# Patient Record
Sex: MALE | Race: WHITE | NOT HISPANIC OR LATINO | Employment: OTHER | URBAN - METROPOLITAN AREA
[De-identification: names, ages, dates, MRNs, and addresses within clinical notes are randomized per-mention and may not be internally consistent; named-entity substitution may affect disease eponyms.]

---

## 2017-02-03 DIAGNOSIS — K21.9 GASTROESOPHAGEAL REFLUX DISEASE WITHOUT ESOPHAGITIS: ICD-10-CM

## 2017-02-03 DIAGNOSIS — E78.5 HYPERLIPIDEMIA LDL GOAL <70: Primary | ICD-10-CM

## 2017-02-03 RX ORDER — ATORVASTATIN CALCIUM 40 MG/1
TABLET, FILM COATED ORAL
Qty: 135 TABLET | Refills: 2 | Status: SHIPPED | OUTPATIENT
Start: 2017-02-03 | End: 2017-05-15

## 2017-02-03 RX ORDER — FAMOTIDINE 20 MG/1
TABLET, FILM COATED ORAL
Qty: 270 TABLET | Refills: 1 | Status: SHIPPED | OUTPATIENT
Start: 2017-02-03 | End: 2017-08-04

## 2017-02-03 NOTE — TELEPHONE ENCOUNTER
Atorvastatin 60 daily (1.5 tabs)     Last Written Prescription Date: 9/6/16  Last Fill Quantity: 90, # refills: 3  [place] [facility] [facility] or [facility] prescribing provider: 9/6/16       CHOL      132   9/6/2016  HDL       51   9/6/2016  LDL       64   9/6/2016  TRIG       85   9/6/2016  CHOLHDLRATIO      3.2   8/19/2015    Prescription approved per [facility] Refill Protocol.  Quantity increased to reflect directions.  Ariela Dunham RN    Famotidine 20 mg      Last Written Prescription Date: 9/6/16  Last Fill Quantity: 270,  # refills: 1   Last [place] [facility] [place] [facility] or [facility] prescribing provider: 9/6/16  Prescription approved per [facility] [place]ill Protocol.  Ariela Dunham RN    Patient is due for diabetes follow up appointment in March. Please assist with scheduling. Thanks.  Ariela Dunham RN

## 2017-02-06 NOTE — TELEPHONE ENCOUNTER
TC called patient and informed  Declined to schedule right now[place]  Will call back  Kalyn PALACIOS

## 2017-04-11 DIAGNOSIS — I10 ESSENTIAL HYPERTENSION, BENIGN: ICD-10-CM

## 2017-04-11 DIAGNOSIS — I10 ESSENTIAL HYPERTENSION WITH GOAL BLOOD PRESSURE LESS THAN 140/90: ICD-10-CM

## 2017-04-12 RX ORDER — LISINOPRIL 20 MG/1
TABLET ORAL
Qty: 180 TABLET | Refills: 1 | Status: SHIPPED | OUTPATIENT
Start: 2017-04-12 | End: 2017-05-15

## 2017-04-12 NOTE — TELEPHONE ENCOUNTER
Prescription approved per [facility] [place].  Sarah Begum RN   [facility] - Triage

## 2017-04-13 ENCOUNTER — TELEPHONE (OUTPATIENT)
Dept: UROLOGY | Facility: CLINIC | Age: 82
End: 2017-04-13

## 2017-04-13 NOTE — TELEPHONE ENCOUNTER
Dr Mejia,          Pt calling with gross hematuria and is demanding to be seen today. He had a bowel movement and passed a blood clot through his penis. Pt is post radiation for CaP. I have explained to him that you are not available. He states he will come to the office and wait to be seen by anyone. Again, explained that we only have Dr Guzman in the office today and he has zero openings. I asked is he was taking blood thinners, he stated no. I asked about ASA 81mg which he IS taking. I have recommended that he discontinue that as it is most likely contributing to the issue. I explained that we could make an appointment for him, but the chance of him being seen today is not good, especially if he is trying to catch a flight out to [place] tonight. I recommended that he go to the emergency room. He did not like this response. He wants to be seen in the clinic, today. I honestly do not see how we can accommodate him with this request. He is requesting that you call him at [phone].    VENTURA Mckeon

## 2017-05-15 ENCOUNTER — OFFICE VISIT (OUTPATIENT)
Dept: FAMILY MEDICINE | Facility: CLINIC | Age: 82
End: 2017-05-15
Payer: COMMERCIAL

## 2017-05-15 VITALS
HEART RATE: 65 BPM | WEIGHT: 199 LBS | OXYGEN SATURATION: 96 % | HEIGHT: 68 IN | SYSTOLIC BLOOD PRESSURE: 123 MMHG | BODY MASS INDEX: 30.16 KG/M2 | DIASTOLIC BLOOD PRESSURE: 73 MMHG | TEMPERATURE: 99.2 F

## 2017-05-15 DIAGNOSIS — E78.5 HYPERLIPIDEMIA LDL GOAL <70: ICD-10-CM

## 2017-05-15 DIAGNOSIS — I10 ESSENTIAL HYPERTENSION, BENIGN: ICD-10-CM

## 2017-05-15 DIAGNOSIS — E11.21 TYPE 2 DIABETES MELLITUS WITH DIABETIC NEPHROPATHY, WITHOUT LONG-TERM CURRENT USE OF INSULIN (H): ICD-10-CM

## 2017-05-15 DIAGNOSIS — M25.50 PAIN IN JOINT, MULTIPLE SITES: ICD-10-CM

## 2017-05-15 DIAGNOSIS — Z00.00 ROUTINE GENERAL MEDICAL EXAMINATION AT A HEALTH CARE FACILITY: Primary | ICD-10-CM

## 2017-05-15 DIAGNOSIS — J30.89 SEASONAL ALLERGIC RHINITIS DUE TO OTHER ALLERGIC TRIGGER: ICD-10-CM

## 2017-05-15 LAB
CRP SERPL-MCNC: 54 MG/L (ref 0–8)
ERYTHROCYTE [DISTWIDTH] IN BLOOD BY AUTOMATED COUNT: 13.9 % (ref 10–15)
HBA1C MFR BLD: 6.4 % (ref 4.3–6)
HCT VFR BLD AUTO: 44.1 % (ref 40–53)
HGB BLD-MCNC: 14.7 G/DL (ref 13.3–17.7)
MCH RBC QN AUTO: 29.6 PG (ref 26.5–33)
MCHC RBC AUTO-ENTMCNC: 33.3 G/DL (ref 31.5–36.5)
MCV RBC AUTO: 89 FL (ref 78–100)
PLATELET # BLD AUTO: 220 10E9/L (ref 150–450)
RBC # BLD AUTO: 4.97 10E12/L (ref 4.4–5.9)
WBC # BLD AUTO: 8.4 10E9/L (ref 4–11)

## 2017-05-15 PROCEDURE — 99212 OFFICE O/P EST SF 10 MIN: CPT | Mod: 25 | Performed by: FAMILY MEDICINE

## 2017-05-15 PROCEDURE — 86140 C-REACTIVE PROTEIN: CPT | Performed by: FAMILY MEDICINE

## 2017-05-15 PROCEDURE — 82043 UR ALBUMIN QUANTITATIVE: CPT | Performed by: FAMILY MEDICINE

## 2017-05-15 PROCEDURE — 99397 PER PM REEVAL EST PAT 65+ YR: CPT | Performed by: FAMILY MEDICINE

## 2017-05-15 PROCEDURE — 83036 HEMOGLOBIN GLYCOSYLATED A1C: CPT | Performed by: FAMILY MEDICINE

## 2017-05-15 PROCEDURE — 86038 ANTINUCLEAR ANTIBODIES: CPT | Performed by: FAMILY MEDICINE

## 2017-05-15 PROCEDURE — 84550 ASSAY OF BLOOD/URIC ACID: CPT | Performed by: FAMILY MEDICINE

## 2017-05-15 PROCEDURE — 36415 COLL VENOUS BLD VENIPUNCTURE: CPT | Performed by: FAMILY MEDICINE

## 2017-05-15 PROCEDURE — 85027 COMPLETE CBC AUTOMATED: CPT | Performed by: FAMILY MEDICINE

## 2017-05-15 PROCEDURE — 86431 RHEUMATOID FACTOR QUANT: CPT | Performed by: FAMILY MEDICINE

## 2017-05-15 RX ORDER — LISINOPRIL 20 MG/1
20 TABLET ORAL 2 TIMES DAILY
Qty: 180 TABLET | Refills: 1 | Status: SHIPPED | OUTPATIENT
Start: 2017-05-15 | End: 2017-05-15

## 2017-05-15 RX ORDER — METFORMIN HCL 500 MG
1000 TABLET, EXTENDED RELEASE 24 HR ORAL 2 TIMES DAILY WITH MEALS
Qty: 360 TABLET | Refills: 1 | Status: SHIPPED | OUTPATIENT
Start: 2017-05-15 | End: 2018-03-16

## 2017-05-15 RX ORDER — ATORVASTATIN CALCIUM 40 MG/1
40 TABLET, FILM COATED ORAL DAILY
Qty: 100 TABLET | Refills: 2 | Status: SHIPPED | OUTPATIENT
Start: 2017-05-15 | End: 2018-08-10

## 2017-05-15 RX ORDER — FLUTICASONE PROPIONATE 50 MCG
2 SPRAY, SUSPENSION (ML) NASAL DAILY
Qty: 48 G | Refills: 11 | Status: SHIPPED | OUTPATIENT
Start: 2017-05-15 | End: 2018-07-03

## 2017-05-15 RX ORDER — LISINOPRIL 20 MG/1
20 TABLET ORAL 2 TIMES DAILY
Qty: 180 TABLET | Refills: 1 | Status: SHIPPED | OUTPATIENT
Start: 2017-05-15 | End: 2018-03-21

## 2017-05-15 RX ORDER — ATORVASTATIN CALCIUM 40 MG/1
40 TABLET, FILM COATED ORAL DAILY
Qty: 100 TABLET | Refills: 2 | Status: SHIPPED | OUTPATIENT
Start: 2017-05-15 | End: 2017-05-15

## 2017-05-15 NOTE — MR AVS SNAPSHOT
After Visit Summary   5/15/2017    Jamil Bedolla Sr., MD    MRN: [id number]           Patient Information     Date Of Birth          1935        Visit Information        Provider Department      5/15/2017 10:00 AM Cassia Wolff MD [facility]        Today's Diagnoses     Routine general medical examination at a health care facility    -  1    Type 2 diabetes mellitus with diabetic nephropathy, without long-term current use of insulin (H)        Essential hypertension with goal blood pressure less than 140/90        Hyperlipidemia LDL goal <70        Pain in joint, multiple sites        Essential hypertension, benign          Care Instructions      Preventive Health Recommendations:       Male Ages 65 and over    Yearly exam:             See your health care provider every year in order to  o   Review health changes.   o   Discuss preventive care.    o   Review your medicines if your doctor has prescribed any.    Talk with your health care provider about whether you should have a test to screen for prostate cancer (PSA).    Every 3 years, have a diabetes test (fasting glucose). If you are at risk for diabetes, you should have this test more often.    Every 5 years, have a cholesterol test. Have this test more often if you are at risk for high cholesterol or heart disease.     Every 10 years, have a colonoscopy. Or, have a yearly FIT test (stool test). These exams will check for colon cancer.    Talk to with your health care provider about screening for Abdominal Aortic Aneurysm if you have a family history of AAA or have a history of smoking.  Shots:     Get a flu shot each year.     Get a tetanus shot every 10 years.     Talk to your doctor about your pneumonia vaccines. There are now two you should receive - Pneumovax (PPSV 23) and Prevnar (PCV 13).    Talk to your doctor about a shingles vaccine.     Talk to your doctor about the hepatitis B vaccine.  Nutrition:     Eat  at least 5 servings of fruits and vegetables each day.     Eat whole-grain bread, whole-wheat pasta and brown rice instead of white grains and rice.     Talk to your doctor about Calcium and Vitamin D.   Lifestyle    Exercise for at least 150 minutes a week (30 minutes a day, 5 days a week). This will help you control your weight and prevent disease.     Limit alcohol to one drink per day.     No smoking.     Wear sunscreen to prevent skin cancer.     See your dentist every six months for an exam and cleaning.     See your eye doctor every 1 to 2 years to screen for conditions such as glaucoma, macular degeneration and cataracts.        Follow-ups after your visit        Your next 10 appointments already scheduled     Jun 21, 2017 10:10 AM CDT   Cystoscopy with Wes Mejia MD,  [facility]    [address]  [address]  [facility] [address] [phone]              Who to contact     If you have questions or need follow up information about today's clinic visit or your schedule please contact [facility] [place] directly at [phone].  Normal or non-critical lab and imaging results will be communicated to you by Aplicahart, letter or phone within 4 business days after the clinic has received the results. If you do not hear from us within 7 days, please contact the clinic through Aplicahart or phone. If you have a critical or abnormal lab result, we will notify you by phone as soon as possible.  Submit refill requests through Picturk or call your pharmacy and they will forward the refill request to us. Please allow 3 business days for your refill to be completed.          Additional Information About Your Visit        AplicaharCreateTrips Information     Picturk gives you secure access to your electronic health record. If you see a primary care provider, you can also send messages to your care team and make appointments. If you have  "questions, please call your primary care clinic.  If you do not have a primary care provider, please call [phone] and they will assist you.        Care EveryWhere ID     This is your Care EveryWhere ID. This could be used by other organizations to access your [facility] medical records  [id number]        Your Vitals Were     Pulse Temperature Height Pulse Oximetry BMI (Body Mass Index)       65 99.2  F (37.3  C) (Tympanic) 5' 8\" (1.727 m) 96% 30.26 kg/m2        Blood Pressure from Last 3 Encounters:   05/15/17 123/73   12/16/16 142/80   09/27/16 152/77    Weight from Last 3 Encounters:   05/15/17 199 lb (90.3 kg)   09/27/16 194 lb (88 kg)   09/12/16 203 lb (92.1 kg)              We Performed the Following     Antinuclear antibody screen by EIA     CBC with platelets     CRP, inflammation     Hemoglobin A1c     Rheumatoid factor     Uric acid          Today's Medication Changes          These changes are accurate as of: 5/15/17 11:16 AM.  If you have any questions, ask your nurse or doctor.               Start taking these medicines.        Dose/Directions    atorvastatin 40 MG tablet   Commonly known as:  LIPITOR   Used for:  Hyperlipidemia LDL goal <70   Started by:  Cassia Wolff MD        Dose:  40 mg   Take 1 tablet (40 mg) by mouth daily   Quantity:  100 tablet   Refills:  2       lisinopril 20 MG tablet   Commonly known as:  PRINIVIL/ZESTRIL   Used for:  Essential hypertension, benign, Essential hypertension with goal blood pressure less than 140/90   Started by:  Cassia Wolff MD        Dose:  20 mg   Take 1 tablet (20 mg) by mouth 2 times daily   Quantity:  180 tablet   Refills:  1            Where to get your medicines      Some of these will need a paper prescription and others can be bought over the counter.  Ask your nurse if you have questions.     Bring a paper prescription for each of these medications     atorvastatin 40 MG tablet    lisinopril 20 MG tablet                Primary " Care Provider Office Phone # Fax #    Cassia Wolff [phone]       [place] [address] [place] [address] [address]  [place] [address]        Thank you!     [facility]  [facility]. Our goal is always to provide you with excellent care. Hearing back from our patients is one way we can continue to improve our services. Please take a few minutes to complete the written survey that you may receive in the mail after your visit with us. Thank you!             Your Updated Medication List - Protect others around you: Learn how to safely use, store and throw away your medicines at www.disposemymeds.org.          This list is accurate as of: 5/15/17 11:16 AM.  Always use your most recent med list.                   Brand Name Dispense Instructions for use    * Alcohol Swabs Pads     300 each    Use one swab 2-3 times per day       * B-D SINGLE USE SWABS REGULAR Pads     270 each    1 pad 3 times daily       atorvastatin 40 MG tablet    LIPITOR    100 tablet    Take 1 tablet (40 mg) by mouth daily       * blood glucose calibration solution     3 Bottle    Use to calibrate blood glucose monitor as needed as directed.       * blood glucose calibration solution     1 Bottle    by In Vitro route 3 times daily as needed Use to calibrate blood glucose monitor as needed as directed.       blood glucose monitoring lancets     3 Box    TESTING 3-4 TIMES PER DAY       * blood glucose monitoring test strip    TRUETEST    3 Box    Testing 3 times per day. One Touch Ultra blue       * blood glucose monitoring test strip    no brand specified    100 strip    OneTouch Ultra strips; use bid.       * blood glucose monitoring test strip    ONE TOUCH ULTRA    1 Box    Use to test blood sugar 3-4 times daily or as directed.       * ONE TOUCH ULTRA test strip   Generic drug:  blood glucose monitoring     300 each    USE TO TEST BLOOD SUGAR 3-4 TIMES DAILY OR AS DIRECTED        famotidine 20 MG tablet    PEPCID    270 tablet    TAKE 1 TABLET 2 TO 3 TIMES PER DAY       fluticasone 50 MCG/ACT spray    FLONASE    48 g    USE 1 TO 2 SPRAYS IN EACH NOSTRIL EVERY DAY       glucosamine-chondroitin 500-400 MG Caps per capsule      One TID       LAMISIL PO          latanoprost 0.005 % ophthalmic solution    XALATAN     1 drop At Bedtime.       lisinopril 20 MG tablet    PRINIVIL/ZESTRIL    180 tablet    Take 1 tablet (20 mg) by mouth 2 times daily       MELATONIN PO      Take 5 mg by mouth At Bedtime       metFORMIN 500 MG 24 hr tablet    GLUCOPHAGE-XR    360 tablet    TAKE 2 TABLETS TWICE DAILY WITH MEALS       Multi-vitamin Tabs tablet   Generic drug:  multivitamin, therapeutic with minerals      1 TABLET DAILY       * ONE TOUCH ULTRA (DEVICES) Misc     1mo    soft lancets-testing 2-3 times per day       * ONE TOUCH ULTRA (DEVICES) Misc     1 bottle    Control Solution-use as directed       * blood glucose monitoring meter device kit     1 kit    Use to test blood sugars 1- times daily or as directed.       order for DME     1 each    Equipment being ordered: Postivac       vardenafil 20 MG tablet    LEVITRA    30 tablet    Take 0.5-1 tablets (10-20 mg) by mouth daily as needed for erectile dysfunction Never use with nitroglycerin, terazosin or doxazosin.       * Notice:  This list has 11 medication(s) that are the same as other medications prescribed for you. Read the directions carefully, and ask your doctor or other care provider to review them with you.

## 2017-05-15 NOTE — NURSING NOTE
"Chief Complaint   Patient presents with     Physical       Initial /73  Pulse 65  Temp 99.2  F (37.3  C) (Tympanic)  Ht 5' 8\" (1.727 m)  Wt 199 lb (90.3 kg)  SpO2 96%  BMI 30.26 kg/m2 Estimated body mass index is 30.26 kg/(m^2) as calculated from the following:    Height as of this encounter: 5' 8\" (1.727 m).    Weight as of this encounter: 199 lb (90.3 kg).  Medication Reconciliation: complete  "

## 2017-05-15 NOTE — PROGRESS NOTES
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  click delete button to remove this line now  click delete button to remove this line now  Are you in the first 12 months of your Medicare Part B coverage?  No    Healthy Habits:    Do you get at least three servings of calcium containing foods daily (dairy, green leafy vegetables, etc.)? no, taking calcium and/or vitamin D supplement: no    Amount of exercise or daily activities, outside of work: 7 day(s) per week    Problems taking medications regularly No    Medication side effects: No    Have you had an eye exam in the past two years? yes    Do you see a dentist twice per year? yes    Do you have sleep apnea, excessive snoring or daytime drowsiness?no abnormal sleeping pattern     COGNITIVE SCREEN  1) Repeat 3 items (Banana, Sunrise, Chair)    2) Clock draw: NORMAL  3) 3 item recall: Recalls 3 objects  Results: 3 items recalled: COGNITIVE IMPAIRMENT LESS LIKELY    Mini-CogTM Copyright S Aishwarya. Licensed by the author for use in [employer]; reprinted with permission (caron@[facility]). All rights reserved.            Other concerns   Joint Pain     Onset: had on and off wrist and thumb joint pain in last month     Description:   Location: thumb and wrist  mostly both hands , looked swollen and red at some point so worried about gout , no FAM hx of though   Character: Dull ache    Intensity: mild    Progression of Symptoms: better    Accompanying Signs & Symptoms:  Other symptoms: none  Has hx of back problem and back had flared up couple of times but doing quiet well since PT and no recurrence at this time    History:   Previous similar pain: no       Precipitating factors:   Trauma or overuse: no     Alleviating factors:  Improved by: NSAID - helped        Therapies Tried and outcome: aleve       Diabetes  concerns       Patient is checking blood sugars:  rarely.  Results range from 118  to 130 and sometimes higher so just worried and wants to have labs . His micro album was also high previously, although it improved since on lisinopril, but  wants to  Have it checked again     Diabetic concerns: None and other - as above, but he is trying to eat better      Symptoms of hypoglycemia (low blood sugar): none     Paresthesias (numbness or burning in feet) or sores: No     Date of last diabetic eye exam: last week      Hyperlipidemia Follow-Up      Rate your low fat/cholesterol diet?: good    Taking statin?  Yes, no muscle aches from statin. He has lowered his dose back to 40 mg instead of 60, as per cardiology instructions. Doing well o current med's    Other lipid medications/supplements?:  none     Hypertension Follow-up      Outpatient blood pressures are not being checked at home.  Results are good mostly though     Low Salt Diet: no added salt. He needs refill on all med's          Reviewed and updated as needed this visit by clinical staff  Allergies  Meds         Reviewed and updated as needed this visit by Provider        Social History   Substance Use Topics     Smoking status: Never Smoker     Smokeless tobacco: Never Used     Alcohol use Yes      Comment: 10-14 drinks per week       The patient does not drink >3 drinks per day nor >7 drinks per week.    Today's PHQ-2 Score:   PHQ-2 ( 1999 Pfizer) 5/15/2017 9/6/2016   Q1: Little interest or pleasure in doing things 0 0   Q2: Feeling down, depressed or hopeless 0 0   PHQ-2 Score 0 0       Do you feel safe in your environment - Yes    Do you have a Health Care Directive?: No: Advance care planning was reviewed with patient; patient declined at this time.    Current providers sharing in care for this patient include:   Patient Care Team:  Cassia Wolff MD as PCP - General (Family Practice)      Hearing impairment: No    Ability to successfully perform activities of daily living: Yes, no assistance  needed     Fall risk:  Fallen 2 or more times in the past year?: No  Any fall with injury in the past year?: No    Home safety:  none identified  click delete button to remove this line now    The following health maintenance items are reviewed in Epic and correct as of today:  Health Maintenance   Topic Date Due     A1C Q6 MO( NO INBASKET)  03/06/2017     DEXA Q2 YR  08/19/2017     INFLUENZA VACCINE (SYSTEM ASSIGNED)  09/01/2017     FOOT EXAM Q1 YEAR( NO INBASKET)  09/06/2017     CMP Q1 YR (NO INBASKET)  09/06/2017     FALL RISK ASSESSMENT  09/06/2017     LIPID MONITORING Q1 YEAR( NO INBASKET)  09/06/2017     MICROALBUMIN Q1 YEAR( NO INBASKET)  09/06/2017     EYE EXAM Q1 YEAR( NO INBASKET)  10/19/2017     TSH W/ FREE T4 REFLEX Q2 YEAR (NO INBASKET)  09/06/2018     TSH Q2 YEAR (NO INBASKET)  09/06/2018     ADVANCE DIRECTIVE PLANNING Q5 YRS (NO INBASKET)  09/06/2021     TETANUS IMMUNIZATION (SYSTEM ASSIGNED)  06/27/2025     PNEUMOCOCCAL  Completed         Pneumonia Vaccine:Adults age 65+ who received their first dose of Pneumovax (PPSV23) prior to age 65 years: Should be given PCV 13 > 1 year after their most recent PPSV23 AND should be given a another dose of PPSV23 > 5 years after their most recent dose of PPSV23     ROS:  C: NEGATIVE for fever, chills, change in weight  I: NEGATIVE for worrisome rashes, moles or lesions  E: NEGATIVE for vision changes or irritation  E/M: NEGATIVE for ear, mouth and throat problems  ENT/MOUTH: allergy related sx on and off, also recovering for cold recently   R: NEGATIVE for significant cough or SOB  B: NEGATIVE for masses, tenderness or discharge  CV: NEGATIVE for chest pain, palpitations or peripheral edema  GI: NEGATIVE for nausea, abdominal pain, heartburn, or change in bowel habits  : NEGATIVE for frequency, dysuria, or hematuria  M: NEGATIVE for significant arthralgias or myalgia  MUSCULOSKELETAL:NEGATIVE for significant arthralgias or myalgia except back issue   N:  "NEGATIVE for weakness, dizziness or paresthesias  ENDOCRINE: as per HPI   H: NEGATIVE for bleeding problems  P: NEGATIVE for changes in mood or affect    Problem list, Medication list, Allergies, and Medical/Social/Surgical histories reviewed in [facility] and updated as appropriate.  OBJECTIVE:                                                            There were no vitals taken for this visit. Estimated body mass index is 29.5 kg/(m^2) as calculated from the following:    Height as of 9/27/16: 5' 8\" (1.727 m).    Weight as of 9/27/16: 194 lb (88 kg).  EXAM:   GENERAL: healthy, alert and no distress  EYES: Eyes grossly normal to inspection, PERRL and conjunctivae and sclerae normal  HENT: ear canals and TM's normal, nose and mouth without ulcers or lesions  NECK: no adenopathy, no asymmetry, masses, or scars and thyroid normal to palpation  RESP: lungs clear to auscultation - no rales, rhonchi or wheezes  CV: regular rate and rhythm, normal S1 S2, no S3 or S4,  no peripheral edema and peripheral pulses strong  ABDOMEN: soft, nontender, no hepatosplenomegaly, no masses and bowel sounds normal   ( male): pt declined, seeing urology   RECTAL (pt declined   MS: no gross musculoskeletal defects noted except has minimal tenderness on both wrist also left hand thumb, MCP joint but no redness or swelling at this time. , ROM  of joints is good, and aggravate minimal discomfort, no leg edema  SKIN: no suspicious lesions or rashes  NEURO: Normal strength and tone, mentation intact and speech normal  PSYCH: mentation appears normal, affect normal/bright  Foot exam : No lesion except small area  on onychomycosis on few finger nail , normal sensation by monofilament. Normal peripheral pulses        ASSESSMENT / PLAN:                                                            (Z00.00) Routine general medical examination at a health care facility  (primary encounter diagnosis)  Comment:   Plan:     (E11.21) Type 2 diabetes mellitus " "with diabetic nephropathy, without long-term current use of insulin (H)  Comment:   Plan: Hemoglobin A1c, metFORMIN (GLUCOPHAGE-XR) 500         MG 24 hr tablet, OFFICE/OUTPT VISIT,EST,LEVL IV        Albumin Random Urine Quantitative   Discussed cares, diet/ exercise . Talked about DM management , health risk etc. gave refill on med's. Check lab, call pt with results.. Follow up as needed      (E78.5) Hyperlipidemia LDL goal <70  Comment: stable   Plan: atorvastatin (LIPITOR) 40 MG tablet,         OFFICE/OUTPT VISIT,EST,LEVL IV, DISCONTINUED:         atorvastatin (LIPITOR) 40 MG tablet,         DISCONTINUED: atorvastatin (LIPITOR) 40 MG         tablet            (M25.50) Pain in joint, multiple sites  Comment: left wrist, thumbs  Plan: Uric acid, Rheumatoid factor, Antinuclear         antibody screen by EIA, CBC with platelets,         CRP, inflammation, OFFICE/OUTPT VISIT,EST,LEVL         IV        Discussed possible differential diagnosis for his  Symptoms. He is mostly worried about gout or some other inflammatory arthritis , so wish to proceed with labs. Cares and symptomatic treatment discussed follow up if problem       (I10) Essential hypertension, benign  Comment:   Plan: lisinopril (PRINIVIL/ZESTRIL) 20 MG tablet,                  (J30.89) Seasonal allergic rhinitis due to other allergic trigger  Comment: stable   Plan: fluticasone (FLONASE) 50 MCG/ACT spray         End of Life Planning:  Patient currently has an advanced directive: Yes.  Practitioner is supportive of decision.    COUNSELING:  Reviewed preventive health counseling, as reflected in patient instructions       Regular exercise       Healthy diet/nutrition       Vision screening        Estimated body mass index is 29.5 kg/(m^2) as calculated from the following:    Height as of 9/27/16: 5' 8\" (1.727 m).    Weight as of 9/27/16: 194 lb (88 kg).     reports that he has never smoked. He has never used smokeless tobacco.        HCM issues discussed. " Diet/ exercise discussed. Check labs , call patiens with results. follow up as needed.       Appropriate preventive services were discussed with this patient, including applicable screening as appropriate for cardiovascular disease, diabetes, osteopenia/osteoporosis, and glaucoma.  As appropriate for age/gender, discussed screening for colorectal cancer, prostate cancer, breast cancer, and cervical cancer. Checklist reviewing preventive services available has been given to the patient.    Reviewed patients plan of care and provided an AVS. The Basic Care Plan (routine screening as documented in Health Maintenance) for Jamil meets the Care Plan requirement. This Care Plan has been established and reviewed with the Patient.    Counseling Resources:  ATP IV Guidelines  Pooled Cohorts Equation Calculator  Breast Cancer Risk Calculator  FRAX Risk Assessment  ICSI Preventive Guidelines  Dietary Guidelines for Americans, 2010  USDA's MyPlate  ASA Prophylaxis  Lung CA Screening    Cassia Wolff MD  [facility]

## 2017-05-16 LAB
ANA SER QL IA: NORMAL
CREAT UR-MCNC: 169 MG/DL
MICROALBUMIN UR-MCNC: 44 MG/L
MICROALBUMIN/CREAT UR: 26.04 MG/G CR (ref 0–17)
RHEUMATOID FACT SER NEPH-ACNC: <20 IU/ML (ref 0–20)
URATE SERPL-MCNC: 6.4 MG/DL (ref 3.5–7.2)

## 2017-06-20 DIAGNOSIS — C61 PROSTATE CANCER (H): Primary | ICD-10-CM

## 2017-06-21 ENCOUNTER — OFFICE VISIT (OUTPATIENT)
Dept: UROLOGY | Facility: CLINIC | Age: 82
End: 2017-06-21
Payer: COMMERCIAL

## 2017-06-21 VITALS
BODY MASS INDEX: 29.52 KG/M2 | WEIGHT: 194.8 LBS | HEIGHT: 68 IN | SYSTOLIC BLOOD PRESSURE: 128 MMHG | DIASTOLIC BLOOD PRESSURE: 70 MMHG | HEART RATE: 62 BPM

## 2017-06-21 DIAGNOSIS — C61 PROSTATE CANCER (H): ICD-10-CM

## 2017-06-21 LAB — PSA SERPL-MCNC: 0.09 NG/ML (ref 0–4)

## 2017-06-21 PROCEDURE — 84153 ASSAY OF PSA TOTAL: CPT | Performed by: UROLOGY

## 2017-06-21 PROCEDURE — 99212 OFFICE O/P EST SF 10 MIN: CPT | Performed by: UROLOGY

## 2017-06-21 PROCEDURE — 36415 COLL VENOUS BLD VENIPUNCTURE: CPT | Performed by: UROLOGY

## 2017-06-21 NOTE — MR AVS SNAPSHOT
After Visit Summary   6/21/2017    Jamil Bedolla Sr., MD    MRN: [id number]           Patient Information     Date Of Birth          1935        Visit Information        Provider Department      6/21/2017 10:00 AM Wes Mejia MD; [facility] [place]        Today's Diagnoses     Prostate cancer-[place] 4, treated with radiation           Follow-ups after your visit        Your next 10 appointments already scheduled     Sep 21, 2017 10:15 AM CDT   Return Visit with Mikal Chung MD   [facility]YSICIANS [facility] AT [place] ([facility])    [address]  [facility] [address] [phone]            Dec 06, 2017 10:20 AM CST   (Arrive by 10:00 AM)   Return Visit with Wes Mejia MD   [facility] ([facility] [place])    [address]  [address]  [facility] [place] [id number] [phone]              Who to contact     If you have questions or need follow up information about today's clinic visit or your schedule please contact [facility] [place] directly at [phone].  Normal or non-critical lab and imaging results will be communicated to you by MyChart, letter or phone within 4 business days after the clinic has received the results. If you do not hear from us within 7 days, please contact the clinic through MyChart or phone. If you have a critical or abnormal lab result, we will notify you by phone as soon as possible.  Submit refill requests through [employer] or call your pharmacy and they will forward the refill request to us. Please allow 3 business days for your refill to be completed.          Additional Information About Your Visit        MyChart Information     [employer] gives you secure access to your electronic health record. If you see a primary care provider, you can also send messages to your care team and make  "appointments. If you have questions, please call your primary care clinic.  If you do not have a primary care provider, please call [phone] and they will assist you.        Care EveryWhere ID     This is your Care EveryWhere ID. This could be used by other organizations to access your [place] medical records  [id number]        Your Vitals Were     Pulse Height BMI (Body Mass Index)             62 1.727 m (5' 8\") 29.62 kg/m2          Blood Pressure from Last 3 Encounters:   06/21/17 128/70   05/15/17 123/73   12/16/16 142/80    Weight from Last 3 Encounters:   06/21/17 88.4 kg (194 lb 12.8 oz)   05/15/17 90.3 kg (199 lb)   09/27/16 88 kg (194 lb)              We Performed the Following     PSA Diag Urologic Phys [[id number]]        Primary Care Provider Office Phone # Fax #    Cassia Baltazar Wolff [phone]       [place] LETY DELGADO [address] [place] [address] [address]  [place] [address]        Equal Access to Services     [facility] : Hadii aad ku hadasho Sodane, waaxda luqadaha, qaybta kaalmada brea, gian mclean . So [facility] [phone].    ATENCIÓN: Si habla español, tiene a wray disposición servicios gratuitos de asistencia lingüística. [facility] [phone].    We comply with applicable federal civil rights laws and [place] laws. We do not discriminate on the basis of race, color, national origin, age, disability sex, sexual orientation or gender identity.            Thank you!     Thank you for choosing [facility] [place]  for your care. Our goal is always to provide you with excellent care. Hearing back from our patients is one way we can continue to improve our services. Please take a few minutes to complete the written survey that you may receive in the mail after your visit with us. Thank you!             Your Updated Medication List - Protect others around you: Learn how to safely use, store and throw away your medicines at " www.disposemymeds.org.          This list is accurate as of: 6/21/17 11:05 AM.  Always use your most recent med list.                   Brand Name Dispense Instructions for use Diagnosis    * Alcohol Swabs Pads     300 each    Use one swab 2-3 times per day    Type 2 diabetes, HbA1C goal < 8% (H)       * B-D SINGLE USE SWABS REGULAR Pads     270 each    1 pad 3 times daily    Type 2 diabetes, HbA1C goal < 8% (H), Type II or unspecified type diabetes mellitus without mention of complication, not stated as uncontrolled       atorvastatin 40 MG tablet    LIPITOR    100 tablet    Take 1 tablet (40 mg) by mouth daily    Hyperlipidemia LDL goal <70       * blood glucose calibration solution     3 Bottle    Use to calibrate blood glucose monitor as needed as directed.    Type 2 diabetes, HbA1C goal < 8% (H)       * blood glucose calibration solution     1 Bottle    by In Vitro route 3 times daily as needed Use to calibrate blood glucose monitor as needed as directed.    Type 2 diabetes mellitus with diabetic nephropathy (H)       blood glucose monitoring lancets     3 Box    TESTING 3-4 TIMES PER DAY    Type 2 diabetes, HbA1C goal < 8% (H)       * blood glucose monitoring test strip    TRUETEST    3 Box    Testing 3 times per day. One Touch Ultra blue    Type 2 diabetes, HbA1C goal < 8% (H), Type II or unspecified type diabetes mellitus without mention of complication, not stated as uncontrolled       * blood glucose monitoring test strip    no brand specified    100 strip    OneTouch Ultra strips; use bid.    Type 2 diabetes, HbA1C goal < 8% (H)       * blood glucose monitoring test strip    ONE TOUCH ULTRA    1 Box    Use to test blood sugar 3-4 times daily or as directed.    Type 2 diabetes, HbA1C goal < 8% (H), Type II or unspecified type diabetes mellitus without mention of complication, not stated as uncontrolled       * ONE TOUCH ULTRA test strip   Generic drug:  blood glucose monitoring     300 each    USE TO TEST  BLOOD SUGAR 3-4 TIMES DAILY OR AS DIRECTED    Type 2 diabetes, HbA1C goal < 8% (H)       famotidine 20 MG tablet    PEPCID    270 tablet    TAKE 1 TABLET 2 TO 3 TIMES PER DAY    Gastroesophageal reflux disease without esophagitis       fluticasone 50 MCG/ACT spray    FLONASE    48 g    Spray 2 sprays into both nostrils daily    Seasonal allergic rhinitis due to other allergic trigger       glucosamine-chondroitin 500-400 MG Caps per capsule      One TID        LAMISIL PO           latanoprost 0.005 % ophthalmic solution    XALATAN     1 drop At Bedtime.        lisinopril 20 MG tablet    PRINIVIL/ZESTRIL    180 tablet    Take 1 tablet (20 mg) by mouth 2 times daily    Essential hypertension, benign       MELATONIN PO      Take 5 mg by mouth At Bedtime        metFORMIN 500 MG 24 hr tablet    GLUCOPHAGE-XR    360 tablet    Take 2 tablets (1,000 mg) by mouth 2 times daily (with meals)    Type 2 diabetes mellitus with diabetic nephropathy, without long-term current use of insulin (H)       Multi-vitamin Tabs tablet   Generic drug:  multivitamin, therapeutic with minerals      1 TABLET DAILY        * ONE TOUCH ULTRA (DEVICES) Misc     1mo    soft lancets-testing 2-3 times per day    Type II or unspecified type diabetes mellitus without mention of complication, not stated as uncontrolled       * ONE TOUCH ULTRA (DEVICES) Misc     1 bottle    Control Solution-use as directed    Type II or unspecified type diabetes mellitus without mention of complication, not stated as uncontrolled       * blood glucose monitoring meter device kit     1 kit    Use to test blood sugars 1- times daily or as directed.    Type 2 diabetes mellitus with diabetic nephropathy, without long-term current use of insulin (H)       order for DME     1 each    Equipment being ordered: Postivac    Erectile dysfunction       vardenafil 20 MG tablet    LEVITRA    30 tablet    Take 0.5-1 tablets (10-20 mg) by mouth daily as needed for erectile dysfunction  Never use with nitroglycerin, terazosin or doxazosin.    Other male erectile dysfunction       * Notice:  This list has 11 medication(s) that are the same as other medications prescribed for you. Read the directions carefully, and ask your doctor or other care provider to review them with you.

## 2017-06-21 NOTE — LETTER
6/21/2017       RE: Jamil Bedolla Sr., MD  [address] [place] [address]     Dear Colleague,    Thank you for referring your patient, Jamil Bedolla Sr., MD, to the [facility] [place] at [facility]. Please see a copy of my visit note below.    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  PSA is stable at 0.09.  Past medical history: Type 2 diabetes, high cholesterol, hypertension, ED, colonic polyps, plantar fasciitis, diverticulosis, coronary artery disease, PFO, carotid artery disease, glaucoma, nonsmoker  Family history: No prostate cancer. Diabetes, coronary artery disease, asthma  Review of systems: No dysuria or hematuria. He voids on a timed basis since he likes bladder sensation. Feels he empties his bladder almost all the time, can have volumes as high as 700 cc-discussed  Medications: Pepcid, Flonase, glucosamine/chondroitin, Xalatan drops, lisinopril, melatonin, metformin, multivitamin, Lamisil, Levitra  Allergies: IV contrast  Urinalysis: Clear  Exam: Normal appearance, normal vital signs alert and oriented, normocephalic, normal respirations  Assessment: PSA remains low  Plan: See in 6 months with PSA    Again, thank you for allowing me to participate in the care of your patient.      Sincerely,    Wes Mejia MD

## 2017-06-21 NOTE — PROGRESS NOTES
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  PSA is stable at 0.09.  Past medical history: Type 2 diabetes, high cholesterol, hypertension, ED, colonic polyps, plantar fasciitis, diverticulosis, coronary artery disease, PFO, carotid artery disease, glaucoma, nonsmoker  Family history: No prostate cancer. Diabetes, coronary artery disease, asthma  Review of systems: No dysuria or hematuria. He voids on a timed basis since he likes bladder sensation. Feels he empties his bladder almost all the time, can have volumes as high as 700 cc-discussed  Medications: Pepcid, Flonase, glucosamine/chondroitin, Xalatan drops, lisinopril, melatonin, metformin, multivitamin, Lamisil, Levitra  Allergies: IV contrast  Urinalysis: Clear  Exam: Normal appearance, normal vital signs alert and oriented, normocephalic, normal respirations  Assessment: PSA remains low  Plan: See in 6 months with PSA

## 2017-08-04 DIAGNOSIS — K21.9 GASTROESOPHAGEAL REFLUX DISEASE WITHOUT ESOPHAGITIS: ICD-10-CM

## 2017-08-04 NOTE — TELEPHONE ENCOUNTER
Pepcid      Last Written Prescription Date: 2/3/17  Last Fill Quantity: 270,  # refills: 1   [place] [facility] or [facility] prescribing provider: 5/15/17                                         Next 5 appointments (look out 90 days)     Sep 21, 2017 10:15 AM CDT   Return Visit with Mikal Chung MD   [facility] [place] ([facility])    [address] [place] [address] [phone]                    Misti Abdi [facility]

## 2017-08-07 RX ORDER — FAMOTIDINE 20 MG/1
TABLET, FILM COATED ORAL
Qty: 270 TABLET | Refills: 1 | Status: SHIPPED | OUTPATIENT
Start: 2017-08-07 | End: 2017-12-27

## 2017-08-07 NOTE — TELEPHONE ENCOUNTER
Refill approved through [facility] protocol.  Miriam Melvin RN  [facility]  [phone]

## 2017-08-09 DIAGNOSIS — Z71.89 ADVANCED DIRECTIVES, COUNSELING/DISCUSSION: ICD-10-CM

## 2017-08-09 NOTE — TELEPHONE ENCOUNTER
blood glucose monitoring (ONE TOUCH ULTRA) test strip         Last Written Prescription Date: 7/6/17  Last Fill Quantity: 1 box, # refills: 3  [place] [facility] or [facility] prescribing provider:  5/15/17   Next 5 appointments (look out 90 days)     Sep 21, 2017 10:15 AM CDT   Return Visit with Mikal Chung MD   [facility] AT [employer] ([facility] Clin[facility])    [address] [place] [address] [phone]                   BP Readings from Last 3 Encounters:   06/21/17 128/70   05/15/17 123/73   12/16/16 142/80     Lab Results   Component Value Date    MICROL 44 05/15/2017     Lab Results   Component Value Date    UMALCR 26.04 05/15/2017     Creatinine   Date Value Ref Range Status   09/06/2016 0.95 0.66 - 1.25 mg/dL Final   ]  GFR Estimate   Date Value Ref Range Status   09/06/2016 76 >60 mL/min/1.7m2 Final     Comment:     Non  GFR Calc   08/19/2015 77 >60 mL/min/1.7m2 Final     Comment:     Non  GFR Calc   06/26/2015 84 >60 mL/min/1.7m2 Final     Comment:     Non  GFR Calc     GFR Estimate If Black   Date Value Ref Range Status   09/06/2016 >90   GFR Calc   >60 mL/min/1.7m2 Final   08/19/2015 >90   GFR Calc   >60 mL/min/1.7m2 Final   06/26/2015 >90   GFR Calc   >60 mL/min/1.7m2 Final     Lab Results   Component Value Date    CHOL 132 09/06/2016     Lab Results   Component Value Date    HDL 51 09/06/2016     Lab Results   Component Value Date    LDL 64 09/06/2016     Lab Results   Component Value Date    TRIG 85 09/06/2016     Lab Results   Component Value Date    CHOLHDLRATIO 3.2 08/19/2015     Lab Results   Component Value Date    AST 18 09/06/2016     Lab Results   Component Value Date    ALT 29 09/06/2016     Lab Results   Component Value Date    A1C 6.4 05/15/2017    A1C 6.4 09/06/2016    A1C 6.6 02/29/2016    A1C 6.5 06/30/2015    A1C 6.8  01/08/2015     Potassium   Date Value Ref Range Status   09/06/2016 4.3 3.4 - 5.3 mmol/L Final

## 2017-08-09 NOTE — TELEPHONE ENCOUNTER
Patient has refills remaining with pharmacy.  Georgette Stoll RN - Triage  [facility]

## 2017-09-15 DIAGNOSIS — I10 ESSENTIAL HYPERTENSION, BENIGN: Primary | ICD-10-CM

## 2017-09-15 DIAGNOSIS — I25.10 CAD (CORONARY ARTERY DISEASE): ICD-10-CM

## 2017-09-15 DIAGNOSIS — E78.5 HYPERLIPIDEMIA LDL GOAL <70: ICD-10-CM

## 2017-09-21 ENCOUNTER — OFFICE VISIT (OUTPATIENT)
Dept: CARDIOLOGY | Facility: CLINIC | Age: 82
End: 2017-09-21
Attending: INTERNAL MEDICINE
Payer: COMMERCIAL

## 2017-09-21 VITALS
WEIGHT: 191.2 LBS | SYSTOLIC BLOOD PRESSURE: 121 MMHG | BODY MASS INDEX: 28.98 KG/M2 | DIASTOLIC BLOOD PRESSURE: 70 MMHG | HEIGHT: 68 IN | HEART RATE: 64 BPM

## 2017-09-21 DIAGNOSIS — I65.29 CAROTID ARTERY PLAQUE, UNSPECIFIED LATERALITY: Primary | ICD-10-CM

## 2017-09-21 DIAGNOSIS — E78.5 HYPERLIPIDEMIA LDL GOAL <70: ICD-10-CM

## 2017-09-21 DIAGNOSIS — I10 ESSENTIAL HYPERTENSION WITH GOAL BLOOD PRESSURE LESS THAN 140/90: ICD-10-CM

## 2017-09-21 DIAGNOSIS — E78.2 MIXED HYPERLIPIDEMIA: ICD-10-CM

## 2017-09-21 DIAGNOSIS — I25.10 CORONARY ARTERY DISEASE INVOLVING NATIVE CORONARY ARTERY OF NATIVE HEART WITHOUT ANGINA PECTORIS: ICD-10-CM

## 2017-09-21 DIAGNOSIS — I10 ESSENTIAL HYPERTENSION, BENIGN: ICD-10-CM

## 2017-09-21 LAB
ALT SERPL W P-5'-P-CCNC: 18 U/L (ref 5–30)
ANION GAP SERPL CALCULATED.3IONS-SCNC: 12.1 MMOL/L (ref 6–17)
BUN SERPL-MCNC: 35 MG/DL (ref 7–30)
CALCIUM SERPL-MCNC: 9.8 MG/DL (ref 8.5–10.5)
CHLORIDE SERPL-SCNC: 103 MMOL/L (ref 98–107)
CHOLEST SERPL-MCNC: 150 MG/DL
CO2 SERPL-SCNC: 25 MMOL/L (ref 23–29)
CREAT SERPL-MCNC: 1.21 MG/DL (ref 0.7–1.3)
GFR SERPL CREATININE-BSD FRML MDRD: 58 ML/MIN/1.7M2
GLUCOSE SERPL-MCNC: 146 MG/DL (ref 70–105)
HDLC SERPL-MCNC: 45 MG/DL
LDLC SERPL CALC-MCNC: 86 MG/DL
NONHDLC SERPL-MCNC: 105 MG/DL
POTASSIUM SERPL-SCNC: 5.1 MMOL/L (ref 3.5–5.1)
SODIUM SERPL-SCNC: 135 MMOL/L (ref 136–145)
TRIGL SERPL-MCNC: 93 MG/DL

## 2017-09-21 PROCEDURE — 80061 LIPID PANEL: CPT | Performed by: INTERNAL MEDICINE

## 2017-09-21 PROCEDURE — 99214 OFFICE O/P EST MOD 30 MIN: CPT | Performed by: INTERNAL MEDICINE

## 2017-09-21 PROCEDURE — 80048 BASIC METABOLIC PNL TOTAL CA: CPT | Performed by: INTERNAL MEDICINE

## 2017-09-21 PROCEDURE — 84460 ALANINE AMINO (ALT) (SGPT): CPT | Performed by: INTERNAL MEDICINE

## 2017-09-21 PROCEDURE — 36415 COLL VENOUS BLD VENIPUNCTURE: CPT | Performed by: INTERNAL MEDICINE

## 2017-09-21 NOTE — PROGRESS NOTES
HPI and Plan:   See dictation  I recommend continuing current treatment plans in the chart.            No orders of the defined types were placed in this encounter.    No orders of the defined types were placed in this encounter.    Medications Discontinued During This Encounter   Medication Reason     Terbinafine HCl (LAMISIL PO) Therapy completed         Encounter Diagnoses   Name Primary?     Essential hypertension with goal blood pressure less than 140/90      Coronary artery disease involving native coronary artery of native heart without angina pectoris      Type 2 diabetes mellitus without complication (H)      Mixed hyperlipidemia        CURRENT MEDICATIONS:  Current Outpatient Prescriptions   Medication Sig Dispense Refill     famotidine (PEPCID) 20 MG tablet TAKE 1 TABLET 2 TO 3 TIMES PER  tablet 1     atorvastatin (LIPITOR) 40 MG tablet Take 1 tablet (40 mg) by mouth daily 100 tablet 2     lisinopril (PRINIVIL/ZESTRIL) 20 MG tablet Take 1 tablet (20 mg) by mouth 2 times daily 180 tablet 1     metFORMIN (GLUCOPHAGE-XR) 500 MG 24 hr tablet Take 2 tablets (1,000 mg) by mouth 2 times daily (with meals) 360 tablet 1     fluticasone (FLONASE) 50 MCG/ACT spray Spray 2 sprays into both nostrils daily 48 g 11     vardenafil (LEVITRA) 20 MG tablet Take 0.5-1 tablets (10-20 mg) by mouth daily as needed for erectile dysfunction Never use with nitroglycerin, terazosin or doxazosin. 30 tablet 4     MELATONIN PO Take 5 mg by mouth At Bedtime       latanoprost (XALATAN) 0.005 % ophthalmic solution 1 drop At Bedtime.       MULTI-VITAMIN OR TABS 1 TABLET DAILY       GLUCOSAMINE-CHONDROITIN 500-400 MG OR CAPS One TID       Alcohol Swabs (ALCOHOL PADS) 70 % PADS 1 Application daily as needed 100 each prn     blood glucose monitoring (ONE TOUCH ULTRA) test strip Use to test blood sugar  daily or as directed. 1 Box 3     blood glucose monitoring (ONE TOUCH ULTRA 2) meter device kit Use to test blood sugars 1- times  daily or as directed. 1 kit 0     blood glucose calibration (ONETOUCH ULTRA CONTROL) solution by In Vitro route 3 times daily as needed Use to calibrate blood glucose monitor as needed as directed. 1 Bottle 3     blood glucose monitoring (ONE TOUCH ULTRASOFT) lancets TESTING 3-4 TIMES PER DAY 3 Box 3     ONE TOUCH ULTRA test strip USE TO TEST BLOOD SUGAR 3-4 TIMES DAILY OR AS DIRECTED 300 each 3     Alcohol Swabs (B-D SINGLE USE SWABS REGULAR) PADS 1 pad 3 times daily 270 each 3     blood glucose test strip OneTouch Ultra strips; use bid. 100 strip 11     blood glucose (TRUETEST) test strip Testing 3 times per day. One Touch Ultra blue 3 Box 3     blood glucose calibration (TRUETEST CONTROL LEVEL 2) solution Use to calibrate blood glucose monitor as needed as directed. 3 Bottle 2     Alcohol Swabs PADS Use one swab 2-3 times per day 300 each prn     ORDER FOR DME Equipment being ordered: Postivac 1 each 0     ONE TOUCH ULTRA (DEVICES) MISC soft lancets-testing 2-3 times per day 1mo 12     ONE TOUCH ULTRA (DEVICES) MISC Control Solution-use as directed 1 bottle prn       ALLERGIES     Allergies   Allergen Reactions     Ivp Dye [Contrast Dye]        PAST MEDICAL HISTORY:  Past Medical History:   Diagnosis Date     CAD (coronary artery disease)     per calcium score=>400     Carotid artery plaque 9/2011     Colon polyps      Diverticulosis of colon      Erectile dysfunction 7/18/2008     Glaucoma      High cholesterol      HTN (hypertension) 7/18/2008     Hypertension goal BP (blood pressure) < 140/90 12/10/2010     Obesity      PFO (patent foramen ovale)      Plantar fasciitis      Presbyesophagus      Prostate cancer-[place] 4, treated with radiation 7/28/2013     Type II or unspecified type diabetes mellitus without mention of complication, not stated as uncontrolled     Dxd about 2000       PAST SURGICAL HISTORY:  Past Surgical History:   Procedure Laterality Date     APPENDECTOMY       ARTHROSCOPY KNEE RT/LT  1998     partial menisectomy left knee     COLONOSCOPY N/A 9/27/2016    Procedure: COMBINED COLONOSCOPY, SINGLE OR MULTIPLE BIOPSY/POLYPECTOMY BY BIOPSY;  Surgeon: Maru Orta MD;  Location:  GI     HC COLONOSCOPY THRU STOMA, DIAGNOSTIC  2005     HC COLONOSCOPY THRU STOMA, DIAGNOSTIC  5/09    diverticulosis, also proctitis     HEMORRHOIDECTOMY       PHACOEMULSIFICATION CLEAR CORNEA WITH STANDARD INTRAOCULAR LENS IMPLANT Right 3/18/2015    Procedure: PHACOEMULSIFICATION CLEAR CORNEA WITH STANDARD INTRAOCULAR LENS IMPLANT;  Surgeon: Lito Gonzales MD;  Location:  EC     PHACOEMULSIFICATION CLEAR CORNEA WITH STANDARD INTRAOCULAR LENS IMPLANT Left 3/25/2015    Procedure: PHACOEMULSIFICATION CLEAR CORNEA WITH STANDARD INTRAOCULAR LENS IMPLANT;  Surgeon: Lito Gonzales MD;  Location:  EC     ROTATOR CUFF REPAIR RT/LT  1998    right.  caused him to retire     SURGICAL HISTORY OF -   1999    L4-5 discectomy     TONSILLECTOMY & ADENOIDECTOMY       TURP         FAMILY HISTORY:  Family History   Problem Relation Age of Onset     C.A.D. Father      C.A.D. Brother      Asthma Brother      C.A.D. 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 No     Social History Narrative         Review of Systems:  Skin:  Positive for bruising     Eyes:  Positive for cataracts;glasses (removal)    ENT:  Positive for postnasal drainage    Respiratory:  Negative       Cardiovascular:    Positive  "for;edema;fatigue    Gastroenterology: Negative      Genitourinary:  not assessed      Musculoskeletal:  Positive for back pain;joint pain 1 disc removed and 1 bulging disc, left knee replacement  Neurologic:  Negative      Psychiatric:  Positive for sleep disturbances    Heme/Lymph/Imm:  Positive for allergies    Endocrine:  Positive for diabetes      Physical Exam:  Vitals: /70  Pulse 64  Ht 1.727 m (5' 8\")  Wt 86.7 kg (191 lb 3.2 oz)  BMI 29.07 kg/m2    Constitutional:  cooperative;alert and oriented;well developed;well nourished overweight      Skin:  warm and dry to the touch        Head:  normocephalic        Eyes:  pupils equal and round;sclera white        ENT:  no pallor or cyanosis        Neck:  JVP normal;carotid pulses are full and equal bilaterally;no carotid bruit        Chest:  clear to auscultation          Cardiac: regular rhythm;normal S1 and S2;no murmurs, gallops or rubs detected                  Abdomen:  abdomen soft;no masses;non-tender        Vascular: pulses full and equal               2+             2+          Extremities and Back:  no deformities, clubbing, cyanosis, erythema observed;no edema              Neurological:  affect appropriate, oriented to time, person and place;no gross motor deficits;affect appropriate          Recent Lab Results:  LIPID RESULTS:  Lab Results   Component Value Date    CHOL 150 09/21/2017    HDL 45 09/21/2017    LDL 86 09/21/2017    TRIG 93 09/21/2017    CHOLHDLRATIO 3.2 08/19/2015       LIVER ENZYME RESULTS:  Lab Results   Component Value Date    AST 18 09/06/2016    ALT 18 09/21/2017       CBC RESULTS:  Lab Results   Component Value Date    WBC 8.4 05/15/2017    RBC 4.97 05/15/2017    HGB 14.7 05/15/2017    HCT 44.1 05/15/2017    MCV 89 05/15/2017    MCH 29.6 05/15/2017    MCHC 33.3 05/15/2017    RDW 13.9 05/15/2017     05/15/2017       BMP RESULTS:  Lab Results   Component Value Date     (L) 09/21/2017    POTASSIUM 5.1 09/21/2017 "    CHLORIDE 103 09/21/2017    CO2 25 09/21/2017    ANIONGAP 12.1 09/21/2017     (H) 09/21/2017    BUN 35 (H) 09/21/2017    CR 1.21 09/21/2017    GFRESTIMATED 58 (L) 09/21/2017    GFRESTBLACK 70 09/21/2017    WILIAN 9.8 09/21/2017        A1C RESULTS:  Lab Results   Component Value Date    A1C 6.4 (H) 05/15/2017       INR RESULTS:  No results found for: INR        CC  Mikal Chung MD  [address]  [place] [address]

## 2017-09-21 NOTE — LETTER
9/21/2017    Cassia Wolff MD  [address]  [place] [address]    RE: Jamil Bedolla Sr., MD       Dear Colleague,    I had the pleasure of seeing Jamil Bedolla Sr., MD in the [facility].    I had the privilege of getting together with Kenny Bedolla today.  Kenny is 82.  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  He takes very good care of himself.  He does tend to be a hair overweight at 190 pounds but remarkably he is down 12 pounds from a year ago.      He has had chronic diabetes mellitus.  Renal function has been at the upper limits of normal.  He has had a little bit of albuminuria but not profoundly so.  He felt that this was a consequence not only of his diabetes but also a period of taking naproxen.      In the past year, he denies chest pain, palpitations, shortness of breath, orthopnea or lower leg edema.  He has had no dizziness or syncope.      ALLERGIES:  IVP contrast dye.      REVIEW OF SYSTEMS:   HEENT:  Positive for a little bit of nasal drainage.   CARDIAC:  Positive for a little bit of fatigue but negative as noted above.     UPPER GASTROINTESTINAL:  Negative.    LOWER GASTROINTESTINAL:  Negative.    GENITOURINARY:  Negative.   MUSCULOSKELETAL:  Positive for some low back pain.   NEUROLOGIC:  Negative.   ENDOCRINE:  Positive for diabetes.   HEMATOLOGIC:  Relatively negative.   PSYCHIATRIC:  Relatively negative.       PHYSICAL EXAMINATION:   GENERAL:  Shows a relatively trim adult male with a little touch of being overweight in the abdominal area.   VITAL SIGNS:  As above.     NECK:  He had no neck vein distention or bruit at 30 degrees.   HEART:  Regular without gallop or murmur.   LUNGS:  Clear to auscultation.   ABDOMEN:  Soft without organomegaly, mass or pain.   EXTREMITIES:  Free of edema.  Pedal pulses +1.      MEDICATIONS:  Listed in Epic and include:   1.   Atorvastatin 40 mg at bedtime.   2.  Lisinopril 20 mg b.i.d.   3.  Metformin 1000 mg b.i.d.   4.  Levitra p.r.n.   5.  Flonase 2 sprays daily.   6.  Glucosamine chondroitin t.i.d.      He takes other p.r.n. medicines that I will not list in concert with treatment of his diabetes.      His hemoglobin A1c's have tended to be really excellent at 6.4.  Renal function:  Creatinine 1.2, BUN 35.  He has a touch of prerenal azotemia.      His lipid profiles are at goal.  His blood pressures are perfectly normal.  From a cardiovascular standpoint, he continues to be asymptomatic.  He has a calcific coronary artery disease but it has always been asymptomatic and accordingly, blood pressure management, lipid lowering and heart-healthy lifestyle have been recommended.  He has followed these issues with remarkable skill.      IMPRESSION:   1.  Healthy adult 82-year-old male.    2.  Asymptomatic coronary artery disease.   3.  Treated hypertension.   4.  Treated hyperlipidemia.      PLAN:  I did not order any tests.  I did not order any studies.  I reviewed previous imaging, echo, stress echo, blood pressure assessment, etc.  I reviewed medications, medication side effects and indications and complications.      Over 35 minutes was spent doing the consult, greater than 50% of that time face-to-face with education, counseling, etc.     Again, thank you for allowing me to participate in the care of your patient.      Sincerely,    SUMIT MCDANIEL MD     [facility]

## 2017-09-21 NOTE — MR AVS SNAPSHOT
After Visit Summary   9/21/2017    Jamil Bedolla Sr., MD    MRN: [id number]           Patient Information     Date Of Birth          1935        Visit Information        Provider Department      9/21/2017 10:15 AM Mikal Chung MD [facility]        Today's Diagnoses     Essential hypertension with goal blood pressure less than 140/90        Coronary artery disease involving native coronary artery of native heart without angina pectoris        Mixed hyperlipidemia           Follow-ups after your visit        Your next 10 appointments already scheduled     Dec 06, 2017 10:20 AM CST   (Arrive by 10:00 AM)   Return Visit with Wes Mejia MD   [facility] ([facility])    [address]  [address]  [facility] [address] [phone]              Who to contact     If you have questions or need follow up information about today's clinic visit or your schedule please contact [facility] directly at [phone].  Normal or non-critical lab and imaging results will be communicated to you by [employer], letter or phone within 4 business days after the clinic has received the results. If you do not hear from us within 7 days, please contact the clinic through [employer] or phone. If you have a critical or abnormal lab result, we will notify you by phone as soon as possible.  Submit refill requests through [employer] or call your pharmacy and they will forward the refill request to us. Please allow 3 business days for your refill to be completed.          Additional Information About Your Visit        [employer] Information     [employer] gives you secure access to your electronic health record. If you see a primary care provider, you can also send messages to your care team and make appointments. If you have questions, please call your primary care clinic.  If you do not have  "a primary care provider, please call [phone] and they will assist you.        Care EveryWhere ID     This is your Care EveryWhere ID. This could be used by other organizations to access your [place] medical records  [id number]        Your Vitals Were     Pulse Height BMI (Body Mass Index)             64 1.727 m (5' 8\") 29.07 kg/m2          Blood Pressure from Last 3 Encounters:   09/21/17 121/70   06/21/17 128/70   05/15/17 123/73    Weight from Last 3 Encounters:   09/21/17 86.7 kg (191 lb 3.2 oz)   06/21/17 88.4 kg (194 lb 12.8 oz)   05/15/17 90.3 kg (199 lb)              We Performed the Following     Follow-Up with Cardiologist        Primary Care Provider Office Phone # Fax #    Cassia Wolff [phone]       [address]  [place] [address]        Equal Access to Services     [facility]: Hadii aad ku hadasho Soomaali, waaxda luqadaha, qaybta kaalmada adeegyada, waxay idiin hayhetal mclean . So [facility] [place] [facility] [phone].    ATENCIÓN: Si habla español, tiene a wray disposición servicios gratuitos de asistencia lingüística. [facility] [phone].    We comply with applicable federal civil rights laws and [place] laws. We do not discriminate on the basis of race, color, national origin, age, disability sex, sexual orientation or gender identity.            Thank you!     Thank you for choosing [facility] [place]  for your care. Our goal is always to provide you with excellent care. Hearing back from our patients is one way we can continue to improve our services. Please take a few minutes to complete the written survey that you may receive in the mail after your visit with us. Thank you!             Your Updated Medication List - Protect others around you: Learn how to safely use, store and throw away your medicines at www.disposemymeds.org.          This list is accurate as of: 9/21/17 10:51 AM.  Always use your most recent med " list.                   Brand Name Dispense Instructions for use Diagnosis    * Alcohol Swabs Pads     300 each    Use one swab 2-3 times per day    Type 2 diabetes, HbA1C goal < 8% (H)       * B-D SINGLE USE SWABS REGULAR Pads     270 each    1 pad 3 times daily    Type 2 diabetes, HbA1C goal < 8% (H), Type II or unspecified type diabetes mellitus without mention of complication, not stated as uncontrolled       * Alcohol Pads 70 % Pads     100 each    1 Application daily as needed    Advanced directives, counseling/discussion       atorvastatin 40 MG tablet    LIPITOR    100 tablet    Take 1 tablet (40 mg) by mouth daily    Hyperlipidemia LDL goal <70       * blood glucose calibration solution     3 Bottle    Use to calibrate blood glucose monitor as needed as directed.    Type 2 diabetes, HbA1C goal < 8% (H)       * blood glucose calibration solution     1 Bottle    by In Vitro route 3 times daily as needed Use to calibrate blood glucose monitor as needed as directed.    Type 2 diabetes mellitus with diabetic nephropathy (H)       blood glucose monitoring lancets     3 Box    TESTING 3-4 TIMES PER DAY    Type 2 diabetes, HbA1C goal < 8% (H)       * blood glucose monitoring test strip    TRUETEST    3 Box    Testing 3 times per day. One Touch Ultra blue    Type 2 diabetes, HbA1C goal < 8% (H), Type II or unspecified type diabetes mellitus without mention of complication, not stated as uncontrolled       * blood glucose monitoring test strip    no brand specified    100 strip    OneTouch Ultra strips; use bid.    Type 2 diabetes, HbA1C goal < 8% (H)       * ONE TOUCH ULTRA test strip   Generic drug:  blood glucose monitoring     300 each    USE TO TEST BLOOD SUGAR 3-4 TIMES DAILY OR AS DIRECTED    Type 2 diabetes, HbA1C goal < 8% (H)       * blood glucose monitoring test strip    ONE TOUCH ULTRA    1 Box    Use to test blood sugar  daily or as directed.    Advanced directives, counseling/discussion       famotidine  20 MG tablet    PEPCID    270 tablet    TAKE 1 TABLET 2 TO 3 TIMES PER DAY    Gastroesophageal reflux disease without esophagitis       fluticasone 50 MCG/ACT spray    FLONASE    48 g    Spray 2 sprays into both nostrils daily    Seasonal allergic rhinitis due to other allergic trigger       glucosamine-chondroitin 500-400 MG Caps per capsule      One TID        latanoprost 0.005 % ophthalmic solution    XALATAN     1 drop At Bedtime.        lisinopril 20 MG tablet    PRINIVIL/ZESTRIL    180 tablet    Take 1 tablet (20 mg) by mouth 2 times daily    Essential hypertension, benign       MELATONIN PO      Take 5 mg by mouth At Bedtime        metFORMIN 500 MG 24 hr tablet    GLUCOPHAGE-XR    360 tablet    Take 2 tablets (1,000 mg) by mouth 2 times daily (with meals)    Type 2 diabetes mellitus with diabetic nephropathy, without long-term current use of insulin (H)       Multi-vitamin Tabs tablet   Generic drug:  multivitamin, therapeutic with minerals      1 TABLET DAILY        * ONE TOUCH ULTRA (DEVICES) Misc     1mo    soft lancets-testing 2-3 times per day    Type II or unspecified type diabetes mellitus without mention of complication, not stated as uncontrolled       * ONE TOUCH ULTRA (DEVICES) Misc     1 bottle    Control Solution-use as directed    Type II or unspecified type diabetes mellitus without mention of complication, not stated as uncontrolled       * blood glucose monitoring meter device kit     1 kit    Use to test blood sugars 1- times daily or as directed.    Type 2 diabetes mellitus with diabetic nephropathy, without long-term current use of insulin (H)       order for DME     1 each    Equipment being ordered: Postivac    Erectile dysfunction       vardenafil 20 MG tablet    LEVITRA    30 tablet    Take 0.5-1 tablets (10-20 mg) by mouth daily as needed for erectile dysfunction Never use with nitroglycerin, terazosin or doxazosin.    Other male erectile dysfunction       * Notice:  This list has 12  medication(s) that are the same as other medications prescribed for you. Read the directions carefully, and ask your doctor or other care provider to review them with you.

## 2017-09-21 NOTE — PROGRESS NOTES
HISTORY OF PRESENT ILLNESS:  I had the privilege of getting together with Kenny Bedolla today.  Kenny is 82.  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  He takes very good care of himself.  He does tend to be a hair overweight at 190 pounds but remarkably he is down 12 pounds from a year ago.      He has had chronic diabetes mellitus.  Renal function has been at the upper limits of normal.  He has had a little bit of albuminuria but not profoundly so.  He felt that this was a consequence not only of his diabetes but also a period of taking naproxen.      In the past year, he denies chest pain, palpitations, shortness of breath, orthopnea or lower leg edema.  He has had no dizziness or syncope.      ALLERGIES:  IVP contrast dye.      REVIEW OF SYSTEMS:   HEENT:  Positive for a little bit of nasal drainage.   CARDIAC:  Positive for a little bit of fatigue but negative as noted above.     UPPER GASTROINTESTINAL:  Negative.    LOWER GASTROINTESTINAL:  Negative.    GENITOURINARY:  Negative.   MUSCULOSKELETAL:  Positive for some low back pain.   NEUROLOGIC:  Negative.   ENDOCRINE:  Positive for diabetes.   HEMATOLOGIC:  Relatively negative.   PSYCHIATRIC:  Relatively negative.       PHYSICAL EXAMINATION:   GENERAL:  Shows a relatively trim adult male with a little touch of being overweight in the abdominal area.   VITAL SIGNS:  As above.     NECK:  He had no neck vein distention or bruit at 30 degrees.   HEART:  Regular without gallop or murmur.   LUNGS:  Clear to auscultation.   ABDOMEN:  Soft without organomegaly, mass or pain.   EXTREMITIES:  Free of edema.  Pedal pulses +1.      MEDICATIONS:  Listed in Epic and include:   1.  Atorvastatin 40 mg at bedtime.   2.  Lisinopril 20 mg b.i.d.   3.  Metformin 1000 mg b.i.d.   4.  Levitra p.r.n.   5.  Flonase 2 sprays daily.   6.  Glucosamine chondroitin t.i.d.      He takes other p.r.n. medicines  that I will not list in concert with treatment of his diabetes.      His hemoglobin A1c's have tended to be really excellent at 6.4.  Renal function:  Creatinine 1.2, BUN 35.  He has a touch of prerenal azotemia.      His lipid profiles are at goal.  His blood pressures are perfectly normal.  From a cardiovascular standpoint, he continues to be asymptomatic.  He has a calcific coronary artery disease but it has always been asymptomatic and accordingly, blood pressure management, lipid lowering and heart-healthy lifestyle have been recommended.  He has followed these issues with remarkable skill.      IMPRESSION:   1.  Healthy adult 82-year-old male.    2.  Asymptomatic coronary artery disease.   3.  Treated hypertension.   4.  Treated hyperlipidemia.      PLAN:  I did not order any tests.  I did not order any studies.  I reviewed previous imaging, echo, stress echo, blood pressure assessment, etc.  I reviewed medications, medication side effects and indications and complications.      Over 35 minutes was spent doing the consult, greater than 50% of that time face-to-face with education, counseling, etc.      cc:   [place] [address]         SUMIT MCDANIEL MD, [facility]             D: 2017 11:25   T: 2017 14:46   MT: FERNIE      Name:     IGNACIO PALOMINO   MRN:      [id number]        Account:      [id number]   :      1935           Service Date: 2017      Document: [id number]

## 2017-11-08 ENCOUNTER — TRANSFERRED RECORDS (OUTPATIENT)
Dept: HEALTH INFORMATION MANAGEMENT | Facility: CLINIC | Age: 82
End: 2017-11-08

## 2017-11-28 DIAGNOSIS — C61 PROSTATE CANCER (H): ICD-10-CM

## 2017-11-28 LAB — PSA SERPL-MCNC: 0.11 NG/ML (ref 0–4)

## 2017-11-28 PROCEDURE — 84153 ASSAY OF PSA TOTAL: CPT | Performed by: UROLOGY

## 2017-11-28 PROCEDURE — 36415 COLL VENOUS BLD VENIPUNCTURE: CPT | Performed by: UROLOGY

## 2017-12-27 DIAGNOSIS — K21.9 GASTROESOPHAGEAL REFLUX DISEASE WITHOUT ESOPHAGITIS: ICD-10-CM

## 2017-12-28 RX ORDER — FAMOTIDINE 20 MG/1
TABLET, FILM COATED ORAL
Qty: 270 TABLET | Refills: 1 | Status: SHIPPED | OUTPATIENT
Start: 2017-12-28 | End: 2018-05-17

## 2017-12-28 NOTE — TELEPHONE ENCOUNTER
Requested Prescriptions   Pending Prescriptions Disp Refills     famotidine (PEPCID) 20 MG tablet [Pharmacy Med Name: FAMOTIDINE 20 MG Tablet]  .Last Written Prescription Date:  8/7/17  Last Fill Quantity: 270,  # refills: 1   Last [facility] Visit with [facility] prescribing provider:  5/15/17   Future Office Visit:      270 tablet 1     Sig: TAKE 1 TABLET 2 TO 3 TIMES PER DAY    H2 Blockers Protocol Passed    12/27/2017  2:06 PM       Passed - Patient is age 12 or older       Passed - Recent or future visit with authorizing provider's specialty    Patient had office visit in the last year or has a visit in the next 30 days with authorizing provider.  See chart review.

## 2018-03-16 DIAGNOSIS — E11.21 TYPE 2 DIABETES MELLITUS WITH DIABETIC NEPHROPATHY, WITHOUT LONG-TERM CURRENT USE OF INSULIN (H): ICD-10-CM

## 2018-03-19 RX ORDER — METFORMIN HCL 500 MG
TABLET, EXTENDED RELEASE 24 HR ORAL
Qty: 360 TABLET | Refills: 1 | Status: SHIPPED | OUTPATIENT
Start: 2018-03-19 | End: 2018-03-20

## 2018-03-19 NOTE — TELEPHONE ENCOUNTER
"Routing refill request to provider for review/approval because:  Patient needs to be seen because:  Due for dm check  Last OV was 05/15/2017 with PCP    Miriam Melvin,RN  [facility]  [phone]            Requested Prescriptions   Pending Prescriptions Disp Refills     metFORMIN (GLUCOPHAGE-XR) 500 MG 24 hr tablet [Pharmacy Med Name: METFORMIN HCL  MG Tablet Extended Release 24 Hour] 360 tablet 1     Sig: TAKE 2 TABLETS TWICE DAILY WITH MEALS    Biguanide Agents Failed    3/16/2018  5:13 PM       Failed - Patient has documented A1c within the specified period of time.    Recent Labs   Lab Test  05/15/17   1115   A1C  6.4*            Failed - Recent (6 mo) or future (30 days) visit within the authorizing provider's specialty    Patient had office visit in the last 6 months or has a visit in the next 30 days with authorizing provider or within the authorizing provider's specialty.  See \"Patient Info\" tab in inbasket, or \"Choose Columns\" in Meds & Orders section of the refill encounter.           Passed - Blood pressure less than 140/90 in past 6 months    BP Readings from Last 3 Encounters:   09/21/17 121/70   06/21/17 128/70   05/15/17 123/73                Passed - Patient has documented LDL within the past 12 mos.    Recent Labs   Lab Test  09/21/17   0926   LDL  86            Passed - Patient has had a Microalbumin in the past 12 mos.    Recent Labs   Lab Test  05/15/17   1159   MICROL  44   UMALCR  26.04*            Passed - Patient is age 10 or older       Passed - Patient's CR is NOT>1.4 OR Patient's EGFR is NOT<45 within past 12 mos.    Recent Labs   Lab Test  09/21/17   0926   GFRESTIMATED  58*   GFRESTBLACK  70       Recent Labs   Lab Test  09/21/17   0926   CR  1.21            Passed - Patient does NOT have a diagnosis of CHF.          "

## 2018-05-16 ASSESSMENT — ACTIVITIES OF DAILY LIVING (ADL)
I_NEED_ASSISTANCE_FOR_THE_FOLLOWING_DAILY_ACTIVITIES:: SHOPPING
CURRENT_FUNCTION: MONEY MANAGEMENT REQUIRES ASSISTANCE
CURRENT_FUNCTION: LAUNDRY REQUIRES ASSISTANCE
CURRENT_FUNCTION: BATHING REQUIRES ASSISTANCE
I_NEED_ASSISTANCE_FOR_THE_FOLLOWING_DAILY_ACTIVITIES:: MONEY MANAGEMENT
CURRENT_FUNCTION: TRANSPORTATION REQUIRES ASSISTANCE
CURRENT_FUNCTION: SHOPPING REQUIRES ASSISTANCE
I_NEED_ASSISTANCE_FOR_THE_FOLLOWING_DAILY_ACTIVITIES:: LAUNDRY
CURRENT_FUNCTION: MEDICATION ADMINISTRATION REQUIRES ASSISTANCE
CURRENT_FUNCTION: PREPARING MEALS REQUIRES ASSISTANCE
I_NEED_ASSISTANCE_FOR_THE_FOLLOWING_DAILY_ACTIVITIES:: HOUSEWORK
I_NEED_ASSISTANCE_FOR_THE_FOLLOWING_DAILY_ACTIVITIES:: MEDICATION ADMINISTRATION
CURRENT_FUNCTION: TELEPHONE REQUIRES ASSISTANCE
I_NEED_ASSISTANCE_FOR_THE_FOLLOWING_DAILY_ACTIVITIES:: TRANSPORTATION
I_NEED_ASSISTANCE_FOR_THE_FOLLOWING_DAILY_ACTIVITIES:: BATHING
I_NEED_ASSISTANCE_FOR_THE_FOLLOWING_DAILY_ACTIVITIES:: TELEPHONE USE
I_NEED_ASSISTANCE_FOR_THE_FOLLOWING_DAILY_ACTIVITIES:: PREPARING MEALS
CURRENT_FUNCTION: HOUSEWORK REQUIRES ASSISTANCE

## 2018-05-17 ENCOUNTER — OFFICE VISIT (OUTPATIENT)
Dept: FAMILY MEDICINE | Facility: CLINIC | Age: 83
End: 2018-05-17
Payer: COMMERCIAL

## 2018-05-17 VITALS
HEIGHT: 68 IN | BODY MASS INDEX: 29.55 KG/M2 | OXYGEN SATURATION: 96 % | HEART RATE: 59 BPM | DIASTOLIC BLOOD PRESSURE: 72 MMHG | TEMPERATURE: 97.3 F | SYSTOLIC BLOOD PRESSURE: 131 MMHG | WEIGHT: 195 LBS

## 2018-05-17 DIAGNOSIS — R39.89 URINARY PROBLEM: ICD-10-CM

## 2018-05-17 DIAGNOSIS — K21.9 GASTROESOPHAGEAL REFLUX DISEASE WITHOUT ESOPHAGITIS: ICD-10-CM

## 2018-05-17 DIAGNOSIS — I10 ESSENTIAL HYPERTENSION, BENIGN: ICD-10-CM

## 2018-05-17 DIAGNOSIS — Z71.89 ADVANCED DIRECTIVES, COUNSELING/DISCUSSION: ICD-10-CM

## 2018-05-17 DIAGNOSIS — R10.9 ABDOMINAL DISCOMFORT: ICD-10-CM

## 2018-05-17 DIAGNOSIS — G47.09 OTHER INSOMNIA: ICD-10-CM

## 2018-05-17 DIAGNOSIS — Z00.00 ROUTINE GENERAL MEDICAL EXAMINATION AT A HEALTH CARE FACILITY: Primary | ICD-10-CM

## 2018-05-17 DIAGNOSIS — N52.8 OTHER MALE ERECTILE DYSFUNCTION: ICD-10-CM

## 2018-05-17 DIAGNOSIS — E11.21 TYPE 2 DIABETES MELLITUS WITH DIABETIC NEPHROPATHY, WITHOUT LONG-TERM CURRENT USE OF INSULIN (H): ICD-10-CM

## 2018-05-17 LAB
ALBUMIN UR-MCNC: NEGATIVE MG/DL
APPEARANCE UR: CLEAR
BILIRUB UR QL STRIP: NEGATIVE
COLOR UR AUTO: YELLOW
GLUCOSE UR STRIP-MCNC: NEGATIVE MG/DL
HBA1C MFR BLD: 6.1 % (ref 0–5.6)
HGB UR QL STRIP: NEGATIVE
KETONES UR STRIP-MCNC: NEGATIVE MG/DL
LEUKOCYTE ESTERASE UR QL STRIP: NEGATIVE
NITRATE UR QL: NEGATIVE
PH UR STRIP: 6 PH (ref 5–7)
SOURCE: NORMAL
SP GR UR STRIP: 1.01 (ref 1–1.03)
UROBILINOGEN UR STRIP-ACNC: 0.2 EU/DL (ref 0.2–1)

## 2018-05-17 PROCEDURE — G0438 PPPS, INITIAL VISIT: HCPCS | Performed by: FAMILY MEDICINE

## 2018-05-17 PROCEDURE — 82043 UR ALBUMIN QUANTITATIVE: CPT | Performed by: FAMILY MEDICINE

## 2018-05-17 PROCEDURE — 81003 URINALYSIS AUTO W/O SCOPE: CPT | Performed by: FAMILY MEDICINE

## 2018-05-17 PROCEDURE — 84443 ASSAY THYROID STIM HORMONE: CPT | Performed by: FAMILY MEDICINE

## 2018-05-17 PROCEDURE — 99214 OFFICE O/P EST MOD 30 MIN: CPT | Mod: 25 | Performed by: FAMILY MEDICINE

## 2018-05-17 PROCEDURE — 80053 COMPREHEN METABOLIC PANEL: CPT | Performed by: FAMILY MEDICINE

## 2018-05-17 PROCEDURE — 99207 C FOOT EXAM  NO CHARGE: CPT | Performed by: FAMILY MEDICINE

## 2018-05-17 PROCEDURE — 83036 HEMOGLOBIN GLYCOSYLATED A1C: CPT | Performed by: FAMILY MEDICINE

## 2018-05-17 PROCEDURE — 36415 COLL VENOUS BLD VENIPUNCTURE: CPT | Performed by: FAMILY MEDICINE

## 2018-05-17 RX ORDER — BLOOD-GLUCOSE METER
EACH MISCELLANEOUS
Qty: 1 KIT | Refills: 0 | Status: SHIPPED | OUTPATIENT
Start: 2018-05-17

## 2018-05-17 RX ORDER — LANCETS
EACH MISCELLANEOUS
Qty: 100 EACH | Refills: 11 | Status: SHIPPED | OUTPATIENT
Start: 2018-05-17 | End: 2019-05-13

## 2018-05-17 RX ORDER — LISINOPRIL 20 MG/1
20 TABLET ORAL 2 TIMES DAILY
Qty: 180 TABLET | Refills: 1 | Status: SHIPPED | OUTPATIENT
Start: 2018-05-17 | End: 2018-08-10

## 2018-05-17 RX ORDER — VARDENAFIL HYDROCHLORIDE 20 MG/1
10-20 TABLET ORAL DAILY PRN
Qty: 30 TABLET | Refills: 4 | Status: SHIPPED | OUTPATIENT
Start: 2018-05-17 | End: 2019-05-13

## 2018-05-17 RX ORDER — LANCETS
EACH MISCELLANEOUS
Status: CANCELLED | OUTPATIENT
Start: 2018-05-17

## 2018-05-17 RX ORDER — BLOOD SUGAR DIAGNOSTIC
1 STRIP MISCELLANEOUS DAILY PRN
Qty: 100 EACH | Status: SHIPPED | OUTPATIENT
Start: 2018-05-17 | End: 2018-08-17

## 2018-05-17 RX ORDER — FAMOTIDINE 20 MG/1
20 TABLET, FILM COATED ORAL 2 TIMES DAILY
Qty: 270 TABLET | Refills: 3 | Status: SHIPPED | OUTPATIENT
Start: 2018-05-17 | End: 2018-08-10

## 2018-05-17 ASSESSMENT — ACTIVITIES OF DAILY LIVING (ADL)
CURRENT_FUNCTION: MONEY MANAGEMENT REQUIRES ASSISTANCE
CURRENT_FUNCTION: MEDICATION ADMINISTRATION REQUIRES ASSISTANCE
CURRENT_FUNCTION: TRANSPORTATION REQUIRES ASSISTANCE
CURRENT_FUNCTION: LAUNDRY REQUIRES ASSISTANCE
CURRENT_FUNCTION: HOUSEWORK REQUIRES ASSISTANCE
CURRENT_FUNCTION: BATHING REQUIRES ASSISTANCE
CURRENT_FUNCTION: TELEPHONE REQUIRES ASSISTANCE
CURRENT_FUNCTION: PREPARING MEALS REQUIRES ASSISTANCE
CURRENT_FUNCTION: SHOPPING REQUIRES ASSISTANCE

## 2018-05-17 NOTE — PATIENT INSTRUCTIONS
Prevention Guidelines, Men Ages 65 and Older  Screening tests and vaccines are an important part of managing your health.A screening test is done to find possible disorders or diseases in people who don't have any symptoms. The goal is to find a disease early so lifestyle changes can be made and you can be watched more closely to reduce the risk of disease, or to detect it early enough to treat it most effectively. Screening tests are not considered diagnostic, but are used to determine if more testing is needed.  Health counseling is essential, too. Below are guidelines for these, for men ages 65 and older. Talk with your healthcare provider to make sure you re up-to-date on what you need.  Screening Who needs it How often   Abdominal aortic aneurysm Men ages 65 to 75 who have ever smoked 1 ultrasound   Alcohol misuse All men in this age group At routine exams   Blood pressure All men in this age group Yearly checkup if your blood pressure is normal  Normal blood pressure is less than 120/80 mm Hg  If your blood pressure reading is higher than normal, follow the advice of your healthcare provider   Colorectal cancer All men in this age group Flexible sigmoidoscopy every 5 years, or colonoscopy every 10 years, or double-contrast barium enema every 5 years; yearly fecal occult blood test or fecal immunochemical test; or a stool DNA test as often as your healthcare provider advises; talk with your healthcare provider about which tests are best for you and when you no longer need colonoscopies (generally after age 75)   Depression All men in this age group At routine exams   Type 2 diabetes or prediabetes All men beginning at age 45 and men without symptoms at any age who are overweight or obese and have 1 or more other risk factors for diabetes At least every 3 years (yearly if your blood sugar has already begun to rise)   Type 2 diabetes All men with prediabetes Every year   Hepatitis C Men at increased risk for  infection   talk with your healthcare provider At routine exams   High cholesterol or triglycerides All men in this age group At least every 5 years   HIV Men at increased risk for infection   talk with your healthcare provider At routine exams   Lung cancer Adults ages 55 to 80 who have smoked Yearly screening in smokers with 30 pack-year history of smoking or who quit within 15 years   Obesity All men in this age group At routine exams   Prostate cancer All men in this age group, talk to healthcare provider about risks and benefits of digital rectal exam (AILYN) and prostate-specific antigen (PSA) screening1 At routine exams   Syphilis Men at increased risk for infection   talk with your healthcare provider At routine exams   Tuberculosis Men at increased risk for infection   talk with your healthcare provider Ask your healthcare provider   Vision All men in this age group Every 1 to 2 years; if you have a chronic health condition, ask your healthcare provider if you needs exams more often   Vaccine Who needs it How often   Chickenpox (varicella) All men in this age group who have no record of this infection or vaccine 2 doses; second dose should be given at least 4 weeks after the first dose   Hepatitis A Men at increased risk for infection   talk with your healthcare provider 2 doses given at least 6 months apart   Hepatitis B Men at increased risk for infection   talk with your healthcare provider 3 doses over 6 months; second dose should be given 1 month after the first dose; the third dose should be given at least 2 months after the second dose and at least 4 months after the first dose   Haemophilus influenzae Type B (HIB) Men at increased risk for infection   talk with your healthcare provider 1 to 3 doses   Influenza (flu) All men in this age group  Once a year   Meningococcal Men at increased risk for infection   talk with your healthcare provider 1 or more doses   Pneumococcal conjugate vaccine (PCV13) and  pneumococcal polysaccharide vaccine (PPSV23) All men in this age group 1 dose of each vaccine   Tetanus/diphtheria/  pertussis (Td/Tdap) booster All men in this age group Td every 10 years, or Tdap if you will have contact with a child younger than 12 months old   Zoster All men in this age group 1 dose   Counseling Who needs it How often   Diet and exercise Men who are overweight or obese When diagnosed, and then at routine exams   Fall prevention (exercise, vitamin D supplements) All men in this age group At routine exams   Sexually transmitted infection Men at increased risk for infection   talk with your healthcare provider At routine exams   Use of daily aspirin Men ages 45 to 79 at risk for cardiovascular health problems At routine exams   Use of tobacco and the health effects it can cause All men in this age group Every visit   [address] [place] [address] [facility]   Date Last Reviewed: 2/1/2017 2000-2017 [employer] [address] [place] [address]. All rights reserved. This information is not intended as a substitute for professional medical care. Always follow your healthcare professional's instructions.

## 2018-05-17 NOTE — PROGRESS NOTES
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  click delete button to remove this line now  click delete button to remove this line now  Are you in the first 12 months of your Medicare coverage?  No    Physical   Annual:     Getting at least 3 servings of Calcium per day::  Yes    Bi-annual eye exam::  Yes    Dental care twice a year::  Yes    Sleep apnea or symptoms of sleep apnea::  Daytime drowsiness    Diet::  Diabetic and Carbohydrate counting    Frequency of exercise::  4-5 days/week    Duration of exercise::  15-30 minutes    Taking medications regularly::  Yes    Medication side effects::  Other    Additional concerns today::  YES    Ability to successfully perform activities of daily living: telephone requires assistance, transportation requires assistance, shopping requires assistance, preparing meals requires assistance, housework requires assistance, bathing requires assistance, laundry requires assistance, medication administration requires assistance and money management requires assistance  Home Safety:  Lack of grab bars in the bathroom  Hearing Impairment: no hearing concerns       Fall risk:  Fallen 2 or more times in the past year?: No  Any fall with injury in the past year?: No  click delete button to remove this line now  COGNITIVE SCREEN  1) Repeat 3 items (Banana, Sunrise, Chair)    2) Clock draw: NORMAL  3) 3 item recall: Recalls 3 objects  Results: 3 items recalled: COGNITIVE IMPAIRMENT LESS LIKELY    Mini-CogTM Copyright CRISTI Neff. Licensed by the author for use in [facility]; reprinted with permission (caron@[email or web address]). All rights reserved.        Reviewed and updated as needed this visit by clinical staff  Tobacco  Allergies  Meds         Reviewed and updated as needed this visit by Provider        Social History   Substance Use Topics     Smoking status: Never Smoker     Smokeless tobacco: Never Used     Alcohol use Yes       Comment: 10-14 drinks per week       Alcohol Use 5/16/2018   If you drink alcohol do you typically have greater than 3 drinks per day OR greater than 7 drinks per week? No   No flowsheet data found.        PROBLEMS TO ADD ON...  He wants his urine checked, as he had occasionally noted some abdominal pressure.  Although denies any burning frequency urgency.  He is planning to see his urologist in few weeks, but wants to have a urine checked today.  Also would like to have a refill on as Levitra it works well and he wants to continue.     Is also worried about his sleep, thinking he does not sleep deep and does not feel rested sometimes.  He is not too concerned with any possible sleep apnea, but he wants to go through sleep study and wants to get a referral.  Denies any mood problem otherwise feels well  He also wants refill on all his med's.  He is due for labs and is fasting today.   Diabetes Follow-up    Patient is checking blood sugars: three times daily. 2  Blood sugar testing frequency justification: He wants to check his sugar more frequently because he worries about hypoglycemia, sometime it may run higher and he wants to, so he just likes to check it frequently.  His diabetes has been in good control I told him that he does not need to check it very frequently, but patient states that he feels more comfortable checking frequently.  Her insurance does not cover the supplies he will buy it out of his pocket.          Diabetic concerns: None     Symptoms of hypoglycemia (low blood sugar): none     Paresthesias (numbness or burning in feet) or sores: No     Date of last diabetic eye exam: His see  routinely for his checkup yearly    Hyperlipidemia Follow-Up      Rate your low fat/cholesterol diet?: good    Taking statin?  Yes, no muscle aches from statin    Other lipid medications/supplements?:  None     hypertension follow-up      His BP is usually good.  Denies any problem taking medications .  Denies any  chest pain palpitations shortness of breath leg edema etc. due for labs     BP Readings from Last 2 Encounters:   05/17/18 131/72   09/21/17 121/70     Hemoglobin A1C (%)   Date Value   05/15/2017 6.4 (H)   09/06/2016 6.4 (H)     LDL Cholesterol Calculated (mg/dL)   Date Value   09/21/2017 86   09/06/2016 64       Today's PHQ-2 Score:   PHQ-2 ( 1999 [employer]) 5/16/2018   Q1: Little interest or pleasure in doing things 1   Q2: Feeling down, depressed or hopeless 0   PHQ-2 Score 1   Q1: Little interest or pleasure in doing things Several days   Q2: Feeling down, depressed or hopeless Not at all   PHQ-2 Score 1     Answers for HPI/ROS submitted by the patient on 5/16/2018   PHQ-2 Score: 1    Do you feel safe in your environment - Yes    Do you have a Health Care Directive?: Yes: Advance Directive has been received and scanned.    Current providers sharing in care for this patient include:   Patient Care Team:  Cassia Wolff MD as PCP - General (Family Practice)    The following health maintenance items are reviewed in Epic and correct as of today:  Health Maintenance   Topic Date Due     DEXA Q2 YR  08/19/2017     FOOT EXAM Q1 YEAR  09/06/2017     CMP Q1 YR  09/06/2017     A1C Q6 MO  11/15/2017     MICROALBUMIN Q1 YEAR  05/15/2018     FALL RISK ASSESSMENT  05/15/2018     INFLUENZA VACCINE (Season Ended) 09/01/2018     TSH W/ FREE T4 REFLEX Q2 YEAR  09/06/2018     TSH Q2 YEAR  09/06/2018     LIPID MONITORING Q1 YEAR  09/21/2018     EYE EXAM Q1 YEAR  11/08/2018     ADVANCE DIRECTIVE PLANNING Q5 YRS  08/09/2022     TETANUS IMMUNIZATION (SYSTEM ASSIGNED)  06/27/2025     PNEUMOCOCCAL  Completed     Patient Active Problem List   Diagnosis     Erectile dysfunction     Family history of coronary artery disease     Gastro-esophageal reflux disease with esophagitis     Decreased libido     BPH (benign prostatic hyperplasia)     Advanced directives, counseling/discussion     Type 2 diabetes, HbA1C goal < 8% (H)      Onychomycosis     Obesity (BMI 30-39.9)     Prostate cancer-Gavin 4, treated with radiation     BMI 29.0-29.9,adult     Microalbuminuria     History of colonic polyps     CAD (coronary artery disease)     Carotid artery plaque     Primary osteoarthritis of both knees     Gastroesophageal reflux disease without esophagitis     Essential hypertension, benign     Hyperlipidemia LDL goal <70     Essential hypertension with goal blood pressure less than 140/90     Type 2 diabetes mellitus with diabetic nephropathy, without long-term current use of insulin (H)     Past Surgical History:   Procedure Laterality Date     APPENDECTOMY       ARTHROSCOPY KNEE RT/LT  1998    partial menisectomy left knee     COLONOSCOPY N/A 9/27/2016    Procedure: COMBINED COLONOSCOPY, SINGLE OR MULTIPLE BIOPSY/POLYPECTOMY BY BIOPSY;  Surgeon: Maru Orta MD;  Location:  GI     HC COLONOSCOPY THRU STOMA, DIAGNOSTIC  2005     HC COLONOSCOPY THRU STOMA, DIAGNOSTIC  5/09    diverticulosis, also proctitis     HEMORRHOIDECTOMY       PHACOEMULSIFICATION CLEAR CORNEA WITH STANDARD INTRAOCULAR LENS IMPLANT Right 3/18/2015    Procedure: PHACOEMULSIFICATION CLEAR CORNEA WITH STANDARD INTRAOCULAR LENS IMPLANT;  Surgeon: Lito Gonzales MD;  Location:  [facility]     PHACOEMULSIFICATION CLEAR CORNEA WITH STANDARD INTRAOCULAR LENS IMPLANT Left 3/25/2015    Procedure: PHACOEMULSIFICATION CLEAR CORNEA WITH STANDARD INTRAOCULAR LENS IMPLANT;  Surgeon: Lito Gonzales MD;  Location:  [facility]     ROTATOR CUFF REPAIR RT/LT  1998    right.  caused him to retire     SURGICAL HISTORY OF -   1999    L4-5 discectomy     TONSILLECTOMY & ADENOIDECTOMY       TURP         Social History   Substance Use Topics     Smoking status: Never Smoker     Smokeless tobacco: Never Used     Alcohol use Yes      Comment: 10-14 drinks per week     Family History   Problem Relation Age of Onset     C.AJAM Father      KIMBERLY Brother      Asthma Brother      KIMBERLY Mother       "DIABETES Brother      Lipids Brother            Pneumonia Vaccine:Adults age 65+ who received Pneumovax (PPSV23) at 65 years or older: Should be given PCV13 > 1 year after their most recent PPSV23    Review of Systems  CONSTITUTIONAL: NEGATIVE for fever, chills, change in weight  INTEGUMENTARY/SKIN: NEGATIVE for worrisome rashes, moles or lesions  EYES: NEGATIVE for vision changes or irritation  ENT/MOUTH: NEGATIVE for ear, mouth and throat problems  RESP: NEGATIVE for significant cough or SOB  BREAST: NEGATIVE for masses, tenderness or discharge  CV: NEGATIVE for chest pain, palpitations or peripheral edema  GI: NEGATIVE for nausea, abdominal pain, heartburn, or change in bowel habits  : NEGATIVE for frequency, dysuria, or hematuria  MUSCULOSKELETAL: NEGATIVE for significant arthralgias or myalgia  NEURO: NEGATIVE for weakness, dizziness or paresthesias  ENDOCRINE: NEGATIVE for temperature intolerance, skin/hair changes  HEME: NEGATIVE for bleeding problems  PSYCHIATRIC: NEGATIVE for changes in mood or affect  .Foot exam : No lesion , normal sensation by monofilament. Normal peripheral pulses  .       OBJECTIVE:   /72  Pulse 59  Temp 97.3  F (36.3  C) (Tympanic)  Ht 5' 8\" (1.727 m)  Wt 195 lb (88.5 kg)  SpO2 96%  BMI 29.65 kg/m2 Estimated body mass index is 29.65 kg/(m^2) as calculated from the following:    Height as of this encounter: 5' 8\" (1.727 m).    Weight as of this encounter: 195 lb (88.5 kg).  Physical Exam  GENERAL: healthy, alert and no distress  EYES: Eyes grossly normal to inspection, PERRL and conjunctivae and sclerae normal  HENT: ear canals and TM's normal, nose and mouth without ulcers or lesions  NECK: no adenopathy, no asymmetry, masses, or scars and thyroid normal to palpation  RESP: lungs clear to auscultation - no rales, rhonchi or wheezes  CV: regular rate and rhythm, normal S1 S2, no S3 or S4, no murmur, click or rub, no peripheral edema and peripheral pulses strong  ABDOMEN: " soft, nontender, no hepatosplenomegaly, no masses and bowel sounds normal  MS: no gross musculoskeletal defects noted, no edema  SKIN: no suspicious lesions or rashes  NEURO: Normal strength and tone, mentation intact and speech normal  PSYCH: mentation appears normal, affect normal/bright    ASSESSMENT / PLAN:   (Z00.00) Routine general medical examination at a health care facility  (primary encounter diagnosis)  Comment:   Plan:     (Z71.89) Advanced directives, counseling/discussion  Comment:   Plan:                       (E11.21) Type 2 diabetes mellitus with diabetic nephropathy, without long-term current use of insulin (H)  Comment:   Plan: Albumin Random Urine Quantitative with Creat         Ratio, Alcohol Swabs (ALCOHOL PADS) 70 % PADS,         blood glucose monitoring (TRUETEST) test strip,        blood glucose calibration (ONETOUCH ULTRA         CONTROL) solution, blood glucose monitoring         (ONE TOUCH ULTRA 2) meter device kit, blood         glucose monitoring (ONE TOUCH ULTRASOFT)         lancets, ENDOCRINOLOGY ADULT REFERRAL, FOOT         EXAM, TSH with free T4 reflex, Comprehensive         metabolic panel, Hemoglobin A1c         Discussed cares, diet/ exercise . Talked about DM management , health risk etc. gave refill on med's. Check lab, call pt with results.  Follow up as needed      (N52.8) Other male erectile dysfunction  Comment:   Plan: vardenafil (LEVITRA) 20 MG tablet    (R10.9) Abdominal discomfort  Comment: She is mostly worried about his urine  Plan: *UA reflex to Microscopic and Culture ([facility]         and [facility] (except [place] and         [place])    (R39.89) Urinary problem  Comment:   Plan: *UA reflex to Microscopic and Culture ([facility]         and [facility] (except [place] and         [place])        UA is normal. He also plans to do follow up with urology, as scheduled     (G47.09) Other insomnia  Comment:   Plan: SLEEP PSYCHOLOGY REFERRAL        He wish to  "proceed with referral and further evaluation       (I10) Essential hypertension, benign  Comment: stable   Plan: lisinopril (PRINIVIL/ZESTRIL) 20 MG tablet            (K21.9) Gastroesophageal reflux disease without esophagitis  Comment:   Plan: famotidine (PEPCID) 20 MG tablet          Check labs. refill sent.Cares and  treatment discussed follow. up if problem   Patient expressed understanding and agreement with treatment plan. All patient's questions were answered, will let me know if has more later.  Medications: Rx's: Reviewed the potential side effects/complications of medications prescribed.       End of Life Planning:  Patient currently has an advanced directive: Yes.  Practitioner is supportive of decision.    COUNSELING:  Reviewed preventive health counseling, as reflected in patient instructions       Regular exercise       Healthy diet/nutrition       Vision screening       Hearing screening       Dental care        Estimated body mass index is 29.65 kg/(m^2) as calculated from the following:    Height as of this encounter: 5' 8\" (1.727 m).    Weight as of this encounter: 195 lb (88.5 kg).  Weight management plan: Patient was referred to their PCP to discuss a diet and exercise plan.   reports that he has never smoked. He has never used smokeless tobacco.      Appropriate preventive services were discussed with this patient, including applicable screening as appropriate for cardiovascular disease, diabetes, osteopenia/osteoporosis, and glaucoma.  As appropriate for age/gender, discussed screening for colorectal cancer, prostate cancer, breast cancer, and cervical cancer. Checklist reviewing preventive services available has been given to the patient.    Reviewed patients plan of care and provided an AVS. The Basic Care Plan (routine screening as documented in Health Maintenance) for Jamil meets the Care Plan requirement. This Care Plan has been established and reviewed with the Patient.    Counseling " Resources:  ATP IV Guidelines  Pooled Cohorts Equation Calculator  Breast Cancer Risk Calculator  FRAX Risk Assessment  ICSI Preventive Guidelines  Dietary Guidelines for Americans, 2010  USDA's MyPlate  ASA Prophylaxis  Lung CA Screening    Cassia Wolff MD  [facility] [place]

## 2018-05-17 NOTE — MR AVS SNAPSHOT
After Visit Summary   5/17/2018    Jamil Bedolla Sr., MD    MRN: [id number]           Patient Information     Date Of Birth          1935        Visit Information        Provider Department      5/17/2018 10:00 AM Cassia Wolff [place] [facility] [place]        Today's Diagnoses     Routine general medical examination at a health care facility    -  1    Advanced directives, counseling/discussion        Other male erectile dysfunction        Type 2 diabetes mellitus with diabetic nephropathy, without long-term current use of insulin (H)        Urinary problem        Other insomnia        Abdominal discomfort        Essential hypertension, benign        Gastroesophageal reflux disease without esophagitis          Care Instructions      Prevention Guidelines, Men Ages 65 and Older  Screening tests and vaccines are an important part of managing your health.A screening test is done to find possible disorders or diseases in people who don't have any symptoms. The goal is to find a disease early so lifestyle changes can be made and you can be watched more closely to reduce the risk of disease, or to detect it early enough to treat it most effectively. Screening tests are not considered diagnostic, but are used to determine if more testing is needed.  Health counseling is essential, too. Below are guidelines for these, for men ages 65 and older. Talk with your healthcare provider to make sure you re up-to-date on what you need.  Screening Who needs it How often   Abdominal aortic aneurysm Men ages 65 to 75 who have ever smoked 1 ultrasound   Alcohol misuse All men in this age group At routine exams   Blood pressure All men in this age group Yearly checkup if your blood pressure is normal  Normal blood pressure is less than 120/80 mm Hg  If your blood pressure reading is higher than normal, follow the advice of your healthcare provider   Colorectal cancer All men in this age group Flexible  sigmoidoscopy every 5 years, or colonoscopy every 10 years, or double-contrast barium enema every 5 years; yearly fecal occult blood test or fecal immunochemical test; or a stool DNA test as often as your healthcare provider advises; talk with your healthcare provider about which tests are best for you and when you no longer need colonoscopies (generally after age 75)   Depression All men in this age group At routine exams   Type 2 diabetes or prediabetes All men beginning at age 45 and men without symptoms at any age who are overweight or obese and have 1 or more other risk factors for diabetes At least every 3 years (yearly if your blood sugar has already begun to rise)   Type 2 diabetes All men with prediabetes Every year   Hepatitis C Men at increased risk for infection - talk with your healthcare provider At routine exams   High cholesterol or triglycerides All men in this age group At least every 5 years   HIV Men at increased risk for infection - talk with your healthcare provider At routine exams   Lung cancer Adults ages 55 to 80 who have smoked Yearly screening in smokers with 30 pack-year history of smoking or who quit within 15 years   Obesity All men in this age group At routine exams   Prostate cancer All men in this age group, talk to healthcare provider about risks and benefits of digital rectal exam (AILYN) and prostate-specific antigen (PSA) screening1 At routine exams   Syphilis Men at increased risk for infection - talk with your healthcare provider At routine exams   Tuberculosis Men at increased risk for infection - talk with your healthcare provider Ask your healthcare provider   Vision All men in this age group Every 1 to 2 years; if you have a chronic health condition, ask your healthcare provider if you needs exams more often   Vaccine Who needs it How often   Chickenpox (varicella) All men in this age group who have no record of this infection or vaccine 2 doses; second dose should be given  at least 4 weeks after the first dose   Hepatitis A Men at increased risk for infection - talk with your healthcare provider 2 doses given at least 6 months apart   Hepatitis B Men at increased risk for infection - talk with your healthcare provider 3 doses over 6 months; second dose should be given 1 month after the first dose; the third dose should be given at least 2 months after the second dose and at least 4 months after the first dose   Haemophilus influenzae Type B (HIB) Men at increased risk for infection - talk with your healthcare provider 1 to 3 doses   Influenza (flu) All men in this age group  Once a year   Meningococcal Men at increased risk for infection - talk with your healthcare provider 1 or more doses   Pneumococcal conjugate vaccine (PCV13) and pneumococcal polysaccharide vaccine (PPSV23) All men in this age group 1 dose of each vaccine   Tetanus/diphtheria/  pertussis (Td/Tdap) booster All men in this age group Td every 10 years, or Tdap if you will have contact with a child younger than 12 months old   Zoster All men in this age group 1 dose   Counseling Who needs it How often   Diet and exercise Men who are overweight or obese When diagnosed, and then at routine exams   Fall prevention (exercise, vitamin D supplements) All men in this age group At routine exams   Sexually transmitted infection Men at increased risk for infection - talk with your healthcare provider At routine exams   Use of daily aspirin Men ages 45 to 79 at risk for cardiovascular health problems At routine exams   Use of tobacco and the health effects it can cause All men in this age group Every visit   [address] [place] [address] [facility]   Date Last Reviewed: 2/1/2017 2000-2017 [employer]. [address] [place] [address], [place] [address]. All rights reserved. This information is not intended as a substitute for professional medical care. Always follow your healthcare professional's instructions.                 Follow-ups after your visit        Additional Services     ENDOCRINOLOGY ADULT REFERRAL       Your provider has referred you to: [facility]: [facility] [place] [phone]   [email or web address]  [facility]:  [facility]  [phone]  [email or web address]      Please be aware that coverage of these services is subject to the terms and limitations of your health insurance plan.  Call member services at your health plan with any benefit or coverage questions.      Please bring the following to your appointment:    >>   Any x-rays, CTs or MRIs which have been performed.  Contact the facility where they were done to arrange for  prior to your scheduled appointment.    >>   List of current medications   >>   This referral request   >>   Any documents/labs given to you for this referral            SLEEP PSYCHOLOGY REFERRAL       Referral Urgency: Next available    Dr. Regis Shukla is at the following clinics on the following days:  [address]        Thursday and Friday (PLEASE CALL [phone] to schedule an appointment).  [facility] [address], [place]       Wednesdays   (PLEASE CALL [phone] to schedule an appointment).     Please be aware that coverage of these services is subject to the terms and limitations of your health insurance plan. Call member services at your health plan with any benefit or coverage questions.     Please bring the following to your appointment:  >> List of current medications   >> This referral request   >> Any documents/labs given to you for this referral                  Follow-up notes from your care team     Return in about 6 months (around 11/17/2018).      Your next 10 appointments already scheduled     May 30, 2018  9:20 AM CDT   (Arrive by 9:00 AM)   Return Visit with Wes Mejia MD  "  [facility] ([facility])    [address]  [address]  [facility] [address] [phone]              Who to contact     If you have questions or need follow up information about today's clinic visit or your schedule please contact [facility] [place] directly at [phone].  Normal or non-critical lab and imaging results will be communicated to you by [employer], letter or phone within 4 business days after the clinic has received the results. If you do not hear from us within 7 days, please contact the clinic through [employer] or phone. If you have a critical or abnormal lab result, we will notify you by phone as soon as possible.  Submit refill requests through [employer] or call your pharmacy and they will forward the refill request to us. Please allow 3 business days for your refill to be completed.          Additional Information About Your Visit        [employer] Information     [employer] gives you secure access to your electronic health record. If you see a primary care provider, you can also send messages to your care team and make appointments. If you have questions, please call your primary care clinic.  If you do not have a primary care provider, please call [phone] and they will assist you.        Care EveryWhere ID     This is your Care EveryWhere ID. This could be used by other organizations to access your [place] medical records  [id number]        Your Vitals Were     Pulse Temperature Height Pulse Oximetry BMI (Body Mass Index)       59 97.3  F (36.3  C) (Tympanic) 5' 8\" (1.727 m) 96% 29.65 kg/m2        Blood Pressure from Last 3 Encounters:   05/17/18 131/72   09/21/17 121/70   06/21/17 128/70    Weight from Last 3 Encounters:   05/17/18 195 lb (88.5 kg)   09/21/17 191 lb 3.2 oz (86.7 kg)   06/21/17 194 lb 12.8 oz (88.4 kg)              We Performed the Following     *UA reflex to Microscopic and Culture ([place] and [facility] " (except [place] and [place])     Albumin Random Urine Quantitative with Creat Ratio     Comprehensive metabolic panel     ENDOCRINOLOGY ADULT REFERRAL     FOOT EXAM     Hemoglobin A1c     SLEEP PSYCHOLOGY REFERRAL     TSH with free T4 reflex          Today's Medication Changes          These changes are accurate as of 5/17/18 10:59 AM.  If you have any questions, ask your nurse or doctor.               These medicines have changed or have updated prescriptions.        Dose/Directions    blood glucose monitoring lancets   This may have changed:  See the new instructions.   Used for:  Type 2 diabetes mellitus with diabetic nephropathy, without long-term current use of insulin (H)   Changed by:  Cassia Wolff MD        Use to test blood sugar 1-2  times daily or as directed.   Quantity:  100 each   Refills:  11       * blood glucose monitoring test strip   Commonly known as:  no brand specified   This may have changed:  Another medication with the same name was changed. Make sure you understand how and when to take each.   Used for:  Type 2 diabetes, HbA1C goal < 8% (H)   Changed by:  Cassia Wolff MD        OneTouch Ultra strips; use bid.   Quantity:  100 strip   Refills:  11       * ONETOUCH ULTRA test strip   This may have changed:  Another medication with the same name was changed. Make sure you understand how and when to take each.   Used for:  Type 2 diabetes, HbA1C goal < 8% (H)   Generic drug:  blood glucose monitoring   Changed by:  Cassia Wolff MD        USE TO TEST BLOOD SUGAR 3-4 TIMES DAILY OR AS DIRECTED   Quantity:  300 each   Refills:  3       * blood glucose monitoring test strip   Commonly known as:  ONETOUCH ULTRA   This may have changed:  Another medication with the same name was changed. Make sure you understand how and when to take each.   Used for:  Advanced directives, counseling/discussion   Changed by:  Cassia Wolff MD        Use to test blood sugar  daily  or as directed.   Quantity:  1 Box   Refills:  3       * blood glucose monitoring test strip   Commonly known as:  TRUETEST   This may have changed:  additional instructions   Used for:  Type 2 diabetes mellitus with diabetic nephropathy, without long-term current use of insulin (H)   Changed by:  Cassia Wolff MD        Testing 1-2  times per day. One Touch Ultra blue   Quantity:  3 Box   Refills:  3       famotidine 20 MG tablet   Commonly known as:  PEPCID   This may have changed:  See the new instructions.   Used for:  Gastroesophageal reflux disease without esophagitis   Changed by:  Cassia Wolff MD        Dose:  20 mg   Take 1 tablet (20 mg) by mouth 2 times daily   Quantity:  270 tablet   Refills:  3       lisinopril 20 MG tablet   Commonly known as:  PRINIVIL/ZESTRIL   This may have changed:  See the new instructions.   Used for:  Essential hypertension, benign   Changed by:  Cassia Wolff MD        Dose:  20 mg   Take 1 tablet (20 mg) by mouth 2 times daily   Quantity:  180 tablet   Refills:  1       * ONE TOUCH ULTRA (DEVICES) Misc   This may have changed:  Another medication with the same name was changed. Make sure you understand how and when to take each.   Used for:  Type II or unspecified type diabetes mellitus without mention of complication, not stated as uncontrolled   Changed by:  Cassia Wolff MD        soft lancets-testing 2-3 times per day   Quantity:  1mo   Refills:  12       * ONE TOUCH ULTRA (DEVICES) Misc   This may have changed:  Another medication with the same name was changed. Make sure you understand how and when to take each.   Used for:  Type II or unspecified type diabetes mellitus without mention of complication, not stated as uncontrolled   Changed by:  Cassia Wolff MD        Control Solution-use as directed   Quantity:  1 bottle   Refills:  prn       * blood glucose monitoring meter device kit   This may have changed:  additional  instructions   Used for:  Type 2 diabetes mellitus with diabetic nephropathy, without long-term current use of insulin (H)   Changed by:  Cassia Wolff MD        Use to test blood sugars 1- 2 times daily or as directed.   Quantity:  1 kit   Refills:  0       vardenafil 20 MG tablet   Commonly known as:  LEVITRA   This may have changed:  reasons to take this   Used for:  Other male erectile dysfunction   Changed by:  Cassia Wolff MD        Dose:  10-20 mg   Take 0.5-1 tablets (10-20 mg) by mouth daily as needed Never use with nitroglycerin, terazosin or doxazosin.   Quantity:  30 tablet   Refills:  4       * Notice:  This list has 7 medication(s) that are the same as other medications prescribed for you. Read the directions carefully, and ask your doctor or other care provider to review them with you.         Where to get your medicines      These medications were sent to [facility] - [facility] [address] [facility]  [address] [facility] [address]     Phone:  [phone]     Alcohol Pads 70 % Pads    blood glucose calibration solution    blood glucose monitoring lancets    blood glucose monitoring meter device kit    blood glucose monitoring test strip    famotidine 20 MG tablet    lisinopril 20 MG tablet         Some of these will need a paper prescription and others can be bought over the counter.  Ask your nurse if you have questions.     Bring a paper prescription for each of these medications     vardenafil 20 MG tablet                Primary Care Provider Office Phone # Fax #    Cassia Wolff [phone]       [address] [place] [facility] [place] [address]        Equal Access to Services     [facility] Center[facility] : Hadclaritza Decker, waaxda luqadaha, qaybta kaalmada gian guidry. So [facility] [phone].    ATENCIÓN: Si habla español, tiene a wray disposición servicios gratuitos de asistencia  lingüísticaTejas Steen al [phone].    We comply with applicable federal civil rights laws and [place] laws. We do not discriminate on the basis of race, color, national origin, age, disability, sex, sexual orientation, or gender identity.            Thank you!     Thank you for choosing [facility] [place]  for your care. Our goal is always to provide you with excellent care. Hearing back from our patients is one way we can continue to improve our services. Please take a few minutes to complete the written survey that you may receive in the mail after your visit with us. Thank you!             Your Updated Medication List - Protect others around you: Learn how to safely use, store and throw away your medicines at www.disposemymeds.org.          This list is accurate as of 5/17/18 10:59 AM.  Always use your most recent med list.                   Brand Name Dispense Instructions for use Diagnosis    * Alcohol Swabs Pads     300 each    Use one swab 2-3 times per day    Type 2 diabetes, HbA1C goal < 8% (H)       * B-D SINGLE USE SWABS REGULAR Pads     270 each    1 pad 3 times daily    Type 2 diabetes, HbA1C goal < 8% (H), Type II or unspecified type diabetes mellitus without mention of complication, not stated as uncontrolled       * Alcohol Pads 70 % Pads     100 each    1 Application daily as needed    Advanced directives, counseling/discussion, Type 2 diabetes mellitus with diabetic nephropathy, without long-term current use of insulin (H)       atorvastatin 40 MG tablet    LIPITOR    100 tablet    Take 1 tablet (40 mg) by mouth daily    Hyperlipidemia LDL goal <70       * blood glucose calibration solution     3 Bottle    Use to calibrate blood glucose monitor as needed as directed.    Type 2 diabetes, HbA1C goal < 8% (H)       * blood glucose calibration solution     1 Bottle    by In Vitro route 3 times daily as needed Use to calibrate blood glucose monitor as needed as directed.    Type 2 diabetes  mellitus with diabetic nephropathy, without long-term current use of insulin (H)       blood glucose monitoring lancets     100 each    Use to test blood sugar 1-2  times daily or as directed.    Type 2 diabetes mellitus with diabetic nephropathy, without long-term current use of insulin (H)       * blood glucose monitoring test strip    no brand specified    100 strip    OneTouch Ultra strips; use bid.    Type 2 diabetes, HbA1C goal < 8% (H)       * ONETOUCH ULTRA test strip   Generic drug:  blood glucose monitoring     300 each    USE TO TEST BLOOD SUGAR 3-4 TIMES DAILY OR AS DIRECTED    Type 2 diabetes, HbA1C goal < 8% (H)       * blood glucose monitoring test strip    ONETOUCH ULTRA    1 Box    Use to test blood sugar  daily or as directed.    Advanced directives, counseling/discussion       * blood glucose monitoring test strip    TRUETEST    3 Box    Testing 1-2  times per day. One Touch Ultra blue    Type 2 diabetes mellitus with diabetic nephropathy, without long-term current use of insulin (H)       famotidine 20 MG tablet    PEPCID    270 tablet    Take 1 tablet (20 mg) by mouth 2 times daily    Gastroesophageal reflux disease without esophagitis       fluticasone 50 MCG/ACT spray    FLONASE    48 g    Spray 2 sprays into both nostrils daily    Seasonal allergic rhinitis due to other allergic trigger       glucosamine-chondroitin 500-400 MG Caps per capsule      One TID        latanoprost 0.005 % ophthalmic solution    XALATAN     1 drop At Bedtime.        lisinopril 20 MG tablet    PRINIVIL/ZESTRIL    180 tablet    Take 1 tablet (20 mg) by mouth 2 times daily    Essential hypertension, benign       MELATONIN PO      Take 5 mg by mouth At Bedtime        metFORMIN 500 MG 24 hr tablet    GLUCOPHAGE-XR    360 tablet    TAKE 2 TABLETS TWICE DAILY WITH MEALS    Type 2 diabetes mellitus with diabetic nephropathy, without long-term current use of insulin (H)       Multi-vitamin Tabs tablet   Generic drug:   multivitamin, therapeutic with minerals      1 TABLET DAILY        * ONE TOUCH ULTRA (DEVICES) Misc     1mo    soft lancets-testing 2-3 times per day    Type II or unspecified type diabetes mellitus without mention of complication, not stated as uncontrolled       * ONE TOUCH ULTRA (DEVICES) Misc     1 bottle    Control Solution-use as directed    Type II or unspecified type diabetes mellitus without mention of complication, not stated as uncontrolled       * blood glucose monitoring meter device kit     1 kit    Use to test blood sugars 1- 2 times daily or as directed.    Type 2 diabetes mellitus with diabetic nephropathy, without long-term current use of insulin (H)       order for DME     1 each    Equipment being ordered: Postivac    Erectile dysfunction       vardenafil 20 MG tablet    LEVITRA    30 tablet    Take 0.5-1 tablets (10-20 mg) by mouth daily as needed Never use with nitroglycerin, terazosin or doxazosin.    Other male erectile dysfunction       * Notice:  This list has 12 medication(s) that are the same as other medications prescribed for you. Read the directions carefully, and ask your doctor or other care provider to review them with you.

## 2018-05-18 ENCOUNTER — MYC MEDICAL ADVICE (OUTPATIENT)
Dept: FAMILY MEDICINE | Facility: CLINIC | Age: 83
End: 2018-05-18

## 2018-05-18 DIAGNOSIS — E11.21 TYPE 2 DIABETES MELLITUS WITH DIABETIC NEPHROPATHY, WITHOUT LONG-TERM CURRENT USE OF INSULIN (H): ICD-10-CM

## 2018-05-18 LAB
ALBUMIN SERPL-MCNC: 4 G/DL (ref 3.4–5)
ALP SERPL-CCNC: 91 U/L (ref 40–150)
ALT SERPL W P-5'-P-CCNC: 30 U/L (ref 0–70)
ANION GAP SERPL CALCULATED.3IONS-SCNC: 8 MMOL/L (ref 3–14)
AST SERPL W P-5'-P-CCNC: 15 U/L (ref 0–45)
BILIRUB SERPL-MCNC: 0.5 MG/DL (ref 0.2–1.3)
BUN SERPL-MCNC: 27 MG/DL (ref 7–30)
CALCIUM SERPL-MCNC: 9.2 MG/DL (ref 8.5–10.1)
CHLORIDE SERPL-SCNC: 106 MMOL/L (ref 94–109)
CO2 SERPL-SCNC: 22 MMOL/L (ref 20–32)
CREAT SERPL-MCNC: 0.9 MG/DL (ref 0.66–1.25)
CREAT UR-MCNC: 62 MG/DL
GFR SERPL CREATININE-BSD FRML MDRD: 81 ML/MIN/1.7M2
GLUCOSE SERPL-MCNC: 120 MG/DL (ref 70–99)
MICROALBUMIN UR-MCNC: 15 MG/L
MICROALBUMIN/CREAT UR: 24.56 MG/G CR (ref 0–17)
POTASSIUM SERPL-SCNC: 4.6 MMOL/L (ref 3.4–5.3)
PROT SERPL-MCNC: 7.7 G/DL (ref 6.8–8.8)
SODIUM SERPL-SCNC: 136 MMOL/L (ref 133–144)
TSH SERPL DL<=0.005 MIU/L-ACNC: 0.79 MU/L (ref 0.4–4)

## 2018-05-18 RX ORDER — METFORMIN HCL 500 MG
TABLET, EXTENDED RELEASE 24 HR ORAL
Qty: 360 TABLET | Refills: 1 | Status: SHIPPED | OUTPATIENT
Start: 2018-05-18 | End: 2018-08-10

## 2018-05-25 DIAGNOSIS — C61 PROSTATE CANCER (H): Primary | ICD-10-CM

## 2018-05-30 ENCOUNTER — OFFICE VISIT (OUTPATIENT)
Dept: UROLOGY | Facility: CLINIC | Age: 83
End: 2018-05-30
Payer: COMMERCIAL

## 2018-05-30 VITALS
WEIGHT: 191 LBS | HEART RATE: 63 BPM | SYSTOLIC BLOOD PRESSURE: 138 MMHG | OXYGEN SATURATION: 97 % | DIASTOLIC BLOOD PRESSURE: 76 MMHG | HEIGHT: 69 IN | BODY MASS INDEX: 28.29 KG/M2

## 2018-05-30 DIAGNOSIS — C61 PROSTATE CANCER (H): Primary | ICD-10-CM

## 2018-05-30 LAB
ALBUMIN UR-MCNC: NEGATIVE MG/DL
APPEARANCE UR: CLEAR
BILIRUB UR QL STRIP: NEGATIVE
COLOR UR AUTO: YELLOW
GLUCOSE UR STRIP-MCNC: NEGATIVE MG/DL
HGB UR QL STRIP: NEGATIVE
KETONES UR STRIP-MCNC: NEGATIVE MG/DL
LEUKOCYTE ESTERASE UR QL STRIP: NEGATIVE
NITRATE UR QL: NEGATIVE
PH UR STRIP: 5 PH (ref 5–7)
PSA SERPL-MCNC: 0.4 NG/ML (ref 0–4)
RESIDUAL VOLUME (RV) (EXTERNAL): 32
SOURCE: NORMAL
SP GR UR STRIP: 1.02 (ref 1–1.03)
UROBILINOGEN UR STRIP-ACNC: 0.2 EU/DL (ref 0.2–1)

## 2018-05-30 PROCEDURE — 84153 ASSAY OF PSA TOTAL: CPT | Performed by: UROLOGY

## 2018-05-30 PROCEDURE — 51798 US URINE CAPACITY MEASURE: CPT | Performed by: UROLOGY

## 2018-05-30 PROCEDURE — 99213 OFFICE O/P EST LOW 20 MIN: CPT | Mod: 25 | Performed by: UROLOGY

## 2018-05-30 PROCEDURE — 36415 COLL VENOUS BLD VENIPUNCTURE: CPT | Performed by: UROLOGY

## 2018-05-30 PROCEDURE — 81003 URINALYSIS AUTO W/O SCOPE: CPT | Performed by: UROLOGY

## 2018-05-30 ASSESSMENT — PAIN SCALES - GENERAL: PAINLEVEL: NO PAIN (0)

## 2018-05-30 NOTE — MR AVS SNAPSHOT
After Visit Summary   5/30/2018    Jamil Bedolla Sr., MD    MRN: [id number]           Patient Information     Date Of Birth          1935        Visit Information        Provider Department      5/30/2018 9:20 AM Wes Mejia MD [facility]        Today's Diagnoses     BPH (benign prostatic hyperplasia)    -  1    Prostate cancer (H)           Follow-ups after your visit        Your next 10 appointments already scheduled     Jun 13, 2018  2:00 PM CDT   Cystoscopy with Wes Mejia MD, UA CYF   [facility] ([facility])    [address]  [address]  [facility] [address]   [phone]            Nov 30, 2018 10:30 AM CST   Return Visit with Wes Mejia MD   [facility] ([facility])    [address]  [address]  [facility] [address]   [phone]              Future tests that were ordered for you today     Open Future Orders        Priority Expected Expires Ordered    PSA Diag Urologic Phys Routine 11/30/2018 5/30/2019 5/30/2018            Who to contact     If you have questions or need follow up information about today's clinic visit or your schedule please contact [facility] directly at [phone].  Normal or non-critical lab and imaging results will be communicated to you by MyChart, letter or phone within 4 business days after the clinic has received the results. If you do not hear from us within 7 days, please contact the clinic through MyChart or phone. If you have a critical or abnormal lab result, we will notify you by phone as soon as possible.  Submit refill requests through GRAYL or call your pharmacy and they will forward the refill request to us. Please allow 3 business days for your refill to be completed.          Additional Information About Your Visit        GetO2hart  "Information     Kiran gives you secure access to your electronic health record. If you see a primary care provider, you can also send messages to your care team and make appointments. If you have questions, please call your primary care clinic.  If you do not have a primary care provider, please call [phone] and they will assist you.        Care EveryWhere ID     This is your Care EveryWhere ID. This could be used by other organizations to access your [place] medical records  [id number]        Your Vitals Were     Pulse Height Pulse Oximetry BMI (Body Mass Index)          63 1.74 m (5' 8.5\") 97% 28.62 kg/m2         Blood Pressure from Last 3 Encounters:   05/30/18 138/76   05/17/18 131/72   09/21/17 121/70    Weight from Last 3 Encounters:   05/30/18 86.6 kg (191 lb)   05/17/18 88.5 kg (195 lb)   09/21/17 86.7 kg (191 lb 3.2 oz)              We Performed the Following     MEASURE POST-VOID RESIDUAL URINE/BLADDER CAPACITY, US NON-IMAGING (87589)     PSA Diag Urologic Phys     UA without Microscopic        Primary Care Provider Office Phone # Fax #    Cassia Wolff [phone]       [address] [place] [facility] [place] [address]        Equal Access to Services     [facility] AH: Hadii aad ku hadasho Soomaali, waaxda luqadaha, qaybta kaalmada adeegyada, waxay jeromein hayaan murtaza mclean . So [facility] [phone].    ATENCIÓN: Si habla español, tiene a wray disposición servicios gratuitos de asistencia lingüística. Llame al [phone].    We comply with applicable federal civil rights laws and [place] laws. We do not discriminate on the basis of race, color, national origin, age, disability, sex, sexual orientation, or gender identity.            Thank you!     Thank you for choosing [facility]  for your care. Our goal is always to provide you with excellent care. Hearing back from our patients is one way we can continue to improve our " services. Please take a few minutes to complete the written survey that you may receive in the mail after your visit with us. Thank you!             Your Updated Medication List - Protect others around you: Learn how to safely use, store and throw away your medicines at www.disposemymeds.org.          This list is accurate as of 5/30/18 10:27 AM.  Always use your most recent med list.                   Brand Name Dispense Instructions for use Diagnosis    * Alcohol Swabs Pads     300 each    Use one swab 2-3 times per day    Type 2 diabetes, HbA1C goal < 8% (H)       * B-D SINGLE USE SWABS REGULAR Pads     270 each    1 pad 3 times daily    Type 2 diabetes, HbA1C goal < 8% (H), Type II or unspecified type diabetes mellitus without mention of complication, not stated as uncontrolled       * Alcohol Pads 70 % Pads     100 each    1 Application daily as needed    Type 2 diabetes mellitus with diabetic nephropathy, without long-term current use of insulin (H)       atorvastatin 40 MG tablet    LIPITOR    100 tablet    Take 1 tablet (40 mg) by mouth daily    Hyperlipidemia LDL goal <70       * blood glucose calibration solution     3 Bottle    Use to calibrate blood glucose monitor as needed as directed.    Type 2 diabetes, HbA1C goal < 8% (H)       * blood glucose calibration solution     1 Bottle    by In Vitro route 3 times daily as needed Use to calibrate blood glucose monitor as needed as directed.    Type 2 diabetes mellitus with diabetic nephropathy, without long-term current use of insulin (H)       blood glucose monitoring lancets     100 each    Use to test blood sugar 1-2  times daily or as directed.    Type 2 diabetes mellitus with diabetic nephropathy, without long-term current use of insulin (H)       * blood glucose monitoring test strip    no brand specified    100 strip    OneTouch Ultra strips; use bid.    Type 2 diabetes, HbA1C goal < 8% (H)       * ONETOUCH ULTRA test strip   Generic drug:  blood  glucose monitoring     300 each    USE TO TEST BLOOD SUGAR 3-4 TIMES DAILY OR AS DIRECTED    Type 2 diabetes, HbA1C goal < 8% (H)       * blood glucose monitoring test strip    ONETOUCH ULTRA    1 Box    Use to test blood sugar  daily or as directed.    Advanced directives, counseling/discussion       * blood glucose monitoring test strip    TRUETEST    3 Box    Testing 1-2  times per day. One Touch Ultra blue    Type 2 diabetes mellitus with diabetic nephropathy, without long-term current use of insulin (H)       famotidine 20 MG tablet    PEPCID    270 tablet    Take 1 tablet (20 mg) by mouth 2 times daily    Gastroesophageal reflux disease without esophagitis       fluticasone 50 MCG/ACT spray    FLONASE    48 g    Spray 2 sprays into both nostrils daily    Seasonal allergic rhinitis due to other allergic trigger       glucosamine-chondroitin 500-400 MG Caps per capsule      One TID        latanoprost 0.005 % ophthalmic solution    XALATAN     1 drop At Bedtime.        lisinopril 20 MG tablet    PRINIVIL/ZESTRIL    180 tablet    Take 1 tablet (20 mg) by mouth 2 times daily    Essential hypertension, benign       MELATONIN PO      Take 5 mg by mouth At Bedtime        metFORMIN 500 MG 24 hr tablet    GLUCOPHAGE-XR    360 tablet    TAKE 2 TABLETS TWICE DAILY WITH MEALS    Type 2 diabetes mellitus with diabetic nephropathy, without long-term current use of insulin (H)       Multi-vitamin Tabs tablet   Generic drug:  multivitamin, therapeutic with minerals      1 TABLET DAILY        * ONE TOUCH ULTRA (DEVICES) Misc     1mo    soft lancets-testing 2-3 times per day    Type II or unspecified type diabetes mellitus without mention of complication, not stated as uncontrolled       * ONE TOUCH ULTRA (DEVICES) Misc     1 bottle    Control Solution-use as directed    Type II or unspecified type diabetes mellitus without mention of complication, not stated as uncontrolled       * blood glucose monitoring meter device kit      1 kit    Use to test blood sugars 1- 2 times daily or as directed.    Type 2 diabetes mellitus with diabetic nephropathy, without long-term current use of insulin (H)       order for DME     1 each    Equipment being ordered: Postivac    Erectile dysfunction       vardenafil 20 MG tablet    LEVITRA    30 tablet    Take 0.5-1 tablets (10-20 mg) by mouth daily as needed Never use with nitroglycerin, terazosin or doxazosin.    Other male erectile dysfunction       * Notice:  This list has 12 medication(s) that are the same as other medications prescribed for you. Read the directions carefully, and ask your doctor or other care provider to review them with you.

## 2018-05-30 NOTE — PROGRESS NOTES
Jamil Bedolla is an 82-year-old male who underwent 6 months of hormonal therapy and external radiation for a grade 3+4 adenocarcinoma the prostate. Initial stage was T2b  PSA is now 0.40. He is noticing more difficulty initiating a stream. 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  Other past medical history: Coronary artery disease, carotid artery disease, colonic polyps, diverticulosis, ED, glaucoma, high cholesterol, hypertension, history of patent foramen ovale, history of plantar fasciitis, presbyesophagus, type 2 diabetes, proteinuria, nonsmoker  Family history: Asthma, diabetes, coronary artery disease  Medications: Lipitor, Pepcid, Flonase spray, glucosamine/chondroitin, Xalatan ophthalmic solution, lisinopril, melatonin, metformin, multivitamin, Levitra  Allergies: IV contrast  Exam: Normal appearance, normal vital signs, alert and oriented, normocephalic, normal respirations, neuro grossly intact. Abdominal exam reveals no tenderness, masses. No inguinal hernia or adenopathy. Normal sphincter tone, no rectal mass or impaction, smooth prostatic fossa, seminal vesicles not palpable  Assessment: Difficulty initiating urinary stream-post void residual 13 cc. Increasing PSA-long discussion regarding follow-up, options for treatment if PSA continues to rise  Plan: See me back for cystoscopy in 1-2 weeks, PSA in 6 months

## 2018-05-30 NOTE — LETTER
5/30/2018     RE: Jamil Bedolla Sr., MD  [address] [place] [address]     Dear Colleague,    Thank you for referring your patient, Jamil Bedolla Sr., MD, to the [facility] [place] [facility]. Please see a copy of my visit note below.    Jamil Bedolla is an 82-year-old male who underwent 6 months of hormonal therapy and external radiation for a grade 3+4 adenocarcinoma the prostate. Initial stage was T2b  PSA is now 0.40. He is noticing more difficulty initiating a stream. 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  Other past medical history: Coronary artery disease, carotid artery disease, colonic polyps, diverticulosis, ED, glaucoma, high cholesterol, hypertension, history of patent foramen ovale, history of plantar fasciitis, presbyesophagus, type 2 diabetes, proteinuria, nonsmoker  Family history: Asthma, diabetes, coronary artery disease  Medications: Lipitor, Pepcid, Flonase spray, glucosamine/chondroitin, Xalatan ophthalmic solution, lisinopril, melatonin, metformin, multivitamin, Levitra  Allergies: IV contrast  Exam: Normal appearance, normal vital signs, alert and oriented, normocephalic, normal respirations, neuro grossly intact. Abdominal exam reveals no tenderness, masses. No inguinal hernia or adenopathy. Normal sphincter tone, no rectal mass or impaction, smooth prostatic fossa, seminal vesicles not palpable  Assessment: Difficulty initiating urinary stream-post void residual 13 cc. Increasing PSA-long discussion regarding follow-up, options for treatment if PSA continues to rise  Plan: See me back for cystoscopy in 1-2 weeks, PSA in 6 months    Again, thank you for allowing me to participate in the care of your patient.      Sincerely,    Wes Mejia MD

## 2018-06-12 DIAGNOSIS — R39.198 SLOWING OF URINARY STREAM: Primary | ICD-10-CM

## 2018-06-12 DIAGNOSIS — E11.9 TYPE 2 DIABETES, HBA1C GOAL < 8% (H): ICD-10-CM

## 2018-06-12 NOTE — TELEPHONE ENCOUNTER
Prescription approved per [facility], [facility] or [facility] refill protocol.  Georgette Stoll RN - Triage  [facility]

## 2018-06-12 NOTE — TELEPHONE ENCOUNTER
Prescription approved per [facility], [facility] or [facility] refill protocol.  Georgette Stoll RN - Triage  [facility]

## 2018-06-12 NOTE — TELEPHONE ENCOUNTER
"Requested Prescriptions   Pending Prescriptions Disp Refills     blood glucose monitoring (ONETOUCH ULTRA) test strip  Last Written Prescription Date:  5/17/18  Last Fill Quantity: 3 box,  # refills: 3   Last office visit: 5/17/2018 with prescribing provider:  New   Future Office Visit:     300 each 3     Sig: Use to test blood sugar 1-2 times daily or as directed.    Diabetic Supplies Protocol Passed    6/12/2018  1:44 PM       Passed - Patient is 18 years of age or older       Passed - Recent (6 mo) or future (30 days) visit within the authorizing provider's specialty    Patient had office visit in the last 6 months or has a visit in the next 30 days with authorizing provider.  See \"Patient Info\" tab in inbasket, or \"Choose Columns\" in Meds & Orders section of the refill encounter.              "

## 2018-06-13 ENCOUNTER — OFFICE VISIT (OUTPATIENT)
Dept: UROLOGY | Facility: CLINIC | Age: 83
End: 2018-06-13
Payer: MEDICARE

## 2018-06-13 VITALS
SYSTOLIC BLOOD PRESSURE: 128 MMHG | DIASTOLIC BLOOD PRESSURE: 80 MMHG | HEIGHT: 68 IN | HEART RATE: 80 BPM | BODY MASS INDEX: 28.64 KG/M2 | WEIGHT: 189 LBS

## 2018-06-13 DIAGNOSIS — R39.198 SLOWING OF URINARY STREAM: ICD-10-CM

## 2018-06-13 DIAGNOSIS — R39.11 URINARY HESITANCY: Primary | ICD-10-CM

## 2018-06-13 DIAGNOSIS — Z85.46 PERSONAL HISTORY OF MALIGNANT NEOPLASM OF PROSTATE: ICD-10-CM

## 2018-06-13 DIAGNOSIS — C61 PROSTATE CANCER (H): Primary | ICD-10-CM

## 2018-06-13 LAB
ALBUMIN UR-MCNC: NEGATIVE MG/DL
APPEARANCE UR: CLEAR
BILIRUB UR QL STRIP: NEGATIVE
COLOR UR AUTO: YELLOW
GLUCOSE UR STRIP-MCNC: NEGATIVE MG/DL
HGB UR QL STRIP: NEGATIVE
KETONES UR STRIP-MCNC: NEGATIVE MG/DL
LEUKOCYTE ESTERASE UR QL STRIP: NEGATIVE
NITRATE UR QL: NEGATIVE
PH UR STRIP: 5 PH (ref 5–7)
SOURCE: NORMAL
SP GR UR STRIP: 1.02 (ref 1–1.03)
UROBILINOGEN UR STRIP-ACNC: 0.2 EU/DL (ref 0.2–1)

## 2018-06-13 PROCEDURE — 81003 URINALYSIS AUTO W/O SCOPE: CPT | Performed by: UROLOGY

## 2018-06-13 PROCEDURE — 52000 CYSTOURETHROSCOPY: CPT | Mod: GW | Performed by: UROLOGY

## 2018-06-13 RX ORDER — CIPROFLOXACIN 500 MG/1
500 TABLET, FILM COATED ORAL ONCE
Qty: 1 TABLET | Refills: 0 | Status: SHIPPED | OUTPATIENT
Start: 2018-06-13 | End: 2018-06-13

## 2018-06-13 ASSESSMENT — PAIN SCALES - GENERAL: PAINLEVEL: NO PAIN (0)

## 2018-06-13 NOTE — MR AVS SNAPSHOT
After Visit Summary   6/13/2018    Jamil Bedolla Sr., MD    MRN: [id number]           Patient Information     Date Of Birth          1935        Visit Information        Provider Department      6/13/2018 2:00 PM Wes Mejia MD; UA CYF [facility]        Today's Diagnoses     Urinary hesitancy    -  1    Personal history of malignant neoplasm of prostate          Care Instructions    Please call [phone] to schedule MRI          Follow-ups after your visit        Your next 10 appointments already scheduled     Nov 30, 2018 10:30 AM CST   Return Visit with Wes Mejia MD   [facility] [place] ([facility])    [address]  [address]  [facility] [address] [phone]              Future tests that were ordered for you today     Open Future Orders        Priority Expected Expires Ordered    MR Prostate [[id number]] Routine  6/13/2019 6/13/2018            Who to contact     If you have questions or need follow up information about today's clinic visit or your schedule please contact [facility] directly at [phone].  Normal or non-critical lab and imaging results will be communicated to you by Plymptonhart, letter or phone within 4 business days after the clinic has received the results. If you do not hear from us within 7 days, please contact the clinic through Plymptonhart or phone. If you have a critical or abnormal lab result, we will notify you by phone as soon as possible.  Submit refill requests through eSNF or call your pharmacy and they will forward the refill request to us. Please allow 3 business days for your refill to be completed.          Additional Information About Your Visit        MyChart Information     eSNF gives you secure access to your electronic health record. If you see a primary care provider, you can also send messages to your  "care team and make appointments. If you have questions, please call your primary care clinic.  If you do not have a primary care provider, please call [phone] and they will assist you.        Care EveryWhere ID     This is your Care EveryWhere ID. This could be used by other organizations to access your [place] medical records  [id number]        Your Vitals Were     Pulse Height BMI (Body Mass Index)             80 1.727 m (5' 8\") 28.74 kg/m2          Blood Pressure from Last 3 Encounters:   06/13/18 128/80   05/30/18 138/76   05/17/18 131/72    Weight from Last 3 Encounters:   06/13/18 85.7 kg (189 lb)   05/30/18 86.6 kg (191 lb)   05/17/18 88.5 kg (195 lb)              We Performed the Following     CYSTOURETHROSCOPY (54890)          Today's Medication Changes          These changes are accurate as of 6/13/18  2:56 PM.  If you have any questions, ask your nurse or doctor.               Start taking these medicines.        Dose/Directions    ciprofloxacin 500 MG tablet   Commonly known as:  CIPRO   Used for:  Prostate cancer (H)   Started by:  Wes Mejia MD        Dose:  500 mg   Take 1 tablet (500 mg) by mouth once for 1 dose   Quantity:  1 tablet   Refills:  0            Where to get your medicines      These medications were sent to [facility] [place], [place] - [address]  [address], [place] [address]     Phone:  [phone]     ciprofloxacin 500 MG tablet                Primary Care Provider Office Phone # Fax #    Cassia Baltazar Wolff [phone] [address] [place] [facility] [place] [address]        Equal Access to Services     [facility] AH: Hadclaritza Decker, blake goodman, gian durán. So [facility] [phone].    ATENCIÓN: Si habla español, tiene a wray disposición servicios gratuitos de asistencia lingüística. Llame al [phone].    We comply with applicable federal " civil rights laws and [place] laws. We do not discriminate on the basis of race, color, national origin, age, disability, sex, sexual orientation, or gender identity.            Thank you!     Thank you for choosing [facility]  for your care. Our goal is always to provide you with excellent care. Hearing back from our patients is one way we can continue to improve our services. Please take a few minutes to complete the written survey that you may receive in the mail after your visit with us. Thank you!             Your Updated Medication List - Protect others around you: Learn how to safely use, store and throw away your medicines at www.disposemymeds.org.          This list is accurate as of 6/13/18  2:56 PM.  Always use your most recent med list.                   Brand Name Dispense Instructions for use Diagnosis    * Alcohol Swabs Pads     300 each    Use one swab 2-3 times per day    Type 2 diabetes, HbA1C goal < 8% (H)       * B-D SINGLE USE SWABS REGULAR Pads     270 each    1 pad 3 times daily    Type 2 diabetes, HbA1C goal < 8% (H), Type II or unspecified type diabetes mellitus without mention of complication, not stated as uncontrolled       * Alcohol Pads 70 % Pads     100 each    1 Application daily as needed    Type 2 diabetes mellitus with diabetic nephropathy, without long-term current use of insulin (H)       atorvastatin 40 MG tablet    LIPITOR    100 tablet    Take 1 tablet (40 mg) by mouth daily    Hyperlipidemia LDL goal <70       * blood glucose calibration solution     3 Bottle    Use to calibrate blood glucose monitor as needed as directed.    Type 2 diabetes, HbA1C goal < 8% (H)       * blood glucose calibration solution     1 Bottle    by In Vitro route 3 times daily as needed Use to calibrate blood glucose monitor as needed as directed.    Type 2 diabetes mellitus with diabetic nephropathy, without long-term current use of insulin (H)       blood  glucose monitoring lancets     100 each    Use to test blood sugar 1-2  times daily or as directed.    Type 2 diabetes mellitus with diabetic nephropathy, without long-term current use of insulin (H)       * blood glucose monitoring test strip    no brand specified    100 strip    OneTouch Ultra strips; use bid.    Type 2 diabetes, HbA1C goal < 8% (H)       * blood glucose monitoring test strip    ONETOUCH ULTRA    1 Box    Use to test blood sugar  daily or as directed.    Advanced directives, counseling/discussion       * blood glucose monitoring test strip    TRUETEST    3 Box    Testing 1-2  times per day. One Touch Ultra blue    Type 2 diabetes mellitus with diabetic nephropathy, without long-term current use of insulin (H)       * blood glucose monitoring test strip    ONETOUCH ULTRA    300 each    USE TO TEST BLOOD SUGAR 3-4 TIMES DAILY OR AS DIRECTED    Type 2 diabetes, HbA1C goal < 8% (H)       ciprofloxacin 500 MG tablet    CIPRO    1 tablet    Take 1 tablet (500 mg) by mouth once for 1 dose    Prostate cancer (H)       famotidine 20 MG tablet    PEPCID    270 tablet    Take 1 tablet (20 mg) by mouth 2 times daily    Gastroesophageal reflux disease without esophagitis       fluticasone 50 MCG/ACT spray    FLONASE    48 g    Spray 2 sprays into both nostrils daily    Seasonal allergic rhinitis due to other allergic trigger       glucosamine-chondroitin 500-400 MG Caps per capsule      One TID        latanoprost 0.005 % ophthalmic solution    XALATAN     1 drop At Bedtime.        lisinopril 20 MG tablet    PRINIVIL/ZESTRIL    180 tablet    Take 1 tablet (20 mg) by mouth 2 times daily    Essential hypertension, benign       MELATONIN PO      Take 5 mg by mouth At Bedtime        metFORMIN 500 MG 24 hr tablet    GLUCOPHAGE-XR    360 tablet    TAKE 2 TABLETS TWICE DAILY WITH MEALS    Type 2 diabetes mellitus with diabetic nephropathy, without long-term current use of insulin (H)       Multi-vitamin Tabs tablet    Generic drug:  multivitamin, therapeutic with minerals      1 TABLET DAILY        * ONE TOUCH ULTRA (DEVICES) Misc     1mo    soft lancets-testing 2-3 times per day    Type II or unspecified type diabetes mellitus without mention of complication, not stated as uncontrolled       * ONE TOUCH ULTRA (DEVICES) Misc     1 bottle    Control Solution-use as directed    Type II or unspecified type diabetes mellitus without mention of complication, not stated as uncontrolled       * blood glucose monitoring meter device kit     1 kit    Use to test blood sugars 1- 2 times daily or as directed.    Type 2 diabetes mellitus with diabetic nephropathy, without long-term current use of insulin (H)       order for DME     1 each    Equipment being ordered: Postivac    Erectile dysfunction       vardenafil 20 MG tablet    LEVITRA    30 tablet    Take 0.5-1 tablets (10-20 mg) by mouth daily as needed Never use with nitroglycerin, terazosin or doxazosin.    Other male erectile dysfunction       * Notice:  This list has 12 medication(s) that are the same as other medications prescribed for you. Read the directions carefully, and ask your doctor or other care provider to review them with you.

## 2018-06-13 NOTE — PROGRESS NOTES
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 He's had more urinary hesitancy than usual initiating a stream and has to strain. This exacerbates his diastases recti. He now presents for cystoscopy to rule out obstruction. He had TURP for benign disease in 1992.  Recently his PSA increased from 0.11-0.40. He has a negative rectal exam but is anxious about the increase.  Flexible cystoscopy-normal urethra, wide-open prostatic fossa and bladder neck, trabeculated bladder with large diverticuli.  Patient has small postvoid residuals with Valsalva.  Assessment: No bladder outlet obstruction, weak bladder wall, increasing PSA after radiation  Plan: Cipro 500 mg ×1 today. 3 Lynda MRI of prostate to rule out local recurrence. UA, PVR Visit in the fall-check residual without straining to void

## 2018-06-13 NOTE — NURSING NOTE
Chief Complaint   Patient presents with     Follow Up For     pt. here for Cystoscopy due to slow stream      Maggie Men

## 2018-06-13 NOTE — LETTER
6/13/2018     RE: Jamil Bedolla Sr., MD  [address] [place] [address]     Dear Colleague,    Thank you for referring your patient, Jamil Bedolla Sr., MD, to the [facility] UROLOGY CLINIC [place] at [facility]. Please see a copy of my visit note below.    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 He's had more urinary hesitancy than usual initiating a stream and has to strain. This exacerbates his diastases recti. He now presents for cystoscopy to rule out obstruction. He had TURP for benign disease in 1992.  Recently his PSA increased from 0.11-0.40. He has a negative rectal exam but is anxious about the increase.  Flexible cystoscopy-normal urethra, wide-open prostatic fossa and bladder neck, trabeculated bladder with large diverticuli.  Patient has small postvoid residuals with Valsalva.  Assessment: No bladder outlet obstruction, weak bladder wall, increasing PSA after radiation  Plan: Cipro 500 mg ×1 today. 3 Lynda MRI of prostate to rule out local recurrence. UA, PVR Visit in the fall-check residual without straining to void    Again, thank you for allowing me to participate in the care of your patient.      Sincerely,    Wes Mejia MD

## 2018-06-13 NOTE — PATIENT INSTRUCTIONS
"Please call [phone] to schedule MRI         AFTER YOUR CYSTOSCOPY         You have just completed a cystoscopy, or \"cysto\", which allowed your physician to learn more about your bladder (or to remove a stent placed after surgery). We suggest that you continue to avoid caffeine, fruit juice, and alcohol for the next 24 hours, however, you are encouraged to return to your normal activities.       A few things that are considered normal after your cystoscopy:    * small amount of bleeding (or spotting) that clears within the next 24 hours    * slight burning sensation with urination    * sensation to of needing to avoid more frequently    * the feeling of \"air\" in your urine    * mild discomfort that is relieved with Tylonol        Please contact our office promptly if you:    * develop a fever above 101 degrees    * are unable to urinate    * develop bright red blood that does not stop    * severe pain or swelling        And of course, please contact our office with any concerns or questions [phone]        "

## 2018-06-26 ENCOUNTER — RADIANT APPOINTMENT (OUTPATIENT)
Dept: MRI IMAGING | Facility: CLINIC | Age: 83
End: 2018-06-26
Attending: UROLOGY
Payer: COMMERCIAL

## 2018-06-26 DIAGNOSIS — Z85.46 PERSONAL HISTORY OF MALIGNANT NEOPLASM OF PROSTATE: ICD-10-CM

## 2018-06-26 RX ORDER — GADOBUTROL 604.72 MG/ML
7.5 INJECTION INTRAVENOUS ONCE
Status: COMPLETED | OUTPATIENT
Start: 2018-06-26 | End: 2018-06-26

## 2018-06-26 RX ADMIN — GADOBUTROL 7.5 ML: 604.72 INJECTION INTRAVENOUS at 18:42

## 2018-06-27 NOTE — DISCHARGE INSTRUCTIONS

## 2018-06-29 DIAGNOSIS — Z79.2 PROPHYLACTIC ANTIBIOTIC: Primary | ICD-10-CM

## 2018-06-29 RX ORDER — CIPROFLOXACIN 500 MG/1
500 TABLET, FILM COATED ORAL 2 TIMES DAILY
Qty: 6 TABLET | Refills: 0 | Status: CANCELLED | OUTPATIENT
Start: 2018-06-29

## 2018-07-02 DIAGNOSIS — Z79.2 PROPHYLACTIC ANTIBIOTIC: Primary | ICD-10-CM

## 2018-07-02 RX ORDER — SULFAMETHOXAZOLE/TRIMETHOPRIM 800-160 MG
1 TABLET ORAL EVERY 12 HOURS
Qty: 6 TABLET | Refills: 0 | Status: SHIPPED | OUTPATIENT
Start: 2018-07-02 | End: 2018-07-05

## 2018-07-02 NOTE — PROGRESS NOTES
----- Message -----      From: Wes Mejia MD      Sent: 6/28/2018   5:29 PM        To: [facility] - Scheduling Pool     Needs TRUSP + biopsies   Stopping aspirin today   Septra DS #6 - please call in; take 1 every 12 hours starting day before procedure   Gent 80mg IM 30 mins before procedure        Septra DS e-scribed to pt's pharmacy as above. Pt informed. He expressed understanding and agreement wit this plan.

## 2018-07-03 DIAGNOSIS — J30.2 SEASONAL ALLERGIC RHINITIS: Primary | ICD-10-CM

## 2018-07-03 RX ORDER — FLUTICASONE PROPIONATE 50 MCG
SPRAY, SUSPENSION (ML) NASAL
Qty: 48 G | Refills: 11 | Status: SHIPPED | OUTPATIENT
Start: 2018-07-03 | End: 2019-08-23

## 2018-07-03 NOTE — TELEPHONE ENCOUNTER
"Requested Prescriptions   Pending Prescriptions Disp Refills     fluticasone (FLONASE) 50 MCG/ACT spray [Pharmacy Med Name: FLUTICASONE PROPIONATE 50 MCG/ACT Suspension] 48 g 11    Last Written Prescription Date:  05/15/2017  Last Fill Quantity: 48 g,  # refills: 11   Last office visit: 5/17/2018 with prescribing provider:  Cassia Wolff Office Visit:   Next 5 appointments (look out 90 days)     Sep 26, 2018 10:45 AM CDT   Return Visit with Mikal Chung MD   [facility] ([facility])    [address] [place] [address]   [phone] OPT 2                  Sig: USE 2 SPRAYS IN EACH NOSTRIL EVERY DAY    Inhaled Steroids Protocol Passed    7/3/2018  5:12 AM       Passed - Patient is age 12 or older       Passed - Recent (12 mo) or future (30 days) visit within the authorizing provider's specialty    Patient had office visit in the last 12 months or has a visit in the next 30 days with authorizing provider or within the authorizing provider's specialty.  See \"Patient Info\" tab in inbasket, or \"Choose Columns\" in Meds & Orders section of the refill encounter.                  "

## 2018-07-03 NOTE — TELEPHONE ENCOUNTER
Prescription approved per [facility], [facility] or [facility] refill protocol.  Georgette Stoll RN - Triage  [facility]

## 2018-07-06 ENCOUNTER — APPOINTMENT (OUTPATIENT)
Dept: UROLOGY | Facility: CLINIC | Age: 83
End: 2018-07-06
Payer: COMMERCIAL

## 2018-07-06 ENCOUNTER — OFFICE VISIT (OUTPATIENT)
Dept: UROLOGY | Facility: CLINIC | Age: 83
End: 2018-07-06
Payer: COMMERCIAL

## 2018-07-06 VITALS
HEIGHT: 68 IN | BODY MASS INDEX: 28.64 KG/M2 | OXYGEN SATURATION: 96 % | DIASTOLIC BLOOD PRESSURE: 70 MMHG | WEIGHT: 189 LBS | SYSTOLIC BLOOD PRESSURE: 142 MMHG | HEART RATE: 70 BPM

## 2018-07-06 DIAGNOSIS — C61 MALIGNANT NEOPLASM OF PROSTATE (H): Primary | ICD-10-CM

## 2018-07-06 PROCEDURE — 88342 IMHCHEM/IMCYTCHM 1ST ANTB: CPT | Mod: 59 | Performed by: UROLOGY

## 2018-07-06 PROCEDURE — 76872 US TRANSRECTAL: CPT | Performed by: UROLOGY

## 2018-07-06 PROCEDURE — 88305 TISSUE EXAM BY PATHOLOGIST: CPT | Performed by: UROLOGY

## 2018-07-06 PROCEDURE — 76942 ECHO GUIDE FOR BIOPSY: CPT | Mod: 59 | Performed by: UROLOGY

## 2018-07-06 PROCEDURE — 55700 HC BIOPSY PROSTATE NEEDLE/PUNCH: CPT | Performed by: UROLOGY

## 2018-07-06 PROCEDURE — 88344 IMHCHEM/IMCYTCHM EA MLT ANTB: CPT | Performed by: UROLOGY

## 2018-07-06 ASSESSMENT — PAIN SCALES - GENERAL
PAINLEVEL: NO PAIN (0)
PAINLEVEL: NO PAIN (0)

## 2018-07-06 NOTE — PATIENT INSTRUCTIONS
[facility]  Transrectal Ultrasound  Post Operative Information    The physician who performed your Transrectal Ultrasound is Dr. Mejia (telephone number [phone]).  Please contact this doctor if you have any problems or questions.  If unable to reach your doctor, please return to the Emergency Department.      Take one antibiotic the evening of the procedure and then as directed on your prescription.    Drink at least 6-8 glasses of fluids for the first 48 hours.    Avoid heavy lifting and strenuous activity for 48 hours.    Avoid sexual intercourse for the first 24 hours.    No aspirin or ibuprofen products (Motrin, Advil, Nuprin, ect.) for one week.  You may take acetaminophen (Tylenol) for pain.    You may notice a small amount of blood on the tissue after a bowel movement.    You may pass blood with clots in your urine following the procedure.  The amount will decrease with time but may be visible for up to two weeks.     You make have blood in your semen for 4 weeks after the procedure.    You may experience mild perineal (groin area) discomfort after the procedure.    Please call you doctor if you have any of the follow symptoms:  Fever  Increase in the amount of blood passed  Severe discomfort or pain

## 2018-07-06 NOTE — MR AVS SNAPSHOT
After Visit Summary   7/6/2018    Jamil Bedolla Sr., MD    MRN: [id number]           Patient Information     Date Of Birth          1935        Visit Information        Provider Department      7/6/2018 3:00 PM Wes Mejia MD; UA BX ROOM [facility] [place]        Today's Diagnoses     Malignant neoplasm of prostate (H)    -  1      Care Instructions    [facility]  Transrectal Ultrasound  Post Operative Information    The physician who performed your Transrectal Ultrasound is Dr. Mejia (telephone number [phone]).  Please contact this doctor if you have any problems or questions.  If unable to reach your doctor, please return to the Emergency Department.      Take one antibiotic the evening of the procedure and then as directed on your prescription.    Drink at least 6-8 glasses of fluids for the first 48 hours.    Avoid heavy lifting and strenuous activity for 48 hours.    Avoid sexual intercourse for the first 24 hours.    No aspirin or ibuprofen products (Motrin, Advil, Nuprin, ect.) for one week.  You may take acetaminophen (Tylenol) for pain.    You may notice a small amount of blood on the tissue after a bowel movement.    You may pass blood with clots in your urine following the procedure.  The amount will decrease with time but may be visible for up to two weeks.     You make have blood in your semen for 4 weeks after the procedure.    You may experience mild perineal (groin area) discomfort after the procedure.    Please call you doctor if you have any of the follow symptoms:  Fever  Increase in the amount of blood passed  Severe discomfort or pain            Follow-ups after your visit        Your next 10 appointments already scheduled     Jul 13, 2018  9:00 AM CDT   Return Visit with Wes Mejia MD   [facility] [place] ([facility] [place])    [address]  [address] [place] [address]  [address]   [phone]            Sep 26, 2018 10:45 AM CDT   Return Visit with Mikal Chung MD   [facility] ([facility])    [address]  [place] [address] [phone] OPT 2            Nov 30, 2018 10:30 AM CST   Return Visit with Wes Mejia MD   [facility] [place] ([facility] [place])    [address]  [address]  [facility] [id number] [phone]              Future tests that were ordered for you today     Open Future Orders        Priority Expected Expires Ordered    US TRANSRECTAL Routine  7/6/2019 7/6/2018            Who to contact     If you have questions or need follow up information about today's clinic visit or your schedule please contact [facility] directly at [phone].  Normal or non-critical lab and imaging results will be communicated to you by [email or web address], letter or phone within 4 business days after the clinic has received the results. If you do not hear from us within 7 days, please contact the clinic through Pulsart or phone. If you have a critical or abnormal lab result, we will notify you by phone as soon as possible.  Submit refill requests through Auterra or call your pharmacy and they will forward the refill request to us. Please allow 3 business days for your refill to be completed.          Additional Information About Your Visit        [email or web address] Information     Auterra gives you secure access to your electronic health record. If you see a primary care provider, you can also send messages to your care team and make appointments. If you have questions, please call your primary care clinic.  If you do not have a primary care provider, please call [phone] and they will assist you.        Care EveryWhere ID     This is your Care EveryWhere ID. This could be used by other organizations to access your [facility]  "records  [id number]        Your Vitals Were     Pulse Height Pulse Oximetry BMI (Body Mass Index)          70 1.727 m (5' 8\") 96% 28.74 kg/m2         Blood Pressure from Last 3 Encounters:   07/06/18 142/70   06/13/18 128/80   05/30/18 138/76    Weight from Last 3 Encounters:   07/06/18 85.7 kg (189 lb)   06/13/18 85.7 kg (189 lb)   05/30/18 86.6 kg (191 lb)              We Performed the Following     BIOPSY OF PROSTATE,NEEDLE/PUNCH [74894]     INJECT NERV BLCK,OTHR PERIPH NERV [82423]        Primary Care Provider Office Phone # Fax #    Cassia Wolff [phone]       [address]  [place] [address]        Equal Access to Services     [facility]: Hadii aad ku hadasho Soomaali, waaxda luqadaha, qaybta kaalmada adeegyada, waxay jeromein hayhetal mclean . So [facility] [phone].    ATENCIÓN: Si habla español, tiene a wray disposición servicios gratuitos de asistencia lingüística. Celsa al [phone].    We comply with applicable federal civil rights laws and [place] laws. We do not discriminate on the basis of race, color, national origin, age, disability, sex, sexual orientation, or gender identity.            Thank you!     Thank you for choosing [facility]OLOGY CL[facility] [place]  for your care. Our goal is always to provide you with excellent care. Hearing back from our patients is one way we can continue to improve our services. Please take a few minutes to complete the written survey that you may receive in the mail after your visit with us. Thank you!             Your Updated Medication List - Protect others around you: Learn how to safely use, store and throw away your medicines at www.disposemymeds.org.          This list is accurate as of 7/6/18  3:48 PM.  Always use your most recent med list.                   Brand Name Dispense Instructions for use Diagnosis    * Alcohol Swabs Pads     300 each    Use one swab 2-3 times per day    Type 2 " diabetes, HbA1C goal < 8% (H)       * B-D SINGLE USE SWABS REGULAR Pads     270 each    1 pad 3 times daily    Type 2 diabetes, HbA1C goal < 8% (H), Type II or unspecified type diabetes mellitus without mention of complication, not stated as uncontrolled       * Alcohol Pads 70 % Pads     100 each    1 Application daily as needed    Type 2 diabetes mellitus with diabetic nephropathy, without long-term current use of insulin (H)       atorvastatin 40 MG tablet    LIPITOR    100 tablet    Take 1 tablet (40 mg) by mouth daily    Hyperlipidemia LDL goal <70       * blood glucose calibration solution     3 Bottle    Use to calibrate blood glucose monitor as needed as directed.    Type 2 diabetes, HbA1C goal < 8% (H)       * blood glucose calibration solution     1 Bottle    by In Vitro route 3 times daily as needed Use to calibrate blood glucose monitor as needed as directed.    Type 2 diabetes mellitus with diabetic nephropathy, without long-term current use of insulin (H)       blood glucose monitoring lancets     100 each    Use to test blood sugar 1-2  times daily or as directed.    Type 2 diabetes mellitus with diabetic nephropathy, without long-term current use of insulin (H)       * blood glucose monitoring test strip    no brand specified    100 strip    OneTouch Ultra strips; use bid.    Type 2 diabetes, HbA1C goal < 8% (H)       * blood glucose monitoring test strip    ONETOUCH ULTRA    1 Box    Use to test blood sugar  daily or as directed.    Advanced directives, counseling/discussion       * blood glucose monitoring test strip    TRUETEST    3 Box    Testing 1-2  times per day. One Touch Ultra blue    Type 2 diabetes mellitus with diabetic nephropathy, without long-term current use of insulin (H)       * blood glucose monitoring test strip    ONETOUCH ULTRA    300 each    USE TO TEST BLOOD SUGAR 3-4 TIMES DAILY OR AS DIRECTED    Type 2 diabetes, HbA1C goal < 8% (H)       famotidine 20 MG tablet    PEPCID     270 tablet    Take 1 tablet (20 mg) by mouth 2 times daily    Gastroesophageal reflux disease without esophagitis       fluticasone 50 MCG/ACT spray    FLONASE    48 g    USE 2 SPRAYS IN EACH NOSTRIL EVERY DAY    Seasonal allergic rhinitis       glucosamine-chondroitin 500-400 MG Caps per capsule      One TID        latanoprost 0.005 % ophthalmic solution    XALATAN     1 drop At Bedtime.        lisinopril 20 MG tablet    PRINIVIL/ZESTRIL    180 tablet    Take 1 tablet (20 mg) by mouth 2 times daily    Essential hypertension, benign       MELATONIN PO      Take 5 mg by mouth At Bedtime        metFORMIN 500 MG 24 hr tablet    GLUCOPHAGE-XR    360 tablet    TAKE 2 TABLETS TWICE DAILY WITH MEALS    Type 2 diabetes mellitus with diabetic nephropathy, without long-term current use of insulin (H)       Multi-vitamin Tabs tablet   Generic drug:  multivitamin, therapeutic with minerals      1 TABLET DAILY        * ONE TOUCH ULTRA (DEVICES) Misc     1mo    soft lancets-testing 2-3 times per day    Type II or unspecified type diabetes mellitus without mention of complication, not stated as uncontrolled       * ONE TOUCH ULTRA (DEVICES) Misc     1 bottle    Control Solution-use as directed    Type II or unspecified type diabetes mellitus without mention of complication, not stated as uncontrolled       * blood glucose monitoring meter device kit     1 kit    Use to test blood sugars 1- 2 times daily or as directed.    Type 2 diabetes mellitus with diabetic nephropathy, without long-term current use of insulin (H)       order for DME     1 each    Equipment being ordered: Postivac    Erectile dysfunction       vardenafil 20 MG tablet    LEVITRA    30 tablet    Take 0.5-1 tablets (10-20 mg) by mouth daily as needed Never use with nitroglycerin, terazosin or doxazosin.    Other male erectile dysfunction       * Notice:  This list has 12 medication(s) that are the same as other medications prescribed for you. Read the directions  carefully, and ask your doctor or other care provider to review them with you.

## 2018-07-06 NOTE — NURSING NOTE
Chief Complaint   Patient presents with     Hx of Prostate Cancer       The following medication was given:     MEDICATION:  Lidocaine 2% Soln  ROUTE: Rectal  SITE: Prostate  DOSE: 20ml  LOT #: [id number]  : Hospira  EXPIRATION DATE: 04/01/2020  NDC#: 2962-4736-77  Was there drug waste? No        The following medication was given:     MEDICATION:  Gentamicin  ROUTE: IM  SITE: LUQ - Gluteus  DOSE: 80mg  LOT #: [id number]  : Premier  EXPIRATION DATE:01/19  NDC#: 353420-914-16  Was there drug waste? No    Pre-Operative    Consent read and signed: Yes     Allergies   Allergen Reactions     Ivp Dye [Contrast Dye]      Pre-operative antibiotics taken: Yes  Aspirin or other blood thinning medications not taken in 7-10 days:  Yes  Time of Fleet's enema: 1:00pm        Krysta Larios CMA  July 6, 2018

## 2018-07-06 NOTE — PROGRESS NOTES
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 His PSA increased recently from 0.1-0.4. Rectal exam was unremarkable. MRI scan shows a lesion at the left apex. He now returns for ultrasound of the prostate with transrectal biopsies. He has been off anticoagulants, taken oral antibiotic and received gentamicin IM prior to the procedure.  Procedure: Transrectal ultrasound of prostate, transrectal biopsies of prostate  Local anesthesia  Estimated blood loss less than 5 cc  Findings: Very small prostate, large TUR defect, hypoechoic changes at the left apex, less so at left base laterally and right base laterally. 4 biopsies taken from left apex, one biopsy from right base laterally and left base laterally  Complications: None. Patient tolerated procedure well  Follow-up: Rest for 24 hours, complete antibiotic course. Call with tissue report next Tuesday  Discussed possibility of seed implants, cryotherapy, intermittent hormonal therapy

## 2018-07-08 ENCOUNTER — MEDICAL CORRESPONDENCE (OUTPATIENT)
Dept: HEALTH INFORMATION MANAGEMENT | Facility: CLINIC | Age: 83
End: 2018-07-08

## 2018-07-11 LAB — COPATH REPORT: NORMAL

## 2018-07-13 ENCOUNTER — OFFICE VISIT (OUTPATIENT)
Dept: UROLOGY | Facility: CLINIC | Age: 83
End: 2018-07-13
Payer: COMMERCIAL

## 2018-07-13 VITALS
DIASTOLIC BLOOD PRESSURE: 68 MMHG | WEIGHT: 189 LBS | OXYGEN SATURATION: 97 % | SYSTOLIC BLOOD PRESSURE: 130 MMHG | BODY MASS INDEX: 28.64 KG/M2 | HEART RATE: 64 BPM | HEIGHT: 68 IN

## 2018-07-13 DIAGNOSIS — C61 PROSTATE CANCER (H): ICD-10-CM

## 2018-07-13 DIAGNOSIS — C61 MALIGNANT NEOPLASM OF PROSTATE (H): Primary | ICD-10-CM

## 2018-07-13 PROCEDURE — 99214 OFFICE O/P EST MOD 30 MIN: CPT | Performed by: UROLOGY

## 2018-07-13 PROCEDURE — 96402 CHEMO HORMON ANTINEOPL SQ/IM: CPT | Performed by: UROLOGY

## 2018-07-13 ASSESSMENT — PAIN SCALES - GENERAL: PAINLEVEL: NO PAIN (0)

## 2018-07-13 NOTE — PROGRESS NOTES
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 His PSA increased from 0.1-0.4 recently. An MRI scan of the pelvis showed a suspicious area at the left apex. Biopsies show grade 4+4 recurrence at the left apex and left base.  He is here with his wife today to discuss options for treatment.  Other past medical history: Coronary artery disease, carotid artery plaque, colonic polyps, diverticulosis, ED, glaucoma, high cholesterol, hypertension, patent foramen ovale, plantar fasciitis, type 2 diabetes, nonsmoker  Medications: Lipitor, Pepcid, Flonase, glucosamine/chondroitin, Xalatan ophthalmic solution, lisinopril, metformin, melatonin, Levitra  Allergies: IV contrast  Exam: Alert and oriented, with spouse. Previous rectal exams unremarkable. Small prostate on ultrasound, large TUR defect  Assessment: Recurrent grade 4 adenocarcinoma of the prostate. Discussed options for treatment, risks and follow-up. Not a candidate for salvage prostatectomy or further external radiation. Discussed afterloading of seeds, cryotherapy, intermittent and continuous hormonal therapy, prognosis, side effects.  Plan: Confirm again 240 mg today, bone scan, CBC and LFTs. See Edgardo Guzman M.D. to discuss cryotherapy. They would also like to have a second opinion before any final decision is made-I recommended they see Joel Che M.D. at the [facility] or Je Hernandez M.D. at [facility]

## 2018-07-13 NOTE — LETTER
7/13/2018       RE: Jamil Bedolla Sr., MD  [address] [place] [address]     Dear Colleague,    Thank you for referring your patient, Jamil Bedolla Sr., MD, to the [facility]OLOG[facility] [place] at [facility]. Please see a copy of my visit note below.    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 His PSA increased from 0.1-0.4 recently. An MRI scan of the pelvis showed a suspicious area at the left apex. Biopsies show grade 4+4 recurrence at the left apex and left base.  He is here with his wife today to discuss options for treatment.  Other past medical history: Coronary artery disease, carotid artery plaque, colonic polyps, diverticulosis, ED, glaucoma, high cholesterol, hypertension, patent foramen ovale, plantar fasciitis, type 2 diabetes, nonsmoker  Medications: Lipitor, Pepcid, Flonase, glucosamine/chondroitin, Xalatan ophthalmic solution, lisinopril, metformin, melatonin, Levitra  Allergies: IV contrast  Exam: Alert and oriented, with spouse. Previous rectal exams unremarkable. Small prostate on ultrasound, large TUR defect  Assessment: Recurrent grade 4 adenocarcinoma of the prostate. Discussed options for treatment, risks and follow-up. Not a candidate for salvage prostatectomy or further external radiation. Discussed afterloading of seeds, cryotherapy, intermittent and continuous hormonal therapy, prognosis, side effects.  Plan: Confirm again 240 mg today, bone scan, CBC and LFTs. See Edgardo Guzman M.D. to discuss cryotherapy. They would also like to have a second opinion before any final decision is made-I recommended they see Joel Che M.D. at the [facility] or Je Hernandez M.D. at [facility]    Again, thank you for allowing me to participate in the care of your patient.       Sincerely,    Wes Mejia MD

## 2018-07-13 NOTE — LETTER
7/13/2018      RE: Jamil Bedolla Sr., MD  [address]   [place] [address]       Jamil Bedolla M.D. is an 82-year-old male who was treated for grade 3+4 adenocarcinoma of the prostate several years ago with hormonal therapy and external beam radiation therapy. His PSA increased from 0.1-0.4 recently. An MRI scan of the pelvis showed a suspicious area at the left apex. Biopsies show grade 4+4 recurrence at the left apex and left base.  He is here with his wife today to discuss options for treatment.  Other past medical history: Coronary artery disease, carotid artery plaque, colonic polyps, diverticulosis, ED, glaucoma, high cholesterol, hypertension, patent foramen ovale, plantar fasciitis, type 2 diabetes, nonsmoker  Medications: Lipitor, Pepcid, Flonase, glucosamine/chondroitin, Xalatan ophthalmic solution, lisinopril, metformin, melatonin, Levitra  Allergies: IV contrast  Exam: Alert and oriented, with spouse. Previous rectal exams unremarkable. Small prostate on ultrasound, large TUR defect  Assessment: Recurrent grade 4 adenocarcinoma of the prostate. Discussed options for treatment, risks and follow-up. Not a candidate for salvage prostatectomy or further external radiation. Discussed afterloading of seeds, cryotherapy, intermittent and continuous hormonal therapy, prognosis, side effects.  Plan: Confirm again 240 mg today, bone scan, CBC and LFTs. See Edgardo Guzman M.D. to discuss cryotherapy. They would also like to have a second opinion before any final decision is made-I recommended they see Joel Che M.D. at the [facility] or Je Hernandez M.D. at [facility]    Wes Mejia MD

## 2018-07-13 NOTE — MR AVS SNAPSHOT
After Visit Summary   7/13/2018    Jamil Bedolla Sr., MD    MRN: [id number]           Patient Information     Date Of Birth          1935        Visit Information        Provider Department      7/13/2018 9:00 AM Wes Mejia MD [facility]        Today's Diagnoses     Malignant neoplasm of prostate (H)    -  1       Follow-ups after your visit        Your next 10 appointments already scheduled     Jul 20, 2018 10:00 AM CDT   NM INJECTION with SHNMINJ   [facility] [place] [facility] Nuclear Medicine ([facility])    [address] [address] [phone]            Jul 20, 2018  1:00 PM CDT   NM BONE SCAN WHOLE BODY with SHNM1   [facility] Nuclear Medicine [facility])    [address] [address] [phone]           Please bring a list of your medicines to the exam. (Include vitamins, minerals and over-the-counter drugs.) You should wear comfortable clothes. Leave your valuables at home. Please bring related prior results and films.  Tell your doctor:   If you are breastfeeding or may be pregnant.   If you have had a barium test within the past 48 hours. Barium may change the results of certain exams.   If you think you may need sedation (medicine to help you relax).  You may eat and drink as normal.  Please call your Imaging Department at your exam site with any questions.            Jul 23, 2018  8:00 AM CDT   Return Visit with Edgardo Guzman MD   [facility] [place] ([facility])    [address]  [address]  [facility] [address]   [phone]            Aug 16, 2018  1:00 PM CDT   Return Visit with UA NURSE   [facility] ([facility])    [address]  [address]  [facility] [address]   [phone]            Sep 26, 2018 10:45 AM CDT   Return Visit with Mikal GROVE  MD Sheldon   [facility]   [place] ([facility])    [address]  [place] [address] [phone] OPT 2            Nov 30, 2018 10:30 AM CST   Return Visit with Wes Mejia MD   [facility] [place] ([facility] [place] [facility])    [address]  [address]  [place] [id number] [phone]              Future tests that were ordered for you today     Open Future Orders        Priority Expected Expires Ordered    CBC with platelets [PCU225] Routine  7/13/2019 7/13/2018    Hepatic panel Routine  7/13/2019 7/13/2018    NM Bone Scan Whole Body Routine  7/13/2019 7/13/2018            Who to contact     If you have questions or need follow up information about today's clinic visit or your schedule please contact [facility] [place] directly at [phone].  Normal or non-critical lab and imaging results will be communicated to you by Composerighthart, letter or phone within 4 business days after the clinic has received the results. If you do not hear from us within 7 days, please contact the clinic through Holidog or phone. If you have a critical or abnormal lab result, we will notify you by phone as soon as possible.  Submit refill requests through Holidog or call your pharmacy and they will forward the refill request to us. Please allow 3 business days for your refill to be completed.          Additional Information About Your Visit        Composerighthart Information     Holidog gives you secure access to your electronic health record. If you see a primary care provider, you can also send messages to your care team and make appointments. If you have questions, please call your primary care clinic.  If you do not have a primary care provider, please call [phone] and they will assist you.        Care EveryWhere ID     This is your Care EveryWhere ID. This could be used by other organizations to access  "your [facility] medical records  [id number]        Your Vitals Were     Pulse Height Pulse Oximetry BMI (Body Mass Index)          64 1.727 m (5' 8\") 97% 28.74 kg/m2         Blood Pressure from Last 3 Encounters:   07/13/18 130/68   07/06/18 142/70   06/13/18 128/80    Weight from Last 3 Encounters:   07/13/18 85.7 kg (189 lb)   07/06/18 85.7 kg (189 lb)   06/13/18 85.7 kg (189 lb)               Primary Care Provider Office Phone # Fax #    Cassia Baltazar Wolff [phone]       [address]  [place] [address]        Equal Access to Services     [facility] : Hadii vincenzo jamil hadasho Soomaali, waaxda luqadaha, qaybta kaalmada adeegyada, gian canoin gerald mclean . So [facility] [phone].    ATENCIÓN: Si habla español, tiene a wray disposición servicios gratuitos de asistencia lingüística. [facility] [phone].    We comply with applicable federal civil rights laws and [place] laws. We do not discriminate on the basis of race, color, national origin, age, disability, sex, sexual orientation, or gender identity.            Thank you!     Thank you for choosing [facility] [place]  for your care. Our goal is always to provide you with excellent care. Hearing back from our patients is one way we can continue to improve our services. Please take a few minutes to complete the written survey that you may receive in the mail after your visit with us. Thank you!             Your Updated Medication List - Protect others around you: Learn how to safely use, store and throw away your medicines at www.disposemymeds.org.          This list is accurate as of 7/13/18 10:13 AM.  Always use your most recent med list.                   Brand Name Dispense Instructions for use Diagnosis    * Alcohol Swabs Pads     300 each    Use one swab 2-3 times per day    Type 2 diabetes, HbA1C goal < 8% (H)       * B-D SINGLE USE SWABS REGULAR Pads     270 each    1 pad 3 " times daily    Type 2 diabetes, HbA1C goal < 8% (H), Type II or unspecified type diabetes mellitus without mention of complication, not stated as uncontrolled       * Alcohol Pads 70 % Pads     100 each    1 Application daily as needed    Type 2 diabetes mellitus with diabetic nephropathy, without long-term current use of insulin (H)       atorvastatin 40 MG tablet    LIPITOR    100 tablet    Take 1 tablet (40 mg) by mouth daily    Hyperlipidemia LDL goal <70       * blood glucose calibration solution     3 Bottle    Use to calibrate blood glucose monitor as needed as directed.    Type 2 diabetes, HbA1C goal < 8% (H)       * blood glucose calibration solution     1 Bottle    by In Vitro route 3 times daily as needed Use to calibrate blood glucose monitor as needed as directed.    Type 2 diabetes mellitus with diabetic nephropathy, without long-term current use of insulin (H)       blood glucose monitoring lancets     100 each    Use to test blood sugar 1-2  times daily or as directed.    Type 2 diabetes mellitus with diabetic nephropathy, without long-term current use of insulin (H)       * blood glucose monitoring test strip    no brand specified    100 strip    OneTouch Ultra strips; use bid.    Type 2 diabetes, HbA1C goal < 8% (H)       * blood glucose monitoring test strip    ONETOUCH ULTRA    1 Box    Use to test blood sugar  daily or as directed.    Advanced directives, counseling/discussion       * blood glucose monitoring test strip    TRUETEST    3 Box    Testing 1-2  times per day. One Touch Ultra blue    Type 2 diabetes mellitus with diabetic nephropathy, without long-term current use of insulin (H)       * blood glucose monitoring test strip    ONETOUCH ULTRA    300 each    USE TO TEST BLOOD SUGAR 3-4 TIMES DAILY OR AS DIRECTED    Type 2 diabetes, HbA1C goal < 8% (H)       famotidine 20 MG tablet    PEPCID    270 tablet    Take 1 tablet (20 mg) by mouth 2 times daily    Gastroesophageal reflux disease  without esophagitis       fluticasone 50 MCG/ACT spray    FLONASE    48 g    USE 2 SPRAYS IN EACH NOSTRIL EVERY DAY    Seasonal allergic rhinitis       glucosamine-chondroitin 500-400 MG Caps per capsule      One TID        latanoprost 0.005 % ophthalmic solution    XALATAN     1 drop At Bedtime.        lisinopril 20 MG tablet    PRINIVIL/ZESTRIL    180 tablet    Take 1 tablet (20 mg) by mouth 2 times daily    Essential hypertension, benign       MELATONIN PO      Take 5 mg by mouth At Bedtime        metFORMIN 500 MG 24 hr tablet    GLUCOPHAGE-XR    360 tablet    TAKE 2 TABLETS TWICE DAILY WITH MEALS    Type 2 diabetes mellitus with diabetic nephropathy, without long-term current use of insulin (H)       Multi-vitamin Tabs tablet   Generic drug:  multivitamin, therapeutic with minerals      1 TABLET DAILY        * ONE TOUCH ULTRA (DEVICES) Misc     1mo    soft lancets-testing 2-3 times per day    Type II or unspecified type diabetes mellitus without mention of complication, not stated as uncontrolled       * ONE TOUCH ULTRA (DEVICES) Misc     1 bottle    Control Solution-use as directed    Type II or unspecified type diabetes mellitus without mention of complication, not stated as uncontrolled       * blood glucose monitoring meter device kit     1 kit    Use to test blood sugars 1- 2 times daily or as directed.    Type 2 diabetes mellitus with diabetic nephropathy, without long-term current use of insulin (H)       order for DME     1 each    Equipment being ordered: Postivac    Erectile dysfunction       vardenafil 20 MG tablet    LEVITRA    30 tablet    Take 0.5-1 tablets (10-20 mg) by mouth daily as needed Never use with nitroglycerin, terazosin or doxazosin.    Other male erectile dysfunction       * Notice:  This list has 12 medication(s) that are the same as other medications prescribed for you. Read the directions carefully, and ask your doctor or other care provider to review them with you.

## 2018-07-13 NOTE — NURSING NOTE
Chief Complaint   Patient presents with     HX OF PROSTATE CANCER     PATIENT HERE TODAY TOO GO OVER CTRUS RESULT         The following medication was given:     MEDICATION:  Firmagon  ROUTE: SQ  SITE: RLQ - Abd.  DOSE: 120mg  LOT #: [id number]  : SHAQ  EXPIRATION DATE:04/2020  NDC#: [address]-0   Was there drug waste? No  Multi-dose vial: Yes        The following medication was given:     MEDICATION:  Firmagon  ROUTE: SQ  SITE: LUQ - Abd  DOSE: 120mg  LOT #: [id number]  : SHAQ  EXPIRATION DATE: 04/2020  NDC#: [address]-5  Was there drug waste? No  Multi-dose vial: Yes      Patient will return in one month for another  Firmagon    Krysta Larios [facility]  July 13, 2018

## 2018-07-20 ENCOUNTER — HOSPITAL ENCOUNTER (OUTPATIENT)
Dept: NUCLEAR MEDICINE | Facility: CLINIC | Age: 83
Setting detail: NUCLEAR MEDICINE
End: 2018-07-20
Attending: UROLOGY
Payer: MEDICARE

## 2018-07-20 ENCOUNTER — HOSPITAL ENCOUNTER (OUTPATIENT)
Dept: NUCLEAR MEDICINE | Facility: CLINIC | Age: 83
Setting detail: NUCLEAR MEDICINE
Discharge: HOME | End: 2018-07-20
Attending: UROLOGY | Admitting: UROLOGY
Payer: MEDICARE

## 2018-07-20 DIAGNOSIS — C61 MALIGNANT NEOPLASM OF PROSTATE (H): ICD-10-CM

## 2018-07-20 PROCEDURE — A9503 TC99M MEDRONATE: HCPCS | Performed by: UROLOGY

## 2018-07-20 PROCEDURE — 78306 BONE IMAGING WHOLE BODY: CPT

## 2018-07-20 PROCEDURE — 34300033 ZZH RX 343: Performed by: UROLOGY

## 2018-07-20 RX ORDER — TC 99M MEDRONATE 20 MG/10ML
25 INJECTION, POWDER, LYOPHILIZED, FOR SOLUTION INTRAVENOUS ONCE
Status: COMPLETED | OUTPATIENT
Start: 2018-07-20 | End: 2018-07-20

## 2018-07-20 RX ADMIN — TC 99M MEDRONATE 25 MCI.: 20 INJECTION, POWDER, LYOPHILIZED, FOR SOLUTION INTRAVENOUS at 10:31

## 2018-07-23 ENCOUNTER — OFFICE VISIT (OUTPATIENT)
Dept: UROLOGY | Facility: CLINIC | Age: 83
End: 2018-07-23
Payer: COMMERCIAL

## 2018-07-23 VITALS
HEIGHT: 69 IN | BODY MASS INDEX: 28.14 KG/M2 | HEART RATE: 62 BPM | DIASTOLIC BLOOD PRESSURE: 78 MMHG | OXYGEN SATURATION: 97 % | SYSTOLIC BLOOD PRESSURE: 122 MMHG | WEIGHT: 190 LBS

## 2018-07-23 DIAGNOSIS — C61 PROSTATE CANCER (H): Primary | ICD-10-CM

## 2018-07-23 PROCEDURE — 99215 OFFICE O/P EST HI 40 MIN: CPT | Performed by: UROLOGY

## 2018-07-23 ASSESSMENT — PAIN SCALES - GENERAL: PAINLEVEL: NO PAIN (0)

## 2018-07-23 NOTE — PROGRESS NOTES
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 Mejia.  He has a history of being diagnosed with Gavin 7 adenocarcinoma the prostate 5 years ago and treated by external beam therapy at that time.  There has been a recent gradual rise in his PSA  Results for ALTIGNACIO SR., MD (MRN [id number]) as of 7/23/2018 08:19   Ref. Range 12/16/2016 10:35 6/21/2017 10:32 11/28/2017 10:58 5/30/2018 09:16   PSA Diag Urologic Phys Latest Ref Range: 0.00 - 4.00 ng/mL 0.06 0.09 0.11 0.40     He has no major symptoms at this time, is controlling the urine well.  In view of the recent rise in the PSA, the MRI scan was repeated    MRI PROSTATE:  6/26/2018 7:39 PM     CLINICAL HISTORY: grade 3+4 prostate cancer, treated with radiation,  PSA increasing; Personal history of malignant neoplasm of prostate     Comparison: 7/9/2013.     TECHNIQUE:      The following sequences were obtained: High-resolution axial  T2-weighted, coronal T2-weighted, 3D volumetric T2-weighted, axial  pre-contrast T1, axial diffusion-weighted, axial apparent diffusion  coefficient and axial dynamic contrast-enhanced T1. Postcontrast  images were evaluated on a separate workstation to evaluate dynamic  contrast enhancement.  Contrast dose: 7.5mL Gadavist     Using an image analysis software package ([employer]), three-dimensional  (3D) volumetric images of the prostate were reconstructed by a  radiologist and archived to PACS for prostate lesion analysis and  biopsy planning.  Additionally, the software package was utilized for  contrast kinetic analysis.     FINDINGS:  Prostate gland size: Large TURP defect making size estimated  unreliable.     Diffuse radiation changes in the remaining prostate. PI-RADS  terminology is not utilized due to previous radiation therapy changes.     There is a focus of abnormal diffusion at the left prostate apex at  approximately  the 3:00 position relative to the urethra. This is best  seen on series 11 image 16 and series 14 image 299. It measures 1.4  cm. It has early focal contrast enhancement. There is low T2 signal,  however T2 signal throughout the peripheral zone is low due to  radiation changes. No gross extraprostatic spread.     Diffuse bladder trabeculation and diverticula. Colonic diverticuli. No  adenopathy. Visualized pelvic bones are unremarkable.         IMPRESSION:   1. Based on the most suspicious abnormality, clinically significant  cancer is likely to be present. There are diffuse radiation changes  and TURP changes. A 1.4 cm left prostate apex lesion is suspicious for  recurrence. PI-RADS characterization does not apply following  radiation treatment, though this lesion is the equivalent of a PI-RADs  4 abnormality.  2. No evidence of extraprostatic malignancy. No suspicious adenopathy  or evidence of pelvic metastases.     The images are interpreted according to PI-RADS v2.  http://[email or web address].[email or web address]  [email or web address]     BRENDEN HARTMAN MD  Past Medical History:   Diagnosis Date     CAD (coronary artery disease)     per calcium score=>400     Carotid artery plaque 9/2011     Colon polyps      Diverticulosis of colon      Erectile dysfunction 7/18/2008     Glaucoma      High cholesterol      HTN (hypertension) 7/18/2008     Hypertension goal BP (blood pressure) < 140/90 12/10/2010     Obesity      PFO (patent foramen ovale)      Plantar fasciitis      Presbyesophagus      Prostate cancer-Gavin 4, treated with radiation 7/28/2013     Type II or unspecified type diabetes mellitus without mention of complication, not stated as uncontrolled     Dxd about 2000       Social History     Social History     Marital status:      Spouse name: Salome Gates     Number of children: 2     Years of education: N/A     Occupational History     MD, [employer] Retired     Social  History Main Topics     Smoking status: Never Smoker     Smokeless tobacco: Never Used     Alcohol use Yes      Comment: 10-14 drinks per week     Drug use: No     Sexual activity: Yes     Partners: Female     Other Topics Concern     Parent/Sibling W/ Cabg, Mi Or Angioplasty Before 65f 55m? No     Social History Narrative       Past Surgical History:   Procedure Laterality Date     APPENDECTOMY       ARTHROSCOPY KNEE RT/LT  1998    partial menisectomy left knee     COLONOSCOPY N/A 9/27/2016    Procedure: COMBINED COLONOSCOPY, SINGLE OR MULTIPLE BIOPSY/POLYPECTOMY BY BIOPSY;  Surgeon: Maru Orta MD;  Location:  GI     HC COLONOSCOPY THRU STOMA, DIAGNOSTIC  2005     HC COLONOSCOPY THRU STOMA, DIAGNOSTIC  5/09    diverticulosis, also proctitis     HEMORRHOIDECTOMY       PHACOEMULSIFICATION CLEAR CORNEA WITH STANDARD INTRAOCULAR LENS IMPLANT Right 3/18/2015    Procedure: PHACOEMULSIFICATION CLEAR CORNEA WITH STANDARD INTRAOCULAR LENS IMPLANT;  Surgeon: Lito Gonzales MD;  Location:  [facility]     PHACOEMULSIFICATION CLEAR CORNEA WITH STANDARD INTRAOCULAR LENS IMPLANT Left 3/25/2015    Procedure: PHACOEMULSIFICATION CLEAR CORNEA WITH STANDARD INTRAOCULAR LENS IMPLANT;  Surgeon: Lito Gonzales MD;  Location:  [facility]     ROTATOR CUFF REPAIR RT/LT  1998    right.  caused him to retire     SURGICAL HISTORY OF -   1999    L4-5 discectomy     TONSILLECTOMY & ADENOIDECTOMY       TURP         Family History   Problem Relation Age of Onset     C.A.D. Father      C.A.D. Brother      Asthma Brother      C.A.STACEY. Mother      Diabetes Brother      Lipids Brother          Current Outpatient Prescriptions:      atorvastatin (LIPITOR) 40 MG tablet, Take 1 tablet (40 mg) by mouth daily, Disp: 100 tablet, Rfl: 2     famotidine (PEPCID) 20 MG tablet, Take 1 tablet (20 mg) by mouth 2 times daily, Disp: 270 tablet, Rfl: 3     GLUCOSAMINE-CHONDROITIN 500-400 MG OR CAPS, One TID, Disp: , Rfl:      latanoprost (XALATAN) 0.005 %  ophthalmic solution, 1 drop At Bedtime., Disp: , Rfl:      lisinopril (PRINIVIL/ZESTRIL) 20 MG tablet, Take 1 tablet (20 mg) by mouth 2 times daily, Disp: 180 tablet, Rfl: 1     metFORMIN (GLUCOPHAGE-XR) 500 MG 24 hr tablet, TAKE 2 TABLETS TWICE DAILY WITH MEALS, Disp: 360 tablet, Rfl: 1     MULTI-VITAMIN OR TABS, 1 TABLET DAILY, Disp: , Rfl:      vardenafil (LEVITRA) 20 MG tablet, Take 0.5-1 tablets (10-20 mg) by mouth daily as needed Never use with nitroglycerin, terazosin or doxazosin., Disp: 30 tablet, Rfl: 4     Alcohol Swabs (ALCOHOL PADS) 70 % PADS, 1 Application daily as needed (Patient not taking: Reported on 7/23/2018), Disp: 100 each, Rfl: prn     Alcohol Swabs (B-D SINGLE USE SWABS REGULAR) PADS, 1 pad 3 times daily (Patient not taking: Reported on 7/23/2018), Disp: 270 each, Rfl: 3     Alcohol Swabs PADS, Use one swab 2-3 times per day (Patient not taking: Reported on 7/23/2018), Disp: 300 each, Rfl: prn     blood glucose calibration (ONETOUCH ULTRA CONTROL) solution, by In Vitro route 3 times daily as needed Use to calibrate blood glucose monitor as needed as directed. (Patient not taking: Reported on 7/23/2018), Disp: 1 Bottle, Rfl: 3     blood glucose calibration (TRUETEST CONTROL LEVEL 2) solution, Use to calibrate blood glucose monitor as needed as directed. (Patient not taking: Reported on 7/23/2018), Disp: 3 Bottle, Rfl: 2     blood glucose monitoring (ONE TOUCH ULTRA 2) meter device kit, Use to test blood sugars 1- 2 times daily or as directed. (Patient not taking: Reported on 7/23/2018), Disp: 1 kit, Rfl: 0     blood glucose monitoring (ONE TOUCH ULTRA) test strip, Use to test blood sugar  daily or as directed. (Patient not taking: Reported on 7/23/2018), Disp: 1 Box, Rfl: 3     blood glucose monitoring (ONE TOUCH ULTRASOFT) lancets, Use to test blood sugar 1-2  times daily or as directed. (Patient not taking: Reported on 7/23/2018), Disp: 100 each, Rfl: 11     blood glucose monitoring  "(ONETOUCH ULTRA) test strip, USE TO TEST BLOOD SUGAR 3-4 TIMES DAILY OR AS DIRECTED (Patient not taking: Reported on 7/23/2018), Disp: 300 each, Rfl: 3     blood glucose monitoring (TRUETEST) test strip, Testing 1-2  times per day. One Touch Ultra blue (Patient not taking: Reported on 7/23/2018), Disp: 3 Box, Rfl: 3     blood glucose test strip, OneTouch Ultra strips; use bid. (Patient not taking: Reported on 7/23/2018), Disp: 100 strip, Rfl: 11     fluticasone (FLONASE) 50 MCG/ACT spray, USE 2 SPRAYS IN EACH NOSTRIL EVERY DAY (Patient not taking: Reported on 7/23/2018), Disp: 48 g, Rfl: 11     MELATONIN PO, Take 5 mg by mouth At Bedtime, Disp: , Rfl:      ONE TOUCH ULTRA (DEVICES) MISC, soft lancets-testing 2-3 times per day (Patient not taking: No sig reported), Disp: 1mo, Rfl: 12     ONE TOUCH ULTRA (DEVICES) MISC, Control Solution-use as directed (Patient not taking: No sig reported), Disp: 1 bottle, Rfl: prn     ORDER FOR DME, Equipment being ordered: Postivac (Patient not taking: Reported on 7/23/2018), Disp: 1 each, Rfl: 0    10 point ROS of systems including Constitutional, Eyes, Respiratory, Cardiovascular, Gastroenterology, Genitourinary, Integumentary, Muscularskeletal, Psychiatric were all negative except for pertinent positives noted in my HPI.    Examination:   /78 (BP Location: Left arm, Patient Position: Sitting, Cuff Size: Adult Regular)  Pulse 62  Ht 1.74 m (5' 8.5\")  Wt 86.2 kg (190 lb)  SpO2 97%  BMI 28.47 kg/m2  General Impression: Very pleasant gentleman in no acute distress, well oriented in time place and person.  Quite conversational  Mental Status: Normal.  HEENT.  There is no clinical evidence of jaundice and the mucous membranes are normal  Skin: Skin is normal to examination  Respiratory System: The respiratory cycle is normal  Lymph Nodes: Not examined  Back/Flank Tenderness: Not examined  Cardiovascular System: Not examined  Abdominal Examination: Not examined  Extremities: " Not examined  Genitial: Not examined  Rectal Examination: Recently had rectal examination by Dr. Mejia  Neurologic System: No focal abnormal clinical neurological signs in the central, or peripheral nervous systems    Impression: Today we had a lengthy discussion about the situation.  In summary he does have evidence of a rising PSA after radiation therapy 5 years ago although the PSA is only 0.4 at the present time.  The recent MRI does show, a suspicious area at the left prostatic apex approximately 1.4 cm in diameter which when subsequently biopsied showed evidence of Gavin 4/4 adenocarcinoma.  There is a significant prostatic filling defect observed on the MRI scan and the volume of the prostate was not estimated.  He has recently been treated with 240 mg of Firmagon.  The patient is very interested in ongoing options for treatment of disease.  I went over the options with him.  Clearly we cannot reradiate the prostate and he would not be considered a candidate for radical prostatectomy in this situation.  The 3 options would be  1.  Observation only.  2.  Hormonal treatment and I would recommend intermittent treatment if this was to be considered.  3.  Cryoablation of the prostate.     In the face of [place] 4/4 disease I would be concerned about observation only unless.  Life expectancy was very limited he is 82 years of age but is in stable health although there are some ongoing medical issues with a family history of longevity.  I would therefore not favor this option.  Hormonal treatment alone is a very reasonable option although that clearly would be side effects, potentially causing hot flashes and even bone demineralization.  He has already received 240 of Firmagon, and I would recommend intermittent treatment by monitoring the PSA and only administering hormone treatment if the PSA climbed is once again after initial f.  Another possibility would be to consider Casodex 50 mg daily only as an option  "at some point  Finally we had a lengthy discussion about cryotherapy of the prostate.  1 of the best indication for cryoablation of the prostate is failure of radiation therapy; and I went over this very carefully with the patient today explaining the technique and the potential complications in particular of incontinence and rectal fistula.  Complications are higher in patients who have had prior radiation therapy although the overall incidence of these, in these days is low because of the precautions that are taken, in particular using thermocouples in Denoinvillier's fascia and adjacent to the external sphincter, in order to monitor the temperature in these areas very closely.  The problem with him he said he has had a previous transurethral resection of the prostate and this is considered a relative contraindication to cryoablation.  I have explained this to them carefully today.  This does not mean I would rule it out, and it may be possible to do focal cryotherapy of the area at the prostatic apex on the left side, but I think this requires very careful follow-up and possibly even a trial of intermittent hormone treatment before we would accept the risk of doing this.  Clearly he is going to be hard to prevent urethral sloughing with the warming catheter were used to protect the prostatic urethral mucosa when there is a wide prostatic filling defect present there.  We did have extended discussion about this and other issues related to this and I answered all his questions.  At the present time we will give consideration to this and I will also discuss the situation with Dr. Mejia.        Plan: I will be happy to see him again further discussions if the patient wishes    Time: 40 minutes.  Greater than 50% in discussion  and consultation of a complex issue as noted above    \"This dictation was performed with voice recognition software and may contain errors,  omissions and inadvertent word substitution.\"      "

## 2018-07-23 NOTE — LETTER
7/23/2018       RE: Jamil Bedolla Sr., MD  [address] [place] [address]     Dear Colleague,    Thank you for referring your patient, Jamil Bedolla Sr., MD, to the [facility] [place] at [facility]. Please see a copy of my visit note below.    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 Mejia.  He has a history of being diagnosed with Gavin 7 adenocarcinoma the prostate 5 years ago and treated by external beam therapy at that time.  There has been a recent gradual rise in his PSA  Results for JAMIL BEDOLLA SR., MD (MRN [id number]) as of 7/23/2018 08:19   Ref. Range 12/16/2016 10:35 6/21/2017 10:32 11/28/2017 10:58 5/30/2018 09:16   PSA Diag Urologic Phys Latest Ref Range: 0.00 - 4.00 ng/mL 0.06 0.09 0.11 0.40     He has no major symptoms at this time, is controlling the urine well.  In view of the recent rise in the PSA, the MRI scan was repeated    MRI PROSTATE:  6/26/2018 7:39 PM     CLINICAL HISTORY: grade 3+4 prostate cancer, treated with radiation,  PSA increasing; Personal history of malignant neoplasm of prostate     Comparison: 7/9/2013.     TECHNIQUE:      The following sequences were obtained: High-resolution axial  T2-weighted, coronal T2-weighted, 3D volumetric T2-weighted, axial  pre-contrast T1, axial diffusion-weighted, axial apparent diffusion  coefficient and axial dynamic contrast-enhanced T1. Postcontrast  images were evaluated on a separate workstation to evaluate dynamic  contrast enhancement.  Contrast dose: 7.5mL Gadavist     Using an image analysis software package (Simple Tithe), three-dimensional  (3D) volumetric images of the prostate were reconstructed by a  radiologist and archived to PACS for prostate lesion analysis and  biopsy planning.  Additionally, the software package was utilized for  contrast kinetic  analysis.     FINDINGS:  Prostate gland size: Large TURP defect making size estimated  unreliable.     Diffuse radiation changes in the remaining prostate. PI-RADS  terminology is not utilized due to previous radiation therapy changes.     There is a focus of abnormal diffusion at the left prostate apex at  approximately the 3:00 position relative to the urethra. This is best  seen on series 11 image 16 and series 14 image 299. It measures 1.4  cm. It has early focal contrast enhancement. There is low T2 signal,  however T2 signal throughout the peripheral zone is low due to  radiation changes. No gross extraprostatic spread.     Diffuse bladder trabeculation and diverticula. Colonic diverticuli. No  adenopathy. Visualized pelvic bones are unremarkable.         IMPRESSION:   1. Based on the most suspicious abnormality, clinically significant  cancer is likely to be present. There are diffuse radiation changes  and TURP changes. A 1.4 cm left prostate apex lesion is suspicious for  recurrence. PI-RADS characterization does not apply following  radiation treatment, though this lesion is the equivalent of a PI-RADs  4 abnormality.  2. No evidence of extraprostatic malignancy. No suspicious adenopathy  or evidence of pelvic metastases.     The images are interpreted according to PI-RADS v2.  http://www.acr.org/~/media/[email or web address]/QualitySafety/Resources  [email or web address]     BRENDEN HARTMAN MD  Past Medical History:   Diagnosis Date     CAD (coronary artery disease)     per calcium score=>400     Carotid artery plaque 9/2011     Colon polyps      Diverticulosis of colon      Erectile dysfunction 7/18/2008     Glaucoma      High cholesterol      HTN (hypertension) 7/18/2008     Hypertension goal BP (blood pressure) < 140/90 12/10/2010     Obesity      PFO (patent foramen ovale)      Plantar fasciitis      Presbyesophagus      Prostate cancer-[place] 4, treated with radiation 7/28/2013     Type II or  unspecified type diabetes mellitus without mention of complication, not stated as uncontrolled     Dxd about 2000       Social History     Social History     Marital status:      Spouse name: Salome Gates     Number of children: 2     Years of education: N/A     Occupational History     MD, [employer] Retired     Social History Main Topics     Smoking status: Never Smoker     Smokeless tobacco: Never Used     Alcohol use Yes      Comment: 10-14 drinks per week     Drug use: No     Sexual activity: Yes     Partners: Female     Other Topics Concern     Parent/Sibling W/ Cabg, Mi Or Angioplasty Before 65f 55m? No     Social History Narrative       Past Surgical History:   Procedure Laterality Date     APPENDECTOMY       ARTHROSCOPY KNEE RT/LT  1998    partial menisectomy left knee     COLONOSCOPY N/A 9/27/2016    Procedure: COMBINED COLONOSCOPY, SINGLE OR MULTIPLE BIOPSY/POLYPECTOMY BY BIOPSY;  Surgeon: Maru Orta MD;  Location:  GI     HC COLONOSCOPY THRU STOMA, DIAGNOSTIC  2005     HC COLONOSCOPY THRU STOMA, DIAGNOSTIC  5/09    diverticulosis, also proctitis     HEMORRHOIDECTOMY       PHACOEMULSIFICATION CLEAR CORNEA WITH STANDARD INTRAOCULAR LENS IMPLANT Right 3/18/2015    Procedure: PHACOEMULSIFICATION CLEAR CORNEA WITH STANDARD INTRAOCULAR LENS IMPLANT;  Surgeon: Lito Gonzales MD;  Location:  EC     PHACOEMULSIFICATION CLEAR CORNEA WITH STANDARD INTRAOCULAR LENS IMPLANT Left 3/25/2015    Procedure: PHACOEMULSIFICATION CLEAR CORNEA WITH STANDARD INTRAOCULAR LENS IMPLANT;  Surgeon: Lito Gonzales MD;  Location:  EC     ROTATOR CUFF REPAIR RT/LT  1998    right.  caused him to retire     SURGICAL HISTORY OF -   1999    L4-5 discectomy     TONSILLECTOMY & ADENOIDECTOMY       TURP         Family History   Problem Relation Age of Onset     C.A.D. Father      C.A.D. Brother      Asthma Brother      CTejasAISIDRA. Mother      Diabetes Brother      Lipids Brother          Current Outpatient  Prescriptions:      atorvastatin (LIPITOR) 40 MG tablet, Take 1 tablet (40 mg) by mouth daily, Disp: 100 tablet, Rfl: 2     famotidine (PEPCID) 20 MG tablet, Take 1 tablet (20 mg) by mouth 2 times daily, Disp: 270 tablet, Rfl: 3     GLUCOSAMINE-CHONDROITIN 500-400 MG OR CAPS, One TID, Disp: , Rfl:      latanoprost (XALATAN) 0.005 % ophthalmic solution, 1 drop At Bedtime., Disp: , Rfl:      lisinopril (PRINIVIL/ZESTRIL) 20 MG tablet, Take 1 tablet (20 mg) by mouth 2 times daily, Disp: 180 tablet, Rfl: 1     metFORMIN (GLUCOPHAGE-XR) 500 MG 24 hr tablet, TAKE 2 TABLETS TWICE DAILY WITH MEALS, Disp: 360 tablet, Rfl: 1     MULTI-VITAMIN OR TABS, 1 TABLET DAILY, Disp: , Rfl:      vardenafil (LEVITRA) 20 MG tablet, Take 0.5-1 tablets (10-20 mg) by mouth daily as needed Never use with nitroglycerin, terazosin or doxazosin., Disp: 30 tablet, Rfl: 4     Alcohol Swabs (ALCOHOL PADS) 70 % PADS, 1 Application daily as needed (Patient not taking: Reported on 7/23/2018), Disp: 100 each, Rfl: prn     Alcohol Swabs (B-D SINGLE USE SWABS REGULAR) PADS, 1 pad 3 times daily (Patient not taking: Reported on 7/23/2018), Disp: 270 each, Rfl: 3     Alcohol Swabs PADS, Use one swab 2-3 times per day (Patient not taking: Reported on 7/23/2018), Disp: 300 each, Rfl: prn     blood glucose calibration (ONETOUCH ULTRA CONTROL) solution, by In Vitro route 3 times daily as needed Use to calibrate blood glucose monitor as needed as directed. (Patient not taking: Reported on 7/23/2018), Disp: 1 Bottle, Rfl: 3     blood glucose calibration (TRUETEST CONTROL LEVEL 2) solution, Use to calibrate blood glucose monitor as needed as directed. (Patient not taking: Reported on 7/23/2018), Disp: 3 Bottle, Rfl: 2     blood glucose monitoring (ONE TOUCH ULTRA 2) meter device kit, Use to test blood sugars 1- 2 times daily or as directed. (Patient not taking: Reported on 7/23/2018), Disp: 1 kit, Rfl: 0     blood glucose monitoring (ONE TOUCH ULTRA) test strip,  "Use to test blood sugar  daily or as directed. (Patient not taking: Reported on 7/23/2018), Disp: 1 Box, Rfl: 3     blood glucose monitoring (ONE TOUCH ULTRASOFT) lancets, Use to test blood sugar 1-2  times daily or as directed. (Patient not taking: Reported on 7/23/2018), Disp: 100 each, Rfl: 11     blood glucose monitoring (ONETOUCH ULTRA) test strip, USE TO TEST BLOOD SUGAR 3-4 TIMES DAILY OR AS DIRECTED (Patient not taking: Reported on 7/23/2018), Disp: 300 each, Rfl: 3     blood glucose monitoring (TRUETEST) test strip, Testing 1-2  times per day. One Touch Ultra blue (Patient not taking: Reported on 7/23/2018), Disp: 3 Box, Rfl: 3     blood glucose test strip, OneTouch Ultra strips; use bid. (Patient not taking: Reported on 7/23/2018), Disp: 100 strip, Rfl: 11     fluticasone (FLONASE) 50 MCG/ACT spray, USE 2 SPRAYS IN EACH NOSTRIL EVERY DAY (Patient not taking: Reported on 7/23/2018), Disp: 48 g, Rfl: 11     MELATONIN PO, Take 5 mg by mouth At Bedtime, Disp: , Rfl:      ONE TOUCH ULTRA (DEVICES) MISC, soft lancets-testing 2-3 times per day (Patient not taking: No sig reported), Disp: 1mo, Rfl: 12     ONE TOUCH ULTRA (DEVICES) MISC, Control Solution-use as directed (Patient not taking: No sig reported), Disp: 1 bottle, Rfl: prn     ORDER FOR DME, Equipment being ordered: Postivac (Patient not taking: Reported on 7/23/2018), Disp: 1 each, Rfl: 0    10 point ROS of systems including Constitutional, Eyes, Respiratory, Cardiovascular, Gastroenterology, Genitourinary, Integumentary, Muscularskeletal, Psychiatric were all negative except for pertinent positives noted in my HPI.    Examination:   /78 (BP Location: Left arm, Patient Position: Sitting, Cuff Size: Adult Regular)  Pulse 62  Ht 1.74 m (5' 8.5\")  Wt 86.2 kg (190 lb)  SpO2 97%  BMI 28.47 kg/m2  General Impression: Very pleasant gentleman in no acute distress, well oriented in time place and person.  Quite conversational  Mental Status: " Normal.  HEENT.  There is no clinical evidence of jaundice and the mucous membranes are normal  Skin: Skin is normal to examination  Respiratory System: The respiratory cycle is normal  Lymph Nodes: Not examined  Back/Flank Tenderness: Not examined  Cardiovascular System: Not examined  Abdominal Examination: Not examined  Extremities: Not examined  Genitial: Not examined  Rectal Examination: Recently had rectal examination by Dr. Mejia  Neurologic System: No focal abnormal clinical neurological signs in the central, or peripheral nervous systems    Impression: Today we had a lengthy discussion about the situation.  In summary he does have evidence of a rising PSA after radiation therapy 5 years ago although the PSA is only 0.4 at the present time.  The recent MRI does show, a suspicious area at the left prostatic apex approximately 1.4 cm in diameter which when subsequently biopsied showed evidence of Gavin 4/4 adenocarcinoma.  There is a significant prostatic filling defect observed on the MRI scan and the volume of the prostate was not estimated.  He has recently been treated with 240 mg of Firmagon.  The patient is very interested in ongoing options for treatment of disease.  I went over the options with him.  Clearly we cannot reradiate the prostate and he would not be considered a candidate for radical prostatectomy in this situation.  The 3 options would be  1.  Observation only.  2.  Hormonal treatment and I would recommend intermittent treatment if this was to be considered.  3.  Cryoablation of the prostate.     In the face of [place] 4/4 disease I would be concerned about observation only unless.  Life expectancy was very limited he is 82 years of age but is in stable health although there are some ongoing medical issues with a family history of longevity.  I would therefore not favor this option.  Hormonal treatment alone is a very reasonable option although that clearly would be side effects,  potentially causing hot flashes and even bone demineralization.  He has already received 240 of Firmagon, and I would recommend intermittent treatment by monitoring the PSA and only administering hormone treatment if the PSA climbed is once again after initial f.  Another possibility would be to consider Casodex 50 mg daily only as an option at some point  Finally we had a lengthy discussion about cryotherapy of the prostate.  1 of the best indication for cryoablation of the prostate is failure of radiation therapy; and I went over this very carefully with the patient today explaining the technique and the potential complications in particular of incontinence and rectal fistula.  Complications are higher in patients who have had prior radiation therapy although the overall incidence of these, in these days is low because of the precautions that are taken, in particular using thermocouples in Denoinvillier's fascia and adjacent to the external sphincter, in order to monitor the temperature in these areas very closely.  The problem with him he said he has had a previous transurethral resection of the prostate and this is considered a relative contraindication to cryoablation.  I have explained this to them carefully today.  This does not mean I would rule it out, and it may be possible to do focal cryotherapy of the area at the prostatic apex on the left side, but I think this requires very careful follow-up and possibly even a trial of intermittent hormone treatment before we would accept the risk of doing this.  Clearly he is going to be hard to prevent urethral sloughing with the warming catheter were used to protect the prostatic urethral mucosa when there is a wide prostatic filling defect present there.  We did have extended discussion about this and other issues related to this and I answered all his questions.  At the present time we will give consideration to this and I will also discuss the situation with  "Dr. Mejia.        Plan: I will be happy to see him again further discussions if the patient wishes    Time: 40 minutes.  Greater than 50% in discussion  and consultation of a complex issue as noted above    \"This dictation was performed with voice recognition software and may contain errors,  omissions and inadvertent word substitution.\"        Again, thank you for allowing me to participate in the care of your patient.      Sincerely,    Edgardo Guzman MD      "

## 2018-07-23 NOTE — MR AVS SNAPSHOT
After Visit Summary   7/23/2018    Jamil Bedolla Sr., MD    MRN: [id number]           Patient Information     Date Of Birth          1935        Visit Information        Provider Department      7/23/2018 8:00 AM Edgardo Guzman MD [facility] [place]        Today's Diagnoses     Prostate cancer (H)    -  1       Follow-ups after your visit        Follow-up notes from your care team     Return if symptoms worsen or fail to improve.      Your next 10 appointments already scheduled     Aug 16, 2018  1:00 PM CDT   Return Visit with UA NURSE   [facility] [place] ([facility])    [address]  [address]  [facility] [address]   [phone]            Sep 26, 2018 10:45 AM CDT   Return Visit with Mikal Chung MD   [facility] ([facility])    [address]  [facility] [address]   [phone] OPT 2            Nov 30, 2018 10:30 AM CST   Return Visit with Wes eMjia MD   [facility] ([facility])    [address]  [address]  [facility] [address]   [phone]              Who to contact     If you have questions or need follow up information about today's clinic visit or your schedule please contact [facility] directly at [phone].  Normal or non-critical lab and imaging results will be communicated to you by MyChart, letter or phone within 4 business days after the clinic has received the results. If you do not hear from us within 7 days, please contact the clinic through MyChart or phone. If you have a critical or abnormal lab result, we will notify you by phone as soon as possible.  Submit refill requests through [employer] or call your pharmacy and they will forward the refill request to us. Please allow 3 business days for your refill to be  "completed.          Additional Information About Your Visit        [facility] Information     [employer] gives you secure access to your electronic health record. If you see a primary care provider, you can also send messages to your care team and make appointments. If you have questions, please call your primary care clinic.  If you do not have a primary care provider, please call [phone] and they will assist you.        Care EveryWhere ID     This is your Care EveryWhere ID. This could be used by other organizations to access your [place] medical records  [id number]        Your Vitals Were     Pulse Height Pulse Oximetry BMI (Body Mass Index)          62 1.74 m (5' 8.5\") 97% 28.47 kg/m2         Blood Pressure from Last 3 Encounters:   07/23/18 122/78   07/13/18 130/68   07/06/18 142/70    Weight from Last 3 Encounters:   07/23/18 86.2 kg (190 lb)   07/13/18 85.7 kg (189 lb)   07/06/18 85.7 kg (189 lb)              Today, you had the following     No orders found for display       Primary Care Provider Office Phone # Fax #    Cassia Baltazar Wolff [phone]       [address] [facility] [address] [facility] [place] [address]        Equal Access to Services     [facility]: Hadii aad ku hadasho Soomaali, waaxda luqadaha, qaybta kaalmada adeegyada, waxay idiin haydarwinn murtaza barraza la'hetal . So [facility] [phone].    ATENCIÓN: Si habla español, tiene a wray disposición servicios gratuitos de asistencia lingüística. Llame al [phone].    We comply with applicable federal civil rights laws and [place] laws. We do not discriminate on the basis of race, color, national origin, age, disability, sex, sexual orientation, or gender identity.            Thank you!     Thank you for choosing [facility] [place]  for your care. Our goal is always to provide you with excellent care. Hearing back from our patients is one way we can continue to improve our services. Please take a few minutes " to complete the written survey that you may receive in the mail after your visit with us. Thank you!             Your Updated Medication List - Protect others around you: Learn how to safely use, store and throw away your medicines at www.disposemymeds.org.          This list is accurate as of 7/23/18 10:13 AM.  Always use your most recent med list.                   Brand Name Dispense Instructions for use Diagnosis    * Alcohol Swabs Pads     300 each    Use one swab 2-3 times per day    Type 2 diabetes, HbA1C goal < 8% (H)       * B-D SINGLE USE SWABS REGULAR Pads     270 each    1 pad 3 times daily    Type 2 diabetes, HbA1C goal < 8% (H), Type II or unspecified type diabetes mellitus without mention of complication, not stated as uncontrolled       * Alcohol Pads 70 % Pads     100 each    1 Application daily as needed    Type 2 diabetes mellitus with diabetic nephropathy, without long-term current use of insulin (H)       atorvastatin 40 MG tablet    LIPITOR    100 tablet    Take 1 tablet (40 mg) by mouth daily    Hyperlipidemia LDL goal <70       * blood glucose calibration solution     3 Bottle    Use to calibrate blood glucose monitor as needed as directed.    Type 2 diabetes, HbA1C goal < 8% (H)       * blood glucose calibration solution     1 Bottle    by In Vitro route 3 times daily as needed Use to calibrate blood glucose monitor as needed as directed.    Type 2 diabetes mellitus with diabetic nephropathy, without long-term current use of insulin (H)       blood glucose monitoring lancets     100 each    Use to test blood sugar 1-2  times daily or as directed.    Type 2 diabetes mellitus with diabetic nephropathy, without long-term current use of insulin (H)       * blood glucose monitoring test strip    no brand specified    100 strip    OneTouch Ultra strips; use bid.    Type 2 diabetes, HbA1C goal < 8% (H)       * blood glucose monitoring test strip    ONETOUCH ULTRA    1 Box    Use to test blood sugar   daily or as directed.    Advanced directives, counseling/discussion       * blood glucose monitoring test strip    TRUETEST    3 Box    Testing 1-2  times per day. One Touch Ultra blue    Type 2 diabetes mellitus with diabetic nephropathy, without long-term current use of insulin (H)       * blood glucose monitoring test strip    ONETOUCH ULTRA    300 each    USE TO TEST BLOOD SUGAR 3-4 TIMES DAILY OR AS DIRECTED    Type 2 diabetes, HbA1C goal < 8% (H)       famotidine 20 MG tablet    PEPCID    270 tablet    Take 1 tablet (20 mg) by mouth 2 times daily    Gastroesophageal reflux disease without esophagitis       fluticasone 50 MCG/ACT spray    FLONASE    48 g    USE 2 SPRAYS IN EACH NOSTRIL EVERY DAY    Seasonal allergic rhinitis       glucosamine-chondroitin 500-400 MG Caps per capsule      One TID        latanoprost 0.005 % ophthalmic solution    XALATAN     1 drop At Bedtime.        lisinopril 20 MG tablet    PRINIVIL/ZESTRIL    180 tablet    Take 1 tablet (20 mg) by mouth 2 times daily    Essential hypertension, benign       MELATONIN PO      Take 5 mg by mouth At Bedtime        metFORMIN 500 MG 24 hr tablet    GLUCOPHAGE-XR    360 tablet    TAKE 2 TABLETS TWICE DAILY WITH MEALS    Type 2 diabetes mellitus with diabetic nephropathy, without long-term current use of insulin (H)       Multi-vitamin Tabs tablet   Generic drug:  multivitamin, therapeutic with minerals      1 TABLET DAILY        * ONE TOUCH ULTRA (DEVICES) Misc     1mo    soft lancets-testing 2-3 times per day    Type II or unspecified type diabetes mellitus without mention of complication, not stated as uncontrolled       * ONE TOUCH ULTRA (DEVICES) Misc     1 bottle    Control Solution-use as directed    Type II or unspecified type diabetes mellitus without mention of complication, not stated as uncontrolled       * blood glucose monitoring meter device kit     1 kit    Use to test blood sugars 1- 2 times daily or as directed.    Type 2 diabetes  mellitus with diabetic nephropathy, without long-term current use of insulin (H)       order for DME     1 each    Equipment being ordered: Postivac    Erectile dysfunction       vardenafil 20 MG tablet    LEVITRA    30 tablet    Take 0.5-1 tablets (10-20 mg) by mouth daily as needed Never use with nitroglycerin, terazosin or doxazosin.    Other male erectile dysfunction       * Notice:  This list has 12 medication(s) that are the same as other medications prescribed for you. Read the directions carefully, and ask your doctor or other care provider to review them with you.

## 2018-07-23 NOTE — NURSING NOTE
Chief Complaint   Patient presents with     Clinic Care Coordination - Follow-up     Pt here to discuss cryo     Galina Spencer CMA

## 2018-08-10 DIAGNOSIS — E78.5 HYPERLIPIDEMIA LDL GOAL <70: ICD-10-CM

## 2018-08-10 DIAGNOSIS — E11.21 TYPE 2 DIABETES MELLITUS WITH DIABETIC NEPHROPATHY, WITHOUT LONG-TERM CURRENT USE OF INSULIN (H): ICD-10-CM

## 2018-08-10 DIAGNOSIS — K21.9 GASTROESOPHAGEAL REFLUX DISEASE WITHOUT ESOPHAGITIS: ICD-10-CM

## 2018-08-10 DIAGNOSIS — I10 ESSENTIAL HYPERTENSION, BENIGN: ICD-10-CM

## 2018-08-10 NOTE — TELEPHONE ENCOUNTER
Reason for Call:  Medication or medication refill:    Do you use a [facility]?  Name of the pharmacy and phone number for the current request:     [facility] [place], [place] [facility] [address]    Name of the medication requested: famotidine (PEPCID) 20 MG tablet, atorvastatin (LIPITOR) 40 MG tablet, lisinopril (PRINIVIL/ZESTRIL) 20 MG tablet, metformin     Other request:     Can we leave a detailed message on this number? YES    Phone number patient can be reached at: Home number on file [phone] (home)    Best Time: anytime     Call taken on 8/10/2018 at 1:13 PM by Merlyn Forte

## 2018-08-13 RX ORDER — ATORVASTATIN CALCIUM 40 MG/1
40 TABLET, FILM COATED ORAL DAILY
Qty: 100 TABLET | Refills: 2 | Status: SHIPPED | OUTPATIENT
Start: 2018-08-13 | End: 2018-09-26

## 2018-08-13 RX ORDER — LISINOPRIL 20 MG/1
20 TABLET ORAL 2 TIMES DAILY
Qty: 180 TABLET | Refills: 1 | Status: SHIPPED | OUTPATIENT
Start: 2018-08-13 | End: 2018-09-26

## 2018-08-13 RX ORDER — ATORVASTATIN CALCIUM 40 MG/1
TABLET, FILM COATED ORAL
Qty: 90 TABLET | Refills: 2 | Status: SHIPPED | OUTPATIENT
Start: 2018-08-13 | End: 2018-08-17

## 2018-08-13 RX ORDER — FAMOTIDINE 20 MG/1
20 TABLET, FILM COATED ORAL 2 TIMES DAILY
Qty: 270 TABLET | Refills: 3 | Status: SHIPPED | OUTPATIENT
Start: 2018-08-13 | End: 2019-09-18

## 2018-08-13 RX ORDER — FAMOTIDINE 20 MG/1
TABLET, FILM COATED ORAL
Qty: 270 TABLET | Refills: 1 | Status: SHIPPED | OUTPATIENT
Start: 2018-08-13 | End: 2018-08-17

## 2018-08-13 RX ORDER — METFORMIN HCL 500 MG
TABLET, EXTENDED RELEASE 24 HR ORAL
Qty: 360 TABLET | Refills: 1 | Status: SHIPPED | OUTPATIENT
Start: 2018-08-13 | End: 2019-05-13

## 2018-08-13 NOTE — TELEPHONE ENCOUNTER
Metformin---- Routing refill request to provider for review/approval because:  Labs out of range:  A1C 6.1 and UMALCR 24.56    Lisinopril, atorvastatin, and famotidine------ Prescription approved per [facility] Refill Protocol.    Court Vee RN  EP Triage

## 2018-08-13 NOTE — TELEPHONE ENCOUNTER
Routing refill request to provider for review/approval because:  A break in medication  Georgette Stoll RN - Triage  [facility]

## 2018-08-13 NOTE — TELEPHONE ENCOUNTER
"Requested Prescriptions   Pending Prescriptions Disp Refills     atorvastatin (LIPITOR) 40 MG tablet [Pharmacy Med Name: ATORVASTATIN CALCIUM 40 MG Tablet]  Last Written Prescription Date:  5-  Last Fill Quantity: 100 tablet,  # refills: 2   Last office visit: 5/17/2018 with prescribing provider:     Future Office Visit:   Next 5 appointments (look out 90 days)     Sep 26, 2018 10:45 AM CDT   Return Visit with Mikal Chung MD   [facility] ([facility])    [address] [place] [id number] [phone] OPT 2                  90 tablet 2     Sig: TAKE 1 TABLET EVERY DAY    Statins Protocol Passed    8/10/2018  4:59 PM       Passed - LDL on file in past 12 months    Recent Labs   Lab Test  09/21/17   0926   LDL  86            Passed - No abnormal creatine kinase in past 12 months    Recent Labs   Lab Test  03/09/15   1031   CKT  130               Passed - Recent (12 mo) or future (30 days) visit within the authorizing provider's specialty    Patient had office visit in the last 12 months or has a visit in the next 30 days with authorizing provider or within the authorizing provider's specialty.  See \"Patient Info\" tab in inbasket, or \"Choose Columns\" in Meds & Orders section of the refill encounter.           Passed - Patient is age 18 or older              famotidine (PEPCID) 20 MG tablet [Pharmacy Med Name: FAMOTIDINE 20 MG Tablet]  Last Written Prescription Date:  5-  Last Fill Quantity: 270 tablet,  # refills: 3   Last office visit: 5/17/2018 with prescribing provider:     Future Office Visit:   Next 5 appointments (look out 90 days)     Sep 26, 2018 10:45 AM CDT   Return Visit with Mikal Chung MD   [facility] ([facility])    [address] [id number] [phone] OPT 2                  270 tablet 1     Sig: TAKE 1 TABLET 2 TO 3 TIMES PER DAY    H2 " "Blockers Protocol Passed    8/10/2018  4:59 PM       Passed - Patient is age 12 or older       Passed - Recent (12 mo) or future (30 days) visit within the authorizing provider's specialty    Patient had office visit in the last 12 months or has a visit in the next 30 days with authorizing provider or within the authorizing provider's specialty.  See \"Patient Info\" tab in inbasket, or \"Choose Columns\" in Meds & Orders section of the refill encounter.              .rxdetail  "

## 2018-08-13 NOTE — TELEPHONE ENCOUNTER
"Requested Prescriptions   Pending Prescriptions Disp Refills     lisinopril (PRINIVIL/ZESTRIL) 20 MG tablet  Last Written Prescription Date:  5-  Last Fill Quantity: 180 tablet,  # refills: 1   Last office visit: 5/17/2018 with prescribing provider:     Future Office Visit:   Next 5 appointments (look out 90 days)     Sep 26, 2018 10:45 AM CDT   Return Visit with Mikal Chung MD   [facility] ([facility])    [address] [place] [id number] [phone] OPT 2                  180 tablet 1     Sig: Take 1 tablet (20 mg) by mouth 2 times daily    ACE Inhibitors (Including Combos) Protocol Passed    8/10/2018  1:16 PM       Passed - Blood pressure under 140/90 in past 12 months    BP Readings from Last 3 Encounters:   07/23/18 122/78   07/13/18 130/68   07/06/18 142/70                Passed - Recent (12 mo) or future (30 days) visit within the authorizing provider's specialty    Patient had office visit in the last 12 months or has a visit in the next 30 days with authorizing provider or within the authorizing provider's specialty.  See \"Patient Info\" tab in inbasket, or \"Choose Columns\" in Meds & Orders section of the refill encounter.           Passed - Patient is age 18 or older       Passed - Normal serum creatinine on file in past 12 months    Recent Labs   Lab Test  05/17/18   1108   CR  0.90            Passed - Normal serum potassium on file in past 12 months    Recent Labs   Lab Test  05/17/18   1108   POTASSIUM  4.6                   atorvastatin (LIPITOR) 40 MG tablet  Last Written Prescription Date:  5-  Last Fill Quantity: 100 tablet,  # refills: 2   Last office visit: 5/17/2018 with prescribing provider:     Future Office Visit:   Next 5 appointments (look out 90 days)     Sep 26, 2018 10:45 AM CDT   Return Visit with Mikal Chung MD   [facility] ([facility])    [facility] " "[address] [address] [phone] OPT 2                  100 tablet 2     Sig: Take 1 tablet (40 mg) by mouth daily    Statins Protocol Passed    8/10/2018  1:16 PM       Passed - LDL on file in past 12 months    Recent Labs   Lab Test  09/21/17   0926   LDL  86            Passed - No abnormal creatine kinase in past 12 months    Recent Labs   Lab Test  03/09/15   1031   CKT  130               Passed - Recent (12 mo) or future (30 days) visit within the authorizing provider's specialty    Patient had office visit in the last 12 months or has a visit in the next 30 days with authorizing provider or within the authorizing provider's specialty.  See \"Patient Info\" tab in inbasket, or \"Choose Columns\" in Meds & Orders section of the refill encounter.           Passed - Patient is age 18 or older              famotidine (PEPCID) 20 MG tablet  Last Written Prescription Date:  5-  Last Fill Quantity: 270 tablet,  # refills: 3   Last office visit: 5/17/2018 with prescribing provider:     Future Office Visit:   Next 5 appointments (look out 90 days)     Sep 26, 2018 10:45 AM CDT   Return Visit with Mikal Chung MD   [facility] ([facility])    [address] [address] [phone] OPT 2                  270 tablet 3     Sig: Take 1 tablet (20 mg) by mouth 2 times daily    H2 Blockers Protocol Passed    8/10/2018  1:16 PM       Passed - Patient is age 12 or older       Passed - Recent (12 mo) or future (30 days) visit within the authorizing provider's specialty    Patient had office visit in the last 12 months or has a visit in the next 30 days with authorizing provider or within the authorizing provider's specialty.  See \"Patient Info\" tab in inbasket, or \"Choose Columns\" in Meds & Orders section of the refill encounter.                  metFORMIN (GLUCOPHAGE-XR) 500 MG 24 hr tablet  Last Written Prescription " "Date:  5-  Last Fill Quantity: 360 tablet,  # refills: 1   Last office visit: 5/17/2018 with prescribing provider:     Future Office Visit:   Next 5 appointments (look out 90 days)     Sep 26, 2018 10:45 AM CDT   Return Visit with Mikal Chung MD   [facility] ([facility] [place])    [address] [place] [id number] [phone] OPT 2                  360 tablet 1     Sig: TAKE 2 TABLETS TWICE DAILY WITH MEALS    Biguanide Agents Passed    8/10/2018  1:16 PM       Passed - Blood pressure less than 140/90 in past 6 months    BP Readings from Last 3 Encounters:   07/23/18 122/78   07/13/18 130/68   07/06/18 142/70                Passed - Patient has documented LDL within the past 12 mos.    Recent Labs   Lab Test  09/21/17   0926   LDL  86            Passed - Patient has had a Microalbumin in the past 12 mos.    Recent Labs   Lab Test  05/17/18   1109   MICROL  15   UMALCR  24.56*            Passed - Patient is age 10 or older       Passed - Patient has documented A1c within the specified period of time.    If HgbA1C is 8 or greater, it needs to be on file within the past 3 months.  If less than 8, must be on file within the past 6 months.     Recent Labs   Lab Test  05/17/18   1108   A1C  6.1*            Passed - Patient's CR is NOT>1.4 OR Patient's EGFR is NOT<45 within past 12 mos.    Recent Labs   Lab Test  05/17/18   1108   GFRESTIMATED  81   GFRESTBLACK  >90       Recent Labs   Lab Test  05/17/18   1108   CR  0.90            Passed - Patient does NOT have a diagnosis of CHF.       Passed - Recent (6 mo) or future (30 days) visit within the authorizing provider's specialty    Patient had office visit in the last 6 months or has a visit in the next 30 days with authorizing provider or within the authorizing provider's specialty.  See \"Patient Info\" tab in inbasket, or \"Choose Columns\" in Meds & Orders section of the refill encounter.        "

## 2018-08-14 ENCOUNTER — OFFICE VISIT (OUTPATIENT)
Dept: UROLOGY | Facility: CLINIC | Age: 83
End: 2018-08-14
Payer: COMMERCIAL

## 2018-08-14 VITALS — WEIGHT: 190 LBS | HEART RATE: 66 BPM | BODY MASS INDEX: 28.79 KG/M2 | OXYGEN SATURATION: 98 % | HEIGHT: 68 IN

## 2018-08-14 DIAGNOSIS — C61 MALIGNANT NEOPLASM OF PROSTATE (H): Primary | ICD-10-CM

## 2018-08-14 PROCEDURE — 99213 OFFICE O/P EST LOW 20 MIN: CPT | Performed by: UROLOGY

## 2018-08-14 ASSESSMENT — PAIN SCALES - GENERAL: PAINLEVEL: NO PAIN (0)

## 2018-08-14 NOTE — PROGRESS NOTES
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 Davis regarding his recurrent disease after radiation therapy. He has grade 4+4 disease at the left apex and left base laterally. His PSA is 0.4 before firmagon.  [facility] refused an appointment-I called today and his records were reviewed by Bryce Betts M.D.  I asked that his records be sent to Je Hernandez M.D. for review and that he call me.  Other past medical history, medications, allergies reviewed  Exam: With spouse. 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  Long discussion regarding observation, intermittent hormonal therapy, survival curves, hormone resistance, more definitive treatment with either afterloading of seeds or focal cryotherapy, risks and follow-up  Plan: Dr. Hernandez will review his records and hopefully be in touch with me in the next week.  We will push for afterloading of seeds or cryotherapy since Dr. Bedolla is very motivated to attempt additional treatment for potential cure rather than palliation.

## 2018-08-14 NOTE — LETTER
8/14/2018      RE: Jamil Bedolla Sr., MD  [address] [place] [address]       Jamil Bedolla MD has now spoken with Dr. Davis regarding his recurrent disease after radiation therapy. He has grade 4+4 disease at the left apex and left base laterally. His PSA is 0.4 before firmagon.  [facility] refused an appointment-I called today and his records were reviewed by Bryce Betts M.D.  I asked that his records be sent to Je Hernandez M.D. for review and that he call me.  Other past medical history, medications, allergies reviewed  Exam: With spouse. 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  Long discussion regarding observation, intermittent hormonal therapy, survival curves, hormone resistance, more definitive treatment with either afterloading of seeds or focal cryotherapy, risks and follow-up  Plan: Dr. Hernandez will review his records and hopefully be in touch with me in the next week.  We will push for afterloading of seeds or cryotherapy since Dr. Bedolla is very motivated to attempt additional treatment for potential cure rather than palliation.                    Wes Mejia MD

## 2018-08-14 NOTE — MR AVS SNAPSHOT
After Visit Summary   8/14/2018    Jamil Bedolla Sr., MD    MRN: [id number]           Patient Information     Date Of Birth          1935        Visit Information        Provider Department      8/14/2018 4:10 PM Wes Mejia MD [facility]        Today's Diagnoses     Malignant neoplasm of prostate (H)    -  1       Follow-ups after your visit        Your next 10 appointments already scheduled     Aug 16, 2018  1:00 PM CDT   Return Visit with UA NURSE   [facility] ([facility])    [address]  [address]  [facility] [address]   [phone]            Sep 26, 2018 10:45 AM CDT   Return Visit with Mikal Chung MD   [facility] ([facility])    [address] [facility] [address]  [facility] [address]   [phone] OPT 2            Nov 30, 2018 10:30 AM CST   Return Visit with Wes Mejia MD   [facility] ([facility])    [address]  [address]  [facility] [address]   [phone]              Who to contact     If you have questions or need follow up information about today's clinic visit or your schedule please contact [facility] directly at [phone].  Normal or non-critical lab and imaging results will be communicated to you by MyChart, letter or phone within 4 business days after the clinic has received the results. If you do not hear from us within 7 days, please contact the clinic through MyChart or phone. If you have a critical or abnormal lab result, we will notify you by phone as soon as possible.  Submit refill requests through babbel or call your pharmacy and they will forward the refill request to us. Please allow 3 business days for your refill to be completed.          Additional Information About Your Visit       "  MyChart Information     [employer] gives you secure access to your electronic health record. If you see a primary care provider, you can also send messages to your care team and make appointments. If you have questions, please call your primary care clinic.  If you do not have a primary care provider, please call [phone] and they will assist you.        Care EveryWhere ID     This is your Care EveryWhere ID. This could be used by other organizations to access your [place] medical records  [id number]        Your Vitals Were     Pulse Height Pulse Oximetry BMI (Body Mass Index)          66 1.727 m (5' 8\") 98% 28.89 kg/m2         Blood Pressure from Last 3 Encounters:   07/23/18 122/78   07/13/18 130/68   07/06/18 142/70    Weight from Last 3 Encounters:   08/14/18 86.2 kg (190 lb)   07/23/18 86.2 kg (190 lb)   07/13/18 85.7 kg (189 lb)              Today, you had the following     No orders found for display       Primary Care Provider Office Phone # Fax #    Cassia Wolff [phone]       [address] [place] [facility] [place][note 2][place] [address]        Equal Access to Services     LAY [facility] AH: Hadii aad ku hadasho Soomaali, waaxda luqadaha, qaybta kaalmada adeegyada, waxay idiin haydarwinn murtaza barraza labal ah. So [facility] [phone].    ATENCIÓN: Si habla español, tiene a wray disposición servicios gratuitos de asistencia lingüística. Llame al [phone].    We comply with applicable federal civil rights laws and [place] laws. We do not discriminate on the basis of race, color, national origin, age, disability, sex, sexual orientation, or gender identity.            Thank you!     Thank you for choosing [facility] [place]  for your care. Our goal is always to provide you with excellent care. Hearing back from our patients is one way we can continue to improve our services. Please take a few minutes to complete the written survey that you may receive in the " mail after your visit with us. Thank you!             Your Updated Medication List - Protect others around you: Learn how to safely use, store and throw away your medicines at www.disposemymeds.org.          This list is accurate as of 8/14/18  5:14 PM.  Always use your most recent med list.                   Brand Name Dispense Instructions for use Diagnosis    * Alcohol Swabs Pads     300 each    Use one swab 2-3 times per day    Type 2 diabetes, HbA1C goal < 8% (H)       * B-D SINGLE USE SWABS REGULAR Pads     270 each    1 pad 3 times daily    Type 2 diabetes, HbA1C goal < 8% (H), Type II or unspecified type diabetes mellitus without mention of complication, not stated as uncontrolled       * Alcohol Pads 70 % Pads     100 each    1 Application daily as needed    Type 2 diabetes mellitus with diabetic nephropathy, without long-term current use of insulin (H)       * atorvastatin 40 MG tablet    LIPITOR    100 tablet    Take 1 tablet (40 mg) by mouth daily    Hyperlipidemia LDL goal <70       * atorvastatin 40 MG tablet    LIPITOR    90 tablet    TAKE 1 TABLET EVERY DAY    Hyperlipidemia LDL goal <70       * blood glucose calibration solution     3 Bottle    Use to calibrate blood glucose monitor as needed as directed.    Type 2 diabetes, HbA1C goal < 8% (H)       * blood glucose calibration solution     1 Bottle    by In Vitro route 3 times daily as needed Use to calibrate blood glucose monitor as needed as directed.    Type 2 diabetes mellitus with diabetic nephropathy, without long-term current use of insulin (H)       blood glucose monitoring lancets     100 each    Use to test blood sugar 1-2  times daily or as directed.    Type 2 diabetes mellitus with diabetic nephropathy, without long-term current use of insulin (H)       * blood glucose monitoring test strip    no brand specified    100 strip    OneTouch Ultra strips; use bid.    Type 2 diabetes, HbA1C goal < 8% (H)       * blood glucose monitoring test  strip    ONETOUCH ULTRA    1 Box    Use to test blood sugar  daily or as directed.    Advanced directives, counseling/discussion       * blood glucose monitoring test strip    TRUETEST    3 Box    Testing 1-2  times per day. One Touch Ultra blue    Type 2 diabetes mellitus with diabetic nephropathy, without long-term current use of insulin (H)       * blood glucose monitoring test strip    ONETOUCH ULTRA    300 each    USE TO TEST BLOOD SUGAR 3-4 TIMES DAILY OR AS DIRECTED    Type 2 diabetes, HbA1C goal < 8% (H)       * famotidine 20 MG tablet    PEPCID    270 tablet    Take 1 tablet (20 mg) by mouth 2 times daily    Gastroesophageal reflux disease without esophagitis       * famotidine 20 MG tablet    PEPCID    270 tablet    TAKE 1 TABLET 2 TO 3 TIMES PER DAY    Gastroesophageal reflux disease without esophagitis       fluticasone 50 MCG/ACT spray    FLONASE    48 g    USE 2 SPRAYS IN EACH NOSTRIL EVERY DAY    Seasonal allergic rhinitis       glucosamine-chondroitin 500-400 MG Caps per capsule      One TID        latanoprost 0.005 % ophthalmic solution    XALATAN     1 drop At Bedtime.        lisinopril 20 MG tablet    PRINIVIL/ZESTRIL    180 tablet    Take 1 tablet (20 mg) by mouth 2 times daily    Essential hypertension, benign       MELATONIN PO      Take 5 mg by mouth At Bedtime        metFORMIN 500 MG 24 hr tablet    GLUCOPHAGE-XR    360 tablet    TAKE 2 TABLETS TWICE DAILY WITH MEALS    Type 2 diabetes mellitus with diabetic nephropathy, without long-term current use of insulin (H)       Multi-vitamin Tabs tablet   Generic drug:  multivitamin, therapeutic with minerals      1 TABLET DAILY        * ONE TOUCH ULTRA (DEVICES) Misc     1mo    soft lancets-testing 2-3 times per day    Type II or unspecified type diabetes mellitus without mention of complication, not stated as uncontrolled       * ONE TOUCH ULTRA (DEVICES) Misc     1 bottle    Control Solution-use as directed    Type II or unspecified type diabetes  mellitus without mention of complication, not stated as uncontrolled       * blood glucose monitoring meter device kit     1 kit    Use to test blood sugars 1- 2 times daily or as directed.    Type 2 diabetes mellitus with diabetic nephropathy, without long-term current use of insulin (H)       order for DME     1 each    Equipment being ordered: Postivac    Erectile dysfunction       vardenafil 20 MG tablet    LEVITRA    30 tablet    Take 0.5-1 tablets (10-20 mg) by mouth daily as needed Never use with nitroglycerin, terazosin or doxazosin.    Other male erectile dysfunction       * Notice:  This list has 16 medication(s) that are the same as other medications prescribed for you. Read the directions carefully, and ask your doctor or other care provider to review them with you.

## 2018-08-14 NOTE — LETTER
8/14/2018       RE: Jamil Bedolla Sr., MD  [address] [place] [address]     Dear Colleague,    Thank you for referring your patient, Jamil Bedolla Sr., MD, to the [facility] [place] at [facility]. Please see a copy of my visit note below.    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 Davis regarding his recurrent disease after radiation therapy. He has grade 4+4 disease at the left apex and left base laterally. His PSA is 0.4 before firmagon.  [facility] refused an appointment-I called today and his records were reviewed by Bryce Betts M.D.  I asked that his records be sent to Je Hernandez M.D. for review and that he call me.  Other past medical history, medications, allergies reviewed  Exam: With spouse. 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  Long discussion regarding observation, intermittent hormonal therapy, survival curves, hormone resistance, more definitive treatment with either afterloading of seeds or focal cryotherapy, risks and follow-up  Plan: Dr. Hernandez will review his records and hopefully be in touch with me in the next week.  We will push for afterloading of seeds or cryotherapy since Dr. Bedolla is very motivated to attempt additional treatment for potential cure rather than palliation.                 Again, thank you for allowing me to participate in the care of your patient.      Sincerely,    Wes Mejia MD

## 2018-08-17 ENCOUNTER — OFFICE VISIT (OUTPATIENT)
Dept: UROLOGY | Facility: CLINIC | Age: 83
End: 2018-08-17
Payer: COMMERCIAL

## 2018-08-17 DIAGNOSIS — C61 PROSTATE CANCER (H): ICD-10-CM

## 2018-08-17 DIAGNOSIS — C61 PROSTATE CANCER (H): Primary | ICD-10-CM

## 2018-08-17 PROCEDURE — 96372 THER/PROPH/DIAG INJ SC/IM: CPT | Performed by: UROLOGY

## 2018-08-17 ASSESSMENT — PAIN SCALES - GENERAL: PAINLEVEL: NO PAIN (0)

## 2018-08-17 NOTE — MR AVS SNAPSHOT
After Visit Summary   8/17/2018    Jamil Bedolla Sr., MD    MRN: [id number]           Patient Information     Date Of Birth          1935        Visit Information        Provider Department      8/17/2018 10:00 AM UA NURSE [facility]rology [facility]        Today's Diagnoses     Prostate cancer-Gavin 4, treated with radiation    -  1       Follow-ups after your visit        Your next 10 appointments already scheduled     Sep 26, 2018 10:45 AM CDT   Return Visit with Mikal Chung MD   [facility] ([email or web address])    [address]  [place] [address] [phone] OPT 2            Nov 30, 2018 10:30 AM CST   Return Visit with Wes Mejia MD   [facility] ([facility])    [address]  [address]  [place] [id number] [phone]              Who to contact     If you have questions or need follow up information about today's clinic visit or your schedule please contact [facility]OLOG[facility] directly at [phone].  Normal or non-critical lab and imaging results will be communicated to you by TeamDynamixhart, letter or phone within 4 business days after the clinic has received the results. If you do not hear from us within 7 days, please contact the clinic through MobPartnert or phone. If you have a critical or abnormal lab result, we will notify you by phone as soon as possible.  Submit refill requests through MeinProspekt or call your pharmacy and they will forward the refill request to us. Please allow 3 business days for your refill to be completed.          Additional Information About Your Visit        MyChart Information     [employer] gives you secure access to your electronic health record. If you see a primary care provider, you can also send messages to your care team and make appointments. If you have questions, please  call your primary care clinic.  If you do not have a primary care provider, please call [phone] and they will assist you.        Care EveryWhere ID     This is your Care EveryWhere ID. This could be used by other organizations to access your [facility] medical records  [id number]         Blood Pressure from Last 3 Encounters:   07/23/18 122/78   07/13/18 130/68   07/06/18 142/70    Weight from Last 3 Encounters:   08/14/18 86.2 kg (190 lb)   07/23/18 86.2 kg (190 lb)   07/13/18 85.7 kg (189 lb)              Today, you had the following     No orders found for display       Primary Care Provider Office Phone # Fax #    Cassia Wolff [phone]       [address]  [place] [address]        Equal Access to Services     [facility]: Hadii aad ku hadasho Soomaali, waaxda luqadaha, qaybta kaalmada adeegyada, waxay jeromein hayaan murtaza mclean . So [facility] [phone].    ATENCIÓN: Si habla español, tiene a wray disposición servicios gratuitos de asistencia lingüística. Llame al [phone].    We comply with applicable federal civil rights laws and [place] laws. We do not discriminate on the basis of race, color, national origin, age, disability, sex, sexual orientation, or gender identity.            Thank you!     Thank you for choosing [facility] [place]  for your care. Our goal is always to provide you with excellent care. Hearing back from our patients is one way we can continue to improve our services. Please take a few minutes to complete the written survey that you may receive in the mail after your visit with us. Thank you!             Your Updated Medication List - Protect others around you: Learn how to safely use, store and throw away your medicines at www.disposemymeds.org.          This list is accurate as of 8/17/18  2:04 PM.  Always use your most recent med list.                   Brand Name Dispense Instructions for use  Diagnosis    atorvastatin 40 MG tablet    LIPITOR    100 tablet    Take 1 tablet (40 mg) by mouth daily    Hyperlipidemia LDL goal <70       B-D SINGLE USE SWABS REGULAR Pads     270 each    1 pad 3 times daily    Type 2 diabetes, HbA1C goal < 8% (H), Type II or unspecified type diabetes mellitus without mention of complication, not stated as uncontrolled       blood glucose monitoring lancets     100 each    Use to test blood sugar 1-2  times daily or as directed.    Type 2 diabetes mellitus with diabetic nephropathy, without long-term current use of insulin (H)       blood glucose monitoring meter device kit     1 kit    Use to test blood sugars 1- 2 times daily or as directed.    Type 2 diabetes mellitus with diabetic nephropathy, without long-term current use of insulin (H)       blood glucose monitoring test strip    ONETOUCH ULTRA    1 Box    Use to test blood sugar  daily or as directed.    Advanced directives, counseling/discussion       famotidine 20 MG tablet    PEPCID    270 tablet    Take 1 tablet (20 mg) by mouth 2 times daily    Gastroesophageal reflux disease without esophagitis       fluticasone 50 MCG/ACT spray    FLONASE    48 g    USE 2 SPRAYS IN EACH NOSTRIL EVERY DAY    Seasonal allergic rhinitis       glucosamine-chondroitin 500-400 MG Caps per capsule      One TID        latanoprost 0.005 % ophthalmic solution    XALATAN     1 drop At Bedtime.        lisinopril 20 MG tablet    PRINIVIL/ZESTRIL    180 tablet    Take 1 tablet (20 mg) by mouth 2 times daily    Essential hypertension, benign       MELATONIN PO      Take 5 mg by mouth At Bedtime        metFORMIN 500 MG 24 hr tablet    GLUCOPHAGE-XR    360 tablet    TAKE 2 TABLETS TWICE DAILY WITH MEALS    Type 2 diabetes mellitus with diabetic nephropathy, without long-term current use of insulin (H)       Multi-vitamin Tabs tablet   Generic drug:  multivitamin, therapeutic with minerals      1 TABLET DAILY        order for DME     1 each     Equipment being ordered: Postivac    Erectile dysfunction       vardenafil 20 MG tablet    LEVITRA    30 tablet    Take 0.5-1 tablets (10-20 mg) by mouth daily as needed Never use with nitroglycerin, terazosin or doxazosin.    Other male erectile dysfunction

## 2018-08-17 NOTE — PROGRESS NOTES
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 Mejia for firmagon (80 mg) injection for prostate cancer.  This service provided today was under the supervising provider of the day, Dr. Mejia, who was available if needed.  The following medication was given:     MEDICATION: Firmagon  ROUTE: deep SQ  SITE: right lower abdomen  DOSE: 80 mg  LOT #: [id number]  :  SHAQ  EXPIRATION DATE:  10/2020  NDC#: [address]-6  Patient tolerate well.     Savannah Cuevas LPN

## 2018-08-19 ENCOUNTER — HOSPITAL ENCOUNTER (EMERGENCY)
Facility: CLINIC | Age: 83
Discharge: HOME | End: 2018-08-19
Attending: EMERGENCY MEDICINE | Admitting: EMERGENCY MEDICINE
Payer: MEDICARE

## 2018-08-19 VITALS
HEIGHT: 68 IN | BODY MASS INDEX: 29.25 KG/M2 | RESPIRATION RATE: 17 BRPM | TEMPERATURE: 97.9 F | DIASTOLIC BLOOD PRESSURE: 55 MMHG | SYSTOLIC BLOOD PRESSURE: 108 MMHG | OXYGEN SATURATION: 94 % | WEIGHT: 193 LBS

## 2018-08-19 DIAGNOSIS — I49.1 PREMATURE ATRIAL CONTRACTIONS: ICD-10-CM

## 2018-08-19 DIAGNOSIS — R00.2 PALPITATIONS: ICD-10-CM

## 2018-08-19 LAB
ANION GAP SERPL CALCULATED.3IONS-SCNC: 16 MMOL/L (ref 3–14)
BASOPHILS # BLD AUTO: 0 10E9/L (ref 0–0.2)
BASOPHILS NFR BLD AUTO: 0.4 %
BUN SERPL-MCNC: 22 MG/DL (ref 7–30)
CALCIUM SERPL-MCNC: 8.9 MG/DL (ref 8.5–10.1)
CHLORIDE SERPL-SCNC: 104 MMOL/L (ref 94–109)
CO2 SERPL-SCNC: 20 MMOL/L (ref 20–32)
CREAT SERPL-MCNC: 0.94 MG/DL (ref 0.66–1.25)
DIFFERENTIAL METHOD BLD: NORMAL
EOSINOPHIL # BLD AUTO: 0.3 10E9/L (ref 0–0.7)
EOSINOPHIL NFR BLD AUTO: 3.2 %
ERYTHROCYTE [DISTWIDTH] IN BLOOD BY AUTOMATED COUNT: 15 % (ref 10–15)
GFR SERPL CREATININE-BSD FRML MDRD: 77 ML/MIN/1.7M2
GLUCOSE SERPL-MCNC: 133 MG/DL (ref 70–99)
HCT VFR BLD AUTO: 42 % (ref 40–53)
HGB BLD-MCNC: 13.7 G/DL (ref 13.3–17.7)
IMM GRANULOCYTES # BLD: 0 10E9/L (ref 0–0.4)
IMM GRANULOCYTES NFR BLD: 0.4 %
INTERPRETATION ECG - MUSE: NORMAL
INTERPRETATION ECG - MUSE: NORMAL
LYMPHOCYTES # BLD AUTO: 2.5 10E9/L (ref 0.8–5.3)
LYMPHOCYTES NFR BLD AUTO: 32 %
MCH RBC QN AUTO: 28.5 PG (ref 26.5–33)
MCHC RBC AUTO-ENTMCNC: 32.6 G/DL (ref 31.5–36.5)
MCV RBC AUTO: 87 FL (ref 78–100)
MONOCYTES # BLD AUTO: 0.7 10E9/L (ref 0–1.3)
MONOCYTES NFR BLD AUTO: 9.2 %
NEUTROPHILS # BLD AUTO: 4.2 10E9/L (ref 1.6–8.3)
NEUTROPHILS NFR BLD AUTO: 54.8 %
NRBC # BLD AUTO: 0 10*3/UL
NRBC BLD AUTO-RTO: 0 /100
PLATELET # BLD AUTO: 183 10E9/L (ref 150–450)
POTASSIUM SERPL-SCNC: 3.9 MMOL/L (ref 3.4–5.3)
RBC # BLD AUTO: 4.81 10E12/L (ref 4.4–5.9)
SODIUM SERPL-SCNC: 140 MMOL/L (ref 133–144)
TROPONIN I SERPL-MCNC: <0.015 UG/L (ref 0–0.04)
WBC # BLD AUTO: 7.7 10E9/L (ref 4–11)

## 2018-08-19 PROCEDURE — 84484 ASSAY OF TROPONIN QUANT: CPT | Performed by: EMERGENCY MEDICINE

## 2018-08-19 PROCEDURE — 85025 COMPLETE CBC W/AUTO DIFF WBC: CPT | Performed by: EMERGENCY MEDICINE

## 2018-08-19 PROCEDURE — 93005 ELECTROCARDIOGRAM TRACING: CPT

## 2018-08-19 PROCEDURE — 80048 BASIC METABOLIC PNL TOTAL CA: CPT | Performed by: EMERGENCY MEDICINE

## 2018-08-19 PROCEDURE — 99284 EMERGENCY DEPT VISIT MOD MDM: CPT | Mod: 25

## 2018-08-19 PROCEDURE — 93005 ELECTROCARDIOGRAM TRACING: CPT | Mod: 76

## 2018-08-19 RX ORDER — ASPIRIN 81 MG/1
324 TABLET, CHEWABLE ORAL ONCE
Status: DISCONTINUED | OUTPATIENT
Start: 2018-08-19 | End: 2018-08-19 | Stop reason: HOSPADM

## 2018-08-19 ASSESSMENT — ENCOUNTER SYMPTOMS
SHORTNESS OF BREATH: 0
COUGH: 1
FEVER: 0

## 2018-08-19 NOTE — ED PROVIDER NOTES
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 The patient reports that he woke up about 3 hours ago and spontaneously decided to check his pulse. He states he noticed his pulse felt irregular and he waited about 2 hours before finally deciding to visit the ED for evaluation. He additionally notes he has a cough that has not progressed from baseline today. He denies fever, chest pain, new leg swelling, palpitations, or trouble breathing.     Allergies:  Contrast dye     Medications:    Lipitor  Pepcid   Flonase  Glucosamine-chondroitin  Xalatan  Lisinopril  Metformin  Levitra    Past Medical History:    Coronary artery disease  Carotid artery plaque  Colon polyps  Diverticulosis of colon  Glaucoma  Hyperlipidemia  Gastroesophageal reflux disease   Onychomycosis  Benign prostatic hyperplasia   Hypertension  Obesity  Patent foramen ovale   Plantar fasciitis  Presbyesophagus  Prostate cancer  Diabetes     Past Surgical History:    Appendectomy  Partial menisectomy left knee  Cystoscopy  Hemorrhoidectomy  Phacoemulsification clear cornea with standard intraocular lens implant  Rotator cuff repair, right  L4-5 discectomy  Tonsillectomy & adenoidectomy  TURP    Family History:    CAD  Asthma  Diabetes  Lipids     Social History:  Marital Status:   [2]  Smoking status: Never Smoker  Alcohol use: Yes    Review of Systems   Constitutional: Negative for fever.   Respiratory: Positive for cough. Negative for shortness of breath.    Cardiovascular: Negative for chest pain.     Physical Exam     Patient Vitals for the past 24 hrs:   BP Temp Temp src Heart Rate Resp SpO2 Height Weight   08/19/18 0530 108/55 - - 65 17 94 % - -   08/19/18 0515 109/54 - - 62 18 94 % - -   08/19/18 0500 116/54 - - 66 18 95 % - -   08/19/18 0445 135/65 - - 75 19  "96 % - -   08/19/18 0426 156/69 97.9  F (36.6  C) Temporal 67 18 95 % 1.727 m (5' 8\") 87.5 kg (193 lb)     Physical Exam  Nursing note and vitals reviewed.  Constitutional:  Appears well-developed and well-nourished.   HENT:   Head:    Atraumatic.   Mouth/Throat:   Oropharynx is clear and moist. No oropharyngeal exudate.   Eyes:    Pupils are equal, round, and reactive to light.   Neck:    Normal range of motion. Neck supple.      No tracheal deviation present. No thyromegaly present.   Cardiovascular:  Normal rate, regular rhythm, no murmur   Pulmonary/Chest: Breath sounds are clear and equal without wheezes or crackles.  Abdominal:   Soft. Bowel sounds are normal. Exhibits no distension and      no mass. There is no tenderness.      There is no rebound and no guarding.   Musculoskeletal:  Exhibits no edema.   Lymphadenopathy:  No cervical adenopathy.   Neurological:   Alert and oriented to person, place, and time.   Skin:    Skin is warm and dry. No rash noted. No pallor.     Emergency Department Course   ECG (4:21:44):  Rate 68 bpm. MD interval 162. QRS duration 86. QT/QTc 398/423. P-R-T axes 63 79 73. Normal sinus rhythm. Possible Left atrial enlargement. Borderline ECG. No significant change compared to 5/16/2012. Interpreted at 0430 by Darling Tolentino MD.    ECG (4:50:54):  Rate 73 bpm. MD interval 152. QRS duration 86. QT/QTc 400/440. P-R-T axes 56 72 50. Sinus rhythm with premature atrial complexes. Otherwise normal ECG. Interpreted at 0505 by Darling Tolentino MD.    Laboratory:  CBC: WNL (WBC 7.7, HGB 13.7, )  BMP: Anion Gap 16 (H), Glucose 133 (H), o/w WNL (Creatinine 0.94)  Troponin: <0.015    Interventions:  0453: Aspirin 324 mg PO    Emergency Department Course:  Past medical records, nursing notes, and vitals reviewed.  0431: I performed an exam of the patient and obtained history, as documented above.   EKG was obtained, results above.  IV inserted and blood drawn.    0450: Repeat EKG " was obtained, results above.    0539: I rechecked the patient. Explained findings to the patient.    Findings and plan explained to the patient. Patient discharged home with instructions regarding supportive care, medications, and reasons to return. The importance of close follow-up was reviewed.     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 I found the patient to have occasional premature atrial contractions which is likely the cause of his irregular pulse. The patient parekr not have any serious arhythmia and he does not have any chest pain or difficulty breathing or any other symptoms, so I did not feel that there is concern for acute coronary syndrome, especially since his EKG does not show any ischemic changes and his troponin is normal. He is not having any symptoms at all and states that he checked his pulse for no reason, so I did not feel that there was any indication for any further testing considering his electrolytes were also normal and his hemoglobin was normal. I felt that he could be safely discharged home and he was told to follow up with his cardiologist this week, to call on Monday and to return if he developed any symptoms such as chest pain, difficulty breathing or other concerning symptoms.      Diagnosis:    ICD-10-CM   1. Premature atrial contractions I49.1   2. Palpitations R00.2       Disposition:  discharged to home      Elyse Rodriguez  8/19/2018    EMERGENCY DEPARTMENT  Elyse RAMOS, am serving as a scribe at 4:31 AM on 8/19/2018 to document services personally performed by Darling Tolentino MD based on my observations and the provider's statements to me.        Darling Tolentino MD  08/19/18 0688

## 2018-08-19 NOTE — ED AVS SNAPSHOT
Emergency Department    [address] [address]    Phone:  [phone]    Fax:  [phone]                                       Jamil Bedolla Sr., MD   MRN: [id number]    Department:   Emergency Department   Date of Visit:  8/19/2018           Patient Information     Date Of Birth          1935        Your diagnoses for this visit were:     Premature atrial contractions     Palpitations        You were seen by Darling Tolentino MD.      Follow-up Information     Follow up with Cassia Wolff MD.    Specialty:  Family Practice    Why:  Monday for recheck    Contact information:    [address]  [place] [id number] [phone]          Follow up with  Emergency Department.    Specialty:  EMERGENCY MEDICINE    Why:  As needed, If symptoms worsen or for chest pain or trouble breathing.    Contact information:    [address] [address] [phone]      Discharge References/Attachments     PALPITATIONS (ENGLISH)      Your next 10 appointments already scheduled     Sep 26, 2018 10:45 AM CDT   Return Visit with Mikal Chung MD   [facility] ([facility])    [address]  [place] [id number] [phone] OPT 2            Nov 30, 2018 10:30 AM CST   Return Visit with Wes Mejia MD   [facility] [place] ([facility])    [address]  [address]  [place] [address] [phone]              24 Hour Appointment Hotline       To make an appointment at any [facility], call [phone] ([phone]). If you don't have a family doctor or clinic, we will help you find one. [facility] clinics are conveniently located to serve the needs of you and your family.             Review of your medicines      Our records show that you are taking the medicines listed below. If these are incorrect, please call your  family doctor or clinic.        Dose / Directions Last dose taken    atorvastatin 40 MG tablet   Commonly known as:  LIPITOR   Dose:  40 mg   Quantity:  100 tablet        Take 1 tablet (40 mg) by mouth daily   Refills:  2        B-D SINGLE USE SWABS REGULAR Pads   Dose:  1 pad   Quantity:  270 each        1 pad 3 times daily   Refills:  3        blood glucose monitoring lancets   Quantity:  100 each        Use to test blood sugar 1-2  times daily or as directed.   Refills:  11        blood glucose monitoring meter device kit   Quantity:  1 kit        Use to test blood sugars 1- 2 times daily or as directed.   Refills:  0        blood glucose monitoring test strip   Commonly known as:  ONETOUCH ULTRA   Quantity:  1 Box        Use to test blood sugar  daily or as directed.   Refills:  3        famotidine 20 MG tablet   Commonly known as:  PEPCID   Dose:  20 mg   Quantity:  270 tablet        Take 1 tablet (20 mg) by mouth 2 times daily   Refills:  3        fluticasone 50 MCG/ACT spray   Commonly known as:  FLONASE   Quantity:  48 g        USE 2 SPRAYS IN EACH NOSTRIL EVERY DAY   Refills:  11        glucosamine-chondroitin 500-400 MG Caps per capsule        One TID   Refills:  0        latanoprost 0.005 % ophthalmic solution   Commonly known as:  XALATAN   Dose:  1 drop        1 drop At Bedtime.   Refills:  0        lisinopril 20 MG tablet   Commonly known as:  PRINIVIL/ZESTRIL   Dose:  20 mg   Quantity:  180 tablet        Take 1 tablet (20 mg) by mouth 2 times daily   Refills:  1        MELATONIN PO   Dose:  5 mg        Take 5 mg by mouth At Bedtime   Refills:  0        metFORMIN 500 MG 24 hr tablet   Commonly known as:  GLUCOPHAGE-XR   Quantity:  360 tablet        TAKE 2 TABLETS TWICE DAILY WITH MEALS   Refills:  1        Multi-vitamin Tabs tablet   Generic drug:  multivitamin, therapeutic with minerals        1 TABLET DAILY   Refills:  0        order for DME   Quantity:  1 each        Equipment being ordered:  Postivac   Refills:  0        vardenafil 20 MG tablet   Commonly known as:  LEVITRA   Dose:  10-20 mg   Quantity:  30 tablet        Take 0.5-1 tablets (10-20 mg) by mouth daily as needed Never use with nitroglycerin, terazosin or doxazosin.   Refills:  4                Procedures and tests performed during your visit     Procedure/Test Number of Times Performed    Basic metabolic panel 1    CBC with platelets + differential 1    Cardiac Continuous Monitoring 2    EKG 12 lead 1    EKG 12-lead, tracing only 2    Peripheral IV: Standard 1    Pulse oximetry nursing 1    Review medications with patient 1    Troponin I 1      Orders Needing Specimen Collection     None      Pending Results     No orders found from 8/17/2018 to 8/20/2018.            Pending Culture Results     No orders found from 8/17/2018 to 8/20/2018.            Pending Results Instructions     If you had any lab results that were not finalized at the time of your Discharge, you can call the ED Lab Result RN at [phone]. You will be contacted by this team for any positive Lab results or changes in treatment. The nurses are available 7 days a week from 10A to 6:30P.  You can leave a message 24 hours per day and they will return your call.        Test Results From Your Hospital Stay        8/19/2018  5:09 AM      Component Results     Component Value Ref Range & Units Status    Sodium 140 133 - 144 mmol/L Final    Potassium 3.9 3.4 - 5.3 mmol/L Final    Chloride 104 94 - 109 mmol/L Final    Carbon Dioxide 20 20 - 32 mmol/L Final    Anion Gap 16 (H) 3 - 14 mmol/L Final    Glucose 133 (H) 70 - 99 mg/dL Final    Urea Nitrogen 22 7 - 30 mg/dL Final    Creatinine 0.94 0.66 - 1.25 mg/dL Final    GFR Estimate 77 >60 mL/min/1.7m2 Final    Non  GFR Calc    GFR Estimate If Black >90 >60 mL/min/1.7m2 Final    African American GFR Calc    Calcium 8.9 8.5 - 10.1 mg/dL Final         8/19/2018  4:55 AM      Component Results     Component Value Ref  Range & Units Status    WBC 7.7 4.0 - 11.0 10e9/L Final    RBC Count 4.81 4.4 - 5.9 10e12/L Final    Hemoglobin 13.7 13.3 - 17.7 g/dL Final    Hematocrit 42.0 40.0 - 53.0 % Final    MCV 87 78 - 100 fl Final    MCH 28.5 26.5 - 33.0 pg Final    MCHC 32.6 31.5 - 36.5 g/dL Final    RDW 15.0 10.0 - 15.0 % Final    Platelet Count 183 150 - 450 10e9/L Final    Diff Method Automated Method  Final    % Neutrophils 54.8 % Final    % Lymphocytes 32.0 % Final    % Monocytes 9.2 % Final    % Eosinophils 3.2 % Final    % Basophils 0.4 % Final    % Immature Granulocytes 0.4 % Final    Nucleated RBCs 0 0 /100 Final    Absolute Neutrophil 4.2 1.6 - 8.3 10e9/L Final    Absolute Lymphocytes 2.5 0.8 - 5.3 10e9/L Final    Absolute Monocytes 0.7 0.0 - 1.3 10e9/L Final    Absolute Eosinophils 0.3 0.0 - 0.7 10e9/L Final    Absolute Basophils 0.0 0.0 - 0.2 10e9/L Final    Abs Immature Granulocytes 0.0 0 - 0.4 10e9/L Final    Absolute Nucleated RBC 0.0  Final         8/19/2018  5:29 AM      Component Results     Component Value Ref Range & Units Status    Troponin I ES <0.015 0.000 - 0.045 ug/L Final    The 99th percentile for upper reference range is 0.045 ug/L.  Troponin values   in the range of 0.045 - 0.120 ug/L may be associated with risks of adverse   clinical events.                  Clinical Quality Measure: Blood Pressure Screening     Your blood pressure was checked while you were in the emergency department today. The last reading we obtained was  BP: 108/55 . Please read the guidelines below about what these numbers mean and what you should do about them.  If your systolic blood pressure (the top number) is less than 120 and your diastolic blood pressure (the bottom number) is less than 80, then your blood pressure is normal. There is nothing more that you need to do about it.  If your systolic blood pressure (the top number) is 120-139 or your diastolic blood pressure (the bottom number) is 80-89, your blood pressure may be  higher than it should be. You should have your blood pressure rechecked within a year by a primary care provider.  If your systolic blood pressure (the top number) is 140 or greater or your diastolic blood pressure (the bottom number) is 90 or greater, you may have high blood pressure. High blood pressure is treatable, but if left untreated over time it can put you at risk for heart attack, stroke, or kidney failure. You should have your blood pressure rechecked by a primary care provider within the next 4 weeks.  If your provider in the emergency department today gave you specific instructions to follow-up with your doctor or provider even sooner than that, you should follow that instruction and not wait for up to 4 weeks for your follow-up visit.        Thank you for choosing [facility]       Thank you for choosing [facility] for your care. Our goal is always to provide you with excellent care. Hearing back from our patients is one way we can continue to improve our services. Please take a few minutes to complete the written survey that you may receive in the mail after you visit with us. Thank you!        [employer] Information     [employer] gives you secure access to your electronic health record. If you see a primary care provider, you can also send messages to your care team and make appointments. If you have questions, please call your primary care clinic.  If you do not have a primary care provider, please call [phone] and they will assist you.        Care EveryWhere ID     This is your Care EveryWhere ID. This could be used by other organizations to access your [facility] medical records  [id number]        Equal Access to Services     ANTONI ORTEGA : Hadii vincenzo Decker, wamarc goodman, megan butleralgian mercedes. So [facility] [phone].    ATENCIÓN: Si habla español, tiene a wray disposición servicios gratuitos de asistencia lingüística. Llame al  [phone].    We comply with applicable federal civil rights laws and [place] laws. We do not discriminate on the basis of race, color, national origin, age, disability, sex, sexual orientation, or gender identity.            After Visit Summary       This is your record. Keep this with you and show to your community pharmacist(s) and doctor(s) at your next visit.

## 2018-08-19 NOTE — ED AVS SNAPSHOT
Emergency Department    [address] [place] [address]    Phone:  [phone]    Fax:  [phone]                                       Jamil Bedolla Sr., MD   MRN: [id number]    Department:   Emergency Department   Date of Visit:  8/19/2018           After Visit Summary Signature Page     I have received my discharge instructions, and my questions have been answered. I have discussed any challenges I see with this plan with the nurse or doctor.    ..........................................................................................................................................  Patient/Patient Representative Signature      ..........................................................................................................................................  Patient Representative Print Name and Relationship to Patient    ..................................................               ................................................  Date                                            Time    ..........................................................................................................................................  Reviewed by Signature/Title    ...................................................              ..............................................  Date                                                            Time

## 2018-09-18 ENCOUNTER — PRE VISIT (OUTPATIENT)
Dept: CARDIOLOGY | Facility: CLINIC | Age: 83
End: 2018-09-18

## 2018-09-18 DIAGNOSIS — E78.5 HYPERLIPIDEMIA LDL GOAL <100: Primary | ICD-10-CM

## 2018-09-18 NOTE — PROGRESS NOTES
Contacted patient and recommended a FLP and alt before OV 9-26-18.  He mentioned he had labs done in August and reviewed chart and FLP was not ordered.  Patient will see if he can get it done prior.

## 2018-09-24 ENCOUNTER — TELEPHONE (OUTPATIENT)
Dept: CARDIOLOGY | Facility: CLINIC | Age: 83
End: 2018-09-24

## 2018-09-24 NOTE — TELEPHONE ENCOUNTER
Had some blood work done at [facility]'s. However did not get Lipids checked.   Called, left voice message updating patient with what labs we needed, and asked if he could come fasting to tomorrows clinic visit so we can check his cholesterol numbers.   We can either draw him when he first gets here, or after OV with Dr. Chung.

## 2018-09-26 ENCOUNTER — OFFICE VISIT (OUTPATIENT)
Dept: CARDIOLOGY | Facility: CLINIC | Age: 83
End: 2018-09-26
Payer: COMMERCIAL

## 2018-09-26 VITALS
WEIGHT: 200.4 LBS | BODY MASS INDEX: 30.37 KG/M2 | HEIGHT: 68 IN | HEART RATE: 67 BPM | DIASTOLIC BLOOD PRESSURE: 74 MMHG | SYSTOLIC BLOOD PRESSURE: 124 MMHG

## 2018-09-26 DIAGNOSIS — E78.5 HYPERLIPIDEMIA LDL GOAL <100: ICD-10-CM

## 2018-09-26 DIAGNOSIS — E78.5 HYPERLIPIDEMIA LDL GOAL <70: ICD-10-CM

## 2018-09-26 DIAGNOSIS — I10 ESSENTIAL HYPERTENSION, BENIGN: ICD-10-CM

## 2018-09-26 DIAGNOSIS — I25.10 CORONARY ARTERY DISEASE INVOLVING NATIVE CORONARY ARTERY OF NATIVE HEART WITHOUT ANGINA PECTORIS: Primary | ICD-10-CM

## 2018-09-26 LAB
ALT SERPL W P-5'-P-CCNC: 11 U/L (ref 5–30)
CHOLEST SERPL-MCNC: 191 MG/DL
HDLC SERPL-MCNC: 60 MG/DL
LDLC SERPL CALC-MCNC: 111 MG/DL
NONHDLC SERPL-MCNC: 131 MG/DL
TRIGL SERPL-MCNC: 102 MG/DL

## 2018-09-26 PROCEDURE — 84460 ALANINE AMINO (ALT) (SGPT): CPT | Performed by: INTERNAL MEDICINE

## 2018-09-26 PROCEDURE — 36415 COLL VENOUS BLD VENIPUNCTURE: CPT | Performed by: INTERNAL MEDICINE

## 2018-09-26 PROCEDURE — 99214 OFFICE O/P EST MOD 30 MIN: CPT | Performed by: INTERNAL MEDICINE

## 2018-09-26 PROCEDURE — 80061 LIPID PANEL: CPT | Performed by: INTERNAL MEDICINE

## 2018-09-26 RX ORDER — ATORVASTATIN CALCIUM 40 MG/1
40 TABLET, FILM COATED ORAL DAILY
Qty: 100 TABLET | Refills: 3 | Status: SHIPPED | OUTPATIENT
Start: 2018-09-26 | End: 2019-10-10

## 2018-09-26 RX ORDER — LISINOPRIL 20 MG/1
20 TABLET ORAL 2 TIMES DAILY
Qty: 180 TABLET | Refills: 3 | Status: SHIPPED | OUTPATIENT
Start: 2018-09-26 | End: 2019-10-10

## 2018-09-26 NOTE — PROGRESS NOTES
IMPRESSION:   1.  Healthy adult 82-year-old male.    2.  Asymptomatic coronary artery disease.   3.  Treated hypertension.   4.  Treated hyperlipidemia.       MEDICATIONS:  Listed in Epic and include:   1.  Atorvastatin 40 mg at bedtime.   2.  Lisinopril 20 mg b.i.d.   3.  Metformin 1000 mg b.i.d.   4.  Levitra p.r.n.   5.  Flonase 2 sprays daily.   6.  Glucosamine chondroitin t.i.d.     HPI and Plan:   See dictation          No orders of the defined types were placed in this encounter.    Orders Placed This Encounter   Medications     atorvastatin (LIPITOR) 40 MG tablet     Sig: Take 1 tablet (40 mg) by mouth daily     Dispense:  100 tablet     Refill:  3     lisinopril (PRINIVIL/ZESTRIL) 20 MG tablet     Sig: Take 1 tablet (20 mg) by mouth 2 times daily     Dispense:  180 tablet     Refill:  3     Medications Discontinued During This Encounter   Medication Reason     GLUCOSAMINE-CHONDROITIN 500-400 MG OR CAPS Stopped by Patient     atorvastatin (LIPITOR) 40 MG tablet Reorder     lisinopril (PRINIVIL/ZESTRIL) 20 MG tablet Reorder         Encounter Diagnoses   Name Primary?     Coronary artery disease involving native coronary artery of native heart without angina pectoris Yes     Essential hypertension, benign      Hyperlipidemia LDL goal <70        CURRENT MEDICATIONS:  Current Outpatient Prescriptions   Medication Sig Dispense Refill     atorvastatin (LIPITOR) 40 MG tablet Take 1 tablet (40 mg) by mouth daily 100 tablet 3     famotidine (PEPCID) 20 MG tablet Take 1 tablet (20 mg) by mouth 2 times daily 270 tablet 3     fluticasone (FLONASE) 50 MCG/ACT spray USE 2 SPRAYS IN EACH NOSTRIL EVERY DAY 48 g 11     latanoprost (XALATAN) 0.005 % ophthalmic solution 1 drop At Bedtime.       lisinopril (PRINIVIL/ZESTRIL) 20 MG tablet Take 1 tablet (20 mg) by mouth 2 times daily 180 tablet 3     MELATONIN PO Take 5 mg by mouth At Bedtime       metFORMIN (GLUCOPHAGE-XR) 500 MG 24 hr tablet TAKE 2 TABLETS TWICE DAILY WITH  MEALS 360 tablet 1     MULTI-VITAMIN OR TABS 1 TABLET DAILY       vardenafil (LEVITRA) 20 MG tablet Take 0.5-1 tablets (10-20 mg) by mouth daily as needed Never use with nitroglycerin, terazosin or doxazosin. 30 tablet 4     Alcohol Swabs (B-D SINGLE USE SWABS REGULAR) PADS 1 pad 3 times daily 270 each 3     blood glucose monitoring (ONE TOUCH ULTRA 2) meter device kit Use to test blood sugars 1- 2 times daily or as directed. 1 kit 0     blood glucose monitoring (ONE TOUCH ULTRA) test strip Use to test blood sugar  daily or as directed. 1 Box 3     blood glucose monitoring (ONE TOUCH ULTRASOFT) lancets Use to test blood sugar 1-2  times daily or as directed. 100 each 11     ORDER FOR DME Equipment being ordered: Postivac 1 each 0     [DISCONTINUED] atorvastatin (LIPITOR) 40 MG tablet Take 1 tablet (40 mg) by mouth daily 100 tablet 2     [DISCONTINUED] lisinopril (PRINIVIL/ZESTRIL) 20 MG tablet Take 1 tablet (20 mg) by mouth 2 times daily 180 tablet 1       ALLERGIES     Allergies   Allergen Reactions     Ivp Dye [Contrast Dye]        PAST MEDICAL HISTORY:  Past Medical History:   Diagnosis Date     CAD (coronary artery disease)     per calcium score=>400     Carotid artery plaque 9/2011     Colon polyps      Diverticulosis of colon      Erectile dysfunction 7/18/2008     Glaucoma      High cholesterol      HTN (hypertension) 7/18/2008     Hypertension goal BP (blood pressure) < 140/90 12/10/2010     Obesity      PFO (patent foramen ovale)      Plantar fasciitis      Presbyesophagus      Prostate cancer-[employer] 4, treated with radiation 7/28/2013     Type II or unspecified type diabetes mellitus without mention of complication, not stated as uncontrolled     Dxd about 2000       PAST SURGICAL HISTORY:  Past Surgical History:   Procedure Laterality Date     APPENDECTOMY       ARTHROSCOPY KNEE RT/LT  1998    partial menisectomy left knee     COLONOSCOPY N/A 9/27/2016    Procedure: COMBINED COLONOSCOPY, SINGLE OR  MULTIPLE BIOPSY/POLYPECTOMY BY BIOPSY;  Surgeon: Maru Orta MD;  Location:  GI     CYSTOSCOPY       HC COLONOSCOPY THRU STOMA, DIAGNOSTIC  2005     HC COLONOSCOPY THRU STOMA, DIAGNOSTIC  5/09    diverticulosis, also proctitis     HEMORRHOIDECTOMY       PHACOEMULSIFICATION CLEAR CORNEA WITH STANDARD INTRAOCULAR LENS IMPLANT Right 3/18/2015    Procedure: PHACOEMULSIFICATION CLEAR CORNEA WITH STANDARD INTRAOCULAR LENS IMPLANT;  Surgeon: Lito Gonzales MD;  Location:  EC     PHACOEMULSIFICATION CLEAR CORNEA WITH STANDARD INTRAOCULAR LENS IMPLANT Left 3/25/2015    Procedure: PHACOEMULSIFICATION CLEAR CORNEA WITH STANDARD INTRAOCULAR LENS IMPLANT;  Surgeon: Lito Gonzales MD;  Location:  EC     ROTATOR CUFF REPAIR RT/LT  1998    right.  caused him to retire     SURGICAL HISTORY OF -   1999    L4-5 discectomy     TONSILLECTOMY & ADENOIDECTOMY       TURP         FAMILY HISTORY:  Family History   Problem Relation Age of Onset     C.A.D. Father      C.A.D. Brother      Asthma Brother      C.A.D. Mother      Diabetes Brother      Lipids Brother        SOCIAL HISTORY:  Social History     Social History     Marital status:      Spouse name: Salome Gates     Number of children: 2     Years of education: N/A     Occupational History     MD, [employer] Retired     Social History Main Topics     Smoking status: Never Smoker     Smokeless tobacco: Never Used     Alcohol use Yes      Comment: 10-14 drinks per week     Drug use: No     Sexual activity: Yes     Partners: Female     Other Topics Concern     Parent/Sibling W/ Cabg, Mi Or Angioplasty Before 65f 55m? No     Social History Narrative         Review of Systems:  Skin:  Positive for bruising     Eyes:  Positive for cataracts;glasses (removal)    ENT:  Positive for postnasal drainage    Respiratory:  Negative       Cardiovascular:    Positive for;edema    Gastroenterology: Negative      Genitourinary:  Positive for prostate problem Protate cancer-  "surgery last thursday for prostate cancer  Musculoskeletal:  Positive for joint pain    Neurologic:  Negative      Psychiatric:  Positive for sleep disturbances    Heme/Lymph/Imm:  Positive for allergies    Endocrine:  Positive for diabetes      Physical Exam:  Vitals: /74  Pulse 67  Ht 1.727 m (5' 8\")  Wt 90.9 kg (200 lb 6.4 oz)  BMI 30.47 kg/m2    Constitutional:  cooperative, alert and oriented, well developed, well nourished, in no acute distress overweight;appears younger than stated age      Skin:  warm and dry to the touch          Head:  normocephalic        Eyes:  pupils equal and round;sclera white        Lymph:No Cervical lymphadenopathy present     ENT:  no pallor or cyanosis        Neck:  JVP normal;carotid pulses are full and equal bilaterally;no carotid bruit        Respiratory:  clear to auscultation         Cardiac: regular rhythm;normal S1 and S2;no murmurs, gallops or rubs detected                pulses full and equal               2+             2+          GI:  abdomen soft;no masses;non-tender        Extremities and Muscular Skeletal:  no deformities, clubbing, cyanosis, erythema observed;no edema              Neurological:  affect appropriate        Psych:  oriented to time, person and place        Recent Lab Results:  LIPID RESULTS:  Lab Results   Component Value Date    CHOL 191 09/26/2018    HDL 60 09/26/2018     (H) 09/26/2018    TRIG 102 09/26/2018    CHOLHDLRATIO 3.2 08/19/2015       LIVER ENZYME RESULTS:  Lab Results   Component Value Date    AST 15 05/17/2018    ALT 11 09/26/2018       CBC RESULTS:  Lab Results   Component Value Date    WBC 7.7 08/19/2018    RBC 4.81 08/19/2018    HGB 13.7 08/19/2018    HCT 42.0 08/19/2018    MCV 87 08/19/2018    MCH 28.5 08/19/2018    MCHC 32.6 08/19/2018    RDW 15.0 08/19/2018     08/19/2018       BMP RESULTS:  Lab Results   Component Value Date     08/19/2018    POTASSIUM 3.9 08/19/2018    CHLORIDE 104 08/19/2018    CO2 " 20 08/19/2018    ANIONGAP 16 (H) 08/19/2018     (H) 08/19/2018    BUN 22 08/19/2018    CR 0.94 08/19/2018    GFRESTIMATED 77 08/19/2018    GFRESTBLACK >90 08/19/2018    WILIAN 8.9 08/19/2018        A1C RESULTS:  Lab Results   Component Value Date    A1C 6.1 (H) 05/17/2018       INR RESULTS:  No results found for: INR        CC  Cassia Wolff MD  [address] [place] [address] [address] [facility] [place] [address]

## 2018-09-26 NOTE — LETTER
9/26/2018    Cassia Wolff MD  [address]  [place] [address]    RE: Jamil Bedolla Sr., MD       Dear Colleague,    I had the pleasure of seeing Jamil Bedolla Sr., MD in the [facility].    IMPRESSION:   1.  Healthy adult 82-year-old male.    2.  Asymptomatic coronary artery disease.   3.  Treated hypertension.   4.  Treated hyperlipidemia.       MEDICATIONS:  Listed in Epic and include:   1.  Atorvastatin 40 mg at bedtime.   2.  Lisinopril 20 mg b.i.d.   3.  Metformin 1000 mg b.i.d.   4.  Levitra p.r.n.   5.  Flonase 2 sprays daily.   6.  Glucosamine chondroitin t.i.d.     HPI and Plan:   See dictation          No orders of the defined types were placed in this encounter.    Orders Placed This Encounter   Medications     atorvastatin (LIPITOR) 40 MG tablet     Sig: Take 1 tablet (40 mg) by mouth daily     Dispense:  100 tablet     Refill:  3     lisinopril (PRINIVIL/ZESTRIL) 20 MG tablet     Sig: Take 1 tablet (20 mg) by mouth 2 times daily     Dispense:  180 tablet     Refill:  3     Medications Discontinued During This Encounter   Medication Reason     GLUCOSAMINE-CHONDROITIN 500-400 MG OR CAPS Stopped by Patient     atorvastatin (LIPITOR) 40 MG tablet Reorder     lisinopril (PRINIVIL/ZESTRIL) 20 MG tablet Reorder         Encounter Diagnoses   Name Primary?     Coronary artery disease involving native coronary artery of native heart without angina pectoris Yes     Essential hypertension, benign      Hyperlipidemia LDL goal <70        CURRENT MEDICATIONS:  Current Outpatient Prescriptions   Medication Sig Dispense Refill     atorvastatin (LIPITOR) 40 MG tablet Take 1 tablet (40 mg) by mouth daily 100 tablet 3     famotidine (PEPCID) 20 MG tablet Take 1 tablet (20 mg) by mouth 2 times daily 270 tablet 3     fluticasone (FLONASE) 50 MCG/ACT spray USE 2 SPRAYS IN EACH NOSTRIL EVERY DAY 48 g 11     latanoprost (XALATAN) 0.005 % ophthalmic solution 1 drop At Bedtime.        lisinopril (PRINIVIL/ZESTRIL) 20 MG tablet Take 1 tablet (20 mg) by mouth 2 times daily 180 tablet 3     MELATONIN PO Take 5 mg by mouth At Bedtime       metFORMIN (GLUCOPHAGE-XR) 500 MG 24 hr tablet TAKE 2 TABLETS TWICE DAILY WITH MEALS 360 tablet 1     MULTI-VITAMIN OR TABS 1 TABLET DAILY       vardenafil (LEVITRA) 20 MG tablet Take 0.5-1 tablets (10-20 mg) by mouth daily as needed Never use with nitroglycerin, terazosin or doxazosin. 30 tablet 4     Alcohol Swabs (B-D SINGLE USE SWABS REGULAR) PADS 1 pad 3 times daily 270 each 3     blood glucose monitoring (ONE TOUCH ULTRA 2) meter device kit Use to test blood sugars 1- 2 times daily or as directed. 1 kit 0     blood glucose monitoring (ONE TOUCH ULTRA) test strip Use to test blood sugar  daily or as directed. 1 Box 3     blood glucose monitoring (ONE TOUCH ULTRASOFT) lancets Use to test blood sugar 1-2  times daily or as directed. 100 each 11     ORDER FOR DME Equipment being ordered: Postivac 1 each 0     [DISCONTINUED] atorvastatin (LIPITOR) 40 MG tablet Take 1 tablet (40 mg) by mouth daily 100 tablet 2     [DISCONTINUED] lisinopril (PRINIVIL/ZESTRIL) 20 MG tablet Take 1 tablet (20 mg) by mouth 2 times daily 180 tablet 1       ALLERGIES     Allergies   Allergen Reactions     Ivp Dye [Contrast Dye]        PAST MEDICAL HISTORY:  Past Medical History:   Diagnosis Date     CAD (coronary artery disease)     per calcium score=>400     Carotid artery plaque 9/2011     Colon polyps      Diverticulosis of colon      Erectile dysfunction 7/18/2008     Glaucoma      High cholesterol      HTN (hypertension) 7/18/2008     Hypertension goal BP (blood pressure) < 140/90 12/10/2010     Obesity      PFO (patent foramen ovale)      Plantar fasciitis      Presbyesophagus      Prostate cancer-[place] 4, treated with radiation 7/28/2013     Type II or unspecified type diabetes mellitus without mention of complication, not stated as uncontrolled     Dxd about 2000       PAST  SURGICAL HISTORY:  Past Surgical History:   Procedure Laterality Date     APPENDECTOMY       ARTHROSCOPY KNEE RT/LT  1998    partial menisectomy left knee     COLONOSCOPY N/A 9/27/2016    Procedure: COMBINED COLONOSCOPY, SINGLE OR MULTIPLE BIOPSY/POLYPECTOMY BY BIOPSY;  Surgeon: Maru Orta MD;  Location:  GI     CYSTOSCOPY       HC COLONOSCOPY THRU STOMA, DIAGNOSTIC  2005     HC COLONOSCOPY THRU STOMA, DIAGNOSTIC  5/09    diverticulosis, also proctitis     HEMORRHOIDECTOMY       PHACOEMULSIFICATION CLEAR CORNEA WITH STANDARD INTRAOCULAR LENS IMPLANT Right 3/18/2015    Procedure: PHACOEMULSIFICATION CLEAR CORNEA WITH STANDARD INTRAOCULAR LENS IMPLANT;  Surgeon: Lito Gonzales MD;  Location:  EC     PHACOEMULSIFICATION CLEAR CORNEA WITH STANDARD INTRAOCULAR LENS IMPLANT Left 3/25/2015    Procedure: PHACOEMULSIFICATION CLEAR CORNEA WITH STANDARD INTRAOCULAR LENS IMPLANT;  Surgeon: Lito Gonzales MD;  Location:  EC     ROTATOR CUFF REPAIR RT/LT  1998    right.  caused him to retire     SURGICAL HISTORY OF -   1999    L4-5 discectomy     TONSILLECTOMY & ADENOIDECTOMY       TURP         FAMILY HISTORY:  Family History   Problem Relation Age of Onset     C.A.D. Father      C.A.D. Brother      Asthma Brother      C.A.D. Mother      Diabetes Brother      Lipids Brother        SOCIAL HISTORY:  Social History     Social History     Marital status:      Spouse name: Salome Gates     Number of children: 2     Years of education: N/A     Occupational History     MD, [employer] Retired     Social History Main Topics     Smoking status: Never Smoker     Smokeless tobacco: Never Used     Alcohol use Yes      Comment: 10-14 drinks per week     Drug use: No     Sexual activity: Yes     Partners: Female     Other Topics Concern     Parent/Sibling W/ Cabg, Mi Or Angioplasty Before 65f 55m? No     Social History Narrative         Review of Systems:  Skin:  Positive for bruising     Eyes:  Positive for  "cataracts;glasses (removal)    ENT:  Positive for postnasal drainage    Respiratory:  Negative       Cardiovascular:    Positive for;edema    Gastroenterology: Negative      Genitourinary:  Positive for prostate problem Protate cancer- surgery last thursday for prostate cancer  Musculoskeletal:  Positive for joint pain    Neurologic:  Negative      Psychiatric:  Positive for sleep disturbances    Heme/Lymph/Imm:  Positive for allergies    Endocrine:  Positive for diabetes      Physical Exam:  Vitals: /74  Pulse 67  Ht 1.727 m (5' 8\")  Wt 90.9 kg (200 lb 6.4 oz)  BMI 30.47 kg/m2    Constitutional:  cooperative, alert and oriented, well developed, well nourished, in no acute distress overweight;appears younger than stated age      Skin:  warm and dry to the touch          Head:  normocephalic        Eyes:  pupils equal and round;sclera white        Lymph:No Cervical lymphadenopathy present     ENT:  no pallor or cyanosis        Neck:  JVP normal;carotid pulses are full and equal bilaterally;no carotid bruit        Respiratory:  clear to auscultation         Cardiac: regular rhythm;normal S1 and S2;no murmurs, gallops or rubs detected                pulses full and equal               2+             2+          GI:  abdomen soft;no masses;non-tender        Extremities and Muscular Skeletal:  no deformities, clubbing, cyanosis, erythema observed;no edema              Neurological:  affect appropriate        Psych:  oriented to time, person and place        Recent Lab Results:  LIPID RESULTS:  Lab Results   Component Value Date    CHOL 191 09/26/2018    HDL 60 09/26/2018     (H) 09/26/2018    TRIG 102 09/26/2018    CHOLHDLRATIO 3.2 08/19/2015       LIVER ENZYME RESULTS:  Lab Results   Component Value Date    AST 15 05/17/2018    ALT 11 09/26/2018       CBC RESULTS:  Lab Results   Component Value Date    WBC 7.7 08/19/2018    RBC 4.81 08/19/2018    HGB 13.7 08/19/2018    HCT 42.0 08/19/2018    MCV 87 " 08/19/2018    MCH 28.5 08/19/2018    MCHC 32.6 08/19/2018    RDW 15.0 08/19/2018     08/19/2018       BMP RESULTS:  Lab Results   Component Value Date     08/19/2018    POTASSIUM 3.9 08/19/2018    CHLORIDE 104 08/19/2018    CO2 20 08/19/2018    ANIONGAP 16 (H) 08/19/2018     (H) 08/19/2018    BUN 22 08/19/2018    CR 0.94 08/19/2018    GFRESTIMATED 77 08/19/2018    GFRESTBLACK >90 08/19/2018    WILIAN 8.9 08/19/2018        A1C RESULTS:  Lab Results   Component Value Date    A1C 6.1 (H) 05/17/2018       INR RESULTS:  No results found for: INR        CC  Cassia Wolff MD  [address] [place] [address] [place] [address]                      Thank you for allowing me to participate in the care of your patient.      Sincerely,     SUMIT MCDANIEL MD     [facility]    cc:   Cassia Wolff MD  [address] [place] [address] [place] [address]

## 2018-09-26 NOTE — LETTER
9/26/2018      Cassia Wolff MD  [address]  [place] [address]      RE: Jamil Bedolla Sr., MD       Dear Colleague,    I had the pleasure of seeing Jamil Bedolla Sr., MD in the [facility].    Service Date: 09/26/2018      HISTORY OF PRESENT ILLNESS:  I got together with Kenny Bedolla.  I am sure you know Kenny well.  He is a physician, retired cosmetic and plastic surgeon.  I have seen him for a rather comfortable combination of chronic diabetes mellitus, mild hypertension and hyperlipidemia.      His diabetes has been very nicely controlled.  His hemoglobin A1c's have been in the 6.1-6.4 range while on metformin alone.  He is not on insulin.      Kenny lives a quietly active lifestyle.  He does some regular gentle biking exercise.  Most recently, however, he has been forced to be less active because of treatment for diagnosis of a nonmetastatic cancer of the prostate.      He also has bilateral shoulder issues with torn rotator cuffs and some bilateral shoulder pain, right worse than left, and this has limited his ability to do an aerodyne type of bicycle exercise.      MEDICATIONS:   1.  Atorvastatin 40 mg at bedtime.   2.  Pepcid 20 mg a day.   3.  Lisinopril 20 mg b.i.d.   4.  Metformin 1000 mg b.i.d.   5.  Other over-the-counter medications.      REVIEW OF SYSTEMS:  Chronicled in Epic.   HEENT:  He has a history of cataracts.     CARDIAC:  Has been relatively negative other than a rather understandable pattern of low-level dyspnea on exertion.  He has a tendency to a little bit of lower leg edema.   UPPER GASTROINTESTINAL AND LOWER GASTROINTESTINAL:  Negative.   GENITOURINARY:  Positive for frequency with his prostate surgery and intervention.   MUSCULOSKELETAL:  He has achy knees, left more than the right, some chronic low back pain and shoulder pain.   NEUROLOGIC:  Negative.   PSYCHIATRIC:  Negative.     The remaining review is negative other than non-insulin-dependent  diabetes mellitus.      PHYSICAL EXAMINATION:   GENERAL:  Bright, alert male.  He looks younger than his years.    VITAL SIGNS:  He is 120/70, heart rate 68 beats a minute.  He weighs 200 pounds, which is up 10 pounds from earlier this year.   HEAD AND NECK:  Normal.  Carotids were equal, no bruits heard.   HEART:  Regular without gallop or murmur.   LUNGS:  Clear to auscultation.   ABDOMEN:  Soft, mildly rotund without organomegaly, mass, pain or guarding.   EXTREMITIES:  Showed currently no edema.  Pedal pulses +1 to +2.       Kenny is 83.  His blood pressure is nicely controlled without obvious side effects, primarily being treated with lisinopril.        His LDL crept from the 80s up to 111, not exactly sure why that is, but I am not going to overtreat it, as he does not have a history of documented arterial disease.  We discussed diet and exercise a little bit, and he smiles and says he will do better.      His diabetes is under good control.  His renal function is normal.  He is not having any obvious cardiovascular symptoms, and we discussed various cardiac issues at length.  Of course, he understands.      IMPRESSION:   1.  Controlled hypertension.   2.  Modestly overweight.   3.  Treated hyperlipidemia   4.  Non-insulin-dependent diabetes mellitus.      I renewed his medications.  I did not order any tests and did not order any studies.  If he has any trouble or if I can be of any help, please let me know.  He is a really sweet man, and he deserves any attention that might be required.      Prognosis is good.  Should he develop obvious cardiovascular complications or issues, let me know.  Warm regards.  Over 35 minutes was spent doing the consult, including multiple records, imaging, medications, medication side effects, multiple diseases as noted above.      cc:   Cassia Wolff MD    [address]  [address]         SUMIT MCDANIEL MD, [facility]             D:  2018   T: 2018   MT: KHURRAM      Name:     IGNACIO PALOMINO   MRN:      [id number]        Account:      [id number]   :      1935           Service Date: 2018      Document: [id number]         Outpatient Encounter Prescriptions as of 2018   Medication Sig Dispense Refill     atorvastatin (LIPITOR) 40 MG tablet Take 1 tablet (40 mg) by mouth daily 100 tablet 3     famotidine (PEPCID) 20 MG tablet Take 1 tablet (20 mg) by mouth 2 times daily 270 tablet 3     fluticasone (FLONASE) 50 MCG/ACT spray USE 2 SPRAYS IN EACH NOSTRIL EVERY DAY 48 g 11     latanoprost (XALATAN) 0.005 % ophthalmic solution 1 drop At Bedtime.       lisinopril (PRINIVIL/ZESTRIL) 20 MG tablet Take 1 tablet (20 mg) by mouth 2 times daily 180 tablet 3     MELATONIN PO Take 5 mg by mouth At Bedtime       metFORMIN (GLUCOPHAGE-XR) 500 MG 24 hr tablet TAKE 2 TABLETS TWICE DAILY WITH MEALS 360 tablet 1     MULTI-VITAMIN OR TABS 1 TABLET DAILY       vardenafil (LEVITRA) 20 MG tablet Take 0.5-1 tablets (10-20 mg) by mouth daily as needed Never use with nitroglycerin, terazosin or doxazosin. 30 tablet 4     Alcohol Swabs (B-D SINGLE USE SWABS REGULAR) PADS 1 pad 3 times daily 270 each 3     blood glucose monitoring (ONE TOUCH ULTRA 2) meter device kit Use to test blood sugars 1- 2 times daily or as directed. 1 kit 0     blood glucose monitoring (ONE TOUCH ULTRA) test strip Use to test blood sugar  daily or as directed. 1 Box 3     blood glucose monitoring (ONE TOUCH ULTRASOFT) lancets Use to test blood sugar 1-2  times daily or as directed. 100 each 11     ORDER FOR DME Equipment being ordered: Postivac 1 each 0     [DISCONTINUED] atorvastatin (LIPITOR) 40 MG tablet Take 1 tablet (40 mg) by mouth daily 100 tablet 2     [DISCONTINUED] GLUCOSAMINE-CHONDROITIN 500-400 MG OR CAPS One TID       [DISCONTINUED] lisinopril (PRINIVIL/ZESTRIL) 20 MG tablet Take 1 tablet (20 mg) by mouth 2 times daily 180 tablet 1     No facility-administered  encounter medications on file as of 9/26/2018.        Again, thank you for allowing me to participate in the care of your patient.      Sincerely,    SUMIT MCDANIEL MD     [facility]

## 2018-09-26 NOTE — MR AVS SNAPSHOT
After Visit Summary   9/26/2018    Jamil Bedolla Sr., MD    MRN: [id number]           Patient Information     Date Of Birth          1935        Visit Information        Provider Department      9/26/2018 10:45 AM Mikal Chung MD [facility]        Today's Diagnoses     Coronary artery disease involving native coronary artery of native heart without angina pectoris    -  1    Essential hypertension, benign        Hyperlipidemia LDL goal <70           Follow-ups after your visit        Your next 10 appointments already scheduled     Nov 30, 2018 10:30 AM CST   Return Visit with Wes Mejia MD   [facility] ([facility])    [address]  [address]  [facility] [address] [phone]              Who to contact     If you have questions or need follow up information about today's clinic visit or your schedule please contact [facility] directly at [phone].  Normal or non-critical lab and imaging results will be communicated to you by Balayahart, letter or phone within 4 business days after the clinic has received the results. If you do not hear from us within 7 days, please contact the clinic through [address] or phone. If you have a critical or abnormal lab result, we will notify you by phone as soon as possible.  Submit refill requests through [facility] or call your pharmacy and they will forward the refill request to us. Please allow 3 business days for your refill to be completed.          Additional Information About Your Visit        MyChart Information     [employer] gives you secure access to your electronic health record. If you see a primary care provider, you can also send messages to your care team and make appointments. If you have questions, please call your primary care clinic.  If you do not have a primary care provider, please call [phone]  "and they will assist you.        Care EveryWhere ID     This is your Care EveryWhere ID. This could be used by other organizations to access your [facility] medical records  [id number]        Your Vitals Were     Pulse Height BMI (Body Mass Index)             67 1.727 m (5' 8\") 30.47 kg/m2          Blood Pressure from Last 3 Encounters:   09/26/18 124/74   08/19/18 108/55   07/23/18 122/78    Weight from Last 3 Encounters:   09/26/18 90.9 kg (200 lb 6.4 oz)   08/19/18 87.5 kg (193 lb)   08/14/18 86.2 kg (190 lb)              Today, you had the following     No orders found for display         Where to get your medicines      These medications were sent to [facility] - [place], [place] - [address] [facility]  [address] [facility], [facility] [address]     Phone:  [phone]     atorvastatin 40 MG tablet    lisinopril 20 MG tablet          Primary Care Provider Office Phone # Fax #    Cassia Baltazar Wolff [phone]       [address] [place] [address]        Equal Access to Services     [facility]: Hadii aad ku hadasho Soomaali, waaxda luqadaha, qaybta kaalmada adeegyada, gian persaud. So [facility] [phone].    ATENCIÓN: Si habla español, tiene a wray disposición servicios gratuitos de asistencia lingüística. [facility] [phone].    We comply with applicable federal civil rights laws and [place] laws. We do not discriminate on the basis of race, color, national origin, age, disability, sex, sexual orientation, or gender identity.            Thank you!     Thank you for choosing [facility]  for your care. Our goal is always to provide you with excellent care. Hearing back from our patients is one way we can continue to improve our services. Please take a few minutes to complete the written survey that you may receive in the mail after your visit with us. Thank you!             Your Updated Medication " List - Protect others around you: Learn how to safely use, store and throw away your medicines at www.disposemymeds.org.          This list is accurate as of 9/26/18 12:17 PM.  Always use your most recent med list.                   Brand Name Dispense Instructions for use Diagnosis    atorvastatin 40 MG tablet    LIPITOR    100 tablet    Take 1 tablet (40 mg) by mouth daily    Hyperlipidemia LDL goal <70       B-D SINGLE USE SWABS REGULAR Pads     270 each    1 pad 3 times daily    Type 2 diabetes, HbA1C goal < 8% (H), Type II or unspecified type diabetes mellitus without mention of complication, not stated as uncontrolled       blood glucose monitoring lancets     100 each    Use to test blood sugar 1-2  times daily or as directed.    Type 2 diabetes mellitus with diabetic nephropathy, without long-term current use of insulin (H)       blood glucose monitoring meter device kit     1 kit    Use to test blood sugars 1- 2 times daily or as directed.    Type 2 diabetes mellitus with diabetic nephropathy, without long-term current use of insulin (H)       blood glucose monitoring test strip    ONETOUCH ULTRA    1 Box    Use to test blood sugar  daily or as directed.    Advanced directives, counseling/discussion       famotidine 20 MG tablet    PEPCID    270 tablet    Take 1 tablet (20 mg) by mouth 2 times daily    Gastroesophageal reflux disease without esophagitis       fluticasone 50 MCG/ACT spray    FLONASE    48 g    USE 2 SPRAYS IN EACH NOSTRIL EVERY DAY    Seasonal allergic rhinitis       latanoprost 0.005 % ophthalmic solution    XALATAN     1 drop At Bedtime.        lisinopril 20 MG tablet    PRINIVIL/ZESTRIL    180 tablet    Take 1 tablet (20 mg) by mouth 2 times daily    Essential hypertension, benign       MELATONIN PO      Take 5 mg by mouth At Bedtime        metFORMIN 500 MG 24 hr tablet    GLUCOPHAGE-XR    360 tablet    TAKE 2 TABLETS TWICE DAILY WITH MEALS    Type 2 diabetes mellitus with diabetic  nephropathy, without long-term current use of insulin (H)       Multi-vitamin Tabs tablet   Generic drug:  multivitamin, therapeutic with minerals      1 TABLET DAILY        order for DME     1 each    Equipment being ordered: Postivac    Erectile dysfunction       vardenafil 20 MG tablet    LEVITRA    30 tablet    Take 0.5-1 tablets (10-20 mg) by mouth daily as needed Never use with nitroglycerin, terazosin or doxazosin.    Other male erectile dysfunction

## 2018-09-27 NOTE — PROGRESS NOTES
Service Date: 09/26/2018      HISTORY OF PRESENT ILLNESS:  I got together with Kenny Bedolla.  I am sure you know Kenny well.  He is a physician, retired cosmetic and plastic surgeon.  I have seen him for a rather comfortable combination of chronic diabetes mellitus, mild hypertension and hyperlipidemia.      His diabetes has been very nicely controlled.  His hemoglobin A1c's have been in the 6.1-6.4 range while on metformin alone.  He is not on insulin.      Kenny lives a quietly active lifestyle.  He does some regular gentle biking exercise.  Most recently, however, he has been forced to be less active because of treatment for diagnosis of a nonmetastatic cancer of the prostate.      He also has bilateral shoulder issues with torn rotator cuffs and some bilateral shoulder pain, right worse than left, and this has limited his ability to do an aerodyne type of bicycle exercise.      MEDICATIONS:   1.  Atorvastatin 40 mg at bedtime.   2.  Pepcid 20 mg a day.   3.  Lisinopril 20 mg b.i.d.   4.  Metformin 1000 mg b.i.d.   5.  Other over-the-counter medications.      REVIEW OF SYSTEMS:  Chronicled in Epic.   HEENT:  He has a history of cataracts.     CARDIAC:  Has been relatively negative other than a rather understandable pattern of low-level dyspnea on exertion.  He has a tendency to a little bit of lower leg edema.   UPPER GASTROINTESTINAL AND LOWER GASTROINTESTINAL:  Negative.   GENITOURINARY:  Positive for frequency with his prostate surgery and intervention.   MUSCULOSKELETAL:  He has achy knees, left more than the right, some chronic low back pain and shoulder pain.   NEUROLOGIC:  Negative.   PSYCHIATRIC:  Negative.     The remaining review is negative other than non-insulin-dependent diabetes mellitus.      PHYSICAL EXAMINATION:   GENERAL:  Bright, alert male.  He looks younger than his years.    VITAL SIGNS:  He is 120/70, heart rate 68 beats a minute.  He weighs 200 pounds, which is up 10 pounds from earlier this year.    HEAD AND NECK:  Normal.  Carotids were equal, no bruits heard.   HEART:  Regular without gallop or murmur.   LUNGS:  Clear to auscultation.   ABDOMEN:  Soft, mildly rotund without organomegaly, mass, pain or guarding.   EXTREMITIES:  Showed currently no edema.  Pedal pulses +1 to +2.       Kenny is 83.  His blood pressure is nicely controlled without obvious side effects, primarily being treated with lisinopril.        His LDL crept from the 80s up to 111, not exactly sure why that is, but I am not going to overtreat it, as he does not have a history of documented arterial disease.  We discussed diet and exercise a little bit, and he smiles and says he will do better.      His diabetes is under good control.  His renal function is normal.  He is not having any obvious cardiovascular symptoms, and we discussed various cardiac issues at length.  Of course, he understands.      IMPRESSION:   1.  Controlled hypertension.   2.  Modestly overweight.   3.  Treated hyperlipidemia   4.  Non-insulin-dependent diabetes mellitus.      I renewed his medications.  I did not order any tests and did not order any studies.  If he has any trouble or if I can be of any help, please let me know.  He is a really sweet man, and he deserves any attention that might be required.      Prognosis is good.  Should he develop obvious cardiovascular complications or issues, let me know.  Warm regards.  Over 35 minutes was spent doing the consult, including multiple records, imaging, medications, medication side effects, multiple diseases as noted above.      cc:   [place] [address]         SUMIT [facility]CDANIEL [facility], [facility]             D: 2018   T: 2018   MT: KHURRAM      Name:     IGNACIO PALOMINO   MRN:      [id number]        Account:      [id number]   :      1935           Service Date: 2018      Document: [id number]

## 2018-10-30 ENCOUNTER — TELEPHONE (OUTPATIENT)
Dept: UROLOGY | Facility: CLINIC | Age: 83
End: 2018-10-30

## 2018-10-30 ENCOUNTER — HOSPITAL ENCOUNTER (OUTPATIENT)
Facility: CLINIC | Age: 83
Setting detail: OBSERVATION
Discharge: HOME | End: 2018-10-31
Attending: EMERGENCY MEDICINE | Admitting: INTERNAL MEDICINE
Payer: MEDICARE

## 2018-10-30 DIAGNOSIS — R31.9 HEMATURIA, UNSPECIFIED TYPE: ICD-10-CM

## 2018-10-30 PROCEDURE — 51700 IRRIGATION OF BLADDER: CPT

## 2018-10-30 PROCEDURE — 99285 EMERGENCY DEPT VISIT HI MDM: CPT

## 2018-10-30 ASSESSMENT — ENCOUNTER SYMPTOMS
DIFFICULTY URINATING: 1
HEMATURIA: 1

## 2018-10-30 NOTE — Clinical Note
Admitting Physician: SENA TRACEY [[id number]]   Special needs: No Special Needs [20]   Bed request comments: OBS

## 2018-10-30 NOTE — TELEPHONE ENCOUNTER
Urology pt of Dr. Mejia last seen 8/14/2018. Jamil reports he's 40 days post prostate cryo surgery at [facility] and he's concerned because he's still having hematuria. He states he's notified [facility] and they don't seem to be concerned. Will bring him to clinic tomorrow for a lab appt for UA/UC. He expressed understanding and will be here between 9 and 11 AM.   He also states he needs catheter supplies. These were most recently ordered from [employer] and pt is directed there to re-order.

## 2018-10-30 NOTE — IP AVS SNAPSHOT
[facility]    [address] [place] [address]    Phone:  [phone]                                       After Visit Summary   10/30/2018    Jamil Bedolla Sr., MD    MRN: [id number]           After Visit Summary Signature Page     I have received my discharge instructions, and my questions have been answered. I have discussed any challenges I see with this plan with the nurse or doctor.    ..........................................................................................................................................  Patient/Patient Representative Signature      ..........................................................................................................................................  Patient Representative Print Name and Relationship to Patient    ..................................................               ................................................  Date                                   Time    ..........................................................................................................................................  Reviewed by Signature/Title    ...................................................              ..............................................  Date                                               Time          22EPIC Rev 08/18

## 2018-10-30 NOTE — IP AVS SNAPSHOT
MRN:[id number]                      After Visit Summary   10/30/2018    Jamil Bedolla Sr., MD    MRN: [id number]           Thank you!     Thank you for choosing [facility] for your care. Our goal is always to provide you with excellent care. Hearing back from our patients is one way we can continue to improve our services. Please take a few minutes to complete the written survey that you may receive in the mail after you visit with us. Thank you!        Patient Information     Date Of Birth          1935        About your hospital stay     You were admitted on:  October 31, 2018 You last received care in the:  [facility] Observation Unit    You were discharged on:  October 31, 2018        Reason for your hospital stay       Admitted with hematuria and low hemoglobin due to traumatic catheter placement.                  Who to Call     For medical emergencies, please call 911.  For non-urgent questions about your medical care, please call your primary care provider or clinic, [phone]          Attending Provider     Provider Specialty    Taras Gong MD Emergency Medicine    Gutierres, Cody Nielsen MD Internal Medicine       Primary Care Provider Office Phone # Fax #    Cassia Wolff [phone]      After Care Instructions     Activity       Your activity upon discharge: activity as tolerated            Diet       Follow this diet upon discharge: Orders Placed This Encounter      Regular Diet Adult                  Follow-up Appointments     Follow-up and recommended labs and tests        Follow up with primary care provider, Cassia Wolff, within 7 days for hospital follow- up.  The following labs/tests are recommended: Repeat CBC check hemoglobin level in 2-5 days post discharge.    Follow up with urologist Dr. Mejia , at [facility], for follow-up and further recommendations.  Call the clinic in order to schedule follow-up appointment for the next 1-2  weeks as recommended by clinic.                  Your next 10 appointments already scheduled     Nov 30, 2018 10:30 AM CST   Return Visit with Wes Mejia MD   [facility] ([facility])    [address]  [address]  [facility] [id number] [phone]              Pending Results     No orders found for last 3 day(s).            Statement of Approval     Ordered          10/31/18 1548  I have reviewed and agree with all the recommendations and orders detailed in this document.  EFFECTIVE NOW     Approved and electronically signed by:  Cate Franklin PA-C             Admission Information     Date & Time Provider Department Dept. Phone    10/30/2018 Gutierres, Cody Nielsen MD [facility] Observ[facility] Unit [phone]      Your Vitals Were     Blood Pressure Temperature Respirations Pulse Oximetry          132/67 99.2  F (37.3  C) (Oral) 16 97%        MyChart Information     [employer] gives you secure access to your electronic health record. If you see a primary care provider, you can also send messages to your care team and make appointments. If you have questions, please call your primary care clinic.  If you do not have a primary care provider, please call [phone] and they will assist you.        Care EveryWhere ID     This is your Care EveryWhere ID. This could be used by other organizations to access your [facility] medical records  [id number]        Equal Access to Services     ANTONI ORTEGA : Hadii vincenzo jamil hadasho Soomaali, waaxda luqadaha, qaybta kaalmada brea, gian persaud. So [facility] [phone].    ATENCIÓN: Si habla español, tiene a wray disposición servicios gratuitos de asistencia lingüística. Llame al [phone].    We comply with applicable federal civil rights laws and [place] laws. We do not discriminate on the basis of race, color, national origin, age, disability, sex, sexual orientation, or gender  identity.               Review of your medicines      CONTINUE these medicines which have NOT CHANGED        Dose / Directions    atorvastatin 40 MG tablet   Commonly known as:  LIPITOR   Used for:  Hyperlipidemia LDL goal <70        Dose:  40 mg   Take 1 tablet (40 mg) by mouth daily   Quantity:  100 tablet   Refills:  3       B-D SINGLE USE SWABS REGULAR Pads   Used for:  Type 2 diabetes, HbA1C goal < 8% (H), Type II or unspecified type diabetes mellitus without mention of complication, not stated as uncontrolled        Dose:  1 pad   1 pad 3 times daily   Quantity:  270 each   Refills:  3       blood glucose monitoring lancets   Used for:  Type 2 diabetes mellitus with diabetic nephropathy, without long-term current use of insulin (H)        Use to test blood sugar 1-2  times daily or as directed.   Quantity:  100 each   Refills:  11       blood glucose monitoring meter device kit   Used for:  Type 2 diabetes mellitus with diabetic nephropathy, without long-term current use of insulin (H)        Use to test blood sugars 1- 2 times daily or as directed.   Quantity:  1 kit   Refills:  0       blood glucose monitoring test strip   Commonly known as:  ONETOUCH ULTRA   Used for:  Advanced directives, counseling/discussion        Use to test blood sugar  daily or as directed.   Quantity:  1 Box   Refills:  3       famotidine 20 MG tablet   Commonly known as:  PEPCID   Used for:  Gastroesophageal reflux disease without esophagitis        Dose:  20 mg   Take 1 tablet (20 mg) by mouth 2 times daily   Quantity:  270 tablet   Refills:  3       fluticasone 50 MCG/ACT spray   Commonly known as:  FLONASE   Used for:  Seasonal allergic rhinitis        USE 2 SPRAYS IN EACH NOSTRIL EVERY DAY   Quantity:  48 g   Refills:  11       latanoprost 0.005 % ophthalmic solution   Commonly known as:  XALATAN        Dose:  1 drop   Place 1 drop into both eyes At Bedtime   Refills:  0       lisinopril 20 MG tablet   Commonly known as:   PRINIVIL/ZESTRIL   Used for:  Essential hypertension, benign        Dose:  20 mg   Take 1 tablet (20 mg) by mouth 2 times daily   Quantity:  180 tablet   Refills:  3       MELATONIN PO        Dose:  5 mg   Take 5 mg by mouth At Bedtime   Refills:  0       metFORMIN 500 MG 24 hr tablet   Commonly known as:  GLUCOPHAGE-XR   Used for:  Type 2 diabetes mellitus with diabetic nephropathy, without long-term current use of insulin (H)        TAKE 2 TABLETS TWICE DAILY WITH MEALS   Quantity:  360 tablet   Refills:  1       Multi-vitamin Tabs tablet   Generic drug:  multivitamin, therapeutic with minerals        1 TABLET DAILY   Refills:  0       order for DME   Used for:  Erectile dysfunction        Equipment being ordered: Postivac   Quantity:  1 each   Refills:  0       vardenafil 20 MG tablet   Commonly known as:  LEVITRA   Used for:  Other male erectile dysfunction        Dose:  10-20 mg   Take 0.5-1 tablets (10-20 mg) by mouth daily as needed Never use with nitroglycerin, terazosin or doxazosin.   Quantity:  30 tablet   Refills:  4                Protect others around you: Learn how to safely use, store and throw away your medicines at www.disposemymeds.org.             Medication List: This is a list of all your medications and when to take them. Check marks below indicate your daily home schedule. Keep this list as a reference.      Medications           Morning Afternoon Evening Bedtime As Needed    atorvastatin 40 MG tablet   Commonly known as:  LIPITOR   Take 1 tablet (40 mg) by mouth daily                                B-D SINGLE USE SWABS REGULAR Pads   1 pad 3 times daily                                blood glucose monitoring lancets   Use to test blood sugar 1-2  times daily or as directed.                                blood glucose monitoring meter device kit   Use to test blood sugars 1- 2 times daily or as directed.                                blood glucose monitoring test strip   Commonly known as:   ONETOUCH ULTRA   Use to test blood sugar  daily or as directed.                                famotidine 20 MG tablet   Commonly known as:  PEPCID   Take 1 tablet (20 mg) by mouth 2 times daily                                fluticasone 50 MCG/ACT spray   Commonly known as:  FLONASE   USE 2 SPRAYS IN EACH NOSTRIL EVERY DAY                                latanoprost 0.005 % ophthalmic solution   Commonly known as:  XALATAN   Place 1 drop into both eyes At Bedtime                                lisinopril 20 MG tablet   Commonly known as:  PRINIVIL/ZESTRIL   Take 1 tablet (20 mg) by mouth 2 times daily                                MELATONIN PO   Take 5 mg by mouth At Bedtime                                metFORMIN 500 MG 24 hr tablet   Commonly known as:  GLUCOPHAGE-XR   TAKE 2 TABLETS TWICE DAILY WITH MEALS                                Multi-vitamin Tabs tablet   1 TABLET DAILY   Generic drug:  multivitamin, therapeutic with minerals                                order for DME   Equipment being ordered: Postivac                                vardenafil 20 MG tablet   Commonly known as:  LEVITRA   Take 0.5-1 tablets (10-20 mg) by mouth daily as needed Never use with nitroglycerin, terazosin or doxazosin.

## 2018-10-31 VITALS
RESPIRATION RATE: 16 BRPM | DIASTOLIC BLOOD PRESSURE: 67 MMHG | TEMPERATURE: 99.2 F | OXYGEN SATURATION: 97 % | SYSTOLIC BLOOD PRESSURE: 132 MMHG

## 2018-10-31 PROBLEM — N30.21 HEMATURIA DUE TO CHRONIC CYSTITIS: Status: ACTIVE | Noted: 2018-10-31

## 2018-10-31 LAB
ANION GAP SERPL CALCULATED.3IONS-SCNC: 7 MMOL/L (ref 3–14)
BUN SERPL-MCNC: 23 MG/DL (ref 7–30)
CALCIUM SERPL-MCNC: 9 MG/DL (ref 8.5–10.1)
CHLORIDE SERPL-SCNC: 106 MMOL/L (ref 94–109)
CO2 SERPL-SCNC: 24 MMOL/L (ref 20–32)
CREAT SERPL-MCNC: 0.97 MG/DL (ref 0.66–1.25)
ERYTHROCYTE [DISTWIDTH] IN BLOOD BY AUTOMATED COUNT: 14.3 % (ref 10–15)
GFR SERPL CREATININE-BSD FRML MDRD: 74 ML/MIN/1.7M2
GLUCOSE BLDC GLUCOMTR-MCNC: 121 MG/DL (ref 70–99)
GLUCOSE BLDC GLUCOMTR-MCNC: 135 MG/DL (ref 70–99)
GLUCOSE BLDC GLUCOMTR-MCNC: 155 MG/DL (ref 70–99)
GLUCOSE SERPL-MCNC: 132 MG/DL (ref 70–99)
HBA1C MFR BLD: 6.7 % (ref 0–5.6)
HCT VFR BLD AUTO: 36 % (ref 40–53)
HGB BLD-MCNC: 11.5 G/DL (ref 13.3–17.7)
HGB BLD-MCNC: 11.9 G/DL (ref 13.3–17.7)
MCH RBC QN AUTO: 29 PG (ref 26.5–33)
MCHC RBC AUTO-ENTMCNC: 33.1 G/DL (ref 31.5–36.5)
MCV RBC AUTO: 88 FL (ref 78–100)
PLATELET # BLD AUTO: 197 10E9/L (ref 150–450)
POTASSIUM SERPL-SCNC: 4.4 MMOL/L (ref 3.4–5.3)
RBC # BLD AUTO: 4.11 10E12/L (ref 4.4–5.9)
SODIUM SERPL-SCNC: 137 MMOL/L (ref 133–144)
WBC # BLD AUTO: 10.3 10E9/L (ref 4–11)

## 2018-10-31 PROCEDURE — G0378 HOSPITAL OBSERVATION PER HR: HCPCS

## 2018-10-31 PROCEDURE — 36415 COLL VENOUS BLD VENIPUNCTURE: CPT | Performed by: INTERNAL MEDICINE

## 2018-10-31 PROCEDURE — 85027 COMPLETE CBC AUTOMATED: CPT | Performed by: EMERGENCY MEDICINE

## 2018-10-31 PROCEDURE — 80048 BASIC METABOLIC PNL TOTAL CA: CPT | Performed by: EMERGENCY MEDICINE

## 2018-10-31 PROCEDURE — A9270 NON-COVERED ITEM OR SERVICE: HCPCS | Mod: GY | Performed by: EMERGENCY MEDICINE

## 2018-10-31 PROCEDURE — 85018 HEMOGLOBIN: CPT | Mod: 91 | Performed by: INTERNAL MEDICINE

## 2018-10-31 PROCEDURE — 00000146 ZZHCL STATISTIC GLUCOSE BY METER IP

## 2018-10-31 PROCEDURE — 99236 HOSP IP/OBS SAME DATE HI 85: CPT | Performed by: INTERNAL MEDICINE

## 2018-10-31 PROCEDURE — 25000132 ZZH RX MED GY IP 250 OP 250 PS 637: Mod: GY | Performed by: EMERGENCY MEDICINE

## 2018-10-31 PROCEDURE — 99207 ZZC APP CREDIT; MD BILLING SHARED VISIT: CPT | Performed by: PHYSICIAN ASSISTANT

## 2018-10-31 PROCEDURE — 99207 ZZC CDG-CODE CATEGORY CHANGED: CPT | Performed by: INTERNAL MEDICINE

## 2018-10-31 PROCEDURE — 83036 HEMOGLOBIN GLYCOSYLATED A1C: CPT | Performed by: EMERGENCY MEDICINE

## 2018-10-31 PROCEDURE — 25800025 ZZH RX 258: Performed by: STUDENT IN AN ORGANIZED HEALTH CARE EDUCATION/TRAINING PROGRAM

## 2018-10-31 RX ORDER — NICOTINE POLACRILEX 4 MG
15-30 LOZENGE BUCCAL
Status: DISCONTINUED | OUTPATIENT
Start: 2018-10-31 | End: 2018-10-31 | Stop reason: HOSPADM

## 2018-10-31 RX ORDER — ACETAMINOPHEN 650 MG/1
650 SUPPOSITORY RECTAL EVERY 4 HOURS PRN
Status: DISCONTINUED | OUTPATIENT
Start: 2018-10-31 | End: 2018-10-31 | Stop reason: HOSPADM

## 2018-10-31 RX ORDER — LIDOCAINE 40 MG/G
CREAM TOPICAL
Status: DISCONTINUED | OUTPATIENT
Start: 2018-10-31 | End: 2018-10-31 | Stop reason: HOSPADM

## 2018-10-31 RX ORDER — ONDANSETRON 4 MG/1
4 TABLET, ORALLY DISINTEGRATING ORAL EVERY 6 HOURS PRN
Status: DISCONTINUED | OUTPATIENT
Start: 2018-10-31 | End: 2018-10-31 | Stop reason: HOSPADM

## 2018-10-31 RX ORDER — NALOXONE HYDROCHLORIDE 0.4 MG/ML
.1-.4 INJECTION, SOLUTION INTRAMUSCULAR; INTRAVENOUS; SUBCUTANEOUS
Status: DISCONTINUED | OUTPATIENT
Start: 2018-10-31 | End: 2018-10-31 | Stop reason: HOSPADM

## 2018-10-31 RX ORDER — PROCHLORPERAZINE MALEATE 5 MG
5 TABLET ORAL EVERY 6 HOURS PRN
Status: DISCONTINUED | OUTPATIENT
Start: 2018-10-31 | End: 2018-10-31 | Stop reason: HOSPADM

## 2018-10-31 RX ORDER — ONDANSETRON 2 MG/ML
4 INJECTION INTRAMUSCULAR; INTRAVENOUS EVERY 6 HOURS PRN
Status: DISCONTINUED | OUTPATIENT
Start: 2018-10-31 | End: 2018-10-31 | Stop reason: HOSPADM

## 2018-10-31 RX ORDER — ACETAMINOPHEN 325 MG/1
650 TABLET ORAL EVERY 4 HOURS PRN
Status: DISCONTINUED | OUTPATIENT
Start: 2018-10-31 | End: 2018-10-31 | Stop reason: HOSPADM

## 2018-10-31 RX ORDER — DEXTROSE MONOHYDRATE 25 G/50ML
25-50 INJECTION, SOLUTION INTRAVENOUS
Status: DISCONTINUED | OUTPATIENT
Start: 2018-10-31 | End: 2018-10-31 | Stop reason: HOSPADM

## 2018-10-31 RX ORDER — PROCHLORPERAZINE 25 MG
12.5 SUPPOSITORY, RECTAL RECTAL EVERY 12 HOURS PRN
Status: DISCONTINUED | OUTPATIENT
Start: 2018-10-31 | End: 2018-10-31 | Stop reason: HOSPADM

## 2018-10-31 RX ORDER — CIPROFLOXACIN 500 MG/1
500 TABLET, FILM COATED ORAL ONCE
Status: COMPLETED | OUTPATIENT
Start: 2018-10-31 | End: 2018-10-31

## 2018-10-31 RX ADMIN — CIPROFLOXACIN HYDROCHLORIDE 500 MG: 500 TABLET, FILM COATED ORAL at 01:09

## 2018-10-31 RX ADMIN — SODIUM CHLORIDE 3000 ML: 900 IRRIGANT IRRIGATION at 01:45

## 2018-10-31 RX ADMIN — SODIUM CHLORIDE 6000 ML: 900 IRRIGANT IRRIGATION at 08:45

## 2018-10-31 RX ADMIN — SODIUM CHLORIDE 3000 ML: 900 IRRIGANT IRRIGATION at 02:22

## 2018-10-31 RX ADMIN — SODIUM CHLORIDE 6000 ML: 900 IRRIGANT IRRIGATION at 10:16

## 2018-10-31 NOTE — H&P
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  Primary urologist is Dr. Mejia, follows through the [facility] as well.  he has had intermittent hematuria in the past as well.  10/15/18 cystoscopy performed at the [facility] for the same reason.  -Urology consulted  -Continue continuous bladder irrigation  -Prior to admission aspirin 81 mg held  -Johnson catheter to remain in place unless otherwise directed by urology    Acute blood loss anemia: Secondary to gross hematuria as above.  Baseline hemoglobin in the 13-14 range, currently 11.9.  -Consented for blood products by myself.  Patient is amenable to blood products for hemoglobin less than 7, would not like to proceed with transfusion if hemoglobin greater than 7 unless further discussion takes place.  -Repeat hemoglobin at 10 AM  -Monitor for ongoing clearance from Johnson catheter with irrigation    Type 2 diabetes: On metformin  -Prior to admission metformin held given observation status  -Blood glucose checks with medium dose sliding scale insulin available as needed  -Hemoglobin A1c pending  -Holding prior to admission lisinopril 20 mg twice daily given observation admission  Given observation admission    Chronic right rotator cuff tear: Following fall with injury while hunting in [place] nearly 20 years ago.  Fairly significant limitations with right upper extremity secondary to this.    Prostate cancer history: Patient with prostate cancer, Gavin 7 status post transurethral resection and external beam radiation therapy in 2013.  Subsequent  recurrence with 9/20/18 cryoablation through the [facility].  -Follow-up as per urology    Hypertension:  -Holding prior to admission lisinopril 20 mg twice daily given observation admission.     Coronary artery disease/coronary atherosclerosis: I am unable to describe further, reported elevated calcium score. Patient denies any prior history of myocardial infarction.  Inquiring about his history of coronary artery disease, patient states that he is has some coronary artery plaque, and that this is normal at his age.  No cardiac symptoms at this time.  -Holding prior to admission statin therapy given observation admission, resume on discharge  -Prior to admission aspirin held given gross hematuria.  Resume when appropriate from urology standpoint.    DVT Prophylaxis: Ambulate every shift    Code Status: Full Code    Disposition: Expected discharge 10/31/18 afternoon.  Clearance to discharge as per urology    [facility]    Primary Care Physician   Cassia Wolff    Chief Complaint   Gross hematuria    History is obtained from the patient, chart review, discussion with Dr. Gong in the emergency department, review of outside records including recent cystoscopy at the [facility].    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  Patient with a history of prostate cancer treated with external beam radiation therapy, recurrence treated with cryotherapy in September 2018.  Patient also with a history of atonic bladder related to this, at times retaining as much as 2 L.  At last cystoscopy, patient with 1 L of retained urine 10/15/18.  At that visit, patient was recommended to continue straight catheterizations 4 times daily, which he has been doing.  Patient has chronic cystitis and intermittent hematuria  related to his prostate radiation.  Patient was vacationing on [facility], straight catheterized, and noted gross blood. This prompted him to  call urology clinic and presented to [facility] for evaluation.  patient reports approximately 300 mL of blood loss with catheterization, much more significant than he had ever experienced before.  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    Urology met with patient in the emergency department, patient was catheterized and irrigated with approximately 2 L of urine out, 500 mL of irrigation used.  A three-way Johnson was placed and patient was put on continuous bladder irrigation.  Hemoglobin in the upper 11 range from the a baseline of 13-14.  Patient without any symptoms related to his acute blood loss anemia.    Ensuring urine cleared with bladder irrigation, observation admission was requested.     Past Medical History    I have reviewed this patient's medical history and updated it with pertinent information if needed.   Past Medical History:   Diagnosis Date     CAD (coronary artery disease)     per calcium score=>400     Carotid artery plaque 9/2011     Colon polyps      Diverticulosis of colon      Erectile dysfunction 7/18/2008     Glaucoma      High cholesterol      HTN (hypertension) 7/18/2008     Hypertension goal BP (blood pressure) < 140/90 12/10/2010     Obesity      PFO (patent foramen ovale)      Plantar fasciitis      Presbyesophagus      Prostate cancer-Hanna 4, treated with radiation 7/28/2013     Type II or unspecified type diabetes mellitus without mention of complication, not stated as uncontrolled     Dxd about 2000     Past Surgical History   I have reviewed this patient's surgical history and updated it with pertinent information if needed.  Past Surgical History:   Procedure Laterality Date     APPENDECTOMY       ARTHROSCOPY KNEE RT/LT  1998    partial menisectomy left knee     COLONOSCOPY N/A 9/27/2016    Procedure: COMBINED COLONOSCOPY, SINGLE OR MULTIPLE BIOPSY/POLYPECTOMY BY BIOPSY;  Surgeon: Maru Orta  MD Radha;  Location:  GI     CYSTOSCOPY       HC COLONOSCOPY THRU STOMA, DIAGNOSTIC       HC COLONOSCOPY THRU STOMA, DIAGNOSTIC      diverticulosis, also proctitis     HEMORRHOIDECTOMY       PHACOEMULSIFICATION CLEAR CORNEA WITH STANDARD INTRAOCULAR LENS IMPLANT Right 3/18/2015    Procedure: PHACOEMULSIFICATION CLEAR CORNEA WITH STANDARD INTRAOCULAR LENS IMPLANT;  Surgeon: Lito Gonzales MD;  Location:  EC     PHACOEMULSIFICATION CLEAR CORNEA WITH STANDARD INTRAOCULAR LENS IMPLANT Left 3/25/2015    Procedure: PHACOEMULSIFICATION CLEAR CORNEA WITH STANDARD INTRAOCULAR LENS IMPLANT;  Surgeon: Lito Gonzales MD;  Location:  EC     ROTATOR CUFF REPAIR RT/LT      right.  caused him to retire     SURGICAL HISTORY OF -       L4-5 discectomy     TONSILLECTOMY & ADENOIDECTOMY       TURP          Prior to Admission Medications   Prior to Admission Medications   Prescriptions Last Dose Informant Patient Reported? Taking?   Alcohol Swabs (B-D SINGLE USE SWABS REGULAR) PADS   No No   Si pad 3 times daily   MELATONIN PO   Yes No   Sig: Take 5 mg by mouth At Bedtime   MULTI-VITAMIN OR TABS   Yes No   Si TABLET DAILY   ORDER FOR DME   No No   Sig: Equipment being ordered: Postivac   atorvastatin (LIPITOR) 40 MG tablet   No No   Sig: Take 1 tablet (40 mg) by mouth daily   blood glucose monitoring (ONE TOUCH ULTRA 2) meter device kit   No No   Sig: Use to test blood sugars 1- 2 times daily or as directed.   blood glucose monitoring (ONE TOUCH ULTRA) test strip   No No   Sig: Use to test blood sugar  daily or as directed.   blood glucose monitoring (ONE TOUCH ULTRASOFT) lancets   No No   Sig: Use to test blood sugar 1-2  times daily or as directed.   famotidine (PEPCID) 20 MG tablet   No No   Sig: Take 1 tablet (20 mg) by mouth 2 times daily   fluticasone (FLONASE) 50 MCG/ACT spray   No No   Sig: USE 2 SPRAYS IN EACH NOSTRIL EVERY DAY   latanoprost (XALATAN) 0.005 % ophthalmic solution   Yes No    Si drop At Bedtime.   lisinopril (PRINIVIL/ZESTRIL) 20 MG tablet   No No   Sig: Take 1 tablet (20 mg) by mouth 2 times daily   metFORMIN (GLUCOPHAGE-XR) 500 MG 24 hr tablet   No No   Sig: TAKE 2 TABLETS TWICE DAILY WITH MEALS   vardenafil (LEVITRA) 20 MG tablet   No No   Sig: Take 0.5-1 tablets (10-20 mg) by mouth daily as needed Never use with nitroglycerin, terazosin or doxazosin.      Facility-Administered Medications: None     Allergies   Allergies   Allergen Reactions     Ivp Dye [Contrast Dye]        Social History   I have reviewed this patient's social history and updated it with pertinent information if needed. 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 He has never used smokeless tobacco. He reports that he drinks alcohol. He reports that he does not use illicit drugs.     Family History   I have reviewed this patient's family history and updated it with pertinent information if needed.   Family History   Problem Relation Age of Onset     C.A.D. Father      C.A.D. Brother      Asthma Brother      C.A.D. Mother      Diabetes Brother      Lipids Brother         Review of Systems   The 10 point Review of Systems is negative other than noted in the HPI or here.   No fevers or chills  No shortness of breath  No chest pain.    Physical Exam   Temp: 97.2  F (36.2  C) Temp src: Oral BP: 138/67   Heart Rate: 72 Resp: 16 SpO2: 97 % O2 Device: None (Room air)    Vital Signs with Ranges  Temp:  [97.2  F (36.2  C)-97.4  F (36.3  C)] 97.2  F (36.2  C)  Heart Rate:  [72-86] 72  Resp:  [16-20] 16  BP: (122-152)/(65-97) 138/67  SpO2:  [97 %] 97 %    Constitutional: no acute distress, alert, conversant obese male in no distress.  Eyes: no scleral icterus or injection  HEENT: moist mucous membranes  Respiratory: breath sounds clear bilaterally to auscultation, no wheezes, no crackles  Cardiovascular: regular rate and rhythm, no murmur  GI: abdomen soft, non-tender, normoactive bowel sounds, no  masses  Lymph/Hematologic: 1+ bilateral lower extremity edema.  Patient states that this is chronic  Genitourinary: Johnson catheter draining with continuous bladder irrigation and pink blood-tinged urine.  Significant gross hematuria and collecting containers at bedside from initial catheterization.  Skin: no rashes.  Some venous stasis hyperpigmentation of bilateral lower extremities noted.  Patient states that this is been present since his 40s following a prolonged plane trip to [place].  Musculoskeletal: Patient with some muscular wasting of his right upper extremity.  He reports a History of significant rotator cuff injury approximately 19 years ago with subsequent chronic disability related to this.  Neurologic: mental status grossly intact, no focal deficits, alert  Psychiatric: normal affect    Data   Data reviewed today:  I personally reviewed no images or EKG's today.    Recent Labs  Lab 10/31/18  0012   WBC 10.3   HGB 11.9*   MCV 88         POTASSIUM 4.4   CHLORIDE 106   CO2 24   BUN 23   CR 0.97   ANIONGAP 7   WILIAN 9.0   *       No results found for this or any previous visit (from the past 24 hour(s)).

## 2018-10-31 NOTE — PLAN OF CARE
Problem: Patient Care Overview  Goal: Plan of Care/Patient Progress Review  Patient is A&O, VSS on RA. Mild discomfort to lower pelvic area, declines pain medication. Continuous bladder irrigation, no clots or bladder spasms noted. Up with SBA. Glucose WNL on admission, no novolog needed. Regular diet. Urology consulted. Continue to monitor.

## 2018-10-31 NOTE — ED NOTES
"[facility]  ED Nurse Handoff Report    ED Chief complaint: Catheter Problem (Pt has hx of prostrate CA, has been self-cathing at home for approx 1 month, did so today and had \"about 400ml of roberto red blood\". Had feeling of urgency and bladder spasms today prior to cathing, which is unusual for pt. )      ED Diagnosis:   Final diagnoses:   Hematuria, unspecified type       Code Status: Full Code    Allergies:   Allergies   Allergen Reactions     Ivp Dye [Contrast Dye]        Activity level - Baseline/Home:  Independent    Activity Level - Current:   Independent     Needed?: No    Isolation: No  Infection: Not Applicable  Bariatric?: No    Vital Signs:   Vitals:    10/30/18 2245   BP: 152/65   Resp: 20   Temp: 97.4  F (36.3  C)   TempSrc: Temporal   SpO2: 97%       Cardiac Rhythm: ,        Pain level: 0-10 Pain Scale: 2    Is this patient confused?: No   [place] - Suicide Severity Rating Scale Completed?  Yes  If yes, what color did the patient score?  White    Patient Report: Initial Complaint: Pt presents to ED for c/o roberto red blood during straight cath at home.   Focused Assessment: Pt c/o roberto red blood with straight cath, has been doing straight cath at home for approx 1 month r/t prostate CA. Denies pain at rest.   Tests Performed:   Results for orders placed or performed during the hospital encounter of 10/30/18 (from the past 24 hour(s))   CBC (+ platelets, no diff)   Result Value Ref Range    WBC 10.3 4.0 - 11.0 10e9/L    RBC Count 4.11 (L) 4.4 - 5.9 10e12/L    Hemoglobin 11.9 (L) 13.3 - 17.7 g/dL    Hematocrit 36.0 (L) 40.0 - 53.0 %    MCV 88 78 - 100 fl    MCH 29.0 26.5 - 33.0 pg    MCHC 33.1 31.5 - 36.5 g/dL    RDW 14.3 10.0 - 15.0 %    Platelet Count 197 150 - 450 10e9/L   Basic metabolic panel   Result Value Ref Range    Sodium 137 133 - 144 mmol/L    Potassium 4.4 3.4 - 5.3 mmol/L    Chloride 106 94 - 109 mmol/L    Carbon Dioxide 24 20 - 32 mmol/L    Anion Gap 7 3 - 14 " mmol/L    Glucose 132 (H) 70 - 99 mg/dL    Urea Nitrogen 23 7 - 30 mg/dL    Creatinine 0.97 0.66 - 1.25 mg/dL    GFR Estimate 74 >60 mL/min/1.7m2    GFR Estimate If Black 89 >60 mL/min/1.7m2    Calcium 9.0 8.5 - 10.1 mg/dL     Abnormal Results: H&H- 11.9 & 36.   Treatments provided: Johnson catheter placed by urologist in ED, CBI in process. Po cipro given.     Family Comments: Wife at bedside.     OBS brochure/video discussed/provided to patient/family: Yes              Name of person given brochure if not patient:               Relationship to patient:     ED Medications:   Medications   ciprofloxacin (CIPRO) tablet 500 mg (not administered)       Drips infusing?:  No    For the majority of the shift this patient was Green.   Interventions performed were .    Severe Sepsis OR Septic Shock Diagnosis Present: No    To be done/followed up on inpatient unit:  N/A    ED NURSE PHONE NUMBER: *[phone]

## 2018-10-31 NOTE — PROGRESS NOTES
Urine clear  Will discharge figueroa after lunch and OK to discharge home  Will restart ISC with 14F coude Magic3 catheters

## 2018-10-31 NOTE — ED PROVIDER NOTES
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 The patient reports he has been self-cathing at home for around 1 month. Today around 3.5 hours ago, he was attempting to self-cath himself, and he states that he had around 200 mL of blood in his catheter. He attempted to urinate again later in the day following bladder spasm and urgency without the catheter and had some blood clots. He then catheterized himself blood come not only through the catheter, but around the catheter as well. The patient notes he has had an extensive history of bladder issues, as well as family history of bladder issues and difficulty urinating. He has had a cryotherapy performed in [place] around 40 days ago. He follows Dr. Mejia for urology and contacted that group rachelle (spoke with Dr. Hernandez) regarding these issues and was prompted to present to the ED.    Allergies:  Ivp Dye [Contrast Dye]     Medications:    Lipitor  Pepcid  Flonase  Xalatan  Lisinopril  Melatonin  Metformin  Levitra     Past Medical History:    CAD  Carotid artery plaque  Diverticulosis of colon  Erectile dysfunction  Glaucoma  HLD  HTN  Obesity  PFO  Plantar fasciitis  Presbyesophagus  Prostate cancer-Gavin 4  DM2    Past Surgical History:    Appendectomy  Arthroscopy knee  Hemorrhoidectomy  Phacoemulsification clear cornea with standard IOL lens implant  Rotator cuff repair right  L4-5 discectomy  T&A  TURP    Family History:    CAD  Asthma  Diabetes  Lipids    Social History:  Presents with wife.  Never smoker.  Positive for alcohol use.   Marital Status:   [2]     Review of Systems   Genitourinary: Positive for difficulty urinating, hematuria and urgency.   All other systems reviewed and are negative.    Physical Exam     Patient Vitals for the past 24 hrs:   BP Temp Temp src Heart Rate Resp  SpO2   10/31/18 0112 - - - - - 97 %   10/31/18 0110 (!) 122/97 - - - - 97 %   10/30/18 2245 152/65 97.4  F (36.3  C) Temporal 86 20 97 %       Physical Exam  General: No distress.   Head: No signs of trauma.   Mouth/Throat: Oropharynx moist.   Eyes: Conjunctivae are normal. Pupils are equal..   Neck: Normal range of motion.    Resp:No respiratory distress.   MSK: Normal range of motion. No obvious deformity.  Neuro: The patient is alert and interactive. FIORE. Speech normal. GCS 15  Skin: No lesion or sign of trauma noted.   Psych: normal mood and affect. behavior is normal.     Emergency Department Course     Laboratory:  CBC: WBC: 10.3, HGB: 11.9 (L), PLT: 197  BMP: Glucose 132 (H), o/w WNL (Creatinine: 0.97)    Interventions:  0109 Cipro 500 mg PO  0222 NS 3000 mL irrigation    Emergency Department Course:  Nursing notes and vitals reviewed. 2335 I performed an exam of the patient as documented above.     IV inserted. Medicine administered as documented above. Blood drawn. This was sent to the lab for further testing, results above.    2348 I spoke with Dr. Reinier Hernandez, urologist with [facility], regarding the patient.    2351 I rechecked and updated the patient.    0045 I rechecked the patient and discussed the results of his workup thus far.     0102 I spoke with Dr. Hernandez again regarding the patient.    0129 I spoke with Dr. Gutierres, hospitalist, who agreed to admit the patient.    Findings and plan explained to the Patient who consents to admission. Discussed the patient with Dr. Gutierres, who will admit the patient to an obs bed for further monitoring, evaluation, and treatment.    I personally reviewed the laboratory results with the Patient and answered all related questions prior to admittance.    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 This patient has been self-cathing and had talked to his  urologist prior to coming to the ED. I consulted with Dr. Reinier Hernandez from [facility] u[facility].  He came to the ED and evaluated the patient. He placed a Johnson catheter, irrigated the bladder, removing large amounts of blood. Because of the fluid removed and the gross hematuria that the patient had experienced, he is recommending observation admission.     The patient's hemoglobin has dropped from previous levels, but he has remained hemodynamically stable here in the ED.    Because of the patient's past medical history, a hospitalist admission is being requested by urology.    Diagnosis:    ICD-10-CM   1. Hematuria, unspecified type R31.9       Disposition:  Admitted to Dr. Gutierres.    I, Rodger Wagner, am serving as a scribe on 10/30/2018 at 11:30 PM to personally document services performed by Taras Gong MD based on my observations and the provider's statements to me.     Rodger Wagner  10/30/2018    EMERGENCY DEPARTMENT       Taras Gong MD  10/31/18 0918

## 2018-10-31 NOTE — TELEPHONE ENCOUNTER
Telephone Encounter  Author: Douglas Hernandez MD    Date: 9:57 PM; 10/30/2018  Patient Name: Jamil Bedolla Sr., MD  MRN: [id number]    Paged by  that  Jamil Bedolla Sr., MD called requesting to speak with urology on call.  I contacted the patient.  Briefly, this is an 83-year-old gentleman who has a remote history (40 years ago) of a transurethral resection of the prostate who subsequently developed prostate cancer for which he is undergone radiation therapy and more recently cryotherapy within the last month and a half.  Since cryotherapy, the patient has been performing intermittent catheterization and has some intermittent hematuria.  He calls this evening with worsening hematuria and clots.  He noted that following passage of his 14 French catheter, he had a lot of clots coming around the catheter; none seem to flow through the catheter.  He does not endorse normal bladder sensation at baseline and thus does not know if he is adequately emptying the bladder.  He does not feel faint or lightheaded.  At this point, I advised the patient to seek care at the emergency department.  Questions solicited & answered.      --    Reinier Hernandez MD   Urology Resident  pgr: [phone]    9:57 PM; 10/30/2018

## 2018-10-31 NOTE — PROGRESS NOTES
RECEIVING UNIT ED HANDOFF REVIEW    ED Nurse Handoff Report was reviewed by: Callie Osman on October 31, 2018 at 1:52 AM

## 2018-10-31 NOTE — PLAN OF CARE
Problem: Patient Care Overview  Goal: Plan of Care/Patient Progress Review  Outcome: Improving  Pt A&O, VSS, on RA. Denies pain or bladder spasms. Ongoing CBI, urine alternates between clear and dark pink. Had to clear 1 clot out of drainage line, notified MD, awaiting call back if ok to discontinue figueroa.  Diabetic, BG wnl. Tolerating diet. IV SL. Will cont to monitor w/ potential discharge home today yet.

## 2018-10-31 NOTE — PHARMACY-ADMISSION MEDICATION HISTORY
Admission medication history interview status for the 10/30/2018  admission is complete. See EPIC admission navigator for prior to admission medications     Medication history source reliability:Moderate    Actions taken by pharmacist (provider contacted, etc):  Spoke w/ patient.  Reviewed recent fill history through [facility].      Additional medication history information not noted on PTA med list :None    Medication reconciliation/reorder completed by provider prior to medication history? No    Time spent in this activity: 15 minutes    Prior to Admission medications    Medication Sig Last Dose Taking? Auth Provider   atorvastatin (LIPITOR) 40 MG tablet Take 1 tablet (40 mg) by mouth daily  Yes Mikal Chung MD   famotidine (PEPCID) 20 MG tablet Take 1 tablet (20 mg) by mouth 2 times daily  Yes Cassia Wolff MD   fluticasone (FLONASE) 50 MCG/ACT spray USE 2 SPRAYS IN EACH NOSTRIL EVERY DAY  Yes Cassia Wolff MD   latanoprost (XALATAN) 0.005 % ophthalmic solution Place 1 drop into both eyes At Bedtime   Yes Reported, Patient   lisinopril (PRINIVIL/ZESTRIL) 20 MG tablet Take 1 tablet (20 mg) by mouth 2 times daily  Yes Mikal Chung MD   MELATONIN PO Take 5 mg by mouth At Bedtime  Yes Reported, Patient   metFORMIN (GLUCOPHAGE-XR) 500 MG 24 hr tablet TAKE 2 TABLETS TWICE DAILY WITH MEALS  Yes Cassia Wolff MD   MULTI-VITAMIN OR TABS 1 TABLET DAILY  Yes Quinton Roberts MD   vardenafil (LEVITRA) 20 MG tablet Take 0.5-1 tablets (10-20 mg) by mouth daily as needed Never use with nitroglycerin, terazosin or doxazosin.  Yes Cassia Wolff MD   Alcohol Swabs (B-D SINGLE USE SWABS REGULAR) PADS 1 pad 3 times daily   Cassia Wolff MD   blood glucose monitoring (ONE TOUCH ULTRA 2) meter device kit Use to test blood sugars 1- 2 times daily or as directed.   Cassia Wolff MD   blood glucose monitoring (ONE TOUCH ULTRA) test strip Use to test blood sugar  daily or  as directed.   Cassia Wolff MD   blood glucose monitoring (ONE TOUCH ULTRASOFT) lancets Use to test blood sugar 1-2  times daily or as directed.   Cassia Wolff MD   ORDER FOR DME Equipment being ordered: [employer]   Quinton Roberts MD Carolyn Dahlman, NaseemD, BCPS

## 2018-10-31 NOTE — PLAN OF CARE
Problem: Patient Care Overview  Goal: Plan of Care/Patient Progress Review  Outcome: No Change  Observation goals PRIOR TO DISCHARGE     Comments:   -diagnostic tests and consults completed and resulted: Not met   -vital signs normal or at patient baseline: Met  -returns to baseline functional status: Not met  Nurse to notify provider when observation goals have been met and patient is ready for discharge.

## 2018-10-31 NOTE — PLAN OF CARE
Problem: Patient Care Overview  Goal: Discharge Needs Assessment  Outcome: Improving  Alert and oriented, figueroa patent with clear urine, orders to remove, catheter removed after manual presure do empty bladder.  Discharge instructions given. Denies questions. Discharged via w/c to family

## 2018-10-31 NOTE — PROVIDER NOTIFICATION
MD Notification    Notified Person: MD    Notified Person Name: Karon Chirinos, PA    Notification Date/Time: 10/31/18; 1315    Notification Interaction: page    Purpose of Notification: flushed 4-5mm clot out of drainage bag spout; blocking spout that empties bag. awaiting call back.     Orders Received: ok to discontinue continuous bladder irrigation and discharge patient home.     Comments:

## 2018-10-31 NOTE — CONSULTS
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  He has previously undergone transurethral resection of the prostate roughly 40 years ago.  More recently (several years ago), he has undergone radiation and hormonal therapy for prostate cancer as well as subsequent salvage cryotherapy roughly 40 days ago.  The patient called earlier this evening with complaints of hematuria and clot (patient does clean intermittent catheterization at baseline for the past several weeks) and he was instructed to proceed to the emergency department.  The patient regularly follows with Dr. Mejia.    The patient notes no feelings of lightheadedness and has not noted any tachycardia.  He does not take any anticoagulation medication.    Past Medical History:  Past Medical History:   Diagnosis Date     CAD (coronary artery disease)     per calcium score=>400     Carotid artery plaque 2011     Colon polyps      Diverticulosis of colon      Erectile dysfunction 2008     Glaucoma      High cholesterol      HTN (hypertension) 2008     Hypertension goal BP (blood pressure) < 140/90 12/10/2010     Obesity      PFO (patent foramen ovale)      Plantar fasciitis      Presbyesophagus      Prostate cancer-Gavin 4, treated with radiation 2013     Type II or unspecified type diabetes mellitus without mention of complication, not stated as uncontrolled     Dxd about 2000       Past Surgical History:  Past Surgical History:   Procedure Laterality Date     APPENDECTOMY       ARTHROSCOPY KNEE RT/LT      partial menisectomy left knee     COLONOSCOPY N/A 2016     Procedure: COMBINED COLONOSCOPY, SINGLE OR MULTIPLE BIOPSY/POLYPECTOMY BY BIOPSY;  Surgeon: Maru Orta MD;  Location:  GI     CYSTOSCOPY       HC COLONOSCOPY THRU STOMA, DIAGNOSTIC  2005     HC COLONOSCOPY THRU STOMA, DIAGNOSTIC  5/09    diverticulosis, also proctitis     HEMORRHOIDECTOMY       PHACOEMULSIFICATION CLEAR CORNEA WITH STANDARD INTRAOCULAR LENS IMPLANT Right 3/18/2015    Procedure: PHACOEMULSIFICATION CLEAR CORNEA WITH STANDARD INTRAOCULAR LENS IMPLANT;  Surgeon: Lito Gonzales MD;  Location:  EC     PHACOEMULSIFICATION CLEAR CORNEA WITH STANDARD INTRAOCULAR LENS IMPLANT Left 3/25/2015    Procedure: PHACOEMULSIFICATION CLEAR CORNEA WITH STANDARD INTRAOCULAR LENS IMPLANT;  Surgeon: Lito Gonzales MD;  Location:  EC     ROTATOR CUFF REPAIR RT/LT  1998    right.  caused him to retire     SURGICAL HISTORY OF -   1999    L4-5 discectomy     TONSILLECTOMY & ADENOIDECTOMY       TURP         Allergies:     Allergies   Allergen Reactions     Ivp Dye [Contrast Dye]        Medications:    No current facility-administered medications on file prior to encounter.   Current Outpatient Prescriptions on File Prior to Encounter:  Alcohol Swabs (B-D SINGLE USE SWABS REGULAR) PADS 1 pad 3 times daily   atorvastatin (LIPITOR) 40 MG tablet Take 1 tablet (40 mg) by mouth daily   blood glucose monitoring (ONE TOUCH ULTRA 2) meter device kit Use to test blood sugars 1- 2 times daily or as directed.   blood glucose monitoring (ONE TOUCH ULTRA) test strip Use to test blood sugar  daily or as directed.   blood glucose monitoring (ONE TOUCH ULTRASOFT) lancets Use to test blood sugar 1-2  times daily or as directed.   famotidine (PEPCID) 20 MG tablet Take 1 tablet (20 mg) by mouth 2 times daily   fluticasone (FLONASE) 50 MCG/ACT spray USE 2 SPRAYS IN EACH NOSTRIL EVERY DAY   latanoprost (XALATAN) 0.005 % ophthalmic solution 1 drop At Bedtime.   lisinopril (PRINIVIL/ZESTRIL) 20 MG tablet Take 1 tablet (20 mg)  by mouth 2 times daily   MELATONIN PO Take 5 mg by mouth At Bedtime   metFORMIN (GLUCOPHAGE-XR) 500 MG 24 hr tablet TAKE 2 TABLETS TWICE DAILY WITH MEALS   MULTI-VITAMIN OR TABS 1 TABLET DAILY   ORDER FOR DME Equipment being ordered: [employer]   vardenafil (LEVITRA) 20 MG tablet Take 0.5-1 tablets (10-20 mg) by mouth daily as needed Never use with nitroglycerin, terazosin or doxazosin.       Social History:  Social History     Social History     Marital status:      Spouse name: Salome Gates     Number of children: 2     Years of education: N/A     Occupational History     MD, [employer] Retired     Social History Main Topics     Smoking status: Never Smoker     Smokeless tobacco: Never Used     Alcohol use Yes      Comment: 10-14 drinks per week     Drug use: No     Sexual activity: Yes     Partners: Female     Other Topics Concern     Parent/Sibling W/ Cabg, Mi Or Angioplasty Before 65f 55m? No     Social History Narrative       Family History:  Family History   Problem Relation Age of Onset     C.A.D. Father      C.A.D. Brother      Asthma Brother      C.A.D. Mother      Diabetes Brother      Lipids Brother        ROS:  The remainder of the complete ROS was negative unless noted in the HPI.    Exam:  /65  Temp 97.4  F (36.3  C) (Temporal)  Resp 20  SpO2 97%  General: Alert, interactive, & in NAD; accompanied by wife  Resp: Breathing is non-labored on room air   Cardiac: Regular rate; extremities warm  Abdomen: Soft, non-tender, nondistended. Rotund.  : Normal circumcised phallus with orthotopic meatus.  Extremities: No LE edema or obvious joint abnormalities  Skin: Warm and dry, no jaundice or rash    Labs:  All laboratory data reviewed       Lab Results   Component Value Date     10/31/2018     08/19/2018     05/17/2018    Lab Results   Component Value Date    CHLORIDE 106 10/31/2018    CHLORIDE 104 08/19/2018    CHLORIDE 106 05/17/2018    Lab Results   Component Value Date     BUN 23 10/31/2018    BUN 22 08/19/2018    BUN 27 05/17/2018      Lab Results   Component Value Date    POTASSIUM 4.4 10/31/2018    POTASSIUM 3.9 08/19/2018    POTASSIUM 4.6 05/17/2018    Lab Results   Component Value Date    CO2 24 10/31/2018    CO2 20 08/19/2018    CO2 22 05/17/2018    Lab Results   Component Value Date    CR 0.97 10/31/2018    CR 0.94 08/19/2018    CR 0.90 05/17/2018        Lab Results   Component Value Date    WBC 10.3 10/31/2018    WBC 7.7 08/19/2018    WBC 8.4 05/15/2017    HGB 11.9 (L) 10/31/2018    HGB 13.7 08/19/2018    HGB 14.7 05/15/2017    HCT 36.0 (L) 10/31/2018    HCT 42.0 08/19/2018    HCT 44.1 05/15/2017    MCV 88 10/31/2018    MCV 87 08/19/2018    MCV 89 05/15/2017     10/31/2018     08/19/2018     05/15/2017     BEDSIDE PROCEDURE  Bladder Irrigation and initiation of continuous bladder irrigation  Following informed verbal consent, the patient was prepped in the normal sterile fashion.  A 20-[place] citizen 2-way catheter was placed using sterile technique.  There was roughly 1000 mL of old blood mixed with clot in the urine that was evacuated.  The bladder was then hand irrigated free of clot with ~1500 mL sterile saline.  Urine efflux was watermelon colored at the completion of irrigation and the 2-way catheter was removed.    The patient was then re-prepped.  A 20-[place] citizen, 3-way catheter was placed and continuous bladder irrigation was initiated.  The urine efflux was clear.  The patient tolerated the procedure well.    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  He remains afebrile, HDS, and his hgb is not too significantly altered from baseline (11.9 this evening and ~14 at baseline); he does not require urgent surgical  intervention.  He did, however, have a significant amount of blood and clot present within the bladder and should be monitored for any evidence of persistent/worsening hematuria.     - Admit to observation   - Continuous bladder irrigation; will wean as able   - Serial hgb   - NPO in case patient requires intervention later today   - Urology will continue to follow along    This patient's exam findings, labs, and imaging discussed with urology staff surgeon Dr. White, who developed the treatment plan.

## 2018-10-31 NOTE — PLAN OF CARE
Problem: Patient Care Overview  Goal: Plan of Care/Patient Progress Review  Outcome: No Change   Observation goals PRIOR TO DISCHARGE     Comments: -diagnostic tests and consults completed and resulted: met   -vital signs normal or at patient baseline: met   -returns to baseline functional status: not met: Continuous bladder irrigation   Nurse to notify provider when observation goals have been met and patient is ready for discharge.

## 2018-10-31 NOTE — PROGRESS NOTES
[facility] Urology Progress Note          Assessment and Plan:   Active Problems:    Hematuria due to chronic cystitis, hx recurrent prostate cancer (see initial consult note)    Assessment: Gross hematuria    Plan: Continue CBI and NPO. Will reassess need for inpatient cysto mid day.       Karon Chirinos PA-C  [facility]rology  [phone]               Interval History:    Johnson watermelon drainage, moderate rate on CBI              Review of Systems:   The 5 point Review of Systems is negative other than noted in the HPI             Medications:     Current Facility-Administered Medications Ordered in Epic   Medication Dose Route Frequency Last Rate Last Dose     acetaminophen (TYLENOL) Suppository 650 mg  650 mg Rectal Q4H PRN         acetaminophen (TYLENOL) tablet 650 mg  650 mg Oral Q4H PRN         glucose gel 15-30 g  15-30 g Oral Q15 Min PRN        Or     dextrose 50 % injection 25-50 mL  25-50 mL Intravenous Q15 Min PRN        Or     glucagon injection 1 mg  1 mg Subcutaneous Q15 Min PRN         insulin aspart (NovoLOG) inj (RAPID ACTING)  1-7 Units Subcutaneous TID AC         insulin aspart (NovoLOG) inj (RAPID ACTING)  1-5 Units Subcutaneous At Bedtime         lidocaine (LMX4) cream   Topical Q1H PRN         lidocaine 1 % 1 mL  1 mL Other Q1H PRN         melatonin tablet 1 mg  1 mg Oral At Bedtime PRN         naloxone (NARCAN) injection 0.1-0.4 mg  0.1-0.4 mg Intravenous Q2 Min PRN         ondansetron (ZOFRAN-ODT) ODT tab 4 mg  4 mg Oral Q6H PRN        Or     ondansetron (ZOFRAN) injection 4 mg  4 mg Intravenous Q6H PRN         opium-belladonna (B&O SUPPRETTES) 30-16.2 MG per suppository 0.5 suppository  15 mg Rectal Q8H PRN         oxyCODONE IR (ROXICODONE) half-tab 2.5-5 mg  2.5-5 mg Oral Q3H PRN         prochlorperazine (COMPAZINE) injection 5 mg  5 mg Intravenous Q6H PRN        Or     prochlorperazine (COMPAZINE) tablet 5 mg  5 mg Oral Q6H PRN        Or     prochlorperazine  (COMPAZINE) Suppository 12.5 mg  12.5 mg Rectal Q12H PRN         sodium chloride (PF) 0.9% PF flush 3 mL  3 mL Intracatheter Q1H PRN         sodium chloride (PF) 0.9% PF flush 3 mL  3 mL Intracatheter Q8H   3 mL at 10/31/18 0313     sodium chloride 0.9% (bag) irrigation   Irrigation Continuous   6,000 mL at 10/31/18 0845     No current Epic-ordered outpatient prescriptions on file.                  Physical Exam:   Vitals were reviewed  Patient Vitals for the past 8 hrs:   BP Temp Temp src Heart Rate Resp SpO2   10/31/18 0720 132/67 99.2  F (37.3  C) Oral 70 16 97 %   10/31/18 0250 138/67 97.2  F (36.2  C) Oral 72 16 97 %   10/31/18 0112 - - - - - 97 %   10/31/18 0110 (!) 122/97 - - - - 97 %     GEN: NAD, lying in bed  EYES: EOMI  MOUTH: MMM  NECK: Supple  RESP: Unlabored breathing  CARDIAC: No LE edema  SKIN: Warm  ABD: soft  NEURO: AAO  : Johnson draining, CBI           Data:   No results found for: NTBNPI, NTBNP  Lab Results   Component Value Date    WBC 10.3 10/31/2018    WBC 7.7 08/19/2018    WBC 8.4 05/15/2017    HGB 11.9 (L) 10/31/2018    HGB 13.7 08/19/2018    HGB 14.7 05/15/2017    HCT 36.0 (L) 10/31/2018    HCT 42.0 08/19/2018    HCT 44.1 05/15/2017    MCV 88 10/31/2018    MCV 87 08/19/2018    MCV 89 05/15/2017     10/31/2018     08/19/2018     05/15/2017     No results found for: INR

## 2018-10-31 NOTE — DISCHARGE SUMMARY
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 For full HPI please see admission H&P from Dr. Cody Gutierres from earlier this morning.    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  The following problems were addressed during his hospitalization:    Hematuria due to chronic cystitis and probable acute trauma during straight cath  Secondary to traumatic Figueroa catheter placement in the setting of chronic cystitis following prostate cancer history.  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  Primary urologist is Dr. Mejia, follows through the [facility] as well.  he has had intermittent hematuria in the past as well.  10/15/18 cystoscopy performed at the [facility] for the same reason. Urology consulted and followed during admission. Pt was on continuous bladder irrigation during stay and figueroa discontinued prior to discharge after clearance from Urology. No need for inpatient cystoscopy today. Pt was given Cipro 500 mg x1 on admission, no need for further abx given chronicity. I d/w [facility] today. Pt stable for discharge today, VSS, feeling well without complaints. No symptoms of anemia.       Acute blood loss anemia, stable  Secondary to gross hematuria as above.  Baseline hemoglobin in the 13-14 range,  currently 11.9. Consented for blood products on admission but not required given stable hemoglobin levels. Johnson removed prior to discharge, pt instructed to straight cath at home and follow up with primary Urologist Dr. Mejia and have repeat CBC with PCP in 3-5 days. Monitor himself for s/s of anemia.      Non-insulin dependent Type 2 diabetes, well controlled.  On metformin PTA. HbA1c 6.7%. Prior to admission metformin held given observation status, resume on discharge. Blood glucose checks with medium dose sliding scale insulin available as needed during hospital stay.      Recent Labs  Lab 10/31/18  0728 10/31/18  0312 10/31/18  0012   GLC  --   --  132*   * 135*  --       Chronic right rotator cuff tear:   Following fall with injury while hunting in [place] nearly 20 years ago.  Fairly significant limitations with right upper extremity secondary to this.      Prostate cancer history with recent recurrence   Patient with prostate cancer, Gavin 7 status post transurethral resection and external beam radiation therapy in 2013.  Subsequent recurrence with 9/20/18 cryoablation through the [facility]. Follow-up as per urology as outpatient.      Hypertension: Held prior to admission lisinopril 20 mg twice daily given observation admission. Resume on discharge.      Coronary artery disease/coronary atherosclerosis:   He is unable to describe further, reported elevated calcium score. Patient denies any prior history of myocardial infarction.  Inquiring about his history of coronary artery disease, patient states that he is has some coronary artery plaque, and that this is normal at his age.  No cardiac symptoms at this time. Last Lipid panel 9/2018 with . Held prior to admission statin therapy given observation admission, resume on discharge. Prior to admission aspirin held given gross hematuria.  Resume on discharge, if further hematuria would hold.    This patient was discussed with Dr. Brown of the  Hospitalist Service who agrees with current plans as outlined above.    Cate Franklin PA-C  Hospitalist Physician Assistant  Pager: [phone]    Significant Results and Procedures   N/A    Code Status   Full Code       Primary Care Physician   Cassia Wolff    Physical Exam   Temp: 99.2  F (37.3  C) Temp src: Oral BP: 132/67   Heart Rate: 70 Resp: 16 SpO2: 97 % O2 Device: None (Room air)    There were no vitals filed for this visit.  Vital Signs with Ranges  Temp:  [97.2  F (36.2  C)-99.2  F (37.3  C)] 99.2  F (37.3  C)  Heart Rate:  [70-86] 70  Resp:  [16-20] 16  BP: (122-152)/(65-97) 132/67  SpO2:  [97 %] 97 %  I/O last 3 completed shifts:  In: -   Out: 870 [Urine:870]    Constitutional: Awake, alert, cooperative, no apparent distress  HEENT: Extraocular movements intact. Pupils equal round reactive to light. Oropharynx clear without exudates.  Respiratory: Clear to auscultation bilaterally, no wheezing, rales, or rhonchi.  Cardiovascular: Regular rate and rhythm, normal S1 and S2, and no murmur noted.  GI: Normal bowel sounds, soft, non-distended, non-tender. Johnson in place to continuous bladder irrigation with pinkish red urine.   Skin/Integumen: No rashes, no cyanosis, no edema.  Neurologic: Cranial nerves 2-12 intact, normal strength and sensation.  Psychiatric: Alert, oriented to person, place and time, no obvious anxiety or depression.    Discharge Disposition   Discharged to home  Condition at discharge: Stable    Consultations This Hospital Stay   UROLOGY IP CONSULT    Time Spent on this Encounter   I, Cate Franklin, personally saw the patient today and spent greater than 30 minutes discharging this patient.    Discharge Orders     Reason for your hospital stay   Admitted with hematuria and low hemoglobin due to traumatic catheter placement.     Follow-up and recommended labs and tests    Follow up with primary care provider, Cassia Wolff, within 7 days for hospital follow- up.   The following labs/tests are recommended: Repeat CBC check hemoglobin level in 2-5 days post discharge.    Follow up with urologist Dr. Mejia , at [facility], for follow-up and further recommendations.  Call the clinic in order to schedule follow-up appointment for the next 1-2 weeks as recommended by clinic.     Activity   Your activity upon discharge: activity as tolerated     Full Code     Diet   Follow this diet upon discharge: Orders Placed This Encounter     Regular Diet Adult       Discharge Medications   Discharge Medication List as of 10/31/2018  3:52 PM      CONTINUE these medications which have NOT CHANGED    Details   atorvastatin (LIPITOR) 40 MG tablet Take 1 tablet (40 mg) by mouth daily, Disp-100 tablet, R-3, E-Prescribe      famotidine (PEPCID) 20 MG tablet Take 1 tablet (20 mg) by mouth 2 times daily, Disp-270 tablet, R-3, E-Prescribe      fluticasone (FLONASE) 50 MCG/ACT spray USE 2 SPRAYS IN EACH NOSTRIL EVERY DAY, Disp-48 g, R-11, E-Prescribe      latanoprost (XALATAN) 0.005 % ophthalmic solution Place 1 drop into both eyes At Bedtime , Historical      lisinopril (PRINIVIL/ZESTRIL) 20 MG tablet Take 1 tablet (20 mg) by mouth 2 times daily, Disp-180 tablet, R-3, E-Prescribe      MELATONIN PO Take 5 mg by mouth At Bedtime, Historical      metFORMIN (GLUCOPHAGE-XR) 500 MG 24 hr tablet TAKE 2 TABLETS TWICE DAILY WITH MEALS, Disp-360 tablet, R-1, E-Prescribe      MULTI-VITAMIN OR TABS 1 TABLET DAILY, Historical      vardenafil (LEVITRA) 20 MG tablet Take 0.5-1 tablets (10-20 mg) by mouth daily as needed Never use with nitroglycerin, terazosin or doxazosin., Disp-30 tablet, R-4, Local Print      Alcohol Swabs (B-D SINGLE USE SWABS REGULAR) PADS 1 pad 3 times daily, Disp-270 each, R-3, E-Prescribe      blood glucose monitoring (ONE TOUCH ULTRA 2) meter device kit Use to test blood sugars 1- 2 times daily or as directed.Disp-1 kit, [id number]      blood glucose monitoring (ONE TOUCH ULTRA)  test strip Use to test blood sugar  daily or as directed., Disp-1 Box, R-3, E-Prescribe      blood glucose monitoring (ONE TOUCH ULTRASOFT) lancets Use to test blood sugar 1-2  times daily or as directed., Disp-100 each, R-11, E-Prescribe      ORDER FOR DME Equipment being ordered: PostivacDisp-1 each, R-0, Normal           Allergies   Allergies   Allergen Reactions     Ivp Dye [Contrast Dye]      Data   Most Recent 3 CBC's:  Recent Labs   Lab Test  10/31/18   1014  10/31/18   0012  08/19/18   0440  05/15/17   1115   WBC   --   10.3  7.7  8.4   HGB  11.5*  11.9*  13.7  14.7   MCV   --   88  87  89   PLT   --   197  183  220      Most Recent 3 BMP's:  Recent Labs   Lab Test  10/31/18   0012  08/19/18   0440  05/17/18   1108   NA  137  140  136   POTASSIUM  4.4  3.9  4.6   CHLORIDE  106  104  106   CO2  24  20  22   BUN  23  22  27   CR  0.97  0.94  0.90   ANIONGAP  7  16*  8   WILIAN  9.0  8.9  9.2   GLC  132*  133*  120*     Most Recent 2 LFT's:  Recent Labs   Lab Test  09/26/18   1033  05/17/18   1108   09/06/16   1022   AST   --   15   --   18   ALT  11  30   < >  29   ALKPHOS   --   91   --   92   BILITOTAL   --   0.5   --   0.6    < > = values in this interval not displayed.     Most Recent INR's and Anticoagulation Dosing History:  Anticoagulation Dose History     There is no flowsheet data to display.        Most Recent 3 Troponin's:  Recent Labs   Lab Test  08/19/18   0440  05/17/12   0100   TROPI  <0.015  <0.012     Most Recent Cholesterol Panel:  Recent Labs   Lab Test  09/26/18   1033   CHOL  191   LDL  111*   HDL  60   TRIG  102     Most Recent 6 Bacteria Isolates From Any Culture (See EPIC Reports for Culture Details):No lab results found.  Most Recent TSH, T4 and A1c Labs:  Recent Labs   Lab Test  10/31/18   0012  05/17/18   1108   TSH   --   0.79   A1C  6.7*  6.1*     Results for orders placed or performed during the hospital encounter of 07/20/18   NM Bone Scan Whole Body    [facility]    NUCLEAR MEDICINE  WHOLE BODY BONE SCAN  7/20/2018 3:08 PM    HISTORY: Prostate cancer.    COMPARISON:       Prior bone scan: None.       Other relevant study: None.    TECHNIQUE: Following the uneventful intravenous administration of  25mCi Tc99m MDP inj Rt hand @1030, routine delayed imaging was  performed of the entire body.    IMAGES OBTAINED: Planar whole body anterior/posterior images and  planar head/neck oblique images.    FINDINGS: There is prominent increased radiotracer uptake seen in the  region of the medial right clavicle or sternoclavicular joint. There  is mild increased uptake in what appears to be the left  sternoclavicular joint. There are surgical changes of the left knee  arthroplasty. There are degenerative changes within the right knee,  both feet, both wrists, and throughout the spine.       Impression    IMPRESSION: Surgical and degenerative changes as described above.  There is prominent increased radiotracer uptake in the medial aspect  of the right clavicle adjacent to the sternoclavicular joint. The  etiology of this is uncertain. Although metastatic disease is a  possibility, the absence of other suspicious lesions would make this  somewhat unlikely. A CT through this region is recommended for further  evaluation.     KEILY ESTEVEZ MD

## 2018-11-01 ENCOUNTER — TELEPHONE (OUTPATIENT)
Dept: FAMILY MEDICINE | Facility: CLINIC | Age: 83
End: 2018-11-01

## 2018-11-01 NOTE — TELEPHONE ENCOUNTER
"ED/Discharge Protocol    \"Hi, my name is Court Vee, a registered nurse, and I am calling on behalf of Dr. Wolff's office at [place].  I am calling to follow up and see how things are going for you after your recent visit.\"    \"I see that you were in the (ER/UC/IP) on 10/30/18.    How are you doing now that you are home?\" not so well yet but just got home yesterday.    Is patient experiencing symptoms that may require a hospital visit?  no    Discharge Instructions    \"Let's review your discharge instructions.  What is/are the follow-up recommendations?  Pt. Response: I have to get a hemoglobin done and I want to come in and see her.    \"Were you instructed to make a follow-up appointment?\"  Pt. Response: Yes.  Has appointment been made?   No.  \"Can I help you schedule that appointment?\" appt scheduled for Monday 11/5 at 2pm.      \"When you see the provider, I would recommend that you bring your discharge instructions with you.    Medications    \"How many new medications are you on since your hospitalization/ED visit?\"    0-1  \"How many of your current medicines changed (dose, timing, name, etc.) while you were in the hospital/ED visit?\"   0-1  \"Do you have questions about your medications?\"   No  \"Were you newly diagnosed with heart failure, COPD, diabetes or did you have a heart attack?\"   No  For patients on insulin: \"Did you start on insulin in the hospital or did you have your insulin dose changed?\"   No    Medication reconciliation completed? Yes    Was MTM referral placed (*Make sure to put transitions as reason for referral)?   No    Call Summary    \"Do you have any questions or concerns about your condition or care plan at the moment?\"    I have a lot of questions but those are for the urologist and orthopedic doctors  Triage nurse advice given: none    Patient was in ER 1 in the past year (assess appropriateness of ER visits.)      \"If you have questions or things don't continue to improve, we encourage " "you contact us through the main clinic number,  [phone].  Even if the clinic is not open, triage nurses are available 24/7 to help you.     We would like you to know that our clinic has extended hours (provide information).  We also have urgent care (provide details on closest location and hours/contact info)\"      \"Thank you for your time and take care!\"        "

## 2018-11-01 NOTE — TELEPHONE ENCOUNTER
Pt was seen at [facility] on Wed Oct 21, 2018.  Reason for visit: Hematuria      Thank you!    Kalyn Nguyen TC

## 2018-11-05 ENCOUNTER — OFFICE VISIT (OUTPATIENT)
Dept: FAMILY MEDICINE | Facility: CLINIC | Age: 83
End: 2018-11-05
Payer: COMMERCIAL

## 2018-11-05 VITALS
SYSTOLIC BLOOD PRESSURE: 115 MMHG | BODY MASS INDEX: 30.31 KG/M2 | HEART RATE: 71 BPM | OXYGEN SATURATION: 97 % | DIASTOLIC BLOOD PRESSURE: 74 MMHG | TEMPERATURE: 98.4 F | WEIGHT: 200 LBS | HEIGHT: 68 IN

## 2018-11-05 DIAGNOSIS — E11.21 TYPE 2 DIABETES MELLITUS WITH DIABETIC NEPHROPATHY, WITHOUT LONG-TERM CURRENT USE OF INSULIN (H): ICD-10-CM

## 2018-11-05 DIAGNOSIS — I10 ESSENTIAL HYPERTENSION, BENIGN: ICD-10-CM

## 2018-11-05 DIAGNOSIS — Z09 HOSPITAL DISCHARGE FOLLOW-UP: Primary | ICD-10-CM

## 2018-11-05 DIAGNOSIS — C61 PROSTATE CANCER (H): ICD-10-CM

## 2018-11-05 DIAGNOSIS — N30.21 HEMATURIA DUE TO CHRONIC CYSTITIS: ICD-10-CM

## 2018-11-05 DIAGNOSIS — E78.5 HYPERLIPIDEMIA LDL GOAL <70: ICD-10-CM

## 2018-11-05 DIAGNOSIS — D50.8 OTHER IRON DEFICIENCY ANEMIA: ICD-10-CM

## 2018-11-05 LAB — HGB BLD-MCNC: 11.8 G/DL (ref 13.3–17.7)

## 2018-11-05 PROCEDURE — 36415 COLL VENOUS BLD VENIPUNCTURE: CPT | Performed by: FAMILY MEDICINE

## 2018-11-05 PROCEDURE — 99214 OFFICE O/P EST MOD 30 MIN: CPT | Performed by: FAMILY MEDICINE

## 2018-11-05 PROCEDURE — 85018 HEMOGLOBIN: CPT | Performed by: FAMILY MEDICINE

## 2018-11-05 NOTE — MR AVS SNAPSHOT
After Visit Summary   11/5/2018    Jamil Bedolla Sr., MD    MRN: [id number]           Patient Information     Date Of Birth          1935        Visit Information        Provider Department      11/5/2018 2:00 PM [facility]        Today's Diagnoses     Hospital discharge follow-up    -  1    Prostate cancer (H)        Hematuria due to chronic cystitis        Other iron deficiency anemia          Care Instructions    Take medications as directed.  Treated  and symptomatic cares discussed   Follow up if problem or concern             Follow-ups after your visit        Follow-up notes from your care team     Return in about 5 months (around 4/5/2019) for sooner if problem .      Your next 10 appointments already scheduled     Nov 30, 2018 10:30 AM CST   Return Visit with Wes Mejia MD   [facility] ([facility])    [address]  [address]  [place] [address] [phone]              Who to contact     If you have questions or need follow up information about today's clinic visit or your schedule please contact [facility] [place] directly at [phone].  Normal or non-critical lab and imaging results will be communicated to you by MyChart, letter or phone within 4 business days after the clinic has received the results. If you do not hear from us within 7 days, please contact the clinic through [employer] or phone. If you have a critical or abnormal lab result, we will notify you by phone as soon as possible.  Submit refill requests through [employer] or call your pharmacy and they will forward the refill request to us. Please allow 3 business days for your refill to be completed.          Additional Information About Your Visit        MyChart Information     [employer] gives you secure access to your electronic health record. If you see a primary care provider, you can also send  "messages to your care team and make appointments. If you have questions, please call your primary care clinic.  If you do not have a primary care provider, please call [phone] and they will assist you.        Care EveryWhere ID     This is your Care EveryWhere ID. This could be used by other organizations to access your [facility] medical records  [id number]        Your Vitals Were     Pulse Temperature Height Pulse Oximetry BMI (Body Mass Index)       71 98.4  F (36.9  C) (Tympanic) 5' 8\" (1.727 m) 97% 30.41 kg/m2        Blood Pressure from Last 3 Encounters:   11/05/18 115/74   10/31/18 132/67   09/26/18 124/74    Weight from Last 3 Encounters:   11/05/18 200 lb (90.7 kg)   09/26/18 200 lb 6.4 oz (90.9 kg)   08/19/18 193 lb (87.5 kg)              We Performed the Following     Hemoglobin        Primary Care Provider Office Phone # Fax #    Cassia Baltazar Wolff [phone]       [address]  [place] [address]        Equal Access to Services     [facility] AH: Hadii aad ku hadasho Soomaali, waaxda luqadaha, qaybta kaalmada adeegyada, gian mclean ah. So [facility] [phone].    ATENCIÓN: Si habla español, tiene a wray disposición servicios gratuitos de asistencia lingüística. Llame al [phone].    We comply with applicable federal civil rights laws and [place] laws. We do not discriminate on the basis of race, color, national origin, age, disability, sex, sexual orientation, or gender identity.            Thank you!     Thank you for choosing [facility] [place]  for your care. Our goal is always to provide you with excellent care. Hearing back from our patients is one way we can continue to improve our services. Please take a few minutes to complete the written survey that you may receive in the mail after your visit with us. Thank you!             Your Updated Medication List - Protect others around you: Learn how to safely use, store and " throw away your medicines at www.disposemymeds.org.          This list is accurate as of 11/5/18  2:51 PM.  Always use your most recent med list.                   Brand Name Dispense Instructions for use Diagnosis    atorvastatin 40 MG tablet    LIPITOR    100 tablet    Take 1 tablet (40 mg) by mouth daily    Hyperlipidemia LDL goal <70       B-D SINGLE USE SWABS REGULAR Pads     270 each    1 pad 3 times daily    Type 2 diabetes, HbA1C goal < 8% (H), Type II or unspecified type diabetes mellitus without mention of complication, not stated as uncontrolled       blood glucose monitoring lancets     100 each    Use to test blood sugar 1-2  times daily or as directed.    Type 2 diabetes mellitus with diabetic nephropathy, without long-term current use of insulin (H)       blood glucose monitoring meter device kit     1 kit    Use to test blood sugars 1- 2 times daily or as directed.    Type 2 diabetes mellitus with diabetic nephropathy, without long-term current use of insulin (H)       blood glucose monitoring test strip    ONETOUCH ULTRA    1 Box    Use to test blood sugar  daily or as directed.    Advanced directives, counseling/discussion       famotidine 20 MG tablet    PEPCID    270 tablet    Take 1 tablet (20 mg) by mouth 2 times daily    Gastroesophageal reflux disease without esophagitis       fluticasone 50 MCG/ACT spray    FLONASE    48 g    USE 2 SPRAYS IN EACH NOSTRIL EVERY DAY    Seasonal allergic rhinitis       latanoprost 0.005 % ophthalmic solution    XALATAN     Place 1 drop into both eyes At Bedtime        lisinopril 20 MG tablet    PRINIVIL/ZESTRIL    180 tablet    Take 1 tablet (20 mg) by mouth 2 times daily    Essential hypertension, benign       MELATONIN PO      Take 5 mg by mouth At Bedtime        metFORMIN 500 MG 24 hr tablet    GLUCOPHAGE-XR    360 tablet    TAKE 2 TABLETS TWICE DAILY WITH MEALS    Type 2 diabetes mellitus with diabetic nephropathy, without long-term current use of insulin  (H)       Multi-vitamin Tabs tablet   Generic drug:  multivitamin, therapeutic with minerals      1 TABLET DAILY        order for DME     1 each    Equipment being ordered: Postivac    Erectile dysfunction       vardenafil 20 MG tablet    LEVITRA    30 tablet    Take 0.5-1 tablets (10-20 mg) by mouth daily as needed Never use with nitroglycerin, terazosin or doxazosin.    Other male erectile dysfunction

## 2018-11-05 NOTE — PATIENT INSTRUCTIONS
Take medications as directed.  Treated  and symptomatic cares discussed   Follow up if problem or concern

## 2018-11-05 NOTE — PROGRESS NOTES
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  Follow up needed: seeing urology, is already scheduled for follow-up.   Other Healthcare Providers Involved in Patient s Care:         None  Update since discharge: improved.     Post Discharge Medication Reconciliation: discharge medications reconciled, continue medications without change.  Plan of care communicated with patient     Coding guidelines for this visit:  Type of Medical   Decision Making Face-to-Face Visit       within 7 Days of discharge Face-to-Face Visit        within 14 days of discharge   Moderate Complexity 72700 [id number]   High Complexity 19636 24080              PROBLEMS TO ADD ON...          Hyperlipidemia Follow-Up      Rate your low fat/cholesterol diet?: good    Taking statin?  Yes, no muscle aches from statin    Other lipid medications/supplements?:  none    Hypertension Follow-up      Outpatient blood pressures are not being checked.    Low Salt Diet: low salt      Amount of exercise or physical activity: None    Problems taking medications regularly: No    Medication side effects: none    Diet: regular (no restrictions)      Diabetes Follow-up      Patient is checking blood sugars: rarely.  Results range from 118 +    Diabetic concerns: He was concerned if sugars are higher then it should be      Symptoms of hypoglycemia (low  blood sugar): none     Paresthesias (numbness or burning in feet) or sores: No     Date of last diabetic eye exam: seeing opthalmology every 6 months.     Diabetes Management Resources    Hyperlipidemia Follow-Up      Rate your low fat/cholesterol diet?: good    Taking statin?  Yes, no muscle aches from statin    Other lipid medications/supplements?:  none    Hypertension Follow-up      Outpatient blood pressures are not being checked.    Low Salt Diet: no added salt    BP Readings from Last 2 Encounters:   11/05/18 115/74   10/31/18 132/67     Hemoglobin A1C (%)   Date Value   10/31/2018 6.7 (H)   05/17/2018 6.1 (H)     LDL Cholesterol Calculated (mg/dL)   Date Value   09/26/2018 111 (H)   09/21/2017 86       Problem list and histories reviewed & adjusted, as indicated.  Additional history: as documented    Patient Active Problem List   Diagnosis     Erectile dysfunction     Family history of coronary artery disease     Gastro-esophageal reflux disease with esophagitis     Decreased libido     BPH (benign prostatic hyperplasia)     Advanced directives, counseling/discussion     Type 2 diabetes, HbA1C goal < 8% (H)     Onychomycosis     Obesity (BMI 30-39.9)     Prostate cancer-[place] 4, treated with radiation     BMI 29.0-29.9,adult     Microalbuminuria     History of colonic polyps     CAD (coronary artery disease)     Carotid artery plaque     Primary osteoarthritis of both knees     Gastroesophageal reflux disease without esophagitis     Essential hypertension, benign     Hyperlipidemia LDL goal <70     Essential hypertension with goal blood pressure less than 140/90     Type 2 diabetes mellitus with diabetic nephropathy, without long-term current use of insulin (H)     Hematuria due to chronic cystitis     Past Surgical History:   Procedure Laterality Date     APPENDECTOMY       ARTHROSCOPY KNEE RT/LT  1998    partial menisectomy left knee     COLONOSCOPY N/A 9/27/2016    Procedure: COMBINED COLONOSCOPY,  "SINGLE OR MULTIPLE BIOPSY/POLYPECTOMY BY BIOPSY;  Surgeon: Maru Orta MD;  Location:  GI     CYSTOSCOPY       HC COLONOSCOPY THRU STOMA, DIAGNOSTIC  2005     HC COLONOSCOPY THRU STOMA, DIAGNOSTIC  5/09    diverticulosis, also proctitis     HEMORRHOIDECTOMY       PHACOEMULSIFICATION CLEAR CORNEA WITH STANDARD INTRAOCULAR LENS IMPLANT Right 3/18/2015    Procedure: PHACOEMULSIFICATION CLEAR CORNEA WITH STANDARD INTRAOCULAR LENS IMPLANT;  Surgeon: Lito Gonzales MD;  Location:  EC     PHACOEMULSIFICATION CLEAR CORNEA WITH STANDARD INTRAOCULAR LENS IMPLANT Left 3/25/2015    Procedure: PHACOEMULSIFICATION CLEAR CORNEA WITH STANDARD INTRAOCULAR LENS IMPLANT;  Surgeon: Lito Gonzales MD;  Location:  EC     ROTATOR CUFF REPAIR RT/LT  1998    right.  caused him to retire     SURGICAL HISTORY OF -   1999    L4-5 discectomy     TONSILLECTOMY & ADENOIDECTOMY       TURP         Social History   Substance Use Topics     Smoking status: Never Smoker     Smokeless tobacco: Never Used     Alcohol use Yes      Comment: 10-14 drinks per week     Family History   Problem Relation Age of Onset     C.A.D. Father      C.A.D. Brother      Asthma Brother      C.A.D. Mother      Diabetes Brother      Lipids Brother            Reviewed and updated as needed this visit by clinical staff       Reviewed and updated as needed this visit by Provider         ROS:  Constitutional, HEENT, cardiovascular, pulmonary, GI, , musculoskeletal, neuro, skin, endocrine and psych systems are negative, except as otherwise noted.    OBJECTIVE:     /74  Pulse 71  Temp 98.4  F (36.9  C) (Tympanic)  Ht 5' 8\" (1.727 m)  Wt 200 lb (90.7 kg)  SpO2 97%  BMI 30.41 kg/m2  Body mass index is 30.41 kg/(m^2).  GENERAL: healthy, alert and no distress  NECK: no adenopathy  RESP: lungs clear to auscultation - no rales, rhonchi or wheezes  CV: regular rate and rhythm, normal S1 S2, no S3 or S4, no murmur, click or rub, no peripheral edema and " peripheral pulses strong  ABDOMEN: soft, nontender, no hepatosplenomegaly, no masses and bowel sounds normal  MS: no edema  NEURO: Normal strength and tone, mentation intact and speech normal      ASSESSMENT/PLAN:       (Z09) Hospital discharge follow-up  (primary encounter diagnosis)  Comment:   Plan:     (C61) Prostate cancer (H)  Comment:  gurjit 8 , s/p cryo treatment, at [facility]   Plan: He has a follow-up scheduled with the urology    (N30.21) Hematuria due to chronic cystitis  Comment:   Plan: Clinically he is doing better no recurrence of hematuria. ,    (D50.8) Other iron deficiency anemia  Comment: Hemoglobin was low since recent hospitalization.  Once a follow-up check.   Plan: Hemoglobin            (I10) Essential hypertension, benign  Comment:   Plan: STABLE     (E78.5) Hyperlipidemia LDL goal <70  Comment:   Plan: STABLE       (E11.21) Type 2 diabetes mellitus with diabetic nephropathy, without long-term current use of insulin (H)  Comment:  hemoglobin AIC  in the hospital last week. Looks stable   Plan: Stay on same medication dose recheck in 4-6 months.         Patient expressed understanding and agreement with treatment plan. All patient's questions were answered, will let me know if has more later.  Medications: Rx's: Reviewed the potential side effects/complications of medications prescribed.     Cassia Wolff MD  [facility]

## 2018-11-07 ENCOUNTER — MEDICAL CORRESPONDENCE (OUTPATIENT)
Dept: HEALTH INFORMATION MANAGEMENT | Facility: CLINIC | Age: 83
End: 2018-11-07

## 2018-11-08 ENCOUNTER — ALLIED HEALTH/NURSE VISIT (OUTPATIENT)
Dept: UROLOGY | Facility: CLINIC | Age: 83
End: 2018-11-08
Payer: COMMERCIAL

## 2018-11-08 DIAGNOSIS — R33.9 URINARY RETENTION: Primary | ICD-10-CM

## 2018-11-08 DIAGNOSIS — R39.89 POSSIBLE URINARY TRACT INFECTION: Primary | ICD-10-CM

## 2018-11-08 LAB
ALBUMIN UR-MCNC: 100 MG/DL
APPEARANCE UR: CLEAR
BILIRUB UR QL STRIP: NEGATIVE
COLOR UR AUTO: YELLOW
GLUCOSE UR STRIP-MCNC: NEGATIVE MG/DL
HGB UR QL STRIP: ABNORMAL
KETONES UR STRIP-MCNC: ABNORMAL MG/DL
LEUKOCYTE ESTERASE UR QL STRIP: ABNORMAL
NITRATE UR QL: NEGATIVE
PH UR STRIP: 6 PH (ref 5–7)
SOURCE: ABNORMAL
SP GR UR STRIP: 1.02 (ref 1–1.03)
UROBILINOGEN UR STRIP-ACNC: 0.2 EU/DL (ref 0.2–1)

## 2018-11-08 PROCEDURE — 87086 URINE CULTURE/COLONY COUNT: CPT | Performed by: UROLOGY

## 2018-11-08 PROCEDURE — 81003 URINALYSIS AUTO W/O SCOPE: CPT | Performed by: UROLOGY

## 2018-11-08 PROCEDURE — 99207 ZZC NO CHARGE NURSE ONLY: CPT

## 2018-11-08 PROCEDURE — 87186 SC STD MICRODIL/AGAR DIL: CPT | Performed by: UROLOGY

## 2018-11-08 PROCEDURE — 87088 URINE BACTERIA CULTURE: CPT | Performed by: UROLOGY

## 2018-11-08 RX ORDER — CIPROFLOXACIN 500 MG/1
500 TABLET, FILM COATED ORAL 2 TIMES DAILY
Qty: 10 TABLET | Refills: 0 | Status: SHIPPED | OUTPATIENT
Start: 2018-11-08 | End: 2019-08-23

## 2018-11-08 NOTE — MR AVS SNAPSHOT
After Visit Summary   11/8/2018    Jamil Beodlla Sr., MD    MRN: [id number]           Patient Information     Date Of Birth          1935        Visit Information        Provider Department      11/8/2018 3:00 PM UA NURSE [facility] [place]        Today's Diagnoses     Urinary retention    -  1       Follow-ups after your visit        Your next 10 appointments already scheduled     Nov 08, 2018  3:00 PM CST   Nurse Visit with UA NURSE   [facility] [place] ([facility] [place])    [address]  [address]  [place] [id number] [phone]            Nov 30, 2018 10:30 AM CST   Return Visit with Wes Mejia MD   [facility] [place] ([facility] [place])    [address]  [address]  [place] [id number] [phone]              Who to contact     If you have questions or need follow up information about today's clinic visit or your schedule please contact [facility] [place] directly at [phone].  Normal or non-critical lab and imaging results will be communicated to you by Cutane[employer] Life Scienceshart, letter or phone within 4 business days after the clinic has received the results. If you do not hear from us within 7 days, please contact the clinic through IJJ CORPt or phone. If you have a critical or abnormal lab result, we will notify you by phone as soon as possible.  Submit refill requests through AlumniFunder or call your pharmacy and they will forward the refill request to us. Please allow 3 business days for your refill to be completed.          Additional Information About Your Visit        MyChart Information     AlumniFunder gives you secure access to your electronic health record. If you see a primary care provider, you can also send messages to your care team and make appointments. If you have questions, please call your primary care clinic.  If you do not  have a primary care provider, please call [phone] and they will assist you.        Care EveryWhere ID     This is your Care EveryWhere ID. This could be used by other organizations to access your [facility] medical records  [id number]         Blood Pressure from Last 3 Encounters:   11/05/18 115/74   10/31/18 132/67   09/26/18 124/74    Weight from Last 3 Encounters:   11/05/18 90.7 kg (200 lb)   09/26/18 90.9 kg (200 lb 6.4 oz)   08/19/18 87.5 kg (193 lb)              Today, you had the following     No orders found for display         Today's Medication Changes          These changes are accurate as of 11/8/18  1:57 PM.  If you have any questions, ask your nurse or doctor.               Start taking these medicines.        Dose/Directions    ciprofloxacin 500 MG tablet   Commonly known as:  CIPRO   Used for:  Possible urinary tract infection        Dose:  500 mg   Take 1 tablet (500 mg) by mouth 2 times daily   Quantity:  10 tablet   Refills:  0            Where to get your medicines      These medications were sent to [facility] [place], [place] - [address]  [address], [place] [address]     Phone:  [phone]     ciprofloxacin 500 MG tablet                Primary Care Provider Office Phone # Fax #    Cassia Wolff [phone]       [address]  [place] [address]        Equal Access to Services     [facility]: Hadii vincenzo jamil hadasho Sodane, waaxda luqadaha, qaybta kaalmagian john. So [facility] [phone].    ATENCIÓN: Si habla español, tiene a wray disposición servicios gratuitos de asistencia lingüística. Llame al [phone].    We comply with applicable federal civil rights laws and [place] laws. We do not discriminate on the basis of race, color, national origin, age, disability, sex, sexual orientation, or gender identity.            Thank you!     Thank you for choosing [facility]  [facility] [place]  for your care. Our goal is always to provide you with excellent care. Hearing back from our patients is one way we can continue to improve our services. Please take a few minutes to complete the written survey that you may receive in the mail after your visit with us. Thank you!             Your Updated Medication List - Protect others around you: Learn how to safely use, store and throw away your medicines at www.disposemymeds.org.          This list is accurate as of 11/8/18  1:57 PM.  Always use your most recent med list.                   Brand Name Dispense Instructions for use Diagnosis    atorvastatin 40 MG tablet    LIPITOR    100 tablet    Take 1 tablet (40 mg) by mouth daily    Hyperlipidemia LDL goal <70       B-D SINGLE USE SWABS REGULAR Pads     270 each    1 pad 3 times daily    Type 2 diabetes, HbA1C goal < 8% (H), Type II or unspecified type diabetes mellitus without mention of complication, not stated as uncontrolled       blood glucose monitoring lancets     100 each    Use to test blood sugar 1-2  times daily or as directed.    Type 2 diabetes mellitus with diabetic nephropathy, without long-term current use of insulin (H)       blood glucose monitoring meter device kit     1 kit    Use to test blood sugars 1- 2 times daily or as directed.    Type 2 diabetes mellitus with diabetic nephropathy, without long-term current use of insulin (H)       blood glucose monitoring test strip    ONETOUCH ULTRA    1 Box    Use to test blood sugar  daily or as directed.    Advanced directives, counseling/discussion       ciprofloxacin 500 MG tablet    CIPRO    10 tablet    Take 1 tablet (500 mg) by mouth 2 times daily    Possible urinary tract infection       famotidine 20 MG tablet    PEPCID    270 tablet    Take 1 tablet (20 mg) by mouth 2 times daily    Gastroesophageal reflux disease without esophagitis       fluticasone 50 MCG/ACT spray    FLONASE    48 g    USE 2  SPRAYS IN EACH NOSTRIL EVERY DAY    Seasonal allergic rhinitis       latanoprost 0.005 % ophthalmic solution    XALATAN     Place 1 drop into both eyes At Bedtime        lisinopril 20 MG tablet    PRINIVIL/ZESTRIL    180 tablet    Take 1 tablet (20 mg) by mouth 2 times daily    Essential hypertension, benign       MELATONIN PO      Take 5 mg by mouth At Bedtime        metFORMIN 500 MG 24 hr tablet    GLUCOPHAGE-XR    360 tablet    TAKE 2 TABLETS TWICE DAILY WITH MEALS    Type 2 diabetes mellitus with diabetic nephropathy, without long-term current use of insulin (H)       Multi-vitamin Tabs tablet   Generic drug:  multivitamin, therapeutic with minerals      1 TABLET DAILY        order for DME     1 each    Equipment being ordered: Postivac    Erectile dysfunction       vardenafil 20 MG tablet    LEVITRA    30 tablet    Take 0.5-1 tablets (10-20 mg) by mouth daily as needed Never use with nitroglycerin, terazosin or doxazosin.    Other male erectile dysfunction

## 2018-11-08 NOTE — PROGRESS NOTES
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          This service provided today was under the supervising provider of the day Dr Guzman, who was available if needed.        Jamil brought in a urine sample from home . He has been doing CIC 3-4 times a day but is having some hematuria with catherizations  At times and also dysuria  And hematuria at times when voiding. He is S/P cyro of the prostate @ [facility] in Sept. UA RESULTS:  Recent Labs   Lab Test  11/08/18   1322   05/17/12   0154   COLOR  Yellow   < >  Yellow   APPEARANCE  Clear   < >  Clear   URINEGLC  Negative   < >  Negative   URINEBILI  Negative   < >  Negative   URINEKETONE  Trace*   < >  10*   SG  1.020   < >  1.016   UBLD  Large*   < >  Negative   URINEPH  6.0   < >  5.0   PROTEIN  100*   < >  Negative   UROBILINOGEN  0.2   < >   --    NITRITE  Negative   < >  Negative   LEUKEST  Moderate*   < >  Negative   RBCU   --    --   1   WBCU   --    --   3*    < > = values in this interval not displayed.     Culture sent. Cipro 500 mg bid x 5 days sent to pharmacy as we await culture. Instructed patient to call office if symptoms of fever chills or increase hematuria  occurs as we  Await culture results. He is a heavy coffee drinker 8 cups a day. Encouraged him to hold coffee and  Other diet irritants for now. Myrna Desai LPN  Myrna Desai

## 2018-11-09 LAB
BACTERIA SPEC CULT: ABNORMAL
Lab: ABNORMAL
SPECIMEN SOURCE: ABNORMAL

## 2018-11-12 DIAGNOSIS — N39.0 URINARY TRACT INFECTION: Primary | ICD-10-CM

## 2018-11-12 RX ORDER — NITROFURANTOIN 25; 75 MG/1; MG/1
100 CAPSULE ORAL 2 TIMES DAILY
Qty: 14 CAPSULE | Refills: 0 | Status: SHIPPED | OUTPATIENT
Start: 2018-11-12 | End: 2019-05-13

## 2018-11-12 NOTE — PROGRESS NOTES
Called Jamil with new Rx information as below. He expressed understanding. Macrobid e-scribed to pt's pharmacy as requested.     RE: EDUARDA result in  Received: Today       Karon Chirinos PA-C Hanson, Cynthia L, RN       Phone Number: [phone]                     Macrobid 100mg BID x 7 days

## 2018-11-13 DIAGNOSIS — N39.0 URINARY TRACT INFECTION: Primary | ICD-10-CM

## 2018-11-13 RX ORDER — AMOXICILLIN 500 MG/1
500 CAPSULE ORAL 3 TIMES DAILY
Qty: 15 CAPSULE | Refills: 0 | Status: SHIPPED | OUTPATIENT
Start: 2018-11-13 | End: 2018-11-18

## 2018-11-16 DIAGNOSIS — R33.9 URINARY RETENTION: Primary | ICD-10-CM

## 2018-11-16 DIAGNOSIS — C61 PROSTATE CANCER (H): ICD-10-CM

## 2018-11-20 DIAGNOSIS — N39.0 URINARY TRACT INFECTION: ICD-10-CM

## 2018-11-20 LAB
ALBUMIN UR-MCNC: 100 MG/DL
APPEARANCE UR: CLEAR
BILIRUB UR QL STRIP: NEGATIVE
COLOR UR AUTO: YELLOW
GLUCOSE UR STRIP-MCNC: NEGATIVE MG/DL
HGB UR QL STRIP: ABNORMAL
KETONES UR STRIP-MCNC: NEGATIVE MG/DL
LEUKOCYTE ESTERASE UR QL STRIP: NEGATIVE
NITRATE UR QL: NEGATIVE
PH UR STRIP: 6 PH (ref 5–7)
SOURCE: ABNORMAL
SP GR UR STRIP: 1.02 (ref 1–1.03)
UROBILINOGEN UR STRIP-ACNC: 0.2 EU/DL (ref 0.2–1)

## 2018-11-20 PROCEDURE — 87186 SC STD MICRODIL/AGAR DIL: CPT | Performed by: UROLOGY

## 2018-11-20 PROCEDURE — 87088 URINE BACTERIA CULTURE: CPT | Performed by: UROLOGY

## 2018-11-20 PROCEDURE — 87086 URINE CULTURE/COLONY COUNT: CPT | Performed by: UROLOGY

## 2018-11-20 PROCEDURE — 81003 URINALYSIS AUTO W/O SCOPE: CPT | Performed by: UROLOGY

## 2018-11-23 LAB
BACTERIA SPEC CULT: ABNORMAL
Lab: ABNORMAL
SPECIMEN SOURCE: ABNORMAL

## 2018-11-30 ENCOUNTER — OFFICE VISIT (OUTPATIENT)
Dept: UROLOGY | Facility: CLINIC | Age: 83
End: 2018-11-30
Payer: MEDICARE

## 2018-11-30 VITALS
SYSTOLIC BLOOD PRESSURE: 120 MMHG | BODY MASS INDEX: 29.33 KG/M2 | HEART RATE: 63 BPM | OXYGEN SATURATION: 97 % | HEIGHT: 69 IN | DIASTOLIC BLOOD PRESSURE: 78 MMHG | WEIGHT: 198 LBS

## 2018-11-30 DIAGNOSIS — R33.9 URINARY RETENTION: ICD-10-CM

## 2018-11-30 DIAGNOSIS — C61 PROSTATE CANCER (H): ICD-10-CM

## 2018-11-30 LAB
ALBUMIN UR-MCNC: 100 MG/DL
APPEARANCE UR: CLEAR
BILIRUB UR QL STRIP: NEGATIVE
COLOR UR AUTO: YELLOW
GLUCOSE UR STRIP-MCNC: NEGATIVE MG/DL
HGB UR QL STRIP: ABNORMAL
KETONES UR STRIP-MCNC: NEGATIVE MG/DL
LEUKOCYTE ESTERASE UR QL STRIP: ABNORMAL
NITRATE UR QL: POSITIVE
PH UR STRIP: 5.5 PH (ref 5–7)
RESIDUAL VOLUME (RV) (EXTERNAL): NORMAL
SOURCE: ABNORMAL
SP GR UR STRIP: 1.02 (ref 1–1.03)
UROBILINOGEN UR STRIP-ACNC: 0.2 EU/DL (ref 0.2–1)

## 2018-11-30 PROCEDURE — 87086 URINE CULTURE/COLONY COUNT: CPT | Performed by: UROLOGY

## 2018-11-30 PROCEDURE — 87088 URINE BACTERIA CULTURE: CPT | Performed by: UROLOGY

## 2018-11-30 PROCEDURE — 87186 SC STD MICRODIL/AGAR DIL: CPT | Performed by: UROLOGY

## 2018-11-30 PROCEDURE — 81003 URINALYSIS AUTO W/O SCOPE: CPT | Performed by: UROLOGY

## 2018-11-30 PROCEDURE — 51798 US URINE CAPACITY MEASURE: CPT | Performed by: UROLOGY

## 2018-11-30 PROCEDURE — 99213 OFFICE O/P EST LOW 20 MIN: CPT | Mod: 25 | Performed by: UROLOGY

## 2018-11-30 ASSESSMENT — PAIN SCALES - GENERAL: PAINLEVEL: NO PAIN (0)

## 2018-11-30 NOTE — NURSING NOTE
Chief Complaint   Patient presents with     Prostate Cancer and Retention     Pt is here for UA, and PVR.    PRV is 642 ml.   Maggie Lopez CMA

## 2018-11-30 NOTE — MR AVS SNAPSHOT
"              After Visit Summary   11/30/2018    Jamil Bedolla Sr., MD    MRN: [id number]           Patient Information     Date Of Birth          1935        Visit Information        Provider Department      11/30/2018 10:30 AM Wes Mejia MD [facility] [place]        Today's Diagnoses     Prostate cancer (H)        Urinary retention           Follow-ups after your visit        Who to contact     If you have questions or need follow up information about today's clinic visit or your schedule please contact [facility] [place] directly at [phone].  Normal or non-critical lab and imaging results will be communicated to you by Dashbookhart, letter or phone within 4 business days after the clinic has received the results. If you do not hear from us within 7 days, please contact the clinic through Skywordt or phone. If you have a critical or abnormal lab result, we will notify you by phone as soon as possible.  Submit refill requests through QuanTemplate or call your pharmacy and they will forward the refill request to us. Please allow 3 business days for your refill to be completed.          Additional Information About Your Visit        MyChart Information     QuanTemplate gives you secure access to your electronic health record. If you see a primary care provider, you can also send messages to your care team and make appointments. If you have questions, please call your primary care clinic.  If you do not have a primary care provider, please call [phone] and they will assist you.        Care EveryWhere ID     This is your Care EveryWhere ID. This could be used by other organizations to access your [facility] medical records  [id number]        Your Vitals Were     Pulse Height Pulse Oximetry BMI (Body Mass Index)          63 1.74 m (5' 8.5\") 97% 29.67 kg/m2         Blood Pressure from Last 3 Encounters:   11/30/18 120/78   11/05/18 115/74   10/31/18 " 132/67    Weight from Last 3 Encounters:   11/30/18 89.8 kg (198 lb)   11/05/18 90.7 kg (200 lb)   09/26/18 90.9 kg (200 lb 6.4 oz)              We Performed the Following     MEASURE POST-VOID RESIDUAL URINE/BLADDER CAPACITY, US NON-IMAGING (27706)     UA without Microscopic     Urine Culture Aerobic Bacterial [YTC997]        Primary Care Provider Office Phone # Fax #    Cassia Wolff [phone]       [address]  [place] [address]        Equal Access to Services     [facility]: Hadii aad ku hadasho Soomaali, waaxda luqadaha, qaybta kaalmada adeegyabarbara, gian mclean . So [facility] [phone].    ATENCIÓN: Si habla español, tiene a wray disposición servicios gratuitos de asistencia lingüística. LlOhio[facility] [phone].    We comply with applicable federal civil rights laws and [place] laws. We do not discriminate on the basis of race, color, national origin, age, disability, sex, sexual orientation, or gender identity.            Thank you!     Thank you for choosing [facility] [place]  for your care. Our goal is always to provide you with excellent care. Hearing back from our patients is one way we can continue to improve our services. Please take a few minutes to complete the written survey that you may receive in the mail after your visit with us. Thank you!             Your Updated Medication List - Protect others around you: Learn how to safely use, store and throw away your medicines at www.disposemymeds.org.          This list is accurate as of 11/30/18 11:27 AM.  Always use your most recent med list.                   Brand Name Dispense Instructions for use Diagnosis    atorvastatin 40 MG tablet    LIPITOR    100 tablet    Take 1 tablet (40 mg) by mouth daily    Hyperlipidemia LDL goal <70       B-D SINGLE USE SWABS REGULAR Pads     270 each    1 pad 3 times daily    Type 2 diabetes, HbA1C goal < 8% (H), Type II  or unspecified type diabetes mellitus without mention of complication, not stated as uncontrolled       blood glucose monitoring lancets     100 each    Use to test blood sugar 1-2  times daily or as directed.    Type 2 diabetes mellitus with diabetic nephropathy, without long-term current use of insulin (H)       blood glucose monitoring meter device kit     1 kit    Use to test blood sugars 1- 2 times daily or as directed.    Type 2 diabetes mellitus with diabetic nephropathy, without long-term current use of insulin (H)       blood glucose monitoring test strip    ONETOUCH ULTRA    1 Box    Use to test blood sugar  daily or as directed.    Advanced directives, counseling/discussion       ciprofloxacin 500 MG tablet    CIPRO    10 tablet    Take 1 tablet (500 mg) by mouth 2 times daily    Possible urinary tract infection       famotidine 20 MG tablet    PEPCID    270 tablet    Take 1 tablet (20 mg) by mouth 2 times daily    Gastroesophageal reflux disease without esophagitis       fluticasone 50 MCG/ACT nasal spray    FLONASE    48 g    USE 2 SPRAYS IN EACH NOSTRIL EVERY DAY    Seasonal allergic rhinitis       latanoprost 0.005 % ophthalmic solution    XALATAN     Place 1 drop into both eyes At Bedtime        lisinopril 20 MG tablet    PRINIVIL/ZESTRIL    180 tablet    Take 1 tablet (20 mg) by mouth 2 times daily    Essential hypertension, benign       MELATONIN PO      Take 5 mg by mouth At Bedtime        metFORMIN 500 MG 24 hr tablet    GLUCOPHAGE-XR    360 tablet    TAKE 2 TABLETS TWICE DAILY WITH MEALS    Type 2 diabetes mellitus with diabetic nephropathy, without long-term current use of insulin (H)       Multi-vitamin tablet   Generic drug:  multivitamin w/minerals      1 TABLET DAILY        order for DME     1 each    Equipment being ordered: Postivac    Erectile dysfunction       vardenafil 20 MG tablet    LEVITRA    30 tablet    Take 0.5-1 tablets (10-20 mg) by mouth daily as needed Never use with  nitroglycerin, terazosin or doxazosin.    Other male erectile dysfunction

## 2018-11-30 NOTE — LETTER
11/30/2018       RE: Jamil Bedolla Sr., MD  [address] [place] [address]     Dear Colleague,    Thank you for referring your patient, Jamil Bedolla Sr., MD, to the [facility] [place] at [facility]. Please see a copy of my visit note below.    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 After cryotherapy at [facility] his PSAs are undetectable. He also has an atonic bladder and has residuals ranging from 125 cc to 600 cc. He is spontaneously voiding volumes of 100 cc-300 cc.  Currently he denies any dysuria, has had some terminal hematuria with catheterizing. Last urine culture grew Staphylococcus that was felt to be colonization and was not treated. Prior to this he had an enterococcus UTI that was treated with amoxicillin-his symptoms were severe bladder spasticity.  Other past medical history, medications and allergies reviewed  Exam: Alert and oriented, normal vital signs, normal appearance  Assessment: Recurrent adenocarcinoma of the prostate treated focally with cryotherapy  Atonic bladder-intermittent self-catheterization twice a day for now  Urinary tract infections due to catheterization-discussed colonization versus UTI and bacterial resistance  Plan: He will follow-up next month with Dr. Hernandez at [facility]  Follow-up with me p.r.n. and then on a regular basis after Dr. Hernandez discharges him from his care     Again, thank you for allowing me to participate in the care of your patient.      Sincerely,    Wes Mejia MD

## 2018-11-30 NOTE — PROGRESS NOTES
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 After cryotherapy at [facility] his PSAs are undetectable. He also has an atonic bladder and has residuals ranging from 125 cc to 600 cc. He is spontaneously voiding volumes of 100 cc-300 cc.  Currently he denies any dysuria, has had some terminal hematuria with catheterizing. Last urine culture grew Staphylococcus that was felt to be colonization and was not treated. Prior to this he had an enterococcus UTI that was treated with amoxicillin-his symptoms were severe bladder spasticity.  Other past medical history, medications and allergies reviewed  Exam: Alert and oriented, normal vital signs, normal appearance  Assessment: Recurrent adenocarcinoma of the prostate treated focally with cryotherapy  Atonic bladder-intermittent self-catheterization twice a day for now  Urinary tract infections due to catheterization-discussed colonization versus UTI and bacterial resistance  Plan: He will follow-up next month with Dr. Hernandez at [facility]  Follow-up with me p.r.n. and then on a regular basis after Dr. Hernandez discharges him from his care

## 2018-12-02 LAB
BACTERIA SPEC CULT: ABNORMAL
Lab: ABNORMAL
SPECIMEN SOURCE: ABNORMAL

## 2018-12-05 ENCOUNTER — TELEPHONE (OUTPATIENT)
Dept: UROLOGY | Facility: CLINIC | Age: 83
End: 2018-12-05

## 2018-12-05 NOTE — TELEPHONE ENCOUNTER
Called pt back with results as below. He denies symptoms so no Rx written. He expressed understanding.       Urine Culture Aerobic Bacterial [[id number]]   Status:  Final result   Visible to patient:  Yes (MyChart) Dx:  Urinary retention; Prostate cancer (H) Order: [id number]       Notes Recorded by Lo Roy LPN on 12/5/2018 at 11:20 AM  Tried calling patient, but voicemail box is full. Lo Roy LPN    ------    Notes Recorded by Wes Mejia MD on 12/5/2018 at 11:15 AM  Does ISC.  No Rx unless symptoms        5d ago   Resulting Agency     Specimen Description Midstream Urine INFECTIOUS DISEASE DIAGNOSTIC LABORATORY     Special Requests Specimen received in preservative [facility]     Culture Micro >100,000 colonies/mL  Coagulase negative Staphylococcus  (A)

## 2018-12-17 ENCOUNTER — APPOINTMENT (OUTPATIENT)
Dept: LAB | Facility: CLINIC | Age: 83
End: 2018-12-17
Payer: COMMERCIAL

## 2019-01-03 DIAGNOSIS — N39.0 URINARY TRACT INFECTION: ICD-10-CM

## 2019-01-03 LAB
ALBUMIN UR-MCNC: NEGATIVE MG/DL
APPEARANCE UR: CLEAR
BILIRUB UR QL STRIP: NEGATIVE
COLOR UR AUTO: YELLOW
GLUCOSE UR STRIP-MCNC: NEGATIVE MG/DL
HGB UR QL STRIP: ABNORMAL
KETONES UR STRIP-MCNC: NEGATIVE MG/DL
LEUKOCYTE ESTERASE UR QL STRIP: ABNORMAL
NITRATE UR QL: POSITIVE
PH UR STRIP: 5.5 PH (ref 5–7)
SOURCE: ABNORMAL
SP GR UR STRIP: 1.02 (ref 1–1.03)
UROBILINOGEN UR STRIP-ACNC: 0.2 EU/DL (ref 0.2–1)

## 2019-01-03 PROCEDURE — 87088 URINE BACTERIA CULTURE: CPT | Performed by: UROLOGY

## 2019-01-03 PROCEDURE — 87186 SC STD MICRODIL/AGAR DIL: CPT | Performed by: UROLOGY

## 2019-01-03 PROCEDURE — 81003 URINALYSIS AUTO W/O SCOPE: CPT | Performed by: UROLOGY

## 2019-01-03 PROCEDURE — 87086 URINE CULTURE/COLONY COUNT: CPT | Performed by: UROLOGY

## 2019-01-04 ENCOUNTER — MEDICAL CORRESPONDENCE (OUTPATIENT)
Dept: HEALTH INFORMATION MANAGEMENT | Facility: CLINIC | Age: 84
End: 2019-01-04

## 2019-01-06 DIAGNOSIS — T83.511D URINARY TRACT INFECTION ASSOCIATED WITH CATHETERIZATION OF URINARY TRACT, UNSPECIFIED INDWELLING URINARY CATHETER TYPE, SUBSEQUENT ENCOUNTER: Primary | ICD-10-CM

## 2019-01-06 DIAGNOSIS — N39.0 URINARY TRACT INFECTION ASSOCIATED WITH CATHETERIZATION OF URINARY TRACT, UNSPECIFIED INDWELLING URINARY CATHETER TYPE, SUBSEQUENT ENCOUNTER: Primary | ICD-10-CM

## 2019-01-06 LAB
BACTERIA SPEC CULT: ABNORMAL
Lab: ABNORMAL
SPECIMEN SOURCE: ABNORMAL

## 2019-01-06 RX ORDER — NITROFURANTOIN 25; 75 MG/1; MG/1
100 CAPSULE ORAL 2 TIMES DAILY
Qty: 14 CAPSULE | Refills: 0 | Status: SHIPPED | OUTPATIENT
Start: 2019-01-06 | End: 2019-05-13

## 2019-01-15 ENCOUNTER — DOCUMENTATION ONLY (OUTPATIENT)
Dept: UROLOGY | Facility: CLINIC | Age: 84
End: 2019-01-15

## 2019-01-15 NOTE — PROGRESS NOTES
Urology pt of Dr. Mejia.  Re: supply order from [facility].    Jamil uses a coude-tip catheter for his ISCs due to his particular anatomy which makes a straight-tip cath difficult and ineffective in passing through the urethra. The coude tip conforms to the pt's anatomy.

## 2019-01-18 ENCOUNTER — TELEPHONE (OUTPATIENT)
Dept: UROLOGY | Facility: CLINIC | Age: 84
End: 2019-01-18

## 2019-01-22 ENCOUNTER — TELEPHONE (OUTPATIENT)
Dept: UROLOGY | Facility: CLINIC | Age: 84
End: 2019-01-22

## 2019-01-22 NOTE — TELEPHONE ENCOUNTER
Spoke to Jamil Bedolla he is needing to pass the catheter four times per day due to high residuals. This is a permanent condition and will not be medically or surgically resolved in the next 90 days,

## 2019-02-25 DIAGNOSIS — E11.9 TYPE 2 DIABETES, HBA1C GOAL < 8% (H): ICD-10-CM

## 2019-02-25 DIAGNOSIS — E11.21 TYPE 2 DIABETES MELLITUS WITH DIABETIC NEPHROPATHY, WITHOUT LONG-TERM CURRENT USE OF INSULIN (H): Primary | ICD-10-CM

## 2019-02-25 NOTE — TELEPHONE ENCOUNTER
Name of caller: Sindhu  Relationship of Patient: [facility]    Reason for Call: PT would like to refill his Alcohol Swabs (B-D SINGLE USE SWABS REGULAR) PADS. However, he would like different directions or a new script with more of the swabs because he is using several per day due to catheterization post prostate cancer, and from the blood sugar checks.     Best phone number to reach pt at is: [phone]  Ok to leave a message with medical info? Yes    Pharmacy preferred (if calling for a refill): [facility]    Sindhu Leon  Patient Representative - [facility]

## 2019-02-25 NOTE — TELEPHONE ENCOUNTER
Requested Prescriptions   Pending Prescriptions Disp Refills     Alcohol Swabs (B-D SINGLE USE SWABS REGULAR) PADS  Last Written Prescription Date:  2015  Last Fill Quantity: 270 each,  # refills: 3   Last office visit: 2018 with prescribing provider:     Future Office Visit:     270 each 3     Si pad 3 times daily    There is no refill protocol information for this order

## 2019-02-26 RX ORDER — ISOPROPYL ALCOHOL 0.75 G/1
1 SWAB TOPICAL 3 TIMES DAILY
Qty: 270 EACH | Refills: 3 | Status: SHIPPED | OUTPATIENT
Start: 2019-02-26

## 2019-04-24 ENCOUNTER — TRANSFERRED RECORDS (OUTPATIENT)
Dept: HEALTH INFORMATION MANAGEMENT | Facility: CLINIC | Age: 84
End: 2019-04-24

## 2019-05-08 ENCOUNTER — TELEPHONE (OUTPATIENT)
Dept: FAMILY MEDICINE | Facility: CLINIC | Age: 84
End: 2019-05-08

## 2019-05-10 ENCOUNTER — TRANSFERRED RECORDS (OUTPATIENT)
Dept: HEALTH INFORMATION MANAGEMENT | Facility: CLINIC | Age: 84
End: 2019-05-10

## 2019-05-13 ENCOUNTER — OFFICE VISIT (OUTPATIENT)
Dept: FAMILY MEDICINE | Facility: CLINIC | Age: 84
End: 2019-05-13
Payer: MEDICARE

## 2019-05-13 VITALS
OXYGEN SATURATION: 96 % | DIASTOLIC BLOOD PRESSURE: 64 MMHG | SYSTOLIC BLOOD PRESSURE: 118 MMHG | BODY MASS INDEX: 30.07 KG/M2 | TEMPERATURE: 97.9 F | HEART RATE: 60 BPM | WEIGHT: 203 LBS | HEIGHT: 69 IN

## 2019-05-13 DIAGNOSIS — Z71.89 ADVANCED DIRECTIVES, COUNSELING/DISCUSSION: ICD-10-CM

## 2019-05-13 DIAGNOSIS — I10 ESSENTIAL HYPERTENSION, BENIGN: Primary | ICD-10-CM

## 2019-05-13 DIAGNOSIS — E78.5 HYPERLIPIDEMIA LDL GOAL <70: ICD-10-CM

## 2019-05-13 DIAGNOSIS — I10 ESSENTIAL HYPERTENSION WITH GOAL BLOOD PRESSURE LESS THAN 140/90: ICD-10-CM

## 2019-05-13 DIAGNOSIS — E11.21 TYPE 2 DIABETES MELLITUS WITH DIABETIC NEPHROPATHY, WITHOUT LONG-TERM CURRENT USE OF INSULIN (H): ICD-10-CM

## 2019-05-13 DIAGNOSIS — N52.8 OTHER MALE ERECTILE DYSFUNCTION: ICD-10-CM

## 2019-05-13 LAB — HBA1C MFR BLD: 6.5 % (ref 0–5.6)

## 2019-05-13 PROCEDURE — 80061 LIPID PANEL: CPT | Performed by: FAMILY MEDICINE

## 2019-05-13 PROCEDURE — 99214 OFFICE O/P EST MOD 30 MIN: CPT | Performed by: FAMILY MEDICINE

## 2019-05-13 PROCEDURE — 36415 COLL VENOUS BLD VENIPUNCTURE: CPT | Performed by: FAMILY MEDICINE

## 2019-05-13 PROCEDURE — 80053 COMPREHEN METABOLIC PANEL: CPT | Performed by: FAMILY MEDICINE

## 2019-05-13 PROCEDURE — 99207 C FOOT EXAM  NO CHARGE: CPT | Performed by: FAMILY MEDICINE

## 2019-05-13 PROCEDURE — 83036 HEMOGLOBIN GLYCOSYLATED A1C: CPT | Performed by: FAMILY MEDICINE

## 2019-05-13 PROCEDURE — 82043 UR ALBUMIN QUANTITATIVE: CPT | Performed by: FAMILY MEDICINE

## 2019-05-13 RX ORDER — METFORMIN HCL 500 MG
TABLET, EXTENDED RELEASE 24 HR ORAL
Qty: 360 TABLET | Refills: 1 | Status: SHIPPED | OUTPATIENT
Start: 2019-05-13 | End: 2019-10-08

## 2019-05-13 RX ORDER — LANCETS
EACH MISCELLANEOUS
Qty: 100 EACH | Refills: 11 | Status: SHIPPED | OUTPATIENT
Start: 2019-05-13

## 2019-05-13 RX ORDER — VARDENAFIL HYDROCHLORIDE 20 MG/1
10-20 TABLET ORAL DAILY PRN
Qty: 30 TABLET | Refills: 4 | Status: SHIPPED | OUTPATIENT
Start: 2019-05-13 | End: 2020-08-27

## 2019-05-13 ASSESSMENT — MIFFLIN-ST. JEOR: SCORE: 1603.24

## 2019-05-13 NOTE — PROGRESS NOTES
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  Due for labs and need refill on med's, as he will be travelling in couple of weeks so he is running out of med's.  He wants written script for Vardenafil,  Test strips (one touch ultra), bc he gets it from different pharmacy bc of cost   Medication working well , and no problem taking med's . Also seeing urologist regarding his recent prostate cancer treatment follow up and he is feeling well otherwise   Diabetes Follow-up      Patient is checking blood sugars: rarely.  Results range from doing 130- sometimes 150     Diabetic concerns: None     Symptoms of hypoglycemia (low blood sugar): none     Paresthesias (numbness or burning in feet) or sores: No     Date of last diabetic eye exam: eye     BP Readings from Last 2 Encounters:   05/13/19 118/64   11/30/18 120/78     Hemoglobin A1C (%)   Date Value   10/31/2018 6.7 (H)   05/17/2018 6.1 (H)     LDL Cholesterol Calculated (mg/dL)   Date Value   09/26/2018 111 (H)   09/21/2017 86       Diabetes Management Resources  Hyperlipidemia Follow-Up      Rate your low fat/cholesterol diet?: good    Taking statin?  Yes, no muscle aches from statin    Other lipid medications/supplements?:  none    Hypertension Follow-up      Outpatient blood pressures are not being checked at home as regular now .  Results are god mostly     Low Salt Diet: no added salt      Additional history: as documented    Reviewed  and updated as needed this visit by clinical staff         Reviewed and updated as needed this visit by Provider         Patient Active Problem List   Diagnosis     Erectile  dysfunction     Family history of coronary artery disease     Gastro-esophageal reflux disease with esophagitis     Decreased libido     BPH (benign prostatic hyperplasia)     Advanced directives, counseling/discussion     Type 2 diabetes, HbA1C goal < 8% (H)     Onychomycosis     Obesity (BMI 30-39.9)     Prostate cancer-Gavin 4, treated with radiation     BMI 29.0-29.9,adult     Microalbuminuria     History of colonic polyps     CAD (coronary artery disease)     Carotid artery plaque     Primary osteoarthritis of both knees     Gastroesophageal reflux disease without esophagitis     Essential hypertension, benign     Hyperlipidemia LDL goal <70     Essential hypertension with goal blood pressure less than 140/90     Type 2 diabetes mellitus with diabetic nephropathy, without long-term current use of insulin (H)     Hematuria due to chronic cystitis     Past Surgical History:   Procedure Laterality Date     APPENDECTOMY       ARTHROSCOPY KNEE RT/LT  1998    partial menisectomy left knee     COLONOSCOPY N/A 9/27/2016    Procedure: COMBINED COLONOSCOPY, SINGLE OR MULTIPLE BIOPSY/POLYPECTOMY BY BIOPSY;  Surgeon: Maru Orta MD;  Location:  GI     CYSTOSCOPY       HC COLONOSCOPY THRU STOMA, DIAGNOSTIC  2005     HC COLONOSCOPY THRU STOMA, DIAGNOSTIC  5/09    diverticulosis, also proctitis     HEMORRHOIDECTOMY       PHACOEMULSIFICATION CLEAR CORNEA WITH STANDARD INTRAOCULAR LENS IMPLANT Right 3/18/2015    Procedure: PHACOEMULSIFICATION CLEAR CORNEA WITH STANDARD INTRAOCULAR LENS IMPLANT;  Surgeon: Lito Gonzales MD;  Location:  EC     PHACOEMULSIFICATION CLEAR CORNEA WITH STANDARD INTRAOCULAR LENS IMPLANT Left 3/25/2015    Procedure: PHACOEMULSIFICATION CLEAR CORNEA WITH STANDARD INTRAOCULAR LENS IMPLANT;  Surgeon: Lito Gonzales MD;  Location:  EC     ROTATOR CUFF REPAIR RT/LT  1998    right.  caused him to retire     SURGICAL HISTORY OF -   1999    L4-5 discectomy     TONSILLECTOMY &  "ADENOIDECTOMY       TURP         Social History     Tobacco Use     Smoking status: Never Smoker     Smokeless tobacco: Never Used   Substance Use Topics     Alcohol use: Yes     Comment: 10-14 drinks per week     Family History   Problem Relation Age of Onset     C.A.D. Father      C.A.D. Brother      Asthma Brother      C.A.D. Mother      Diabetes Brother      Lipids Brother            ROS:  Constitutional, HEENT, cardiovascular, pulmonary, GI, , musculoskeletal, neuro, skin, endocrine and psych systems are negative, except as otherwise noted.    OBJECTIVE:     /64   Pulse 60   Temp 97.9  F (36.6  C) (Tympanic)   Ht 1.74 m (5' 8.5\")   Wt 92.1 kg (203 lb)   SpO2 96%   BMI 30.42 kg/m    Body mass index is 30.42 kg/m .  GENERAL: healthy, alert and no distress  EYES: Eyes grossly normal to inspection,   NECK: no adenopathy, no asymmetry, masses, or scars and thyroid normal to palpation  RESP: lungs clear to auscultation - no rales, rhonchi or wheezes  CV: regular rate and rhythm, normal S1 S2, no S3 or S4, no murmur,  ABDOMEN: soft, nontender,  no masses and bowel sounds normal  MS: no edema  Foot exam : No lesion , normal sensation by monofilament. Normal peripheral pulses  .         ASSESSMENT/PLAN:                 (E11.21) Type 2 diabetes mellitus with diabetic nephropathy, without long-term current use of insulin (H)  Comment:   Plan: blood glucose monitoring (ONE TOUCH ULTRASOFT)         lancets, metFORMIN (GLUCOPHAGE-XR) 500 MG 24 hr        tablet, Comprehensive metabolic panel, Lipid         panel reflex to direct LDL Fasting, Albumin         Random Urine Quantitative with Creat Ratio,         Hemoglobin A1c, FOOT EXAM   Discussed cares, diet/ exercise . Talked about DM management , health risk etc. gave refill on meds. Check lab, call pt with results.   He plans  to see his  Endocrinology  for annual follow up as well.           (I10) Essential hypertension, benign  (primary encounter " diagnosis)  Comment: stable   Plan: Comprehensive metabolic panel            (N52.8) Other male erectile dysfunction  Comment:   Plan: vardenafil (LEVITRA) 20 MG tablet            (Z71.89) Advanced directives, counseling/discussion  Comment:   Plan: blood glucose (ONETOUCH ULTRA) test strip            (I10) Essential hypertension with goal blood pressure less than 140/90  Comment:   Plan: Comprehensive metabolic panel            (E78.5) Hyperlipidemia LDL goal <70  Comment:   Plan: Lipid panel reflex to direct LDL Fasting            Check labs. refill sent.Cares and  treatment discussed follow. up if problem   Patient  And his wife, expressed understanding and agreement with treatment plan. All patient's questions were answered, will let me know if has more later.  Medications: Rx's: Reviewed the potential side effects/complications of medications prescribed.         Cassia Wolff MD  [facility]

## 2019-05-14 LAB
ALBUMIN SERPL-MCNC: 4 G/DL (ref 3.4–5)
ALP SERPL-CCNC: 104 U/L (ref 40–150)
ALT SERPL W P-5'-P-CCNC: 40 U/L (ref 0–70)
ANION GAP SERPL CALCULATED.3IONS-SCNC: 8 MMOL/L (ref 3–14)
AST SERPL W P-5'-P-CCNC: 24 U/L (ref 0–45)
BILIRUB SERPL-MCNC: 0.4 MG/DL (ref 0.2–1.3)
BUN SERPL-MCNC: 31 MG/DL (ref 7–30)
CALCIUM SERPL-MCNC: 9.3 MG/DL (ref 8.5–10.1)
CHLORIDE SERPL-SCNC: 109 MMOL/L (ref 94–109)
CHOLEST SERPL-MCNC: 141 MG/DL
CO2 SERPL-SCNC: 21 MMOL/L (ref 20–32)
CREAT SERPL-MCNC: 1.14 MG/DL (ref 0.66–1.25)
CREAT UR-MCNC: 113 MG/DL
GFR SERPL CREATININE-BSD FRML MDRD: 59 ML/MIN/{1.73_M2}
GLUCOSE SERPL-MCNC: 135 MG/DL (ref 70–99)
HDLC SERPL-MCNC: 44 MG/DL
LDLC SERPL CALC-MCNC: 78 MG/DL
MICROALBUMIN UR-MCNC: 147 MG/L
MICROALBUMIN/CREAT UR: 130.09 MG/G CR (ref 0–17)
NONHDLC SERPL-MCNC: 97 MG/DL
POTASSIUM SERPL-SCNC: 4.5 MMOL/L (ref 3.4–5.3)
PROT SERPL-MCNC: 7.5 G/DL (ref 6.8–8.8)
SODIUM SERPL-SCNC: 138 MMOL/L (ref 133–144)
TRIGL SERPL-MCNC: 97 MG/DL

## 2019-05-16 ENCOUNTER — TRANSFERRED RECORDS (OUTPATIENT)
Dept: HEALTH INFORMATION MANAGEMENT | Facility: CLINIC | Age: 84
End: 2019-05-16

## 2019-05-23 ENCOUNTER — TRANSFERRED RECORDS (OUTPATIENT)
Dept: HEALTH INFORMATION MANAGEMENT | Facility: CLINIC | Age: 84
End: 2019-05-23

## 2019-06-13 ENCOUNTER — TRANSFERRED RECORDS (OUTPATIENT)
Dept: HEALTH INFORMATION MANAGEMENT | Facility: CLINIC | Age: 84
End: 2019-06-13

## 2019-06-18 ENCOUNTER — TELEPHONE (OUTPATIENT)
Dept: UROLOGY | Facility: CLINIC | Age: 84
End: 2019-06-18

## 2019-06-18 DIAGNOSIS — N39.0 RECURRENT UTI: Primary | ICD-10-CM

## 2019-06-18 RX ORDER — NITROFURANTOIN 25; 75 MG/1; MG/1
100 CAPSULE ORAL 2 TIMES DAILY
Qty: 14 CAPSULE | Refills: 0 | Status: SHIPPED | OUTPATIENT
Start: 2019-06-18 | End: 2019-08-23

## 2019-06-18 NOTE — TELEPHONE ENCOUNTER
Patient is leaving on a trip to [place],and [place] and would like a prescription for Nitrofurantoin to take with him just in case he would get an infection.    Per Dr. Mejia, it is OK to refill the prescription.  Order placed.  Alfred JEREZ  [facility]  June 18, 2019 11:32 AM

## 2019-07-11 ENCOUNTER — THERAPY VISIT (OUTPATIENT)
Dept: PHYSICAL THERAPY | Facility: CLINIC | Age: 84
End: 2019-07-11
Payer: MEDICARE

## 2019-07-11 DIAGNOSIS — M62.89 PFD (PELVIC FLOOR DYSFUNCTION): ICD-10-CM

## 2019-07-11 DIAGNOSIS — R33.9 URINARY RETENTION WITH INCOMPLETE BLADDER EMPTYING: Primary | ICD-10-CM

## 2019-07-11 DIAGNOSIS — R15.9 FULL INCONTINENCE OF FECES: ICD-10-CM

## 2019-07-11 PROCEDURE — 97112 NEUROMUSCULAR REEDUCATION: CPT | Mod: GP | Performed by: PHYSICAL THERAPIST

## 2019-07-11 PROCEDURE — 97161 PT EVAL LOW COMPLEX 20 MIN: CPT | Mod: GP | Performed by: PHYSICAL THERAPIST

## 2019-07-11 PROCEDURE — 97140 MANUAL THERAPY 1/> REGIONS: CPT | Mod: GP | Performed by: PHYSICAL THERAPIST

## 2019-07-11 PROCEDURE — 97530 THERAPEUTIC ACTIVITIES: CPT | Mod: GP | Performed by: PHYSICAL THERAPIST

## 2019-07-11 NOTE — PROGRESS NOTES
[facility] Initial Evaluation  Subjective:  The history is provided by the spouse and the patient.   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  Patient states he has had this problem for quite some time, however he notes that things worsened after radiation treatment to prostate which was 2013 with symptoms beginning April 2015. Symptoms have increased with regularity starting January 2019.  PMH:  Prostate cancer, diabetes, HBP, implanted device( cornea and left knee), OA knee and bowel and bladder.  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  Past surgical history:  Appendectomy, TURP for BPH, L5-S1 disectomy, prostate cryosurgery and left knee replacement.  Patient received PT orders 6/13/2019.  SUBJECTIVE:  Urination:  Do you feel the sensation of your bladder filling? No  How many pads per day are you using? NA  Do you leak on the way to the bathroom or with a strong urge to void? No  Do you leak with cough,sneeze, jumping, running? No  Do you have triggers that make you feel you can't wait to go to the bathroom? No      How long can you delay the need to urinate?  hours.   How many times do you get up to urinate at night? 0-1  Can you stop the flow of urine when on the toilet? Yes  Is the volume of urine passed usually: average. (8sec rule=  250ml with average bladder storing  400-600ml)    Do you strain to pass urine? Yes  Do you have a slow or hesitant urinary stream? Yes  Do you have difficulty initiating the urine stream? Yes    Fluid intake(one glass is 8oz or one cup) 4 glasses/day, 3 or more caffinated glasses/day  - alcohol glasses/day.    Bowel habits:  Frequency of bowel movements? Varies  times a week  Consistancy of stool? soft, soft formed, [place] Stool Scale 5-7  Do you ignore the urge to defecate? No  Do you strain to pass  stool? sometimes    Do you have any pain? No  Are you currently on any medication for pain or bladder control ? no                    Objective:  System                                 Pelvic Dysfunction Evaluation:    Bladder/Pelvic Problems:  Patient presents with fecal incontinence and bladder retention.  Storage Problem:  Fecal incontinence  Emptying Problem:  Incomplete emptying, hesitancy and strain to void        Flexibility:    Tightness present at:Adductors; Iliopsoas; Hamstrings; Piriformis and Gluteals  obturator internus  Abdominal Wall:  Abdominal wall pelvic: Fascial restrictions noted cecum, IC valve and sigmoid colon.      Trigger Points:  Iliopsoas    Pelvic Clock Exam:  Pelvic clock exam: Restrictions noted left > right pelvic floor.  Ischiocavernosis pain:  +/-  Bulbocavernosis pain:  +/-  Transverse Perineal:  +/-  Levator ANI:  +/-  Perineal Body:  +/-  SI Provocation:  NA        Reflex Testing:  NA    External Assessment:    Skin Condition:  Normal    Bearing Down/Coughing:  Normal  Tissue Symmetry:  Normal    Muscle Contraction/Perineal Mobility:  Elevation and urogential triangle descent  Internal Assessment:  Internal assessment pelvic: Severe MFR noted left > right internal rectal pelvic floor levator ani.               SEMG Biofeedback:  Semg biofeedback pelvic: Patient demonstrates good awareness of pelvic floor contraction, however does not have good endurnance or high strength tone.    Equipment:  Surface EMG    Suraface electrode placement--Perianal:  Margareth anal  Baseline EMG PM:  .2 uV    Peak pelvic muscle contraction:  1.5uV  Sustained contraction:  3.0uV  EMG interpretation to fatigue:  3-5 seconds  Position:  SupineAdditional History:        Caffeine Consumption:  3 plus                     General     ROS    Assessment/Plan:    Patient is a 83 year old male with pelvic complaints.    Patient has the following significant findings with corresponding treatment plan.                 Diagnosis 1:  Fecal incontinence  Decreased ROM/flexibility - manual therapy, therapeutic exercise, therapeutic activity and home program  Impaired muscle performance - biofeedback, electric stimulation, neuro re-education and home program  Decreased function - therapeutic activities and home program  Diagnosis 2:  Pelvic floor dysfunction  Decreased ROM/flexibility - manual therapy, therapeutic exercise, therapeutic activity and home program  Decreased joint mobility - manual therapy, therapeutic exercise, therapeutic activity and home program  Decreased strength - therapeutic exercise, therapeutic activities and home program  Impaired muscle performance - biofeedback, electric stimulation, neuro re-education and home program  Decreased function - therapeutic activities and home program    Therapy Evaluation Codes:   1) History comprised of:   Personal factors that impact the plan of care:      None.    Comorbidity factors that impact the plan of care are:      Bowel/bladder changes, Cancer, Diabetes, High blood pressure and Implanted device.     Medications impacting care: High blood pressure.  2) Examination of Body Systems comprised of:   Body structures and functions that impact the plan of care:      Lumbar spine and Pelvis.   Activity limitations that impact the plan of care are:      Fecal incontinence and bladder retention.  3) Clinical presentation characteristics are:   Stable/Uncomplicated.  4) Decision-Making    Low complexity using standardized patient assessment instrument and/or measureable assessment of functional outcome.  Cumulative Therapy Evaluation is: Low complexity.    Previous and current functional limitations:  (See Goal Flow Sheet for this information)    Short term and Long term goals: (See Goal Flow Sheet for this information)     Communication ability:  Patient appears to be able to clearly communicate and understand verbal and written communication and follow directions  correctly.  Treatment Explanation - The following has been discussed with the patient:   RX ordered/plan of care  Anticipated outcomes  Possible risks and side effects  This patient would benefit from PT intervention to resume normal activities.   Rehab potential is good.    Frequency:  1 X week, once daily  Duration:  for 12 weeks  Discharge Plan:  Achieve all LTG.  Independent in home treatment program.  Reach maximal therapeutic benefit.    Please refer to the daily flowsheet for treatment today, total treatment time and time spent performing 1:1 timed codes.

## 2019-07-11 NOTE — LETTER
DEPARTMENT OF HEALTH AND HUMAN SERVICES  CENTERS FOR MEDICARE & MEDICAID SERVICES    PLAN/UPDATED PLAN OF PROGRESS FOR OUTPATIENT REHABILITATION    PATIENTS NAME:  Jamil Bedolla SR     : 1935    PROVIDER NUMBER:    [id number]    HICN: [id number]    PROVIDER NAME: [place] [facility]    MEDICAL RECORD NUMBER: [id number]     START OF CARE DATE:  SOC Date: 19   TYPE:  PT    PRIMARY/TREATMENT DIAGNOSIS: (Pertinent Medical Diagnosis)     PFD (pelvic floor dysfunction)  Full incontinence of feces  Urinary retention with incomplete bladder emptying    VISITS FROM START OF CARE:  Rxs Used: 1     [place] [facility] Initial Evaluation  Subjective:  The history is provided by the spouse and the patient.   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  Patient states he has had this problem for quite some time, however he notes that things worsened after radiation treatment to prostate which was 2013 with symptoms beginning 2015. Symptoms have increased with regularity starting 2019.  PMH:  Prostate cancer, diabetes, HBP, implanted device( cornea and left knee), OA knee and bowel and bladder.  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  Past surgical history:  Appendectomy, TURP for BPH, L5-S1 disectomy, prostate cryosurgery and left knee replacement.  Patient received PT orders 2019.  SUBJECTIVE:  Urination:  Do you feel the sensation of your bladder filling? No  How many pads per day are you using? NA  Do you leak on the way to the bathroom or with a strong urge to void? No  Do you leak with cough,sneeze, jumping, running? No  Do you have triggers that make you feel you can't wait to go to the bathroom? No    How long can you delay the need to urinate?  hours.   How many times do you get up to  urinate at night? 0-1  Can you stop the flow of urine when on the toilet? Yes  Is the volume of urine passed usually: average. (8sec rule=  250ml with average bladder storing  400-600ml)  Do you strain to pass urine? Yes  PATIENTS NAME:  Jamil Bedolla SR   : 1935    Do you have a slow or hesitant urinary stream? Yes  Do you have difficulty initiating the urine stream? Yes  Fluid intake(one glass is 8oz or one cup) 4 glasses/day, 3 or more caffinated glasses/day  - alcohol glasses/day.  Bowel habits:  Frequency of bowel movements? Varies  times a week  Consistancy of stool? soft, soft formed, Wagoner Stool Scale 5-7  Do you ignore the urge to defecate? No  Do you strain to pass stool? sometimes  Do you have any pain? No  Are you currently on any medication for pain or bladder control ? no    Objective:  System  Pelvic Dysfunction Evaluation:    Bladder/Pelvic Problems:  Patient presents with fecal incontinence and bladder retention.  Storage Problem:  Fecal incontinence  Emptying Problem:  Incomplete emptying, hesitancy and strain to void  Flexibility:    Tightness present at:Adductors; Iliopsoas; Hamstrings; Piriformis and Gluteals  obturator internus  Abdominal Wall:  Abdominal wall pelvic: Fascial restrictions noted cecum, IC valve and sigmoid colon.    Trigger Points:  Iliopsoas  Pelvic Clock Exam:  Pelvic clock exam: Restrictions noted left > right pelvic floor.  Ischiocavernosis pain:  +/-  Bulbocavernosis pain:  +/-  Transverse Perineal:  +/-  Levator ANI:  +/-  Perineal Body:  +/-  SI Provocation:  NA  Reflex Testing:  NA  External Assessment:    Skin Condition:  Normal  Bearing Down/Coughing:  Normal  Tissue Symmetry:  Normal  Muscle Contraction/Perineal Mobility:  Elevation and urogential triangle descent  Internal Assessment:  Internal assessment pelvic: Severe MFR noted left > right internal rectal pelvic floor levator ani.   SEMG Biofeedback:  Semg biofeedback pelvic: Patient demonstrates good  awareness of pelvic floor contraction, however does not have good endurnance or high strength tone.    Equipment:  Surface EMG  Suraface electrode placement--Perianal:  Margareth anal  Baseline EMG PM:  .2 uV  Peak pelvic muscle contraction:  1.5uV  Sustained contraction:  3.0uV  EMG interpretation to fatigue:  3-5 seconds  Position:  Supine  PATIENTS NAME:  Jamil Bedolla SR   : 1935    Additional History:  Caffeine Consumption:  3 plus     Assessment/Plan:    Patient is a 83 year old male with pelvic complaints.    Patient has the following significant findings with corresponding treatment plan.                Diagnosis 1:  Fecal incontinence  Decreased ROM/flexibility - manual therapy, therapeutic exercise, therapeutic activity and home program  Impaired muscle performance - biofeedback, electric stimulation, neuro re-education and home program  Decreased function - therapeutic activities and home program  Diagnosis 2:  Pelvic floor dysfunction  Decreased ROM/flexibility - manual therapy, therapeutic exercise, therapeutic activity and home program  Decreased joint mobility - manual therapy, therapeutic exercise, therapeutic activity and home program  Decreased strength - therapeutic exercise, therapeutic activities and home program  Impaired muscle performance - biofeedback, electric stimulation, neuro re-education and home program  Decreased function - therapeutic activities and home program    Therapy Evaluation Codes:   1) History comprised of:   Personal factors that impact the plan of care:      None.    Comorbidity factors that impact the plan of care are:      Bowel/bladder changes, Cancer, Diabetes, High blood pressure and Implanted device.     Medications impacting care: High blood pressure.  2) Examination of Body Systems comprised of:   Body structures and functions that impact the plan of care:      Lumbar spine and Pelvis.   Activity limitations that impact the plan of care are:      Fecal  "incontinence and bladder retention.  3) Clinical presentation characteristics are:   Stable/Uncomplicated.  4) Decision-Making    Low complexity using standardized patient assessment instrument and/or measureable assessment of functional outcome.  Cumulative Therapy Evaluation is: Low complexity.    Previous and current functional limitations:  (See Goal Flow Sheet for this information)    Short term and Long term goals: (See Goal Flow Sheet for this information)     Communication ability:  Patient appears to be able to clearly communicate and understand verbal and written communication and follow directions correctly.  Treatment Explanation - The following has been discussed with the patient:   RX ordered/plan of care  Anticipated outcomes. Possible risks and side effects  This patient would benefit from PT intervention to resume normal activities.   Rehab potential is good.    Frequency:  1 X week, once daily  Duration:  for 12 weeks  Discharge Plan:  Achieve all LTG.  Independent in home treatment program.  Reach maximal therapeutic benefit.    Caregiver Signature/Credentials _____________________________ Date ________       Treating Provider: Lilli Martins, PT   I have reviewed and certified the need for these services and plan of treatment while under my care.        PHYSICIAN'S SIGNATURE:   _________________________________________  Date___________   Misti Mays MD    Certification period:  Beginning of Cert date period: 07/11/19 to  End of Cert period date: 10/08/19     Functional Level Progress Report: Please see attached \"Goal Flow sheet for Functional level.\"    ____X____ Continue Services or       ________ DC Services                Service dates: From  SOC Date: 07/11/19 date to present                         "

## 2019-07-15 DIAGNOSIS — R39.89 POSSIBLE URINARY TRACT INFECTION: Primary | ICD-10-CM

## 2019-07-15 LAB
ALBUMIN UR-MCNC: 30 MG/DL
APPEARANCE UR: CLEAR
BILIRUB UR QL STRIP: NEGATIVE
COLOR UR AUTO: YELLOW
GLUCOSE UR STRIP-MCNC: NEGATIVE MG/DL
HGB UR QL STRIP: ABNORMAL
KETONES UR STRIP-MCNC: NEGATIVE MG/DL
LEUKOCYTE ESTERASE UR QL STRIP: ABNORMAL
NITRATE UR QL: POSITIVE
PH UR STRIP: 6 PH (ref 5–7)
SOURCE: ABNORMAL
SP GR UR STRIP: 1.02 (ref 1–1.03)
UROBILINOGEN UR STRIP-ACNC: 0.2 EU/DL (ref 0.2–1)

## 2019-07-15 PROCEDURE — 81003 URINALYSIS AUTO W/O SCOPE: CPT | Mod: GW | Performed by: UROLOGY

## 2019-07-15 PROCEDURE — 87086 URINE CULTURE/COLONY COUNT: CPT | Performed by: UROLOGY

## 2019-07-15 PROCEDURE — 87186 SC STD MICRODIL/AGAR DIL: CPT | Performed by: UROLOGY

## 2019-07-15 PROCEDURE — 87088 URINE BACTERIA CULTURE: CPT | Performed by: UROLOGY

## 2019-07-17 LAB
BACTERIA SPEC CULT: ABNORMAL
Lab: ABNORMAL
SPECIMEN SOURCE: ABNORMAL

## 2019-07-18 ENCOUNTER — THERAPY VISIT (OUTPATIENT)
Dept: PHYSICAL THERAPY | Facility: CLINIC | Age: 84
End: 2019-07-18
Payer: MEDICARE

## 2019-07-18 DIAGNOSIS — N39.0 URINARY TRACT INFECTION: Primary | ICD-10-CM

## 2019-07-18 DIAGNOSIS — R15.9 FULL INCONTINENCE OF FECES: Primary | ICD-10-CM

## 2019-07-18 DIAGNOSIS — M62.89 PFD (PELVIC FLOOR DYSFUNCTION): ICD-10-CM

## 2019-07-18 PROCEDURE — 97140 MANUAL THERAPY 1/> REGIONS: CPT | Mod: GP | Performed by: PHYSICAL THERAPIST

## 2019-07-18 PROCEDURE — 97112 NEUROMUSCULAR REEDUCATION: CPT | Mod: GP | Performed by: PHYSICAL THERAPIST

## 2019-07-18 PROCEDURE — 97530 THERAPEUTIC ACTIVITIES: CPT | Mod: GP | Performed by: PHYSICAL THERAPIST

## 2019-07-18 RX ORDER — SULFAMETHOXAZOLE/TRIMETHOPRIM 800-160 MG
1 TABLET ORAL 2 TIMES DAILY
Qty: 10 TABLET | Refills: 0 | Status: SHIPPED | OUTPATIENT
Start: 2019-07-18 | End: 2019-08-23

## 2019-07-18 NOTE — RESULT ENCOUNTER NOTE
Called patient and informed him of infection. ABT sent to patient's preferred pharmacy. Lo Roy LPN

## 2019-07-25 ENCOUNTER — THERAPY VISIT (OUTPATIENT)
Dept: PHYSICAL THERAPY | Facility: CLINIC | Age: 84
End: 2019-07-25
Payer: MEDICARE

## 2019-07-25 DIAGNOSIS — R15.9 FULL INCONTINENCE OF FECES: Primary | ICD-10-CM

## 2019-07-25 PROCEDURE — 97530 THERAPEUTIC ACTIVITIES: CPT | Mod: GP | Performed by: PHYSICAL THERAPIST

## 2019-07-25 PROCEDURE — 97140 MANUAL THERAPY 1/> REGIONS: CPT | Mod: GP | Performed by: PHYSICAL THERAPIST

## 2019-07-25 PROCEDURE — 97112 NEUROMUSCULAR REEDUCATION: CPT | Mod: GP | Performed by: PHYSICAL THERAPIST

## 2019-07-29 ENCOUNTER — TELEPHONE (OUTPATIENT)
Dept: FAMILY MEDICINE | Facility: CLINIC | Age: 84
End: 2019-07-29

## 2019-07-29 NOTE — TELEPHONE ENCOUNTER
Reason for Call:  PCP     Detailed comments:   Pt was a physician for dermatology and generic practice, cosmetic surgeon.  Pt was recommended by three different physicians, Dr. Wes Mejia, Dr. Mikal Chung, and Dr. Edgardo Michael.  Please consider taking pt on.      Phone Number Patient can be reached at: Home number on file [phone] (home)    Best Time: ok to leave VM     Can we leave a detailed message on this number? YES    Call taken on 7/29/2019 at 11:16 AM by Jacqueline Shankar

## 2019-07-31 ENCOUNTER — THERAPY VISIT (OUTPATIENT)
Dept: PHYSICAL THERAPY | Facility: CLINIC | Age: 84
End: 2019-07-31
Payer: MEDICARE

## 2019-07-31 ENCOUNTER — MEDICAL CORRESPONDENCE (OUTPATIENT)
Dept: HEALTH INFORMATION MANAGEMENT | Facility: CLINIC | Age: 84
End: 2019-07-31

## 2019-07-31 DIAGNOSIS — M62.89 PFD (PELVIC FLOOR DYSFUNCTION): ICD-10-CM

## 2019-07-31 DIAGNOSIS — R15.9 FULL INCONTINENCE OF FECES: Primary | ICD-10-CM

## 2019-07-31 PROCEDURE — 97112 NEUROMUSCULAR REEDUCATION: CPT | Mod: GP | Performed by: PHYSICAL THERAPIST

## 2019-07-31 PROCEDURE — 97530 THERAPEUTIC ACTIVITIES: CPT | Mod: GP | Performed by: PHYSICAL THERAPIST

## 2019-07-31 PROCEDURE — 97140 MANUAL THERAPY 1/> REGIONS: CPT | Mod: GP | Performed by: PHYSICAL THERAPIST

## 2019-08-05 DIAGNOSIS — I10 ESSENTIAL HYPERTENSION, BENIGN: ICD-10-CM

## 2019-08-05 NOTE — TELEPHONE ENCOUNTER
"Last Written Prescription Date:  9/26/18  Last Fill Quantity: 180 tablets,  # refills: 3   Last office visit: 5/13/2019 with prescribing provider:  New   Future Office Visit:   Next 5 appointments (look out 90 days)    Aug 23, 2019  1:00 PM CDT  [facility] [address] [facility] [id number] [phone]           Requested Prescriptions   Pending Prescriptions Disp Refills     lisinopril (PRINIVIL/ZESTRIL) 20 MG tablet 180 tablet 3     Sig: Take 1 tablet (20 mg) by mouth 2 times daily       ACE Inhibitors (Including Combos) Protocol Passed - 8/5/2019  6:01 PM        Passed - Blood pressure under 140/90 in past 12 months     BP Readings from Last 3 Encounters:   05/13/19 118/64   11/30/18 120/78   11/05/18 115/74             Passed - Recent (12 mo) or future (30 days) visit within the authorizing provider's specialty     Patient had office visit in the last 12 months or has a visit in the next 30 days with authorizing provider or within the authorizing provider's specialty.  See \"Patient Info\" tab in inbasket, or \"Choose Columns\" in Meds & Orders section of the refill encounter.              Passed - Medication is active on med list        Passed - Patient is age 18 or older        Passed - Normal serum creatinine on file in past 12 months     Recent Labs   Lab Test 05/13/19  0959   CR 1.14             Passed - Normal serum potassium on file in past 12 months     Recent Labs   Lab Test 05/13/19  0959   POTASSIUM 4.5               "

## 2019-08-06 RX ORDER — LISINOPRIL 20 MG/1
20 TABLET ORAL 2 TIMES DAILY
Qty: 180 TABLET | Refills: 3 | OUTPATIENT
Start: 2019-08-06

## 2019-08-06 NOTE — TELEPHONE ENCOUNTER
Rx denied to pharmacy.  Should have enough medication from rx written on 9/26/18 for # 180 with 3 refills ( years worth).  Has appt on 8/23/19 with Dr. Mon.      Court ZHANG RN  EP Triage

## 2019-08-07 ENCOUNTER — THERAPY VISIT (OUTPATIENT)
Dept: PHYSICAL THERAPY | Facility: CLINIC | Age: 84
End: 2019-08-07
Payer: MEDICARE

## 2019-08-07 DIAGNOSIS — R15.9 FULL INCONTINENCE OF FECES: Primary | ICD-10-CM

## 2019-08-07 DIAGNOSIS — M62.89 PFD (PELVIC FLOOR DYSFUNCTION): ICD-10-CM

## 2019-08-07 PROCEDURE — 97112 NEUROMUSCULAR REEDUCATION: CPT | Mod: GP | Performed by: PHYSICAL THERAPIST

## 2019-08-07 PROCEDURE — 97530 THERAPEUTIC ACTIVITIES: CPT | Mod: GP | Performed by: PHYSICAL THERAPIST

## 2019-08-07 PROCEDURE — 97140 MANUAL THERAPY 1/> REGIONS: CPT | Mod: GP | Performed by: PHYSICAL THERAPIST

## 2019-08-14 ENCOUNTER — THERAPY VISIT (OUTPATIENT)
Dept: PHYSICAL THERAPY | Facility: CLINIC | Age: 84
End: 2019-08-14
Payer: MEDICARE

## 2019-08-14 DIAGNOSIS — R33.9 URINARY RETENTION: ICD-10-CM

## 2019-08-14 DIAGNOSIS — C61 PROSTATE CANCER (H): Primary | ICD-10-CM

## 2019-08-14 DIAGNOSIS — M62.89 PFD (PELVIC FLOOR DYSFUNCTION): ICD-10-CM

## 2019-08-14 DIAGNOSIS — R15.9 FULL INCONTINENCE OF FECES: Primary | ICD-10-CM

## 2019-08-14 PROCEDURE — 97112 NEUROMUSCULAR REEDUCATION: CPT | Mod: GP | Performed by: PHYSICAL THERAPIST

## 2019-08-14 PROCEDURE — 97140 MANUAL THERAPY 1/> REGIONS: CPT | Mod: GP | Performed by: PHYSICAL THERAPIST

## 2019-08-14 PROCEDURE — 97530 THERAPEUTIC ACTIVITIES: CPT | Mod: GP | Performed by: PHYSICAL THERAPIST

## 2019-08-21 ENCOUNTER — THERAPY VISIT (OUTPATIENT)
Dept: PHYSICAL THERAPY | Facility: CLINIC | Age: 84
End: 2019-08-21
Payer: MEDICARE

## 2019-08-21 DIAGNOSIS — M62.89 PFD (PELVIC FLOOR DYSFUNCTION): ICD-10-CM

## 2019-08-21 DIAGNOSIS — R15.9 FULL INCONTINENCE OF FECES: Primary | ICD-10-CM

## 2019-08-21 PROCEDURE — 97112 NEUROMUSCULAR REEDUCATION: CPT | Mod: GP | Performed by: PHYSICAL THERAPIST

## 2019-08-21 PROCEDURE — 97530 THERAPEUTIC ACTIVITIES: CPT | Mod: GP | Performed by: PHYSICAL THERAPIST

## 2019-08-22 ENCOUNTER — TRANSFERRED RECORDS (OUTPATIENT)
Dept: HEALTH INFORMATION MANAGEMENT | Facility: CLINIC | Age: 84
End: 2019-08-22

## 2019-08-22 LAB
ALBUMIN (URINE) MG/SPEC: 5.2 MG/DL
ALBUMIN/CREATININE RATIO: 59 MCG/MG CREAT
CREATININE (URINE): 88 MG/DL (ref 20–320)
HBA1C MFR BLD: 7.2 % (ref 4–6)

## 2019-08-23 ENCOUNTER — OFFICE VISIT (OUTPATIENT)
Dept: FAMILY MEDICINE | Facility: CLINIC | Age: 84
End: 2019-08-23
Payer: MEDICARE

## 2019-08-23 ENCOUNTER — MEDICAL CORRESPONDENCE (OUTPATIENT)
Dept: HEALTH INFORMATION MANAGEMENT | Facility: CLINIC | Age: 84
End: 2019-08-23

## 2019-08-23 VITALS
OXYGEN SATURATION: 97 % | HEIGHT: 69 IN | HEART RATE: 63 BPM | BODY MASS INDEX: 29.33 KG/M2 | SYSTOLIC BLOOD PRESSURE: 164 MMHG | DIASTOLIC BLOOD PRESSURE: 80 MMHG | TEMPERATURE: 97.9 F | WEIGHT: 198 LBS

## 2019-08-23 DIAGNOSIS — I10 ESSENTIAL HYPERTENSION, BENIGN: ICD-10-CM

## 2019-08-23 DIAGNOSIS — K21.9 GASTROESOPHAGEAL REFLUX DISEASE WITHOUT ESOPHAGITIS: ICD-10-CM

## 2019-08-23 DIAGNOSIS — E78.5 HYPERLIPIDEMIA LDL GOAL <70: ICD-10-CM

## 2019-08-23 DIAGNOSIS — G47.9 SLEEP DISORDER: ICD-10-CM

## 2019-08-23 DIAGNOSIS — C61 PROSTATE CANCER (H): Primary | ICD-10-CM

## 2019-08-23 DIAGNOSIS — K59.9 BOWEL DYSFUNCTION: ICD-10-CM

## 2019-08-23 DIAGNOSIS — Z86.0100 HISTORY OF COLONIC POLYPS: ICD-10-CM

## 2019-08-23 DIAGNOSIS — E11.9 DIABETES MELLITUS WITHOUT COMPLICATION (H): ICD-10-CM

## 2019-08-23 DIAGNOSIS — E66.9 OBESITY (BMI 30-39.9): ICD-10-CM

## 2019-08-23 DIAGNOSIS — I25.10 CORONARY ARTERY DISEASE INVOLVING NATIVE CORONARY ARTERY OF NATIVE HEART WITHOUT ANGINA PECTORIS: ICD-10-CM

## 2019-08-23 PROBLEM — R15.9 FECAL INCONTINENCE: Status: RESOLVED | Noted: 2019-07-11 | Resolved: 2019-08-23

## 2019-08-23 PROBLEM — N30.21 HEMATURIA DUE TO CHRONIC CYSTITIS: Status: RESOLVED | Noted: 2018-10-31 | Resolved: 2019-08-23

## 2019-08-23 PROCEDURE — 99214 OFFICE O/P EST MOD 30 MIN: CPT | Performed by: INTERNAL MEDICINE

## 2019-08-23 SDOH — HEALTH STABILITY: MENTAL HEALTH: HOW OFTEN DO YOU HAVE A DRINK CONTAINING ALCOHOL?: 2-3 TIMES A WEEK

## 2019-08-23 SDOH — HEALTH STABILITY: MENTAL HEALTH: HOW MANY STANDARD DRINKS CONTAINING ALCOHOL DO YOU HAVE ON A TYPICAL DAY?: 3 OR 4

## 2019-08-23 SDOH — HEALTH STABILITY: MENTAL HEALTH: HOW OFTEN DO YOU HAVE 6 OR MORE DRINKS ON ONE OCCASION?: LESS THAN MONTHLY

## 2019-08-23 ASSESSMENT — MIFFLIN-ST. JEOR: SCORE: 1580.56

## 2019-08-23 NOTE — PROGRESS NOTES
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  He is here to establish care.  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  He is  for the second time and has been retired for 10 years.  He has 2 kids from his prior marriage.    Overall patient is doing relatively well.  He will be having right knee replacement on October 14 and will come back for a preop.    He has a diagnosis of coronary artery disease based on a coronary CT scan.  He has no history of heart attacks or heart failure.  He has hypertension and high cholesterol as well as obesity.  He has diabetes without complications for which he sees endocrine, Dr. Gee.  He has bowel dysfunction and a history of colon polyps with fairly recent follow-up and acid reflux.    Overall he is doing well.  He is never had a sleep study and wonders about that as he does not sleep well and has some decreased memory function.  He has regular follow-up with the [facility] for his prostate cancer which was recurrent.  He checks his sugars daily and they tend to be 128 on average.  He is up-to-date in terms of his eye exam.    The patient overall feels well.  At times with swallowing something may get in his throat and he will sneeze.  No other HEENT, cardiovascular, respiratory symptoms.  For years he had difficulty emptying his bladder, ever since childhood, and he has to use the Valsalva maneuver for this.  He is also had some loose bowels for quite some time and has seen GI as noted for that.  He does not exercise due to his knee.    Past Medical History:   Diagnosis Date     Bowel dysfunction     Dr. Marito Orta, urgent, soft or liquid stools     CAD (coronary artery disease)     per calcium score=>400     Carotid artery plaque 9/2011     Colon polyps     last colonoscopy nl 5/19     Diverticulosis of colon      Erectile dysfunction 7/18/2008     Glaucoma      High cholesterol      HTN (hypertension) 7/18/2008      Incomplete emptying of bladder due to benign prostatic hyperplasia     Dr. Mejia     Obesity      PFO (patent foramen ovale)      Plantar fasciitis      Presbyesophagus      Prostate cancer-[place] 4, treated with radiation 07/28/2013    rising psa in 2018, seen at [facility], had cryo     Type II or unspecified type diabetes mellitus without mention of complication, not stated as uncontrolled     Dxd about 2000     Past Surgical History:   Procedure Laterality Date     APPENDECTOMY  1942     ARTHROSCOPY KNEE RT/LT  1998    partial menisectomy left knee     COLONOSCOPY N/A 9/27/2016    Procedure: COMBINED COLONOSCOPY, SINGLE OR MULTIPLE BIOPSY/POLYPECTOMY BY BIOPSY;  Surgeon: Maru Orta MD;  Location:  GI     CYSTOSCOPY       HC COLONOSCOPY THRU STOMA, DIAGNOSTIC  2005     HC COLONOSCOPY THRU STOMA, DIAGNOSTIC  5/09    diverticulosis, also proctitis     HEMORRHOIDECTOMY  1975     left tka Left 2015     PHACOEMULSIFICATION CLEAR CORNEA WITH STANDARD INTRAOCULAR LENS IMPLANT Right 3/18/2015    Procedure: PHACOEMULSIFICATION CLEAR CORNEA WITH STANDARD INTRAOCULAR LENS IMPLANT;  Surgeon: Lito Gonzales MD;  Location:  EC     PHACOEMULSIFICATION CLEAR CORNEA WITH STANDARD INTRAOCULAR LENS IMPLANT Left 3/25/2015    Procedure: PHACOEMULSIFICATION CLEAR CORNEA WITH STANDARD INTRAOCULAR LENS IMPLANT;  Surgeon: Lito Gonzales MD;  Location:  EC     ROTATOR CUFF REPAIR RT/LT  1998    right.  caused him to retire     SURGICAL HISTORY OF -   1999    L4-5 discectomy     TONSILLECTOMY & ADENOIDECTOMY  1940     TURP  1988     Social History     Socioeconomic History     Marital status:      Spouse name: Salome Gates     Number of children: 2     Years of education: Not on file     Highest education level: Not on file   Occupational History     Occupation: MD, Family [employer]     Employer: RETIRED   Social Needs     Financial resource strain: Not on file     Food insecurity:     Worry: Not on file     Inability:  Not on file     Transportation needs:     Medical: Not on file     Non-medical: Not on file   Tobacco Use     Smoking status: Never Smoker     Smokeless tobacco: Never Used   Substance and Sexual Activity     Alcohol use: Yes     Frequency: 2-3 times a week     Drinks per session: 3 or 4     Binge frequency: Less than monthly     Comment: 10-14 drinks per week     Drug use: No     Sexual activity: Yes     Partners: Female   Lifestyle     Physical activity:     Days per week: Not on file     Minutes per session: Not on file     Stress: Not on file   Relationships     Social connections:     Talks on phone: Not on file     Gets together: Not on file     Attends Congregational service: Not on file     Active member of club or organization: Not on file     Attends meetings of clubs or organizations: Not on file     Relationship status: Not on file     Intimate partner violence:     Fear of current or ex partner: Not on file     Emotionally abused: Not on file     Physically abused: Not on file     Forced sexual activity: Not on file   Other Topics Concern     Parent/sibling w/ CABG, MI or angioplasty before 65F 55M? No   Social History Narrative     Not on file     Current Outpatient Medications   Medication Sig Dispense Refill     Alcohol Swabs (B-D SINGLE USE SWABS REGULAR) PADS 1 pad 3 times daily 270 each 3     atorvastatin (LIPITOR) 40 MG tablet Take 1 tablet (40 mg) by mouth daily 100 tablet 3     blood glucose (ONETOUCH ULTRA) test strip Use to test blood sugar  daily or as directed. 1 Box 3     blood glucose monitoring (ONE TOUCH ULTRA 2) meter device kit Use to test blood sugars 1- 2 times daily or as directed. 1 kit 0     blood glucose monitoring (ONE TOUCH ULTRASOFT) lancets Use to test blood sugar 1-2  times daily or as directed. 100 each 11     famotidine (PEPCID) 20 MG tablet Take 1 tablet (20 mg) by mouth 2 times daily 270 tablet 3     latanoprost (XALATAN) 0.005 % ophthalmic solution Place 1 drop into both  "eyes At Bedtime        lisinopril (PRINIVIL/ZESTRIL) 20 MG tablet Take 1 tablet (20 mg) by mouth 2 times daily 180 tablet 3     MELATONIN PO Take 5 mg by mouth At Bedtime       metFORMIN (GLUCOPHAGE-XR) 500 MG 24 hr tablet TAKE 2 TABLETS TWICE DAILY WITH MEALS 360 tablet 1     MULTI-VITAMIN OR TABS 1 TABLET DAILY       ORDER FOR DME Equipment being ordered: Postivac 1 each 0     vardenafil (LEVITRA) 20 MG tablet Take 0.5-1 tablets (10-20 mg) by mouth daily as needed Never use with nitroglycerin, terazosin or doxazosin. 30 tablet 4     Allergies   Allergen Reactions     Ivp Dye [Contrast Dye]      FAMILY HISTORY NOTED AND REVIEWED    REVIEW OF SYSTEMS: above    PHYSICAL EXAM    BP (!) 164/80   Pulse 63   Temp 97.9  F (36.6  C) (Oral)   Ht 1.74 m (5' 8.5\")   Wt 89.8 kg (198 lb)   SpO2 97%   BMI 29.67 kg/m      Patient appears non toxic  Mouth - tongue midline and within normal limits, mucous membranes and posterior pharynx within normal limits, no lesions seen.  Neck - no masses, lesions or tenderness  Nodes - no supraclavicular, cervical or axially adenopathy .  Lungs - clear, normal flow  Cardiovascular - regular rate and rhythm, no murmer, rub or gallop, no jvp or edema, carotids within normal limits, no bruits.  Abdomen - normal active bowel sounds, soft, non tender, no masses, guarding or rebound, no hepatosplenomegaly      Labs none today    ASSESSMENT:  1. Diabetes, no c/o, seeing endo and just there yesterday and will get labs  2. Hypertension, up now but has not been, he will check it and follow up for pre op  3. Elevated cholesterol on statin  4. ?cad, based on ebct  5. Colon polyps, bowel dysfunction, follow up gi  6. Obesity  7. gerd  8. ro iram  9. Cap, follow up [place]    PLAN:  Follow up [place]  Follow up gi  Follow up for pre op and labs then  shingrix at pharm      Dalton Mon M.D.                    "

## 2019-08-28 ENCOUNTER — TRANSFERRED RECORDS (OUTPATIENT)
Dept: HEALTH INFORMATION MANAGEMENT | Facility: CLINIC | Age: 84
End: 2019-08-28

## 2019-08-29 ENCOUNTER — THERAPY VISIT (OUTPATIENT)
Dept: PHYSICAL THERAPY | Facility: CLINIC | Age: 84
End: 2019-08-29
Payer: MEDICARE

## 2019-08-29 DIAGNOSIS — K59.9 BOWEL DYSFUNCTION: Primary | ICD-10-CM

## 2019-08-29 DIAGNOSIS — M62.89 PFD (PELVIC FLOOR DYSFUNCTION): ICD-10-CM

## 2019-08-29 PROCEDURE — 97530 THERAPEUTIC ACTIVITIES: CPT | Mod: GP | Performed by: PHYSICAL THERAPIST

## 2019-08-29 PROCEDURE — 97112 NEUROMUSCULAR REEDUCATION: CPT | Mod: GP | Performed by: PHYSICAL THERAPIST

## 2019-09-18 DIAGNOSIS — K21.9 GASTROESOPHAGEAL REFLUX DISEASE WITHOUT ESOPHAGITIS: ICD-10-CM

## 2019-09-19 RX ORDER — FAMOTIDINE 20 MG/1
TABLET, FILM COATED ORAL
Qty: 180 TABLET | Refills: 3 | Status: SHIPPED | OUTPATIENT
Start: 2019-09-19 | End: 2020-08-20

## 2019-09-19 NOTE — TELEPHONE ENCOUNTER
"Requested Prescriptions   Pending Prescriptions Disp Refills     famotidine (PEPCID) 20 MG tablet [Pharmacy Med Name: FAMOTIDINE 20 MG  Last Written Prescription Date:  8-  Last Fill Quantity: 270 tablet,  # refills: 3   Last office visit: 8/23/2019 with prescribing provider:     Future Office Visit:   Next 5 appointments (look out 90 days)    Sep 23, 2019  1:30 PM CDT  Office Visit with Dalton Mon MD  [facility] [address] [facility] [address]  [phone]          Tablet] 180 tablet 3     Sig: TAKE 1 TABLET TWICE DAILY       H2 Blockers Protocol Passed - 9/18/2019 10:21 PM        Passed - Patient is age 12 or older        Passed - Recent (12 mo) or future (30 days) visit within the authorizing provider's specialty     Patient had office visit in the last 12 months or has a visit in the next 30 days with authorizing provider or within the authorizing provider's specialty.  See \"Patient Info\" tab in inbasket, or \"Choose Columns\" in Meds & Orders section of the refill encounter.              Passed - Medication is active on med list          "

## 2019-09-24 ENCOUNTER — OFFICE VISIT (OUTPATIENT)
Dept: FAMILY MEDICINE | Facility: CLINIC | Age: 84
End: 2019-09-24
Payer: MEDICARE

## 2019-09-24 VITALS
HEART RATE: 63 BPM | BODY MASS INDEX: 29.37 KG/M2 | TEMPERATURE: 97.3 F | SYSTOLIC BLOOD PRESSURE: 123 MMHG | DIASTOLIC BLOOD PRESSURE: 71 MMHG | WEIGHT: 196 LBS | OXYGEN SATURATION: 94 %

## 2019-09-24 DIAGNOSIS — Z01.818 PREOP GENERAL PHYSICAL EXAM: Primary | ICD-10-CM

## 2019-09-24 DIAGNOSIS — E11.9 DIABETES MELLITUS WITHOUT COMPLICATION (H): ICD-10-CM

## 2019-09-24 DIAGNOSIS — R39.11 URINARY HESITANCY: ICD-10-CM

## 2019-09-24 DIAGNOSIS — M17.11 PRIMARY OSTEOARTHRITIS OF RIGHT KNEE: ICD-10-CM

## 2019-09-24 LAB
ALBUMIN UR-MCNC: NEGATIVE MG/DL
APPEARANCE UR: CLEAR
BASOPHILS # BLD AUTO: 0.1 10E9/L (ref 0–0.2)
BASOPHILS NFR BLD AUTO: 1.2 %
BILIRUB UR QL STRIP: NEGATIVE
COLOR UR AUTO: YELLOW
DIFFERENTIAL METHOD BLD: NORMAL
EOSINOPHIL # BLD AUTO: 0.4 10E9/L (ref 0–0.7)
EOSINOPHIL NFR BLD AUTO: 5.5 %
ERYTHROCYTE [DISTWIDTH] IN BLOOD BY AUTOMATED COUNT: 14.6 % (ref 10–15)
GLUCOSE UR STRIP-MCNC: NEGATIVE MG/DL
HBA1C MFR BLD: 6.8 % (ref 0–5.6)
HCT VFR BLD AUTO: 41.6 % (ref 40–53)
HGB BLD-MCNC: 13.8 G/DL (ref 13.3–17.7)
HGB UR QL STRIP: NEGATIVE
KETONES UR STRIP-MCNC: NEGATIVE MG/DL
LEUKOCYTE ESTERASE UR QL STRIP: NEGATIVE
LYMPHOCYTES # BLD AUTO: 2 10E9/L (ref 0.8–5.3)
LYMPHOCYTES NFR BLD AUTO: 30.8 %
MCH RBC QN AUTO: 28.9 PG (ref 26.5–33)
MCHC RBC AUTO-ENTMCNC: 33.2 G/DL (ref 31.5–36.5)
MCV RBC AUTO: 87 FL (ref 78–100)
MONOCYTES # BLD AUTO: 0.5 10E9/L (ref 0–1.3)
MONOCYTES NFR BLD AUTO: 8.3 %
MRSA DNA SPEC QL NAA+PROBE: NEGATIVE
NEUTROPHILS # BLD AUTO: 3.5 10E9/L (ref 1.6–8.3)
NEUTROPHILS NFR BLD AUTO: 54.2 %
NITRATE UR QL: NEGATIVE
PH UR STRIP: 5.5 PH (ref 5–7)
PLATELET # BLD AUTO: 239 10E9/L (ref 150–450)
RBC # BLD AUTO: 4.78 10E12/L (ref 4.4–5.9)
SOURCE: NORMAL
SP GR UR STRIP: 1.01 (ref 1–1.03)
SPECIMEN SOURCE: NORMAL
UROBILINOGEN UR STRIP-ACNC: 0.2 EU/DL (ref 0.2–1)
WBC # BLD AUTO: 6.5 10E9/L (ref 4–11)

## 2019-09-24 PROCEDURE — 81003 URINALYSIS AUTO W/O SCOPE: CPT | Performed by: NURSE PRACTITIONER

## 2019-09-24 PROCEDURE — 99215 OFFICE O/P EST HI 40 MIN: CPT | Performed by: NURSE PRACTITIONER

## 2019-09-24 PROCEDURE — 83036 HEMOGLOBIN GLYCOSYLATED A1C: CPT | Performed by: NURSE PRACTITIONER

## 2019-09-24 PROCEDURE — 40000829 ZZHCL STATISTIC STAPH AUREUS SUSCEPT SCREEN PCR: Performed by: NURSE PRACTITIONER

## 2019-09-24 PROCEDURE — 80048 BASIC METABOLIC PNL TOTAL CA: CPT | Performed by: NURSE PRACTITIONER

## 2019-09-24 PROCEDURE — 85025 COMPLETE CBC W/AUTO DIFF WBC: CPT | Performed by: NURSE PRACTITIONER

## 2019-09-24 PROCEDURE — 87640 STAPH A DNA AMP PROBE: CPT | Mod: XU | Performed by: NURSE PRACTITIONER

## 2019-09-24 PROCEDURE — 93000 ELECTROCARDIOGRAM COMPLETE: CPT | Performed by: NURSE PRACTITIONER

## 2019-09-24 PROCEDURE — 87086 URINE CULTURE/COLONY COUNT: CPT | Performed by: NURSE PRACTITIONER

## 2019-09-24 PROCEDURE — 40000830 ZZHCL STATISTIC STAPH AUREUS METH RESIST SCREEN PCR: Performed by: NURSE PRACTITIONER

## 2019-09-24 PROCEDURE — 87641 MR-STAPH DNA AMP PROBE: CPT | Performed by: NURSE PRACTITIONER

## 2019-09-24 PROCEDURE — 36415 COLL VENOUS BLD VENIPUNCTURE: CPT | Performed by: NURSE PRACTITIONER

## 2019-09-24 NOTE — LETTER
[address] [facility]  [address]  [place]  [address]  Tel: [phone]    September 26, 2019    Jamil Bedolla SR, MD   [address] [place] [address]        Dear Mr. Graeme RUTHERFORD,    Beautiful !   You are all set Dr Bedolla. I will have them fax the pre op to your [facility]    If you have any further questions or problems, please contact our office.      Sincerely,    Areli Rivas NP/yany    Enclosure: Lab Results  Results for orders placed or performed in visit on 09/24/19   Basic metabolic panel  (Ca, Cl, CO2, Creat, Gluc, K, Na, BUN)   Result Value Ref Range    Sodium 136 133 - 144 mmol/L    Potassium 5.0 3.4 - 5.3 mmol/L    Chloride 106 94 - 109 mmol/L    Carbon Dioxide 24 20 - 32 mmol/L    Anion Gap 6 3 - 14 mmol/L    Glucose 109 (H) 70 - 99 mg/dL    Urea Nitrogen 22 7 - 30 mg/dL    Creatinine 1.07 0.66 - 1.25 mg/dL    GFR Estimate 64 >60 mL/min/[1.73_m2]    GFR Estimate If Black 74 >60 mL/min/[1.73_m2]    Calcium 9.1 8.5 - 10.1 mg/dL   CBC with platelets and differential   Result Value Ref Range    WBC 6.5 4.0 - 11.0 10e9/L    RBC Count 4.78 4.4 - 5.9 10e12/L    Hemoglobin 13.8 13.3 - 17.7 g/dL    Hematocrit 41.6 40.0 - 53.0 %    MCV 87 78 - 100 fl    MCH 28.9 26.5 - 33.0 pg    MCHC 33.2 31.5 - 36.5 g/dL    RDW 14.6 10.0 - 15.0 %    Platelet Count 239 150 - 450 10e9/L    % Neutrophils 54.2 %    % Lymphocytes 30.8 %    % Monocytes 8.3 %    % Eosinophils 5.5 %    % Basophils 1.2 %    Absolute Neutrophil 3.5 1.6 - 8.3 10e9/L    Absolute Lymphocytes 2.0 0.8 - 5.3 10e9/L    Absolute Monocytes 0.5 0.0 - 1.3 10e9/L    Absolute Eosinophils 0.4 0.0 - 0.7 10e9/L    Absolute Basophils 0.1 0.0 - 0.2 10e9/L    Diff Method Automated Method    Hemoglobin A1c   Result Value Ref Range    Hemoglobin A1C 6.8 (H) 0 - 5.6 %   *UA reflex to Microscopic and Culture ([facility] (except [place] and [place])   Result Value Ref Range    Color Urine Yellow     Appearance Urine Clear      Glucose Urine Negative NEG^Negative mg/dL    Bilirubin Urine Negative NEG^Negative    Ketones Urine Negative NEG^Negative mg/dL    Specific Gravity Urine 1.010 1.003 - 1.035    Blood Urine Negative NEG^Negative    pH Urine 5.5 5.0 - 7.0 pH    Protein Albumin Urine Negative NEG^Negative mg/dL    Urobilinogen Urine 0.2 0.2 - 1.0 EU/dL    Nitrite Urine Negative NEG^Negative    Leukocyte Esterase Urine Negative NEG^Negative    Source Midstream Urine    Methicillin Resistant Staph Aureus PCR   Result Value Ref Range    Specimen Description Nares     Methicillin Resist/Sens S. aureus PCR Negative NEG^Negative   Urine Culture Aerobic Bacterial   Result Value Ref Range    Specimen Description Midstream Urine     Culture Micro No growth

## 2019-09-24 NOTE — LETTER
[place] [address] [facility]  [address]  [place]  [address]  Tel: [phone]    September 25, 2019    Jamil Bedolla SR, MD  [address] [place] [address]        Dear Mr. Graeme RUTHERFORD,    The EKG showed NSR and bradycardia  (HR 59).  So that's a pass!  If you have any further questions or problems, please contact our office.      Sincerely,    Areli Rivas NP/SANDRA

## 2019-09-24 NOTE — PROGRESS NOTES
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 Reyes.  He requires evaluation and anesthesia risk assessment prior to undergoing surgery/procedure for treatment of right knee .    Proposed Surgery/ Procedure: Total Rt knee replacement  Date of Surgery/ Procedure: 10/14/19  Time of Surgery/ Procedure: TBD  Hospital/Surgical Facility: [facility]  Fax number for surgical facility: [phone]  Primary Physician: Dalton Mon  Type of Anesthesia Anticipated: General    Patient has a Health Care Directive or Living Will:  NO    1. NO - Do you have a history of heart attack, stroke, stent, bypass or surgery on an artery in the head, neck, heart or legs?  2. NO - Do you ever have any pain or discomfort in your chest?  3. NO - Do you have a history of  Heart Failure?  4. NO - Are you troubled by shortness of breath when: walking on the level, up a slight hill or at night?  5. NO - Do you currently have a cold, bronchitis or other respiratory infection?  6. NO - Do you have a cough, shortness of breath or wheezing?  7. NO - Do you sometimes get pains in the calves of your legs when you walk?  8. YES - DO YOU OR ANYONE IN YOUR FAMILY HAVE PREVIOUS HISTORY OF BLOOD CLOTS? son  9. NO - Do you or does anyone in your family have a serious bleeding problem such as prolonged bleeding following surgeries or cuts?  10. NO - Have you ever had problems with anemia or been told to take iron pills?  11. NO - Have you had any abnormal blood loss such as black, tarry or bloody stools, or abnormal vaginal bleeding?  12. NO - Have you ever had a blood transfusion?  13. NO - Have you or any of your relatives ever had problems with anesthesia?  14. NO - Do you have sleep apnea, excessive snoring or daytime  drowsiness?  15. NO - Do you have any prosthetic heart valves?  16. YES - DO YOU HAVE PROSTHETIC JOINTS? Left knee  16. NO - Do you have prosthetic joints?  17. NO - Is there any chance that you may be pregnant?      HPI:     HPI related to upcoming procedure: right knee replacement due to primary osteoarthritis    CAD - Patient has a longstanding history of moderate-severe CAD. Patient denies recent chest pain or NTG use, denies exercise induced dyspnea or PND. Last Stress test 2012     DIABETES - Patient has a longstanding history of DiabetesType Type II . Patient is being treated with oral agents and denies significant side effects. Control has been good.     HYPERTENSION - Patient has longstanding history of HTN , currently denies any symptoms referable to elevated blood pressure. Specifically denies chest pain, palpitations, dyspnea, orthopnea, PND or peripheral edema. Blood pressure readings have been in normal range. Current medication regimen is as listed below. Patient denies any side effects of medication.       MEDICAL HISTORY:     Patient Active Problem List    Diagnosis Date Noted     Microalbuminuria 03/21/2014     Priority: High     Prostate cancer-[place] 4, treated with radiation 07/28/2013     Priority: High     S/p radiation , also has TURP        Obesity (BMI 30-39.9) 03/20/2013     Priority: High     Bowel dysfunction      Priority: Medium     Dr. Marito Orta       Diabetes mellitus without complication (H)      Priority: Medium     Dxd about 2000       PFD (pelvic floor dysfunction) 07/11/2019     Priority: Medium     Hyperlipidemia LDL goal <70 02/29/2016     Priority: Medium     Essential hypertension, benign 10/27/2015     Priority: Medium     Primary osteoarthritis of both knees 07/16/2015     Priority: Medium     Gastroesophageal reflux disease without esophagitis 07/16/2015     Priority: Medium     CAD (coronary artery disease)      Priority: Medium     per calcium score=>400        History of colonic polyps 01/08/2015     Priority: Medium     Onychomycosis 08/14/2012     Priority: Medium     Carotid artery plaque 09/01/2011     Priority: Medium     Decreased libido 03/29/2011     Priority: Medium     BPH (benign prostatic hyperplasia) 03/29/2011     Priority: Medium     S/P TURP        Erectile dysfunction 07/18/2008     Priority: Medium     Advanced directives, counseling/discussion 09/08/2011     Priority: Low     Advance Directive Problem List Overview:   Name Relationship Phone    Primary Health Care Agent            Alternative Health Care Agent          Patient states has Advance Directive and will bring in a copy to clinic. 9/8/2011           Past Medical History:   Diagnosis Date     Bowel dysfunction     Dr. Marito Orta, urgent, soft or liquid stools     CAD (coronary artery disease)     per calcium score=>400     Carotid artery plaque 9/2011     Colon polyps     last colonoscopy nl 5/19     Diverticulosis of colon      Erectile dysfunction 7/18/2008     Glaucoma      High cholesterol      HTN (hypertension) 7/18/2008     Incomplete emptying of bladder due to benign prostatic hyperplasia     Dr. Roberto Hanna      PFO (patent foramen ovale)      Plantar fasciitis      Presbyesophagus      Prostate cancer-[place] 4, treated with radiation 07/28/2013    rising psa in 2018, seen at [place], had cryo     Type II or unspecified type diabetes mellitus without mention of complication, not stated as uncontrolled     Dxd about 2000     Past Surgical History:   Procedure Laterality Date     APPENDECTOMY  1942     ARTHROSCOPY KNEE RT/LT  1998    partial menisectomy left knee     COLONOSCOPY N/A 9/27/2016    Procedure: COMBINED COLONOSCOPY, SINGLE OR MULTIPLE BIOPSY/POLYPECTOMY BY BIOPSY;  Surgeon: Maru Orta MD;  Location:  GI     CYSTOSCOPY       HC COLONOSCOPY THRU STOMA, DIAGNOSTIC  2005     HC COLONOSCOPY THRU STOMA, DIAGNOSTIC  5/09    diverticulosis, also proctitis      HEMORRHOIDECTOMY  1975     left tka Left 2015     PHACOEMULSIFICATION CLEAR CORNEA WITH STANDARD INTRAOCULAR LENS IMPLANT Right 3/18/2015    Procedure: PHACOEMULSIFICATION CLEAR CORNEA WITH STANDARD INTRAOCULAR LENS IMPLANT;  Surgeon: Lito Gonzales MD;  Location: [facility]     PHACOEMULSIFICATION CLEAR CORNEA WITH STANDARD INTRAOCULAR LENS IMPLANT Left 3/25/2015    Procedure: PHACOEMULSIFICATION CLEAR CORNEA WITH STANDARD INTRAOCULAR LENS IMPLANT;  Surgeon: Lito Gonzales MD;  Location:  [facility]     ROTATOR CUFF REPAIR RT/LT  1998    right.  caused him to retire     SURGICAL HISTORY OF -   1999    L4-5 discectomy     TONSILLECTOMY & ADENOIDECTOMY  1940     TURP  1988     Current Outpatient Medications   Medication Sig Dispense Refill     Alcohol Swabs (B-D SINGLE USE SWABS REGULAR) PADS 1 pad 3 times daily 270 each 3     atorvastatin (LIPITOR) 40 MG tablet Take 1 tablet (40 mg) by mouth daily 100 tablet 3     blood glucose (ONETOUCH ULTRA) test strip Use to test blood sugar  daily or as directed. 1 Box 3     blood glucose monitoring (ONE TOUCH ULTRA 2) meter device kit Use to test blood sugars 1- 2 times daily or as directed. 1 kit 0     blood glucose monitoring (ONE TOUCH ULTRASOFT) lancets Use to test blood sugar 1-2  times daily or as directed. 100 each 11     famotidine (PEPCID) 20 MG tablet TAKE 1 TABLET TWICE DAILY 180 tablet 3     latanoprost (XALATAN) 0.005 % ophthalmic solution Place 1 drop into both eyes At Bedtime        lisinopril (PRINIVIL/ZESTRIL) 20 MG tablet Take 1 tablet (20 mg) by mouth 2 times daily 180 tablet 3     MELATONIN PO Take 5 mg by mouth At Bedtime       metFORMIN (GLUCOPHAGE-XR) 500 MG 24 hr tablet TAKE 2 TABLETS TWICE DAILY WITH MEALS 360 tablet 1     MULTI-VITAMIN OR TABS 1 TABLET DAILY       ORDER FOR DME Equipment being ordered: Postivac 1 each 0     vardenafil (LEVITRA) 20 MG tablet Take 0.5-1 tablets (10-20 mg) by mouth daily as needed Never use with nitroglycerin, terazosin or  doxazosin. 30 tablet 4     OTC products: tylenol    Allergies   Allergen Reactions     Ivp Dye [Contrast Dye]       Latex Allergy: NO    Social History     Tobacco Use     Smoking status: Never Smoker     Smokeless tobacco: Never Used   Substance Use Topics     Alcohol use: Yes     Frequency: 2-3 times a week     Drinks per session: 3 or 4     Binge frequency: Less than monthly     Comment: 10-14 drinks per week     History   Drug Use No       REVIEW OF SYSTEMS:   CONSTITUTIONAL: NEGATIVE for fever, chills, change in weight  INTEGUMENTARY/SKIN: NEGATIVE for worrisome rashes, moles or lesions  EYES: NEGATIVE for vision changes or irritation  ENT/MOUTH: NEGATIVE for ear, mouth and throat problems  RESP: NEGATIVE for significant cough or SOB  CV: NEGATIVE for chest pain, palpitations or peripheral edema  GI: NEGATIVE for nausea, abdominal pain,or change in bowel habits; POSITIVE heartburn  : NEGATIVE for frequency, dysuria, or hematuria  MUSCULOSKELETAL: NEGATIVE for significant arthralgias or myalgia  NEURO: NEGATIVE for weakness, dizziness or paresthesias  ENDOCRINE: NEGATIVE for temperature intolerance, skin/hair changes  HEME: NEGATIVE for bleeding problems  PSYCHIATRIC: NEGATIVE for changes in mood or affect    EXAM:   /71 (BP Location: Right arm, Cuff Size: Adult Regular)   Pulse 63   Temp 97.3  F (36.3  C) (Oral)   Wt 88.9 kg (196 lb)   SpO2 94%   BMI 29.37 kg/m      GENERAL APPEARANCE: healthy, alert and no distress     EYES: EOMI, PERRL     HENT: ear canals and TM's normal and nose and mouth without ulcers or lesions     NECK: no adenopathy, no asymmetry, masses, or scars and thyroid normal to palpation     RESP: lungs clear to auscultation - no rales, rhonchi or wheezes     CV: regular rates and rhythm, normal S1 S2, no S3 or S4 and no murmur, click or rub     ABDOMEN:  soft, nontender, no HSM or masses and bowel sounds normal     MS: extremities normal- no gross deformities noted, no evidence of  inflammation in joints, FROM in all extremities.     SKIN: no suspicious lesions or rashes     NEURO: Normal strength and tone, sensory exam grossly normal, mentation intact and speech normal     PSYCH: mentation appears normal. and affect normal/bright     LYMPHATICS: No cervical adenopathy    DIAGNOSTICS:   EKG: sinus bradycardia  HR 59  WITHIN NORMAL LIMITS      Recent Labs   Lab Test 08/22/19 05/13/19  0959 11/05/18  1452 10/31/18  1014 10/31/18  0012 08/19/18  0440   HGB  --   --  11.8* 11.5* 11.9* 13.7   PLT  --   --   --   --  197 183   NA  --  138  --   --  137 140   POTASSIUM  --  4.5  --   --  4.4 3.9   CR  --  1.14  --   --  0.97 0.94   A1C 7.2* 6.5*  --   --  6.7*  --      Results for orders placed or performed in visit on 09/24/19   Basic metabolic panel  (Ca, Cl, CO2, Creat, Gluc, K, Na, BUN)   Result Value Ref Range    Sodium 136 133 - 144 mmol/L    Potassium 5.0 3.4 - 5.3 mmol/L    Chloride 106 94 - 109 mmol/L    Carbon Dioxide 24 20 - 32 mmol/L    Anion Gap 6 3 - 14 mmol/L    Glucose 109 (H) 70 - 99 mg/dL    Urea Nitrogen 22 7 - 30 mg/dL    Creatinine 1.07 0.66 - 1.25 mg/dL    GFR Estimate 64 >60 mL/min/[1.73_m2]    GFR Estimate If Black 74 >60 mL/min/[1.73_m2]    Calcium 9.1 8.5 - 10.1 mg/dL   CBC with platelets and differential   Result Value Ref Range    WBC 6.5 4.0 - 11.0 10e9/L    RBC Count 4.78 4.4 - 5.9 10e12/L    Hemoglobin 13.8 13.3 - 17.7 g/dL    Hematocrit 41.6 40.0 - 53.0 %    MCV 87 78 - 100 fl    MCH 28.9 26.5 - 33.0 pg    MCHC 33.2 31.5 - 36.5 g/dL    RDW 14.6 10.0 - 15.0 %    Platelet Count 239 150 - 450 10e9/L    % Neutrophils 54.2 %    % Lymphocytes 30.8 %    % Monocytes 8.3 %    % Eosinophils 5.5 %    % Basophils 1.2 %    Absolute Neutrophil 3.5 1.6 - 8.3 10e9/L    Absolute Lymphocytes 2.0 0.8 - 5.3 10e9/L    Absolute Monocytes 0.5 0.0 - 1.3 10e9/L    Absolute Eosinophils 0.4 0.0 - 0.7 10e9/L    Absolute Basophils 0.1 0.0 - 0.2 10e9/L    Diff Method Automated Method    Hemoglobin  A1c   Result Value Ref Range    Hemoglobin A1C 6.8 (H) 0 - 5.6 %   *UA reflex to Microscopic and Culture ([place] [facility] (except [place] and [place])   Result Value Ref Range    Color Urine Yellow     Appearance Urine Clear     Glucose Urine Negative NEG^Negative mg/dL    Bilirubin Urine Negative NEG^Negative    Ketones Urine Negative NEG^Negative mg/dL    Specific Gravity Urine 1.010 1.003 - 1.035    Blood Urine Negative NEG^Negative    pH Urine 5.5 5.0 - 7.0 pH    Protein Albumin Urine Negative NEG^Negative mg/dL    Urobilinogen Urine 0.2 0.2 - 1.0 EU/dL    Nitrite Urine Negative NEG^Negative    Leukocyte Esterase Urine Negative NEG^Negative    Source Midstream Urine    Methicillin Resistant Staph Aureus PCR   Result Value Ref Range    Specimen Description Nares     Methicillin Resist/Sens S. aureus PCR Negative NEG^Negative   Urine Culture Aerobic Bacterial   Result Value Ref Range    Specimen Description Midstream Urine     Culture Micro No growth     INR  0.98  ESR: 13    IMPRESSION:   Reason for surgery/procedure: right knee arthroplasty  Diagnosis/reason for consult: pre op consult    The proposed surgical procedure is considered INTERMEDIATE risk.    REVISED CARDIAC RISK INDEX  The patient has the following serious cardiovascular risks for perioperative complications such as (MI, PE, VFib and 3  AV Block):  Coronary Artery Disease (MI, positive stress test, angina, Qs on EKG)  INTERPRETATION: 1 risks: Class II (low risk - 0.9% complication rate)    The patient has the following additional risks for perioperative complications:  No identified additional risks      ICD-10-CM    1. Preop general physical exam Z01.818 Methicillin Resistant Staph Aureus PCR     Basic metabolic panel  (Ca, Cl, CO2, Creat, Gluc, K, Na, BUN)     CBC with platelets and differential     EKG 12-lead complete w/read - Clinics   2. Primary osteoarthritis of right knee M17.11    3. Diabetes mellitus without complication  (H) E11.9 Hemoglobin A1c   4. Urinary hesitancy R39.11 *UA reflex to Microscopic and Culture ([place] [facility] (except [place] and [place])     Urine Culture Aerobic Bacterial       RECOMMENDATIONS:       --Patient is to take all scheduled medications on the day of surgery EXCEPT for modifications listed below.    Diabetes Medication Use    -----Hold  Metformin the morning of surgery and while NPO      ACE Inhibitor or Angiotensin Receptor Blocker (ARB) Use  Ace inhibitor or Angiotensin Receptor Blocker (ARB) and should HOLD this medication for the 24 hours prior to surgery.      APPROVAL GIVEN to proceed with proposed procedure, without further diagnostic evaluation       Signed Electronically by: ARMAAN Roca CNP    Copy of this evaluation report is provided to requesting physician.    [facility] Preop Guidelines    Revised Cardiac Risk Index

## 2019-09-25 LAB
ANION GAP SERPL CALCULATED.3IONS-SCNC: 6 MMOL/L (ref 3–14)
BACTERIA SPEC CULT: NO GROWTH
BUN SERPL-MCNC: 22 MG/DL (ref 7–30)
CALCIUM SERPL-MCNC: 9.1 MG/DL (ref 8.5–10.1)
CHLORIDE SERPL-SCNC: 106 MMOL/L (ref 94–109)
CO2 SERPL-SCNC: 24 MMOL/L (ref 20–32)
CREAT SERPL-MCNC: 1.07 MG/DL (ref 0.66–1.25)
GFR SERPL CREATININE-BSD FRML MDRD: 64 ML/MIN/{1.73_M2}
GLUCOSE SERPL-MCNC: 109 MG/DL (ref 70–99)
POTASSIUM SERPL-SCNC: 5 MMOL/L (ref 3.4–5.3)
SODIUM SERPL-SCNC: 136 MMOL/L (ref 133–144)
SPECIMEN SOURCE: NORMAL

## 2019-09-26 NOTE — RESULT ENCOUNTER NOTE
Beautiful !   You are all set Dr Bedolla. I will have them fax the pre op to your [facility]

## 2019-10-02 ENCOUNTER — HEALTH MAINTENANCE LETTER (OUTPATIENT)
Age: 84
End: 2019-10-02

## 2019-10-03 ENCOUNTER — TELEPHONE (OUTPATIENT)
Dept: FAMILY MEDICINE | Facility: CLINIC | Age: 84
End: 2019-10-03

## 2019-10-03 DIAGNOSIS — I10 ESSENTIAL HYPERTENSION, BENIGN: ICD-10-CM

## 2019-10-03 DIAGNOSIS — E78.5 HYPERLIPIDEMIA LDL GOAL <70: ICD-10-CM

## 2019-10-03 NOTE — TELEPHONE ENCOUNTER
These tests are not typically part of this pre op protocol.    I have ordered the tests to be done  He should schedule with the lab to have these drawn

## 2019-10-03 NOTE — TELEPHONE ENCOUNTER
Reason for Call:  Other pre-op labs    Detailed comments: Pt needed ESR & PT-INR for his Pre-op appt. Does not have results and needs them for surgery Oct 14. Pt would like to know if we have these drawn or if he needs to come back in.    Phone Number Patient can be reached at: Cell number on file:    Telephone Information:   Mobile [phone]       Best Time: any    Can we leave a detailed message on this number? YES    Call taken on 10/3/2019 at 1:49 PM by Fredrick Garcia

## 2019-10-07 NOTE — TELEPHONE ENCOUNTER
I called patient to schedule a lab only appt. Pt stated he brought in lab orders to  His preop and would like to know why they weren't done. Pt would like  A nurse to call him

## 2019-10-07 NOTE — TELEPHONE ENCOUNTER
"Spoke with patient     States he brought in paper orders for labs from Joel Reyes and states he handed them directly to Areli at the preop    He is upset because a Sed rate and INR/PT were not checked at that time and states \"the papers clearly state that needed to be checked\"     He is aware the orders are in now     But he is asking if Areli still has a copy of the lab orders he brought in? If not he is going to bring them when he goes to lab so the results can be faxed to that provider too    Malissa CELIS RN    "

## 2019-10-07 NOTE — TELEPHONE ENCOUNTER
I have no recollection of his having brought hand written orders.   Had I seen that request I would have put those orders in.   I apologize and am just glad we were able to correct my error before his surgery.

## 2019-10-08 DIAGNOSIS — E11.21 TYPE 2 DIABETES MELLITUS WITH DIABETIC NEPHROPATHY, WITHOUT LONG-TERM CURRENT USE OF INSULIN (H): ICD-10-CM

## 2019-10-08 RX ORDER — METFORMIN HCL 500 MG
TABLET, EXTENDED RELEASE 24 HR ORAL
Qty: 360 TABLET | Refills: 1 | Status: SHIPPED | OUTPATIENT
Start: 2019-10-08 | End: 2020-08-27

## 2019-10-08 NOTE — TELEPHONE ENCOUNTER
"Requested Prescriptions   Pending Prescriptions Disp Refills     metFORMIN (GLUCOPHAGE-XR) 500 MG 24 hr tablet [Pharmacy Med Name: METFORMIN HYDROCHLORIDE  MG Tablet Extended Release 24 Hour] 360 tablet 1     Sig: TAKE 2 TABLETS TWICE DAILY WITH MEALS       Biguanide Agents Passed - 10/8/2019  3:48 PM        Passed - Blood pressure less than 140/90 in past 6 months     BP Readings from Last 3 Encounters:   09/24/19 123/71   08/23/19 (!) 164/80   05/13/19 118/64                 Passed - Patient has documented LDL within the past 12 mos.     Recent Labs   Lab Test 05/13/19  0959   LDL 78             Passed - Patient has had a Microalbumin in the past 15 mos.     Recent Labs   Lab Test 05/13/19  1007   MICROL 147   UMALCR 130.09*             Passed - Patient is age 10 or older        Passed - Patient has documented A1c within the specified period of time.     If HgbA1C is 8 or greater, it needs to be on file within the past 3 months.  If less than 8, must be on file within the past 6 months.     Recent Labs   Lab Test 09/24/19  1611   A1C 6.8*             Passed - Patient's CR is NOT>1.4 OR Patient's EGFR is NOT<45 within past 12 mos.     Recent Labs   Lab Test 09/24/19  1611   GFRESTIMATED 64   GFRESTBLACK 74       Recent Labs   Lab Test 09/24/19  1611   CR 1.07             Passed - Patient does NOT have a diagnosis of CHF.        Passed - Medication is active on med list        Passed - Recent (6 mo) or future (30 days) visit within the authorizing provider's specialty     Patient had office visit in the last 6 months or has a visit in the next 30 days with authorizing provider or within the authorizing provider's specialty.  See \"Patient Info\" tab in inbasket, or \"Choose Columns\" in Meds & Orders section of the refill encounter.            Last Written Prescription Date:  5/13/19  Last Fill Quantity: 360,  # refills: 1   Last office visit: 9/24/2019 with prescribing provider:  Evelyn   Future Office Visit:  "   Prescription approved per [facility] Refill Protocol.  Ariela Dunham RN

## 2019-10-08 NOTE — TELEPHONE ENCOUNTER
Spoke with patient:     He is appreciative of the calls and orders. Apologized for the miscommunication, but he is understanding.     Plans to visit the lab this afternoon (appt set)     He will be contacting [facility] to send refill requests to clinic    Natasha BABCOCK RN

## 2019-10-09 DIAGNOSIS — Z01.818 PREOP GENERAL PHYSICAL EXAM: ICD-10-CM

## 2019-10-09 LAB
ERYTHROCYTE [SEDIMENTATION RATE] IN BLOOD BY WESTERGREN METHOD: 13 MM/H (ref 0–20)
INR PPP: 0.98 (ref 0.86–1.14)

## 2019-10-09 PROCEDURE — 85610 PROTHROMBIN TIME: CPT | Performed by: NURSE PRACTITIONER

## 2019-10-09 PROCEDURE — 85652 RBC SED RATE AUTOMATED: CPT | Performed by: NURSE PRACTITIONER

## 2019-10-10 DIAGNOSIS — I10 ESSENTIAL HYPERTENSION, BENIGN: ICD-10-CM

## 2019-10-10 DIAGNOSIS — E78.5 HYPERLIPIDEMIA LDL GOAL <70: ICD-10-CM

## 2019-10-10 RX ORDER — ATORVASTATIN CALCIUM 40 MG/1
40 TABLET, FILM COATED ORAL DAILY
Qty: 90 TABLET | Refills: 0 | Status: SHIPPED | OUTPATIENT
Start: 2019-10-10 | End: 2020-08-27

## 2019-10-10 RX ORDER — LISINOPRIL 20 MG/1
20 TABLET ORAL 2 TIMES DAILY
Qty: 180 TABLET | Refills: 0 | Status: SHIPPED | OUTPATIENT
Start: 2019-10-10 | End: 2020-08-27

## 2019-10-11 NOTE — TELEPHONE ENCOUNTER
Fatuma from [facility] is asking for his Lab results from  10/9 Please Fax these to # [phone]    Thank you

## 2019-10-12 NOTE — RESULT ENCOUNTER NOTE
These are the results of the additional labs requested for your pre op , Dr Bedolla.  I am so sorry for all the confusion.

## 2019-10-17 NOTE — PROGRESS NOTES
Discharge Note    Progress reporting period is from initial evaluation date (please see noted date below) to Aug 29, 2019.  No linked episodes      Jamil failed to follow up and current status is unknown.  Please see information below for last relevant information on current status.  Patient seen for 8 visits.    SUBJECTIVE  Subjective changes noted by patient:  Patient presents today with no fecal incontinent accidents to report since our last visit.  Patient states his urination and bladder emptying has improved with the modified valsalva technique. Patient states he is working on strengthening pelvic floor to encourage external anal sphincter control.    .  Current pain level is 0/10.     Previous pain level was  0/10.   Changes in function:  Yes (See Goal flowsheet attached for changes in current functional level)  Adverse reaction to treatment or activity: None    OBJECTIVE  Changes noted in objective findings: Spent a good deal of session today reviewing all the important tips and tools he has been educated in during PT.  Patient is not utilizing toileting position of knees above hips.     ASSESSMENT/PLAN  Diagnosis: fecal incont/PFD Left > R   Updated problem list and treatment plan:   Decreased function - HEP  Impaired muscle performance - HEP  STG/LTGs have been met or progress has been made towards goals:  Yes, please see goal flowsheet for most current information  Assessment of Progress: current status is unknown.    Last current status: Pt is progressing well   Self Management Plans:  HEP  I have re-evaluated this patient and find that the nature, scope, duration and intensity of the therapy is appropriate for the medical condition of the patient.  Jamil continues to require the following intervention to meet STG and LTG's:  HEP.    Recommendations:  Discharge with current home program.  Patient to follow up with MD as needed.    Please refer to the daily flowsheet for treatment today, total treatment  time and time spent performing 1:1 timed codes.

## 2019-11-01 LAB — RETINOPATHY: NORMAL

## 2019-11-11 ENCOUNTER — OFFICE VISIT (OUTPATIENT)
Dept: SLEEP MEDICINE | Facility: CLINIC | Age: 84
End: 2019-11-11
Attending: INTERNAL MEDICINE
Payer: MEDICARE

## 2019-11-11 VITALS
HEART RATE: 74 BPM | DIASTOLIC BLOOD PRESSURE: 72 MMHG | RESPIRATION RATE: 18 BRPM | SYSTOLIC BLOOD PRESSURE: 139 MMHG | OXYGEN SATURATION: 97 % | BODY MASS INDEX: 28.35 KG/M2 | HEIGHT: 69 IN | WEIGHT: 191.4 LBS

## 2019-11-11 DIAGNOSIS — G47.52 RBD (REM BEHAVIORAL DISORDER): Primary | ICD-10-CM

## 2019-11-11 DIAGNOSIS — G47.9 SLEEP DISORDER: ICD-10-CM

## 2019-11-11 DIAGNOSIS — G47.33 OSA (OBSTRUCTIVE SLEEP APNEA): ICD-10-CM

## 2019-11-11 PROCEDURE — 99204 OFFICE O/P NEW MOD 45 MIN: CPT | Mod: GW | Performed by: PSYCHIATRY & NEUROLOGY

## 2019-11-11 ASSESSMENT — MIFFLIN-ST. JEOR: SCORE: 1545.62

## 2019-11-11 NOTE — PROGRESS NOTES
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  In addition to that, he also has a history concerning for possible dream enactment, snoring, and possible sleep disordered breathing.      We had a long conversation today with the patient and his spouse and identified several underlying issues that warrant investigation.  Of note, he does snore and he snores more profoundly when he is supine, which is fairly frequent.  In addition to that, he frequently wakes up with a dry throat, dry mouth as well as has gastroesophageal reflux.  When he wakes up in the morning, he is not particularly refreshed.  His [place] Sleepiness Scale score is 8-10/24.  He does not describe difficulty falling asleep at night, but in addition to the snoring his spouse does indicate that he is fairly active while he is dreaming.  He talks in his sleep quite a bit while he is dreaming.  We do not have a nightly bed partner report, however, because his wife is sleeping in a separate bedroom.      The patient also has several habits that are not particularly helpful.  He sleeps with the television on on occasion.  He often will wake up in the middle of the night and watch television.  During the middle of the night awakening he will often get up and drink a glass of wine.      PAST MEDICAL HISTORY:  Significant for mainly orthopedic injuries.  He had a very severe injury that occurred when he was hunting in [place] that required a pretty substantial rotator cuff repair on his right shoulder that resulted in him having to quit performing surgery.  He also had a right total knee replacement a month ago, prostate cancer, type 2 diabetes, hypertension, and dyslipidemia.      MEDICATIONS:  Include famotidine, lisinopril, metformin, Lipitor, Latanoprost.  He is also taking an opioid analgesic at this time in recovery from his hip surgery.      SOCIAL  HISTORY:  He is a retired surgeon.  He and his wife enjoy traveling.  He enjoys hunting red stags.      FAMILY HISTORY:  There is a family history of cardiovascular disease in both of his parents.  No known history of sleep apnea or restless leg syndrome.      REVIEW OF SYSTEMS:  A 10-point review of system was ascertained.  Pertinent findings are all mentioned above.      PHYSICAL EXAMINATION:   VITAL SIGNS:  The patient's blood pressure is 139/72, pulse 74, respirations 18, oxygen saturation 97%.  Body mass index of 28.7 kg/m2.   HEENT:  Neck circumference is 43 cm.   NEUROLOGIC SPECIALTY:  He is alert and oriented, has normal memory and language.  Mood and affect are congruent.  The rest of the examination is deferred.      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  He has a concerning history of nonrestorative sleep, snoring, gastroesophageal reflux and thus we will arrange for him to have an in-laboratory polysomnogram.  I am also concerned about the possibility of dream enactment behavior, considering his rather active vocalizations during sleep.  In addition to that, we also discussed the importance of avoiding alcohol at bedtime because it can lead to sleep fragmentation.  We discussed the importance of bright light therapy in the morning to help reinforce her circadian rhythm and I advised that he get the television out of the bedroom.      Otherwise, the patient understands the plans.  It is a great privilege being asked to participate in his care.  He is insightful and has been advised on the critical importance of never driving if tired or sleepy.      Forty-five minutes were spent with the patient today, greater than 50% of the time in counseling and coordination of care.         FRANCES HICKEY MD             D: 2019   T: 2019   MT: JUAN      Name:     IGNACIO BEDOLLA SR   MRN:      [id number]        Account:      [id number]   :      1935           Visit Date:   2019       Document: [id number]

## 2019-11-11 NOTE — PROGRESS NOTES
"Chief Complaint   Patient presents with     Sleep Problem       Initial /72   Pulse 74   Resp 18   Ht 1.74 m (5' 8.5\")   Wt 86.8 kg (191 lb 6.4 oz)   SpO2 97%   BMI 28.68 kg/m   Estimated body mass index is 28.68 kg/m  as calculated from the following:    Height as of this encounter: 1.74 m (5' 8.5\").    Weight as of this encounter: 86.8 kg (191 lb 6.4 oz).    Medication Reconciliation: complete    Neck circumference: 17 inches / 43 centimeters.    ESS: 8    [facility] [place]  [facility] - Specialist      "

## 2019-11-11 NOTE — PATIENT INSTRUCTIONS
Your BMI is Body mass index is 28.68 kg/m .  Weight management is a personal decision.  If you are interested in exploring weight loss strategies, the following discussion covers the approaches that may be successful. Body mass index (BMI) is one way to tell whether you are at a healthy weight, overweight, or obese. It measures your weight in relation to your height.  A BMI of 18.5 to 24.9 is in the healthy range. A person with a BMI of 25 to 29.9 is considered overweight, and someone with a BMI of 30 or greater is considered obese. More than two-thirds of American adults are considered overweight or obese.  Being overweight or obese increases the risk for further weight gain. Excess weight may lead to heart disease and diabetes.  Creating and following plans for healthy eating and physical activity may help you improve your health.  Weight control is part of healthy lifestyle and includes exercise, emotional health, and healthy eating habits. Careful eating habits lifelong are the mainstay of weight control. Though there are significant health benefits from weight loss, long-term weight loss with diet alone may be very difficult to achieve- studies show long-term success with dietary management in less than 10% of people. Attaining a healthy weight may be especially difficult to achieve in those with severe obesity. In some cases, medications, devices and surgical management might be considered.  What can you do?  If you are overweight or obese and are interested in methods for weight loss, you should discuss this with your provider.     Consider reducing daily calorie intake by 500 calories.     Keep a food journal.     Avoiding skipping meals, consider cutting portions instead.    Diet combined with exercise helps maintain muscle while optimizing fat loss. Strength training is particularly important for building and maintaining muscle mass. Exercise helps reduce stress, increase energy, and improves fitness.  Increasing exercise without diet control, however, may not burn enough calories to loose weight.       Start walking three days a week 10-20 minutes at a time    Work towards walking thirty minutes five days a week     Eventually, increase the speed of your walking for 1-2 minutes at time    In addition, we recommend that you review healthy lifestyles and methods for weight loss available through the National Institutes of Health patient information sites:  http://win.niddk.nih.gov/publications/index.htm    And look into health and wellness programs that may be available through your health insurance provider, employer, local community center, or [employer]ikie club.    Weight management plan: Patient was referred to their PCP to discuss a diet and exercise plan.

## 2019-12-15 ENCOUNTER — HEALTH MAINTENANCE LETTER (OUTPATIENT)
Age: 84
End: 2019-12-15

## 2019-12-17 DIAGNOSIS — I10 ESSENTIAL HYPERTENSION, BENIGN: ICD-10-CM

## 2019-12-17 DIAGNOSIS — E78.5 HYPERLIPIDEMIA LDL GOAL <70: ICD-10-CM

## 2019-12-17 DIAGNOSIS — K21.9 GASTROESOPHAGEAL REFLUX DISEASE WITHOUT ESOPHAGITIS: ICD-10-CM

## 2019-12-17 RX ORDER — ATORVASTATIN CALCIUM 40 MG/1
40 TABLET, FILM COATED ORAL DAILY
Qty: 90 TABLET | Refills: 0 | Status: CANCELLED | OUTPATIENT
Start: 2019-12-17

## 2019-12-17 RX ORDER — FAMOTIDINE 20 MG/1
20 TABLET, FILM COATED ORAL 2 TIMES DAILY
Qty: 180 TABLET | Refills: 3 | Status: CANCELLED | OUTPATIENT
Start: 2019-12-17

## 2019-12-17 RX ORDER — LISINOPRIL 20 MG/1
20 TABLET ORAL 2 TIMES DAILY
Qty: 180 TABLET | Refills: 0 | Status: CANCELLED | OUTPATIENT
Start: 2019-12-17

## 2019-12-18 NOTE — TELEPHONE ENCOUNTER
"Atorvastatin:  Denied  Managed by cardiology    Lisinopril:   Denied  Managed by cardiology    Pepcid:  Denied  Too early; Rx sent 9/19/2019 for 1 year    Traci NEVES RN    Last Written Prescription Date:  10/10/2019  Last Fill Quantity: 90,  # refills: 0   Last office visit: 9/24/2019 with prescribing provider:     Future Office Visit:    Requested Prescriptions   Pending Prescriptions Disp Refills     famotidine (PEPCID) 20 MG tablet 180 tablet 3     Sig: Take 1 tablet (20 mg) by mouth 2 times daily       H2 Blockers Protocol Passed - 12/17/2019 11:24 AM        Passed - Patient is age 12 or older        Passed - Recent (12 mo) or future (30 days) visit within the authorizing provider's specialty     Patient has had an office visit with the authorizing provider or a provider within the authorizing providers department within the previous 12 mos or has a future within next 30 days. See \"Patient Info\" tab in inbasket, or \"Choose Columns\" in Meds & Orders section of the refill encounter.              Passed - Medication is active on med list        lisinopril (PRINIVIL/ZESTRIL) 20 MG tablet 180 tablet 0     Sig: Take 1 tablet (20 mg) by mouth 2 times daily       ACE Inhibitors (Including Combos) Protocol Passed - 12/17/2019 11:24 AM        Passed - Blood pressure under 140/90 in past 12 months     BP Readings from Last 3 Encounters:   11/11/19 139/72   09/24/19 123/71   08/23/19 (!) 164/80                 Passed - Recent (12 mo) or future (30 days) visit within the authorizing provider's specialty     Patient has had an office visit with the authorizing provider or a provider within the authorizing providers department within the previous 12 mos or has a future within next 30 days. See \"Patient Info\" tab in inbasket, or \"Choose Columns\" in Meds & Orders section of the refill encounter.              Passed - Medication is active on med list        Passed - Patient is age 18 or older        Passed - Normal serum " "creatinine on file in past 12 months     Recent Labs   Lab Test 09/24/19  1611   CR 1.07             Passed - Normal serum potassium on file in past 12 months     Recent Labs   Lab Test 09/24/19  1611   POTASSIUM 5.0             atorvastatin (LIPITOR) 40 MG tablet 90 tablet 0     Sig: Take 1 tablet (40 mg) by mouth daily       Statins Protocol Passed - 12/17/2019 11:24 AM        Passed - LDL on file in past 12 months     Recent Labs   Lab Test 05/13/19  0959   LDL 78             Passed - No abnormal creatine kinase in past 12 months     Recent Labs   Lab Test 03/09/15  1031                   Passed - Recent (12 mo) or future (30 days) visit within the authorizing provider's specialty     Patient has had an office visit with the authorizing provider or a provider within the authorizing providers department within the previous 12 mos or has a future within next 30 days. See \"Patient Info\" tab in inbasket, or \"Choose Columns\" in Meds & Orders section of the refill encounter.              Passed - Medication is active on med list        Passed - Patient is age 18 or older        "

## 2020-01-28 ENCOUNTER — TELEPHONE (OUTPATIENT)
Dept: SLEEP MEDICINE | Facility: CLINIC | Age: 85
End: 2020-01-28

## 2020-01-28 NOTE — TELEPHONE ENCOUNTER
Pt wants to reschedule sleep study in June possibly 6/3/20 and have follow up with Ravi on June 22 possibly. I clld pt to schedule, LMV

## 2020-02-05 ENCOUNTER — OFFICE VISIT (OUTPATIENT)
Dept: FAMILY MEDICINE | Facility: CLINIC | Age: 85
End: 2020-02-05
Payer: MEDICARE

## 2020-02-05 ENCOUNTER — TELEPHONE (OUTPATIENT)
Dept: FAMILY MEDICINE | Facility: CLINIC | Age: 85
End: 2020-02-05

## 2020-02-05 VITALS
HEIGHT: 69 IN | BODY MASS INDEX: 28.14 KG/M2 | OXYGEN SATURATION: 97 % | WEIGHT: 190 LBS | HEART RATE: 75 BPM | DIASTOLIC BLOOD PRESSURE: 80 MMHG | TEMPERATURE: 98.3 F | SYSTOLIC BLOOD PRESSURE: 144 MMHG

## 2020-02-05 DIAGNOSIS — I49.9 IRREGULAR HEARTBEAT: Primary | ICD-10-CM

## 2020-02-05 LAB
ERYTHROCYTE [DISTWIDTH] IN BLOOD BY AUTOMATED COUNT: 14.2 % (ref 10–15)
HCT VFR BLD AUTO: 43.2 % (ref 40–53)
HGB BLD-MCNC: 14.3 G/DL (ref 13.3–17.7)
MCH RBC QN AUTO: 29.2 PG (ref 26.5–33)
MCHC RBC AUTO-ENTMCNC: 33.1 G/DL (ref 31.5–36.5)
MCV RBC AUTO: 88 FL (ref 78–100)
PLATELET # BLD AUTO: 206 10E9/L (ref 150–450)
RBC # BLD AUTO: 4.89 10E12/L (ref 4.4–5.9)
WBC # BLD AUTO: 8.1 10E9/L (ref 4–11)

## 2020-02-05 PROCEDURE — 85027 COMPLETE CBC AUTOMATED: CPT | Performed by: NURSE PRACTITIONER

## 2020-02-05 PROCEDURE — 99214 OFFICE O/P EST MOD 30 MIN: CPT | Performed by: NURSE PRACTITIONER

## 2020-02-05 PROCEDURE — 84443 ASSAY THYROID STIM HORMONE: CPT | Performed by: NURSE PRACTITIONER

## 2020-02-05 PROCEDURE — 80048 BASIC METABOLIC PNL TOTAL CA: CPT | Performed by: NURSE PRACTITIONER

## 2020-02-05 PROCEDURE — 82728 ASSAY OF FERRITIN: CPT | Performed by: NURSE PRACTITIONER

## 2020-02-05 PROCEDURE — 93000 ELECTROCARDIOGRAM COMPLETE: CPT | Performed by: NURSE PRACTITIONER

## 2020-02-05 PROCEDURE — 36415 COLL VENOUS BLD VENIPUNCTURE: CPT | Performed by: NURSE PRACTITIONER

## 2020-02-05 ASSESSMENT — MIFFLIN-ST. JEOR: SCORE: 1539.27

## 2020-02-05 NOTE — PROGRESS NOTES
HPI    Has a poor sleep pattern. Woke at 4am after 1.5hours of sleep   At 10am went to bed and had a dysrhythmia that was very obvious. He described sort of like trigeminy      Strong cardiac FMH   Now feels rhythm is normal   Felt weak when he came into clinic today   Is a little more hoarse as well   No med changes recently   No CP  Had something similar in the past and was dx with PACs     Past Medical History:   Diagnosis Date     Bowel dysfunction     Dr. Marito Orta, urgent, soft or liquid stools     CAD (coronary artery disease)     per calcium score=>400     Carotid artery plaque 9/2011     Colon polyps     last colonoscopy nl 5/19     Diverticulosis of colon      Erectile dysfunction 7/18/2008     Glaucoma      High cholesterol      HTN (hypertension) 7/18/2008     Incomplete emptying of bladder due to benign prostatic hyperplasia     Dr. 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 Father      C.A.D. Brother      Asthma Brother      C.A.D. Mother      Diabetes Brother      Lipids Brother      Past Surgical History:   Procedure Laterality Date     APPENDECTOMY  1942     ARTHROSCOPY KNEE RT/LT  1998    partial menisectomy left knee     COLONOSCOPY N/A 9/27/2016    Procedure: COMBINED COLONOSCOPY, SINGLE OR MULTIPLE BIOPSY/POLYPECTOMY BY BIOPSY;  Surgeon: Maru Orta MD;  Location:  GI     CYSTOSCOPY       HC COLONOSCOPY THRU STOMA, DIAGNOSTIC  2005     HC COLONOSCOPY THRU STOMA, DIAGNOSTIC  5/09    diverticulosis, also proctitis     HEMORRHOIDECTOMY  1975     left tka Left 2015     PHACOEMULSIFICATION CLEAR CORNEA WITH STANDARD INTRAOCULAR LENS  IMPLANT Right 3/18/2015    Procedure: PHACOEMULSIFICATION CLEAR CORNEA WITH STANDARD INTRAOCULAR LENS IMPLANT;  Surgeon: Lito Gonzales MD;  Location:  [facility]     PHACOEMULSIFICATION CLEAR CORNEA WITH STANDARD INTRAOCULAR LENS IMPLANT Left 3/25/2015    Procedure: PHACOEMULSIFICATION CLEAR CORNEA WITH STANDARD INTRAOCULAR LENS IMPLANT;  Surgeon: Lito Gonzales MD;  Location:  [facility]     ROTATOR CUFF REPAIR RT/LT  1998    right.  caused him to retire     SURGICAL HISTORY OF -   1999    L4-5 discectomy     TONSILLECTOMY & ADENOIDECTOMY  1940     TURP  1988     Social History     Tobacco Use     Smoking status: Never Smoker     Smokeless tobacco: Never Used   Substance Use Topics     Alcohol use: Yes     Frequency: 2-3 times a week     Drinks per session: 3 or 4     Binge frequency: Less than monthly     Comment: 10-14 drinks per week     Current Outpatient Medications   Medication Sig Dispense Refill     Alcohol Swabs (B-D SINGLE USE SWABS REGULAR) PADS 1 pad 3 times daily 270 each 3     atorvastatin (LIPITOR) 40 MG tablet Take 1 tablet (40 mg) by mouth daily 90 tablet 0     blood glucose (ONETOUCH ULTRA) test strip Use to test blood sugar  daily or as directed. 1 Box 3     blood glucose monitoring (ONE TOUCH ULTRA 2) meter device kit Use to test blood sugars 1- 2 times daily or as directed. 1 kit 0     blood glucose monitoring (ONE TOUCH ULTRASOFT) lancets Use to test blood sugar 1-2  times daily or as directed. 100 each 11     famotidine (PEPCID) 20 MG tablet TAKE 1 TABLET TWICE DAILY 180 tablet 3     latanoprost (XALATAN) 0.005 % ophthalmic solution Place 1 drop into both eyes At Bedtime        lisinopril (PRINIVIL/ZESTRIL) 20 MG tablet Take 1 tablet (20 mg) by mouth 2 times daily 180 tablet 0     MELATONIN PO Take 5 mg by mouth At Bedtime       metFORMIN (GLUCOPHAGE-XR) 500 MG 24 hr tablet TAKE 2 TABLETS TWICE DAILY WITH MEALS 360 tablet 1     MULTI-VITAMIN OR TABS 1 TABLET DAILY       ORDER FOR DME Equipment  "being ordered: Postivac 1 each 0     vardenafil (LEVITRA) 20 MG tablet Take 0.5-1 tablets (10-20 mg) by mouth daily as needed Never use with nitroglycerin, terazosin or doxazosin. 30 tablet 4     Allergies   Allergen Reactions     Ivp Dye [Contrast Dye]        Reviewed and updated as needed this visit by clinical staff and provider       ROS  Detailed as above       BP (!) 144/80   Pulse 75   Temp 98.3  F (36.8  C) (Oral)   Ht 1.74 m (5' 8.5\")   Wt 86.2 kg (190 lb)   SpO2 97%   BMI 28.47 kg/m       Physical Exam  Constitutional:       Appearance: Normal appearance.   Cardiovascular:      Rate and Rhythm: Normal rate and regular rhythm.   Pulmonary:      Effort: Pulmonary effort is normal.   Neurological:      Mental Status: He is alert.   Psychiatric:         Mood and Affect: Mood normal.         Assessment and Plan:       ICD-10-CM    1. Irregular heartbeat I49.9 EKG 12-lead complete w/read - Clinics     CBC with platelets     Basic metabolic panel  (Ca, Cl, CO2, Creat, Gluc, K, Na, BUN)     TSH with free T4 reflex     Ferritin       Pt reported irregularly irregular rhythm prior to presenting to office. Has history of PACs. EKG is normal here in the clinic. We will check basic labs as above. He should follow-up if symptoms return as he may need a holter      ARMAAN Nava, CNP  [facility]   "

## 2020-02-05 NOTE — TELEPHONE ENCOUNTER
Patient walked into clinic with complaint of irregular heart beat this morning when he lied down to rest.    Denied any other associated symptoms: pain, diaphoresis, SOB, dizziness.     Patient states no history of this type of irregular heartbeat.  Concerned.  Wanting EKG.   Refusing ED.   Accompanied by spouse.      BP:  167/86  HR: 76 regular  O2 sat: 97%    10 min later---BP: 144/80   HR 75    Added patient to Vanda S schedule now.      Candace GUTIERREZ RN,BSN

## 2020-02-06 LAB
ANION GAP SERPL CALCULATED.3IONS-SCNC: 9 MMOL/L (ref 3–14)
BUN SERPL-MCNC: 19 MG/DL (ref 7–30)
CALCIUM SERPL-MCNC: 9 MG/DL (ref 8.5–10.1)
CHLORIDE SERPL-SCNC: 104 MMOL/L (ref 94–109)
CO2 SERPL-SCNC: 23 MMOL/L (ref 20–32)
CREAT SERPL-MCNC: 0.92 MG/DL (ref 0.66–1.25)
FERRITIN SERPL-MCNC: 62 NG/ML (ref 26–388)
GFR SERPL CREATININE-BSD FRML MDRD: 76 ML/MIN/{1.73_M2}
GLUCOSE SERPL-MCNC: 177 MG/DL (ref 70–99)
POTASSIUM SERPL-SCNC: 4.5 MMOL/L (ref 3.4–5.3)
SODIUM SERPL-SCNC: 136 MMOL/L (ref 133–144)
TSH SERPL DL<=0.005 MIU/L-ACNC: 1.02 MU/L (ref 0.4–4)

## 2020-02-06 NOTE — RESULT ENCOUNTER NOTE
Dr Bedolla, your labs all look great overall! Your ferritin is just slightly low so I would recommend trying to get some more iron in your diet. As you remember 75 is our goal so you are close.  Hope your symptoms have not returned but please reach out to me if you have any questions  Annette Gil, NP

## 2020-02-26 ENCOUNTER — APPOINTMENT (OUTPATIENT)
Dept: LAB | Facility: CLINIC | Age: 85
End: 2020-02-26
Payer: MEDICARE

## 2020-03-03 ENCOUNTER — TRANSFERRED RECORDS (OUTPATIENT)
Dept: HEALTH INFORMATION MANAGEMENT | Facility: CLINIC | Age: 85
End: 2020-03-03

## 2020-03-03 LAB
CHOLEST SERPL-MCNC: 141 MG/DL
CREAT SERPL-MCNC: 1.08 MG/DL (ref 0.7–1.11)
GFR SERPL CREATININE-BSD FRML MDRD: 63 ML/MIN/1.73M2
GLUCOSE SERPL-MCNC: 83 MG/DL (ref 65–99)
HBA1C MFR BLD: 7.2 % (ref 4–6)
HDLC SERPL-MCNC: 41 MG/DL
LDLC SERPL CALC-MCNC: 80 MG/DL
NONHDLC SERPL-MCNC: 100 MG/DL
POTASSIUM SERPL-SCNC: 4.9 MMOL/L (ref 3.5–5.3)
TRIGL SERPL-MCNC: 103 MG/DL

## 2020-03-05 ENCOUNTER — TRANSFERRED RECORDS (OUTPATIENT)
Dept: HEALTH INFORMATION MANAGEMENT | Facility: CLINIC | Age: 85
End: 2020-03-05

## 2020-03-05 LAB
CREAT SERPL-MCNC: 1.09 MG/DL (ref 0.7–1.11)
GFR SERPL CREATININE-BSD FRML MDRD: 62 ML/MIN/1.73M2
GLUCOSE SERPL-MCNC: 144 MG/DL (ref 65–99)
POTASSIUM SERPL-SCNC: 5 MMOL/L (ref 3.5–5.3)
TSH SERPL-ACNC: 1.1 UIU/ML (ref 0.3–5)

## 2020-05-20 ENCOUNTER — TRANSFERRED RECORDS (OUTPATIENT)
Dept: HEALTH INFORMATION MANAGEMENT | Facility: CLINIC | Age: 85
End: 2020-05-20

## 2020-05-29 ENCOUNTER — TRANSFERRED RECORDS (OUTPATIENT)
Dept: HEALTH INFORMATION MANAGEMENT | Facility: CLINIC | Age: 85
End: 2020-05-29

## 2020-06-12 ENCOUNTER — TRANSFERRED RECORDS (OUTPATIENT)
Dept: HEALTH INFORMATION MANAGEMENT | Facility: CLINIC | Age: 85
End: 2020-06-12

## 2020-06-12 LAB
GLUCOSE SERPL-MCNC: 127 MG/DL (ref 65–99)
HBA1C MFR BLD: 6.3 % (ref 4–6)

## 2020-08-27 ENCOUNTER — TRANSFERRED RECORDS (OUTPATIENT)
Dept: HEALTH INFORMATION MANAGEMENT | Facility: CLINIC | Age: 85
End: 2020-08-27

## 2020-08-27 ENCOUNTER — OFFICE VISIT (OUTPATIENT)
Dept: FAMILY MEDICINE | Facility: CLINIC | Age: 85
End: 2020-08-27
Payer: MEDICARE

## 2020-08-27 VITALS
WEIGHT: 186.25 LBS | SYSTOLIC BLOOD PRESSURE: 123 MMHG | HEART RATE: 62 BPM | OXYGEN SATURATION: 96 % | BODY MASS INDEX: 27.59 KG/M2 | DIASTOLIC BLOOD PRESSURE: 64 MMHG | HEIGHT: 69 IN

## 2020-08-27 DIAGNOSIS — C61 PROSTATE CANCER (H): ICD-10-CM

## 2020-08-27 DIAGNOSIS — R79.89 ELEVATED SERUM CREATININE: ICD-10-CM

## 2020-08-27 DIAGNOSIS — K59.9 BOWEL DYSFUNCTION: ICD-10-CM

## 2020-08-27 DIAGNOSIS — E11.21 TYPE 2 DIABETES MELLITUS WITH DIABETIC NEPHROPATHY, WITHOUT LONG-TERM CURRENT USE OF INSULIN (H): ICD-10-CM

## 2020-08-27 DIAGNOSIS — N52.8 OTHER MALE ERECTILE DYSFUNCTION: ICD-10-CM

## 2020-08-27 DIAGNOSIS — E78.5 HYPERLIPIDEMIA LDL GOAL <70: ICD-10-CM

## 2020-08-27 DIAGNOSIS — R41.3 SHORT-TERM MEMORY LOSS: ICD-10-CM

## 2020-08-27 DIAGNOSIS — I25.10 CORONARY ARTERY DISEASE INVOLVING NATIVE CORONARY ARTERY OF NATIVE HEART WITHOUT ANGINA PECTORIS: ICD-10-CM

## 2020-08-27 DIAGNOSIS — Z00.00 ROUTINE GENERAL MEDICAL EXAMINATION AT A HEALTH CARE FACILITY: Primary | ICD-10-CM

## 2020-08-27 DIAGNOSIS — E66.9 OBESITY (BMI 30-39.9): ICD-10-CM

## 2020-08-27 DIAGNOSIS — I65.29 CAROTID ATHEROSCLEROSIS, UNSPECIFIED LATERALITY: ICD-10-CM

## 2020-08-27 DIAGNOSIS — K21.9 GASTROESOPHAGEAL REFLUX DISEASE WITHOUT ESOPHAGITIS: ICD-10-CM

## 2020-08-27 DIAGNOSIS — R41.3 MEMORY LOSS: ICD-10-CM

## 2020-08-27 DIAGNOSIS — I10 ESSENTIAL HYPERTENSION, BENIGN: ICD-10-CM

## 2020-08-27 LAB
ALBUMIN SERPL-MCNC: 3.7 G/DL (ref 3.4–5)
ALP SERPL-CCNC: 110 U/L (ref 40–150)
ALT SERPL W P-5'-P-CCNC: 27 U/L (ref 0–70)
ANION GAP SERPL CALCULATED.3IONS-SCNC: 6 MMOL/L (ref 3–14)
AST SERPL W P-5'-P-CCNC: 18 U/L (ref 0–45)
BASOPHILS # BLD AUTO: 0 10E9/L (ref 0–0.2)
BASOPHILS NFR BLD AUTO: 0.6 %
BILIRUB SERPL-MCNC: 0.3 MG/DL (ref 0.2–1.3)
BUN SERPL-MCNC: 35 MG/DL (ref 7–30)
CALCIUM SERPL-MCNC: 9.6 MG/DL (ref 8.5–10.1)
CHLORIDE SERPL-SCNC: 107 MMOL/L (ref 94–109)
CHOLEST SERPL-MCNC: 138 MG/DL
CO2 SERPL-SCNC: 24 MMOL/L (ref 20–32)
CREAT SERPL-MCNC: 1.27 MG/DL (ref 0.66–1.25)
DIFFERENTIAL METHOD BLD: NORMAL
EOSINOPHIL # BLD AUTO: 0.3 10E9/L (ref 0–0.7)
EOSINOPHIL NFR BLD AUTO: 4.2 %
ERYTHROCYTE [DISTWIDTH] IN BLOOD BY AUTOMATED COUNT: 14.7 % (ref 10–15)
FOLATE SERPL-MCNC: 29.6 NG/ML
GFR SERPL CREATININE-BSD FRML MDRD: 52 ML/MIN/{1.73_M2}
GLUCOSE SERPL-MCNC: 125 MG/DL (ref 70–99)
HCT VFR BLD AUTO: 44 % (ref 40–53)
HDLC SERPL-MCNC: 56 MG/DL
HGB BLD-MCNC: 14.8 G/DL (ref 13.3–17.7)
LDLC SERPL CALC-MCNC: 59 MG/DL
LYMPHOCYTES # BLD AUTO: 1.6 10E9/L (ref 0.8–5.3)
LYMPHOCYTES NFR BLD AUTO: 22.9 %
MCH RBC QN AUTO: 30 PG (ref 26.5–33)
MCHC RBC AUTO-ENTMCNC: 33.6 G/DL (ref 31.5–36.5)
MCV RBC AUTO: 89 FL (ref 78–100)
MONOCYTES # BLD AUTO: 0.6 10E9/L (ref 0–1.3)
MONOCYTES NFR BLD AUTO: 8.6 %
NEUTROPHILS # BLD AUTO: 4.4 10E9/L (ref 1.6–8.3)
NEUTROPHILS NFR BLD AUTO: 63.7 %
NONHDLC SERPL-MCNC: 82 MG/DL
PLATELET # BLD AUTO: 202 10E9/L (ref 150–450)
POTASSIUM SERPL-SCNC: 4.6 MMOL/L (ref 3.4–5.3)
PROT SERPL-MCNC: 7.3 G/DL (ref 6.8–8.8)
RBC # BLD AUTO: 4.94 10E12/L (ref 4.4–5.9)
SODIUM SERPL-SCNC: 137 MMOL/L (ref 133–144)
TRIGL SERPL-MCNC: 116 MG/DL
TSH SERPL DL<=0.005 MIU/L-ACNC: 1.21 MU/L (ref 0.4–4)
VIT B12 SERPL-MCNC: 275 PG/ML (ref 193–986)
WBC # BLD AUTO: 6.9 10E9/L (ref 4–11)

## 2020-08-27 PROCEDURE — 84443 ASSAY THYROID STIM HORMONE: CPT | Performed by: INTERNAL MEDICINE

## 2020-08-27 PROCEDURE — 82746 ASSAY OF FOLIC ACID SERUM: CPT | Performed by: INTERNAL MEDICINE

## 2020-08-27 PROCEDURE — 00000402 ZZHCL STATISTIC TOTAL PROTEIN: Performed by: INTERNAL MEDICINE

## 2020-08-27 PROCEDURE — 80053 COMPREHEN METABOLIC PANEL: CPT | Performed by: INTERNAL MEDICINE

## 2020-08-27 PROCEDURE — 84165 PROTEIN E-PHORESIS SERUM: CPT | Performed by: INTERNAL MEDICINE

## 2020-08-27 PROCEDURE — 36415 COLL VENOUS BLD VENIPUNCTURE: CPT | Performed by: INTERNAL MEDICINE

## 2020-08-27 PROCEDURE — 82607 VITAMIN B-12: CPT | Performed by: INTERNAL MEDICINE

## 2020-08-27 PROCEDURE — 80061 LIPID PANEL: CPT | Performed by: INTERNAL MEDICINE

## 2020-08-27 PROCEDURE — 85025 COMPLETE CBC W/AUTO DIFF WBC: CPT | Performed by: INTERNAL MEDICINE

## 2020-08-27 PROCEDURE — G0439 PPPS, SUBSEQ VISIT: HCPCS | Performed by: INTERNAL MEDICINE

## 2020-08-27 PROCEDURE — 99213 OFFICE O/P EST LOW 20 MIN: CPT | Mod: 25 | Performed by: INTERNAL MEDICINE

## 2020-08-27 PROCEDURE — 99207 C FOOT EXAM  NO CHARGE: CPT | Performed by: INTERNAL MEDICINE

## 2020-08-27 RX ORDER — VARDENAFIL HYDROCHLORIDE 20 MG/1
10-20 TABLET ORAL DAILY PRN
Qty: 30 TABLET | Refills: 4 | Status: SHIPPED | OUTPATIENT
Start: 2020-08-27 | End: 2021-06-08

## 2020-08-27 RX ORDER — LISINOPRIL 20 MG/1
20 TABLET ORAL 2 TIMES DAILY
Qty: 180 TABLET | Refills: 3 | Status: SHIPPED | OUTPATIENT
Start: 2020-08-27 | End: 2021-06-07

## 2020-08-27 RX ORDER — METFORMIN HCL 500 MG
TABLET, EXTENDED RELEASE 24 HR ORAL
Qty: 360 TABLET | Refills: 3 | Status: SHIPPED | OUTPATIENT
Start: 2020-08-27 | End: 2021-06-07

## 2020-08-27 RX ORDER — ATORVASTATIN CALCIUM 40 MG/1
40 TABLET, FILM COATED ORAL DAILY
Qty: 90 TABLET | Refills: 3 | Status: SHIPPED | OUTPATIENT
Start: 2020-08-27 | End: 2021-06-07

## 2020-08-27 ASSESSMENT — MIFFLIN-ST. JEOR: SCORE: 1522.26

## 2020-08-27 ASSESSMENT — ACTIVITIES OF DAILY LIVING (ADL): CURRENT_FUNCTION: NO ASSISTANCE NEEDED

## 2020-08-27 NOTE — PATIENT INSTRUCTIONS
I would recommend getting the new shingles shot called shingrix, but I would do it at your pharmacy as they can check with the insurance company to see if it is paid for.    I will send you the labs on morris Mon M.D.

## 2020-08-27 NOTE — PROGRESS NOTES
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    The patient is here with his wife and has few issues to go over.    He has had short term memory loss, for some time.  At times can be irritable but not feeling down or depressed.    He has chronic bowel diff, acid reflux and dysphagia for years, seeing [facility] for this    Has cap and reg [facility] follow up on that    He has clear nasal discharge, not new, wants to see ent    He bruises easily but not having bleeding, not on asa.        He otherwise feels fine, no sores on feet or toes, up to date endo and a1c good.  Blood pressure fine.  Not working out reg but veyr busy.  Weight an issue.               Past Medical History:      Past Medical History:   Diagnosis Date     Bowel dysfunction     Dr. Marito Orta, urgent, soft or liquid stools     CAD (coronary artery disease)     per calcium score=>400     Carotid artery plaque 9/2011     Colon polyps     last colonoscopy nl 5/19     Diverticulosis of colon      Erectile dysfunction 7/18/2008     Glaucoma      High cholesterol      HTN (hypertension) 7/18/2008     Incomplete emptying of bladder due to benign prostatic hyperplasia     Dr. Mejia     Obesity      PFO (patent foramen ovale)      Plantar fasciitis      Presbyesophagus      Prostate cancer-[place] 4, treated with radiation 07/28/2013    rising psa in 2018, seen at [facility], had cryo     Type II or unspecified type diabetes mellitus without mention of complication, not stated as uncontrolled     Dxd about 2000             Past Surgical History:      Past Surgical History:   Procedure Laterality Date     APPENDECTOMY  1942     ARTHROSCOPY KNEE RT/LT  1998    partial menisectomy left knee     COLONOSCOPY N/A 9/27/2016    Procedure: COMBINED COLONOSCOPY, SINGLE OR MULTIPLE BIOPSY/POLYPECTOMY BY BIOPSY;  Surgeon: Maru Orta MD;  Location:  GI     CYSTOSCOPY        COLONOSCOPY THRU STOMA, DIAGNOSTIC  2005       COLONOSCOPY THRU STOMA, DIAGNOSTIC  5/09    diverticulosis, also proctitis     HEMORRHOIDECTOMY  1975     left tka Left 2015     PHACOEMULSIFICATION CLEAR CORNEA WITH STANDARD INTRAOCULAR LENS IMPLANT Right 3/18/2015    Procedure: PHACOEMULSIFICATION CLEAR CORNEA WITH STANDARD INTRAOCULAR LENS IMPLANT;  Surgeon: Lito Gonzales MD;  Location: [facility]     PHACOEMULSIFICATION CLEAR CORNEA WITH STANDARD INTRAOCULAR LENS IMPLANT Left 3/25/2015    Procedure: PHACOEMULSIFICATION CLEAR CORNEA WITH STANDARD INTRAOCULAR LENS IMPLANT;  Surgeon: Lito Gonzales MD;  Location: [facility]     right knee replacement Right 2019     ROTATOR CUFF REPAIR RT/LT  1998    right.  caused him to retire     SURGICAL HISTORY OF -   1999    L4-5 discectomy     TONSILLECTOMY & ADENOIDECTOMY  1940     TURP  1988             Social History:     Social History     Socioeconomic History     Marital status:      Spouse name: Salome Gates     Number of children: 2     Years of education: Not on file     Highest education level: Not on file   Occupational History     Occupation: MD, [employer]     Employer: RETIRED   Social Needs     Financial resource strain: Not on file     Food insecurity     Worry: Not on file     Inability: Not on file     Transportation needs     Medical: Not on file     Non-medical: Not on file   Tobacco Use     Smoking status: Never Smoker     Smokeless tobacco: Never Used   Substance and Sexual Activity     Alcohol use: Yes     Frequency: 2-3 times a week     Drinks per session: 3 or 4     Binge frequency: Less than monthly     Comment: 10-14 drinks per week     Drug use: No     Sexual activity: Yes     Partners: Female   Lifestyle     Physical activity     Days per week: Not on file     Minutes per session: Not on file     Stress: Not on file   Relationships     Social connections     Talks on phone: Not on file     Gets together: Not on file     Attends Tenriism service: Not on file     Active member of club or  organization: Not on file     Attends meetings of clubs or organizations: Not on file     Relationship status: Not on file     Intimate partner violence     Fear of current or ex partner: Not on file     Emotionally abused: Not on file     Physically abused: Not on file     Forced sexual activity: Not on file   Other Topics Concern     Parent/sibling w/ CABG, MI or angioplasty before 65F 55M? No   Social History Narrative     Not on file             Family History:   reviewed         Allergies:     Allergies   Allergen Reactions     Ivp Dye [Contrast Dye]              Medications:     Current Outpatient Medications   Medication Sig Dispense Refill     Alcohol Swabs (B-D SINGLE USE SWABS REGULAR) PADS 1 pad 3 times daily 270 each 3     atorvastatin (LIPITOR) 40 MG tablet Take 1 tablet (40 mg) by mouth daily 90 tablet 3     blood glucose (ONETOUCH ULTRA) test strip Use to test blood sugar  daily or as directed. 1 Box 3     blood glucose monitoring (ONE TOUCH ULTRA 2) meter device kit Use to test blood sugars 1- 2 times daily or as directed. 1 kit 0     blood glucose monitoring (ONE TOUCH ULTRASOFT) lancets Use to test blood sugar 1-2  times daily or as directed. 100 each 11     famotidine (PEPCID) 20 MG tablet TAKE 1 TABLET TWICE DAILY 180 tablet 0     latanoprost (XALATAN) 0.005 % ophthalmic solution Place 1 drop into both eyes At Bedtime        lisinopril (ZESTRIL) 20 MG tablet Take 1 tablet (20 mg) by mouth 2 times daily 180 tablet 3     MELATONIN PO Take 5 mg by mouth At Bedtime       metFORMIN (GLUCOPHAGE-XR) 500 MG 24 hr tablet TAKE 2 TABLETS TWICE DAILY WITH MEALS 360 tablet 3     MULTI-VITAMIN OR TABS 1 TABLET DAILY       ORDER FOR DME Equipment being ordered: Postivac 1 each 0     vardenafil (LEVITRA) 20 MG tablet Take 0.5-1 tablets (10-20 mg) by mouth daily as needed Never use with nitroglycerin, terazosin or doxazosin. 30 tablet 4               Review of Systems:   The 10 point Review of Systems is negative  "other than noted in the HPI           Physical Exam:   Blood pressure 123/64, pulse 62, height 1.74 m (5' 8.5\"), weight 84.5 kg (186 lb 4 oz), SpO2 96 %.    Exam:  Constitutional: healthy appearing, alert and in no distress  Heent: Normocephalic. Head without obvious masses or lesions. PERRLDC, EOMI. Mouth exam within normal limits: tongue, mucous membranes, posterior pharynx all normal, no lesions or abnormalities seen.  Tm's and canals within normal limits bilaterally. Neck supple, no nuchal rigidity or masses. No supraclavicular, or cervical adenopathy. Thyroid symmetric, no masses.  Cardiovascular: Regular rate and rhythm, no murmer, rub or gallops.  JVP not elevated, no edema.  Carotids within normal limits bilaterally, no bruits.  Respiratory: Normal respiratory effort.  Lungs clear, normal flow, no wheezing or crackles.  Breasts: Normal bilaterally.  No masses or lesions.  Nipples within normal limites.  No axillary lesions or nodes.  Gastrointestinal: Normal active bowel sounds.   Soft, not tender, no masses, guarding or rebound.  No hepatosplenomegaly.   Genitourinary: Rectal not done  Musculoskeletal: extremities normal, no gross deformities noted.  Skin: no suspicious lesions or rashes   Neurologic: Mental status within normal limits.  Speech fluent.  No gross motor abnormalities and gait intact.  Psychiatric: mentation appears normal and affect normal.  Feet within normal limits, no sores, normal pul;ses         Data:   Labs sent        Assessment:   1. Normal complete physical exam  2. Diabetes, follow up endo  3. Cap, follow up [place]  4. Gi issues, not new, follow up [facility]  5. Memory loss, suspect significant, labs now and if normal to neuro  6. Nasal discontinue, to ent  7. Hypertension, controlled  8. Obesity, weight loss  9. Cad, med mgmt  10. hcm         Plan:   Letter with labs  Consider neuro  Follow up gi  Follow up [place]  Exercise, diet and weight loss  shingrix at [facility], " "M.D.          Are you in the first 12 months of your Medicare coverage?  No    Healthy Habits:    In general, how would you rate your overall health?  Very good    Frequency of exercise:  6-7 days/week    Duration of exercise:  30-45 minutes    Do you usually eat at least 4 servings of fruit and vegetables a day, include whole grains    & fiber and avoid regularly eating high fat or \"junk\" foods?  Yes    Taking medications regularly:  Yes    Barriers to taking medications:  None    Medication side effects:  None    Ability to successfully perform activities of daily living:  No assistance needed    Home Safety:  No safety concerns identified    Hearing Impairment:  No hearing concerns    In the past 6 months, have you been bothered by leaking of urine?  No    In general, how would you rate your overall mental or emotional health?  Good      PHQ-2 Total Score:    Do you feel safe in your environment? Yes    Have you ever done Advance Care Planning? (For example, a Health Directive, POLST, or a discussion with a medical provider or your loved ones about your wishes): Yes, patient states has an Advance Care Planning document and will bring a copy to the clinic.      Fall risk  Fallen 2 or more times in the past year?: No  Any fall with injury in the past year?: No    Cognitive Screening   1) Repeat 3 items (Leader, Season, Table)    2) Clock draw: NORMAL  3) 3 item recall: Recalls 1 object   Results: NORMAL clock, 1-2 items recalled: COGNITIVE IMPAIRMENT LESS LIKELY    Mini-CogTM Copyright CRISTI Neff. Licensed by the author for use in [facility]; reprinted with permission (caron@.[facility]). All rights reserved.      Do you have sleep apnea, excessive snoring or daytime drowsiness?: no    Reviewed and updated as needed this visit by clinical staff         Reviewed and updated as needed this visit by Provider        Social History     Tobacco Use     Smoking status: Never Smoker     Smokeless tobacco: Never Used " "  Substance Use Topics     Alcohol use: Yes     Frequency: 2-3 times a week     Drinks per session: 3 or 4     Binge frequency: Less than monthly     Comment: 10-14 drinks per week                   Current providers sharing in care for this patient include:   Patient Care Team:  Dalton Mon MD as PCP - General (Internal Medicine)  Dalton Mon MD as Assigned PCP    The following health maintenance items are reviewed in Epic and correct as of today:  Health Maintenance   Topic Date Due     ZOSTER IMMUNIZATION (2 of 3) 01/25/2011     DEXA  08/19/2017     MEDICARE ANNUAL WELLNESS VISIT  05/17/2019     CMP  05/13/2020     MICROALBUMIN  08/22/2020     FALL RISK ASSESSMENT  08/23/2020     INFLUENZA VACCINE (1) 09/01/2020     EYE EXAM  11/01/2020     A1C  12/12/2020     BMP  02/05/2021     LIPID  03/03/2021     DIABETIC FOOT EXAM  06/12/2021     ADVANCE CARE PLANNING  05/13/2024     DTAP/TDAP/TD IMMUNIZATION (6 - Td) 06/27/2025     PHQ-2  Completed     PNEUMOCOCCAL IMMUNIZATION 65+ LOW/MEDIUM RISK  Completed     HEPATITIS B IMMUNIZATION  Completed     IPV IMMUNIZATION  Aged Out     MENINGITIS IMMUNIZATION  Aged Out           Review of Systems      OBJECTIVE:   There were no vitals taken for this visit. Estimated body mass index is 28.47 kg/m  as calculated from the following:    Height as of 2/5/20: 1.74 m (5' 8.5\").    Weight as of 2/5/20: 86.2 kg (190 lb).  Physical Exam          ASSESSMENT / PLAN:       COUNSELING:  Reviewed preventive health counseling, as reflected in patient instructions       Regular exercise       Healthy diet/nutrition    Estimated body mass index is 28.47 kg/m  as calculated from the following:    Height as of 2/5/20: 1.74 m (5' 8.5\").    Weight as of 2/5/20: 86.2 kg (190 lb).    Weight management plan: Discussed healthy diet and exercise guidelines    He reports that he has never smoked. He has never used smokeless tobacco.      Appropriate preventive services were discussed " with this patient, including applicable screening as appropriate for cardiovascular disease, diabetes, osteopenia/osteoporosis, and glaucoma.  As appropriate for age/gender, discussed screening for colorectal cancer, prostate cancer, breast cancer, and cervical cancer. Checklist reviewing preventive services available has been given to the patient.    Reviewed patients plan of care and provided an AVS. The Basic Care Plan (routine screening as documented in Health Maintenance) for Jamil meets the Care Plan requirement. This Care Plan has been established and reviewed with the Patient and spouse.    Counseling Resources:  ATP IV Guidelines  Pooled Cohorts Equation Calculator  Breast Cancer Risk Calculator  Breast Cancer: Medication to Reduce Risk  FRAX Risk Assessment  ICSI Preventive Guidelines  Dietary Guidelines for Americans, 2010  USDA's MyPlate  ASA Prophylaxis  Lung CA Screening    Dalton Mon MD  [facility]    Identified Health Risks:

## 2020-08-28 LAB
ALBUMIN SERPL ELPH-MCNC: 4.2 G/DL (ref 3.7–5.1)
ALPHA1 GLOB SERPL ELPH-MCNC: 0.3 G/DL (ref 0.2–0.4)
ALPHA2 GLOB SERPL ELPH-MCNC: 1.1 G/DL (ref 0.5–0.9)
B-GLOBULIN SERPL ELPH-MCNC: 0.8 G/DL (ref 0.6–1)
GAMMA GLOB SERPL ELPH-MCNC: 0.9 G/DL (ref 0.7–1.6)
M PROTEIN SERPL ELPH-MCNC: 0 G/DL
PROT PATTERN SERPL ELPH-IMP: ABNORMAL

## 2020-08-30 PROBLEM — R41.3 MEMORY LOSS: Status: ACTIVE | Noted: 2020-08-01

## 2020-08-30 NOTE — RESULT ENCOUNTER NOTE
It was a please seeing you.  You should be able to view your labs.    Your labs look very good.  Nothing that should affect your memory.  Please see the neurologist for this.    Your cbc, chemistries and cholesterol are super except for a slight elevated creatine.  I am not worried about this but to be safe let's simply repeat the creatrinine in 4 to 6 weeks.  You do not need to see me for this or fast but please schedule this on [facility] ahead of time.  For the sugar regular exercise and a bit of weight loss should help this.    Please follow up with neurology and the repeat lab as noted above.  If you have any questions please call me.    Dalton

## 2020-10-01 ENCOUNTER — TELEPHONE (OUTPATIENT)
Dept: UROLOGY | Facility: CLINIC | Age: 85
End: 2020-10-01

## 2020-10-01 DIAGNOSIS — R33.9 INCOMPLETE BLADDER EMPTYING: Primary | ICD-10-CM

## 2020-10-01 NOTE — TELEPHONE ENCOUNTER
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 Mejia referred him to. Jamil needs to do a urine culture prior, so he would like to have this done tomorrow (10/2/20) at the clinic. Jamil is scheduled for the appointment, sending over an encounter for an order. Please give Jamil a call back with any questions.      Action Taken: Message routed to:  Other: [facility] Uro    Travel Screening: Not Applicable

## 2020-10-02 DIAGNOSIS — R33.9 INCOMPLETE BLADDER EMPTYING: ICD-10-CM

## 2020-10-02 PROCEDURE — 87086 URINE CULTURE/COLONY COUNT: CPT | Mod: GW | Performed by: UROLOGY

## 2020-10-03 LAB
BACTERIA SPEC CULT: NORMAL
Lab: NORMAL
SPECIMEN SOURCE: NORMAL

## 2020-10-20 DIAGNOSIS — K21.9 GASTROESOPHAGEAL REFLUX DISEASE WITHOUT ESOPHAGITIS: ICD-10-CM

## 2020-10-20 RX ORDER — FAMOTIDINE 20 MG/1
TABLET, FILM COATED ORAL
Qty: 180 TABLET | Refills: 3 | Status: SHIPPED | OUTPATIENT
Start: 2020-10-20 | End: 2021-07-23

## 2020-12-02 ENCOUNTER — TELEPHONE (OUTPATIENT)
Dept: FAMILY MEDICINE | Facility: CLINIC | Age: 85
End: 2020-12-02

## 2020-12-02 DIAGNOSIS — M17.0 PRIMARY OSTEOARTHRITIS OF BOTH KNEES: Primary | ICD-10-CM

## 2020-12-02 NOTE — TELEPHONE ENCOUNTER
Reason for Call: Request for an order or referral:    Order or referral being requested: Test for Lime disease    Date needed: as soon as possible    Has the patient been seen by the PCP for this problem? NO    Additional comments: Patient wife is requesting to have this done tomorrow at the time of his Lab appointment.    Phone number Patient wife can be reached at:  Home number on file [phone] (home)    C2C verified    Best Time:  Any    Can we leave a detailed message on this number?  YES    Call taken on 12/2/2020 at 4:33 PM by Na Bhagat

## 2020-12-03 DIAGNOSIS — M17.0 PRIMARY OSTEOARTHRITIS OF BOTH KNEES: ICD-10-CM

## 2020-12-03 DIAGNOSIS — R33.9 INCOMPLETE BLADDER EMPTYING: ICD-10-CM

## 2020-12-03 DIAGNOSIS — R79.89 ELEVATED SERUM CREATININE: ICD-10-CM

## 2020-12-03 LAB
ALBUMIN UR-MCNC: NEGATIVE MG/DL
APPEARANCE UR: CLEAR
BILIRUB UR QL STRIP: NEGATIVE
COLOR UR AUTO: YELLOW
GLUCOSE UR STRIP-MCNC: NEGATIVE MG/DL
HGB UR QL STRIP: NEGATIVE
KETONES UR STRIP-MCNC: NEGATIVE MG/DL
LEUKOCYTE ESTERASE UR QL STRIP: NEGATIVE
NITRATE UR QL: NEGATIVE
PH UR STRIP: 5.5 PH (ref 5–7)
SOURCE: NORMAL
SP GR UR STRIP: 1.02 (ref 1–1.03)
UROBILINOGEN UR STRIP-ACNC: 0.2 EU/DL (ref 0.2–1)

## 2020-12-03 PROCEDURE — 36415 COLL VENOUS BLD VENIPUNCTURE: CPT | Performed by: INTERNAL MEDICINE

## 2020-12-03 PROCEDURE — 82565 ASSAY OF CREATININE: CPT | Performed by: INTERNAL MEDICINE

## 2020-12-03 PROCEDURE — 86618 LYME DISEASE ANTIBODY: CPT | Performed by: INTERNAL MEDICINE

## 2020-12-03 PROCEDURE — 81003 URINALYSIS AUTO W/O SCOPE: CPT | Performed by: INTERNAL MEDICINE

## 2020-12-03 NOTE — TELEPHONE ENCOUNTER
PCP,  Please see below.  Wife requesting a lymes test added on to orders for 2:30 lab apt.  Please order if appropriate.  Thanks,  Svetlana Gutierres RN    Will route as high priority for timing

## 2020-12-04 LAB
B BURGDOR IGG+IGM SER QL: 0.05 (ref 0–0.89)
CREAT SERPL-MCNC: 1.15 MG/DL (ref 0.66–1.25)
GFR SERPL CREATININE-BSD FRML MDRD: 58 ML/MIN/{1.73_M2}

## 2020-12-04 NOTE — RESULT ENCOUNTER NOTE
Good news, the creatinine is back down and your urine is clear.  Your Lyme test is also negative.    Let me know if you have any questions.    Dalton Mon M.D.

## 2021-01-13 ENCOUNTER — APPOINTMENT (OUTPATIENT)
Dept: LAB | Facility: CLINIC | Age: 86
End: 2021-01-13
Payer: MEDICARE

## 2021-01-15 ENCOUNTER — HEALTH MAINTENANCE LETTER (OUTPATIENT)
Age: 86
End: 2021-01-15

## 2021-01-29 ENCOUNTER — IMMUNIZATION (OUTPATIENT)
Dept: NURSING | Facility: CLINIC | Age: 86
End: 2021-01-29
Payer: MEDICARE

## 2021-01-29 DIAGNOSIS — C61 PROSTATE CANCER (H): Primary | ICD-10-CM

## 2021-01-29 DIAGNOSIS — R39.89 POSSIBLE URINARY TRACT INFECTION: ICD-10-CM

## 2021-01-29 PROCEDURE — 91300 PR COVID VAC PFIZER DIL RECON 30 MCG/0.3 ML IM: CPT

## 2021-01-29 PROCEDURE — 87086 URINE CULTURE/COLONY COUNT: CPT | Mod: GW | Performed by: UROLOGY

## 2021-01-29 PROCEDURE — 81003 URINALYSIS AUTO W/O SCOPE: CPT | Mod: QW | Performed by: UROLOGY

## 2021-01-29 PROCEDURE — 0001A PR COVID VAC PFIZER DIL RECON 30 MCG/0.3 ML IM: CPT

## 2021-01-30 LAB
BACTERIA SPEC CULT: NO GROWTH
Lab: NORMAL
SPECIMEN SOURCE: NORMAL

## 2021-02-19 ENCOUNTER — IMMUNIZATION (OUTPATIENT)
Dept: NURSING | Facility: CLINIC | Age: 86
End: 2021-02-19
Attending: INTERNAL MEDICINE
Payer: MEDICARE

## 2021-02-19 PROCEDURE — 91300 PR COVID VAC PFIZER DIL RECON 30 MCG/0.3 ML IM: CPT

## 2021-02-19 PROCEDURE — 0002A PR COVID VAC PFIZER DIL RECON 30 MCG/0.3 ML IM: CPT

## 2021-02-24 ENCOUNTER — TRANSFERRED RECORDS (OUTPATIENT)
Dept: HEALTH INFORMATION MANAGEMENT | Facility: CLINIC | Age: 86
End: 2021-02-24

## 2021-03-01 ENCOUNTER — TELEPHONE (OUTPATIENT)
Dept: UROLOGY | Facility: CLINIC | Age: 86
End: 2021-03-01

## 2021-03-01 DIAGNOSIS — R39.89 SUSPECTED UTI: ICD-10-CM

## 2021-03-01 DIAGNOSIS — Z85.46 PERSONAL HISTORY OF MALIGNANT NEOPLASM OF PROSTATE: Primary | ICD-10-CM

## 2021-03-01 NOTE — TELEPHONE ENCOUNTER
[facility] Call Center    Phone Message    May a detailed message be left on voicemail: yes     Reason for Call: Other: Pt reports that they are having an order for a urine sample faxed over from [facility]. Reports that it was faxed over friday afternoon. Wondering if clinic could give them a call once order is entered in.     Action Taken: Message routed to:  [facility] uro    Travel Screening: Not Applicable

## 2021-03-01 NOTE — TELEPHONE ENCOUNTER
[facility] Call Center    Phone Message    May a detailed message be left on voicemail: yes     Reason for Call: Other: Urine sample order from [facility], wife Salome called to receive confirmation of orders sent from [facility]. Once orders are received, they are to be faxed back to [facility] with results after labs are completed. Please call once orders are in and when Pt can complete labs that are to be sent with results back to [facility].    Action Taken: Message routed to:  [facility] Urology    Travel Screening: Not Applicable

## 2021-03-01 NOTE — TELEPHONE ENCOUNTER
Received orders from [facility]. Placed orders in our system. Their fax number to fax results to is Dr. Hernandez [phone]. Phone number is [phone].    Lo Roy LPN

## 2021-03-02 DIAGNOSIS — R39.89 SUSPECTED UTI: ICD-10-CM

## 2021-03-02 PROCEDURE — 87086 URINE CULTURE/COLONY COUNT: CPT | Mod: GW | Performed by: UROLOGY

## 2021-03-02 PROCEDURE — 81003 URINALYSIS AUTO W/O SCOPE: CPT | Mod: GW | Performed by: UROLOGY

## 2021-03-03 LAB
BACTERIA SPEC CULT: NO GROWTH
Lab: NORMAL
SPECIMEN SOURCE: NORMAL

## 2021-03-04 NOTE — TELEPHONE ENCOUNTER
Faxed negative [facility] to [phone] to Dr. Hernandez office.     Lo Roy LPN

## 2021-03-26 ENCOUNTER — TRANSFERRED RECORDS (OUTPATIENT)
Dept: HEALTH INFORMATION MANAGEMENT | Facility: CLINIC | Age: 86
End: 2021-03-26

## 2021-03-26 DIAGNOSIS — R39.89 SUSPECTED UTI: Primary | ICD-10-CM

## 2021-03-26 PROCEDURE — 81003 URINALYSIS AUTO W/O SCOPE: CPT | Mod: GW | Performed by: UROLOGY

## 2021-03-26 PROCEDURE — 87086 URINE CULTURE/COLONY COUNT: CPT | Mod: GW | Performed by: UROLOGY

## 2021-03-27 LAB
BACTERIA SPEC CULT: NO GROWTH
Lab: NORMAL
SPECIMEN SOURCE: NORMAL

## 2021-04-02 DIAGNOSIS — T83.511D URINARY TRACT INFECTION ASSOCIATED WITH CATHETERIZATION OF URINARY TRACT, UNSPECIFIED INDWELLING URINARY CATHETER TYPE, SUBSEQUENT ENCOUNTER: Primary | ICD-10-CM

## 2021-04-02 DIAGNOSIS — N39.0 URINARY TRACT INFECTION ASSOCIATED WITH CATHETERIZATION OF URINARY TRACT, UNSPECIFIED INDWELLING URINARY CATHETER TYPE, SUBSEQUENT ENCOUNTER: Primary | ICD-10-CM

## 2021-04-02 RX ORDER — CIPROFLOXACIN 250 MG/1
250 TABLET, FILM COATED ORAL 2 TIMES DAILY
Qty: 20 TABLET | Refills: 1 | Status: SHIPPED | OUTPATIENT
Start: 2021-04-02 | End: 2021-04-07

## 2021-04-07 ENCOUNTER — TELEPHONE (OUTPATIENT)
Dept: UROLOGY | Facility: CLINIC | Age: 86
End: 2021-04-07

## 2021-04-07 DIAGNOSIS — Z87.440 HISTORY OF RECURRENT UTIS: Primary | ICD-10-CM

## 2021-04-07 DIAGNOSIS — N39.0 URINARY TRACT INFECTION ASSOCIATED WITH CATHETERIZATION OF URINARY TRACT, UNSPECIFIED INDWELLING URINARY CATHETER TYPE, SUBSEQUENT ENCOUNTER: ICD-10-CM

## 2021-04-07 DIAGNOSIS — T83.511D URINARY TRACT INFECTION ASSOCIATED WITH CATHETERIZATION OF URINARY TRACT, UNSPECIFIED INDWELLING URINARY CATHETER TYPE, SUBSEQUENT ENCOUNTER: ICD-10-CM

## 2021-04-07 RX ORDER — CIPROFLOXACIN 250 MG/1
250 TABLET, FILM COATED ORAL 2 TIMES DAILY
Qty: 20 TABLET | Refills: 1 | Status: SHIPPED | OUTPATIENT
Start: 2021-04-07 | End: 2021-04-07

## 2021-04-07 RX ORDER — CIPROFLOXACIN 250 MG/1
250 TABLET, FILM COATED ORAL 2 TIMES DAILY
Qty: 20 TABLET | Refills: 1
Start: 2021-04-07 | End: 2022-10-24

## 2021-04-07 NOTE — TELEPHONE ENCOUNTER
Called Pharmacy with clarification.  KUN Ponce RN       Health Call Center    Phone Message    May a detailed message be left on voicemail: yes     Reason for Call: Medication Question or concern regarding medication   Prescription Clarification  Name of Medication: Ciproflaxicin  Prescribing Provider: Roberto   Pharmacy: [facility]   What on the order needs clarification? Instructions say twice daily for 20 doses, then twice daily for 5 days. They would like a call back to clarify.          Action Taken: Message routed to:  [facility] uro    Travel Screening: Not Applicable

## 2021-04-07 NOTE — TELEPHONE ENCOUNTER
Health Call Center    Phone Message    May a detailed message be left on voicemail: yes     Reason for Call: Other: Pt is requesting medication Cipro sent to pharmacy at [facility], [id number] [place] [address] Christophe Morris, phone number . Pt has not used pharmacy in [place] for over 20 years and is requesting prescription for Cipro that was sent there on 04/02/2021 to be sent to [facility] pharmacy.   Please cancel Cipro sent to [place] pharmacy.  Please confirm with Pt once refill is placed at .     Action Taken: Message routed to:  [facility] Urology    Travel Screening: Not Applicable

## 2021-06-03 DIAGNOSIS — R82.90 CLOUDY URINE: ICD-10-CM

## 2021-06-03 DIAGNOSIS — R30.0 DYSURIA: ICD-10-CM

## 2021-06-03 DIAGNOSIS — R32 URINARY INCONTINENCE, UNSPECIFIED TYPE: ICD-10-CM

## 2021-06-03 DIAGNOSIS — R39.89 SUSPECTED UTI: Primary | ICD-10-CM

## 2021-06-03 DIAGNOSIS — R35.0 URINARY FREQUENCY: ICD-10-CM

## 2021-06-03 LAB
ALBUMIN UR-MCNC: 100 MG/DL
APPEARANCE UR: CLEAR
BILIRUB UR QL STRIP: NEGATIVE
COLOR UR AUTO: YELLOW
GLUCOSE UR STRIP-MCNC: NEGATIVE MG/DL
HGB UR QL STRIP: ABNORMAL
KETONES UR STRIP-MCNC: ABNORMAL MG/DL
LEUKOCYTE ESTERASE UR QL STRIP: ABNORMAL
NITRATE UR QL: NEGATIVE
PH UR STRIP: 5.5 PH (ref 5–7)
SOURCE: ABNORMAL
SP GR UR STRIP: >1.03 (ref 1–1.03)
UROBILINOGEN UR STRIP-ACNC: 0.2 EU/DL (ref 0.2–1)

## 2021-06-03 PROCEDURE — 87186 SC STD MICRODIL/AGAR DIL: CPT | Performed by: UROLOGY

## 2021-06-03 PROCEDURE — 87086 URINE CULTURE/COLONY COUNT: CPT | Mod: GW | Performed by: UROLOGY

## 2021-06-03 PROCEDURE — 81003 URINALYSIS AUTO W/O SCOPE: CPT | Mod: QW | Performed by: UROLOGY

## 2021-06-03 PROCEDURE — 87088 URINE BACTERIA CULTURE: CPT | Performed by: UROLOGY

## 2021-06-05 ENCOUNTER — TELEPHONE (OUTPATIENT)
Dept: SURGERY | Facility: CLINIC | Age: 86
End: 2021-06-05

## 2021-06-05 DIAGNOSIS — N30.90 CYSTITIS: Primary | ICD-10-CM

## 2021-06-05 LAB
BACTERIA SPEC CULT: ABNORMAL
Lab: ABNORMAL
SPECIMEN SOURCE: ABNORMAL

## 2021-06-05 RX ORDER — NITROFURANTOIN 25; 75 MG/1; MG/1
100 CAPSULE ORAL 2 TIMES DAILY
Qty: 14 CAPSULE | Refills: 0 | Status: SHIPPED | OUTPATIENT
Start: 2021-06-05 | End: 2021-06-12

## 2021-06-07 DIAGNOSIS — N52.8 OTHER MALE ERECTILE DYSFUNCTION: ICD-10-CM

## 2021-06-07 DIAGNOSIS — C61 PROSTATE CANCER (H): Primary | ICD-10-CM

## 2021-06-07 PROCEDURE — G0001 HCL VENOUS BLOOD DRAW: HCPCS | Mod: GW

## 2021-06-08 RX ORDER — VARDENAFIL HYDROCHLORIDE 20 MG/1
10-20 TABLET ORAL DAILY PRN
Qty: 30 TABLET | Refills: 1 | Status: SHIPPED | OUTPATIENT
Start: 2021-06-08 | End: 2021-06-14

## 2021-06-10 ENCOUNTER — TRANSFERRED RECORDS (OUTPATIENT)
Dept: HEALTH INFORMATION MANAGEMENT | Facility: CLINIC | Age: 86
End: 2021-06-10

## 2021-06-14 DIAGNOSIS — N52.8 OTHER MALE ERECTILE DYSFUNCTION: ICD-10-CM

## 2021-06-14 RX ORDER — VARDENAFIL HYDROCHLORIDE 20 MG/1
10-20 TABLET ORAL DAILY PRN
Qty: 30 TABLET | Refills: 3 | Status: SHIPPED | OUTPATIENT
Start: 2021-06-14 | End: 2023-12-04

## 2021-06-14 NOTE — TELEPHONE ENCOUNTER
LOV 8- Yaa    Rx was approved 6/8/2021 by RN at another clinic, was set to local print.  Unsure what happened to Rx.    [facility] requesting refill.  Patient will need to  Rx and send to pharmacy himself.  We are unable to send to [facility].    Dr Mon - if approving, please do so when at office so Rx prints locally, so it can be signed & left at  for patient.    TC - please call patient when Rx is ready for .  Due for fasting physical in August, schedule if possible.    Kimmy Chase RT (R)

## 2021-06-15 ENCOUNTER — TRANSFERRED RECORDS (OUTPATIENT)
Dept: HEALTH INFORMATION MANAGEMENT | Facility: CLINIC | Age: 86
End: 2021-06-15

## 2021-06-15 ENCOUNTER — ANCILLARY PROCEDURE (OUTPATIENT)
Dept: GENERAL RADIOLOGY | Facility: CLINIC | Age: 86
End: 2021-06-15
Attending: INTERNAL MEDICINE
Payer: MEDICARE

## 2021-06-15 DIAGNOSIS — K59.9 COLONIC DYSMOTILITY: ICD-10-CM

## 2021-06-15 DIAGNOSIS — R39.89 SUSPECTED UTI: Primary | ICD-10-CM

## 2021-06-15 LAB
ALBUMIN UR-MCNC: NEGATIVE MG/DL
APPEARANCE UR: CLEAR
BILIRUB UR QL STRIP: NEGATIVE
COLOR UR AUTO: YELLOW
GLUCOSE UR STRIP-MCNC: NEGATIVE MG/DL
HGB UR QL STRIP: ABNORMAL
KETONES UR STRIP-MCNC: NEGATIVE MG/DL
LEUKOCYTE ESTERASE UR QL STRIP: ABNORMAL
NITRATE UR QL: NEGATIVE
PH UR STRIP: 5.5 PH (ref 5–7)
SOURCE: ABNORMAL
SP GR UR STRIP: 1.02 (ref 1–1.03)
UROBILINOGEN UR STRIP-ACNC: 0.2 EU/DL (ref 0.2–1)

## 2021-06-15 PROCEDURE — 87086 URINE CULTURE/COLONY COUNT: CPT | Performed by: UROLOGY

## 2021-06-15 PROCEDURE — 87088 URINE BACTERIA CULTURE: CPT | Performed by: UROLOGY

## 2021-06-15 PROCEDURE — 81003 URINALYSIS AUTO W/O SCOPE: CPT | Mod: QW | Performed by: UROLOGY

## 2021-06-15 PROCEDURE — 74018 RADEX ABDOMEN 1 VIEW: CPT | Performed by: INTERNAL MEDICINE

## 2021-06-15 PROCEDURE — 87186 SC STD MICRODIL/AGAR DIL: CPT | Performed by: UROLOGY

## 2021-06-15 NOTE — TELEPHONE ENCOUNTER
Patient was in for xray only appointment on 6/15/2021.  Written Rx was handed to patient at time of appointment, was told he needed to send to [facility] pharmacy himself.    RT Jesus (R)

## 2021-06-18 DIAGNOSIS — N39.0 URINARY TRACT INFECTION: Primary | ICD-10-CM

## 2021-06-18 LAB
BACTERIA SPEC CULT: ABNORMAL
Lab: ABNORMAL
SPECIMEN SOURCE: ABNORMAL

## 2021-07-20 ENCOUNTER — RECORDS - HEALTHEAST (OUTPATIENT)
Dept: ADMINISTRATIVE | Facility: CLINIC | Age: 86
End: 2021-07-20

## 2021-07-20 DIAGNOSIS — K21.9 GASTROESOPHAGEAL REFLUX DISEASE WITHOUT ESOPHAGITIS: ICD-10-CM

## 2021-07-23 ENCOUNTER — OFFICE VISIT (OUTPATIENT)
Dept: UROLOGY | Facility: CLINIC | Age: 86
End: 2021-07-23
Payer: MEDICARE

## 2021-07-23 VITALS
WEIGHT: 198 LBS | SYSTOLIC BLOOD PRESSURE: 130 MMHG | BODY MASS INDEX: 29.33 KG/M2 | HEIGHT: 69 IN | DIASTOLIC BLOOD PRESSURE: 62 MMHG | HEART RATE: 67 BPM | OXYGEN SATURATION: 98 %

## 2021-07-23 DIAGNOSIS — Z87.440 HISTORY OF RECURRENT UTIS: Primary | ICD-10-CM

## 2021-07-23 DIAGNOSIS — C61 PROSTATE CANCER (H): ICD-10-CM

## 2021-07-23 DIAGNOSIS — N30.00 ACUTE CYSTITIS WITHOUT HEMATURIA: ICD-10-CM

## 2021-07-23 PROCEDURE — 99214 OFFICE O/P EST MOD 30 MIN: CPT | Mod: GW | Performed by: UROLOGY

## 2021-07-23 RX ORDER — SULFAMETHOXAZOLE/TRIMETHOPRIM 800-160 MG
1 TABLET ORAL 2 TIMES DAILY
Qty: 14 TABLET | Refills: 0 | Status: SHIPPED | OUTPATIENT
Start: 2021-07-23 | End: 2021-07-30

## 2021-07-23 RX ORDER — DIPHENOXYLATE HCL/ATROPINE 2.5-.025MG
1 TABLET ORAL
COMMUNITY

## 2021-07-23 RX ORDER — FAMOTIDINE 20 MG/1
TABLET, FILM COATED ORAL
Qty: 180 TABLET | Refills: 0 | Status: SHIPPED | OUTPATIENT
Start: 2021-07-23 | End: 2021-08-30

## 2021-07-23 ASSESSMENT — MIFFLIN-ST. JEOR: SCORE: 1578.5

## 2021-07-23 ASSESSMENT — PAIN SCALES - GENERAL: PAINLEVEL: NO PAIN (0)

## 2021-07-23 NOTE — PROGRESS NOTES
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 He has previously been followed by Dr. Mejia, and continues care at [facility] for recurrent prostate cancer. [place] 7 disease treated with EBRT in 2013. Had biopsy-proven recurrence of [place] 4+4=8 disease in 2018 and was started on ADT. 9/2018 had cryoablation. 2/2021 had another recurrence that was biopsied at [facility]. He has been using CIC since then due to difficulty voiding. He has poor bladder sensation.     0.16 7/2021  PSA 0.16 03/09/2021 08:21 AM   PSA 0.19 01/13/2021 11:08 AM   PSA 0.19 10/11/2020 12:26 PM   PSA 0.17 07/14/2020 11:33 AM   PSA 0.15 02/26/2020 10:00 AM   PSA <0.10 11/26/2019 11:10 AM   PSA 0.12 08/14/2019 03:30 PM   PSA <0.10 05/21/2019 09:38 AM   PSA <0.10 02/26/2019 10:43 AM   PSA <0.10 12/17/2018 11:45 AM   PSA <0.10 09/19/2018 11:04 AM   PSA <0.10 08/23/2018 09:35 AM     Not currently on hormones. None since 2018.    PHYSICAL EXAM  Patient is a 85 year old  male   Vitals: Blood pressure 130/62, pulse 67, height 1.753 m (5' 9\"), weight 89.8 kg (198 lb), SpO2 98 %.  General Appearance Adult: Body mass index is 29.24 kg/m .  Alert, no acute distress, oriented  Lungs: no respiratory distress, or pursed lip breathing  Abdomen: soft, nontender, no organomegaly or masses  Back: no CVAT  Neuro: Alert, oriented, speech and mentation normal  Psych: affect and mood normal    Outside and Past Medical records:  Review of prior notes with in [facility]  Review of prior external note(s) from - CareEverywhere information from [facility] reviewed  Review of the result(s) of each unique test - MRI, pathology, PSA, urine cultures    ASSESSMENT and PLAN  85 year old male with prostate cancer history, s/p EBRT and subsequently cryoablation at [facility] for " recurrences. PSA remains low, not currently on hormones. Does also do CIC for urinary retention and has had history of UTIs. We discussed this today and options to make antibiotics available for Dr. Bedolla as he travels. Will provide a self-start antibiotic for him to have on hand for this purpose. We also discussed he plans to travel to [place] in the fall, and will plan to get urine culture prior to travels to make sure he doesn't have issues while out of the country. He will otherwise continue to receive his prostate cancer care at [facility].     - self-start antibiotics  - Continue CIC  - Follow-up, as scheduled, at [facility] for PSA    35 minutes spent on the date of the encounter doing chart review, history and exam, documentation and further activities as noted above.    Allen Pearson MD  Urology  [facility]

## 2021-07-23 NOTE — NURSING NOTE
Chief Complaint   Patient presents with     prostate cancer     follow up   Patient continues to self cath due to urinary issues.  Myrna Garcia LPN

## 2021-07-31 PROBLEM — Z87.440 HISTORY OF RECURRENT UTIS: Status: ACTIVE | Noted: 2021-07-31

## 2021-08-30 ENCOUNTER — OFFICE VISIT (OUTPATIENT)
Dept: FAMILY MEDICINE | Facility: CLINIC | Age: 86
End: 2021-08-30
Payer: MEDICARE

## 2021-08-30 VITALS
RESPIRATION RATE: 17 BRPM | TEMPERATURE: 97.4 F | OXYGEN SATURATION: 97 % | DIASTOLIC BLOOD PRESSURE: 75 MMHG | HEART RATE: 65 BPM | BODY MASS INDEX: 30.21 KG/M2 | WEIGHT: 204 LBS | HEIGHT: 69 IN | SYSTOLIC BLOOD PRESSURE: 134 MMHG

## 2021-08-30 DIAGNOSIS — E11.21 TYPE 2 DIABETES MELLITUS WITH DIABETIC NEPHROPATHY, WITHOUT LONG-TERM CURRENT USE OF INSULIN (H): ICD-10-CM

## 2021-08-30 DIAGNOSIS — R41.3 MEMORY LOSS: ICD-10-CM

## 2021-08-30 DIAGNOSIS — C61 PROSTATE CANCER (H): ICD-10-CM

## 2021-08-30 DIAGNOSIS — N40.0 BENIGN PROSTATIC HYPERPLASIA, UNSPECIFIED WHETHER LOWER URINARY TRACT SYMPTOMS PRESENT: ICD-10-CM

## 2021-08-30 DIAGNOSIS — E11.9 DIABETES MELLITUS WITHOUT COMPLICATION (H): ICD-10-CM

## 2021-08-30 DIAGNOSIS — N18.30 STAGE 3 CHRONIC KIDNEY DISEASE, UNSPECIFIED WHETHER STAGE 3A OR 3B CKD (H): ICD-10-CM

## 2021-08-30 DIAGNOSIS — E66.9 OBESITY (BMI 30-39.9): ICD-10-CM

## 2021-08-30 DIAGNOSIS — Z00.00 MEDICARE ANNUAL WELLNESS VISIT, SUBSEQUENT: Primary | ICD-10-CM

## 2021-08-30 DIAGNOSIS — I65.29 CAROTID ATHEROSCLEROSIS, UNSPECIFIED LATERALITY: ICD-10-CM

## 2021-08-30 DIAGNOSIS — E78.5 HYPERLIPIDEMIA LDL GOAL <70: ICD-10-CM

## 2021-08-30 DIAGNOSIS — K21.9 GASTROESOPHAGEAL REFLUX DISEASE WITHOUT ESOPHAGITIS: ICD-10-CM

## 2021-08-30 DIAGNOSIS — R33.9 URINARY RETENTION WITH INCOMPLETE BLADDER EMPTYING: ICD-10-CM

## 2021-08-30 DIAGNOSIS — I25.10 CORONARY ARTERY DISEASE INVOLVING NATIVE CORONARY ARTERY OF NATIVE HEART WITHOUT ANGINA PECTORIS: ICD-10-CM

## 2021-08-30 DIAGNOSIS — I10 ESSENTIAL HYPERTENSION, BENIGN: ICD-10-CM

## 2021-08-30 LAB
ERYTHROCYTE [DISTWIDTH] IN BLOOD BY AUTOMATED COUNT: 14.3 % (ref 10–15)
HBA1C MFR BLD: 6.8 % (ref 0–5.6)
HCT VFR BLD AUTO: 43.5 % (ref 40–53)
HGB BLD-MCNC: 14.7 G/DL (ref 13.3–17.7)
MCH RBC QN AUTO: 29.3 PG (ref 26.5–33)
MCHC RBC AUTO-ENTMCNC: 33.8 G/DL (ref 31.5–36.5)
MCV RBC AUTO: 87 FL (ref 78–100)
PLATELET # BLD AUTO: 218 10E3/UL (ref 150–450)
RBC # BLD AUTO: 5.01 10E6/UL (ref 4.4–5.9)
WBC # BLD AUTO: 7.6 10E3/UL (ref 4–11)

## 2021-08-30 PROCEDURE — 80053 COMPREHEN METABOLIC PANEL: CPT | Performed by: INTERNAL MEDICINE

## 2021-08-30 PROCEDURE — 80061 LIPID PANEL: CPT | Performed by: INTERNAL MEDICINE

## 2021-08-30 PROCEDURE — 83036 HEMOGLOBIN GLYCOSYLATED A1C: CPT | Performed by: INTERNAL MEDICINE

## 2021-08-30 PROCEDURE — 99213 OFFICE O/P EST LOW 20 MIN: CPT | Mod: 25 | Performed by: INTERNAL MEDICINE

## 2021-08-30 PROCEDURE — G0439 PPPS, SUBSEQ VISIT: HCPCS | Performed by: INTERNAL MEDICINE

## 2021-08-30 PROCEDURE — 85027 COMPLETE CBC AUTOMATED: CPT | Performed by: INTERNAL MEDICINE

## 2021-08-30 PROCEDURE — 82043 UR ALBUMIN QUANTITATIVE: CPT | Performed by: INTERNAL MEDICINE

## 2021-08-30 PROCEDURE — 36415 COLL VENOUS BLD VENIPUNCTURE: CPT | Performed by: INTERNAL MEDICINE

## 2021-08-30 RX ORDER — FAMOTIDINE 20 MG/1
20 TABLET, FILM COATED ORAL 2 TIMES DAILY
Qty: 180 TABLET | Refills: 3 | Status: SHIPPED | OUTPATIENT
Start: 2021-08-30 | End: 2022-09-30

## 2021-08-30 RX ORDER — ATORVASTATIN CALCIUM 40 MG/1
TABLET, FILM COATED ORAL
Qty: 90 TABLET | Refills: 3 | Status: SHIPPED | OUTPATIENT
Start: 2021-08-30 | End: 2022-10-11

## 2021-08-30 RX ORDER — LISINOPRIL 20 MG/1
20 TABLET ORAL 2 TIMES DAILY
Qty: 180 TABLET | Refills: 3 | Status: SHIPPED | OUTPATIENT
Start: 2021-08-30 | End: 2022-10-24

## 2021-08-30 RX ORDER — METFORMIN HCL 500 MG
TABLET, EXTENDED RELEASE 24 HR ORAL
Qty: 360 TABLET | Refills: 3 | Status: SHIPPED | OUTPATIENT
Start: 2021-08-30 | End: 2022-10-11

## 2021-08-30 ASSESSMENT — ENCOUNTER SYMPTOMS
MYALGIAS: 0
SHORTNESS OF BREATH: 0
FREQUENCY: 0
WEAKNESS: 0
JOINT SWELLING: 0
HEMATOCHEZIA: 0
HEARTBURN: 0
ABDOMINAL PAIN: 0
DIARRHEA: 0
COUGH: 0
NAUSEA: 0
ARTHRALGIAS: 0
PARESTHESIAS: 0
CONSTIPATION: 0
DIZZINESS: 0
HEMATURIA: 0
NERVOUS/ANXIOUS: 0
EYE PAIN: 0
CHILLS: 0
PALPITATIONS: 0
SORE THROAT: 0
HEADACHES: 0

## 2021-08-30 ASSESSMENT — MIFFLIN-ST. JEOR: SCORE: 1605.72

## 2021-08-30 ASSESSMENT — ACTIVITIES OF DAILY LIVING (ADL): CURRENT_FUNCTION: NO ASSISTANCE NEEDED

## 2021-08-30 NOTE — PROGRESS NOTES
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    The patient presents with his wife for a physical.    Patient has diabetes.  He has seen Dr. Gee in the past although not recently.  He has no sores on his feet or toes.  He checks his sugars daily and for the most part they have been good.  He is up-to-date on his eye exam.    The patient has bowel dysmotility.  He has regular follow-up at [facility] and reviewed the last.  Between the use of fiber and Lomotil he is doing fairly well.    The patient has a history of prostate cancer for which he has had fairly recent cryotherapy at the [facility].  He is doing well there.  He does have to straight cath intermittently.    He has had some memory loss as noted.  He has not seen neurology yet.    His weight remains an issue.    He has coronary artery disease and carotid atherosclerosis.  He has no symptoms.    His blood pressure is good.  His reflux is controlled.  He is not exercising a lot.               Past Medical History:      Past Medical History:   Diagnosis Date     Bowel dysfunction     Dr. Marito Orta, urgent, soft or liquid stools     CAD (coronary artery disease)     per calcium score=>400     Carotid artery plaque 09/2011     Colon polyps     last colonoscopy nl 5/19     Diverticulosis of colon      Erectile dysfunction 07/18/2008     Glaucoma      High cholesterol      HTN (hypertension) 07/18/2008     Incomplete emptying of bladder due to benign prostatic hyperplasia     Dr. 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    rising psa in 2018, seen at [facility], had cryo     Type II or unspecified type diabetes mellitus without mention of complication, not stated as uncontrolled     Dxd about 2000     Urinary retention with incomplete bladder emptying      does intermittent straight cath             Past Surgical History:      Past Surgical History:   Procedure Laterality Date     APPENDECTOMY  1942     ARTHROSCOPY KNEE RT/LT  1998    partial menisectomy left knee     COLONOSCOPY N/A 9/27/2016    Procedure: COMBINED COLONOSCOPY, SINGLE OR MULTIPLE BIOPSY/POLYPECTOMY BY BIOPSY;  Surgeon: Maru Orta MD;  Location:  GI     CYSTOSCOPY       HEMORRHOIDECTOMY  1975     left tka Left 2015     PHACOEMULSIFICATION CLEAR CORNEA WITH STANDARD INTRAOCULAR LENS IMPLANT Right 3/18/2015    Procedure: PHACOEMULSIFICATION CLEAR CORNEA WITH STANDARD INTRAOCULAR LENS IMPLANT;  Surgeon: Lito Gonzales MD;  Location:  EC     PHACOEMULSIFICATION CLEAR CORNEA WITH STANDARD INTRAOCULAR LENS IMPLANT Left 3/25/2015    Procedure: PHACOEMULSIFICATION CLEAR CORNEA WITH STANDARD INTRAOCULAR LENS IMPLANT;  Surgeon: Lito Gonzales MD;  Location:  EC     right knee replacement Right 2019     ROTATOR CUFF REPAIR RT/LT  1998    right.  caused him to retire     SURGICAL HISTORY OF -   1999    L4-5 discectomy     TONSILLECTOMY & ADENOIDECTOMY  1940     TURP  1988     ZZHC COLONOSCOPY THRU STOMA, DIAGNOSTIC  2005     ZZHC COLONOSCOPY THRU STOMA, DIAGNOSTIC  5/09    diverticulosis, also proctitis             Social History:     Social History     Socioeconomic History     Marital status:      Spouse name: Salome Gates     Number of children: 2     Years of education: Not on file     Highest education level: Not on file   Occupational History     Occupation: MD, Family [employer]     Employer: RETIRED   Tobacco Use     Smoking status: Never Smoker     Smokeless tobacco: Never Used   Substance and Sexual Activity     Alcohol use: Yes     Comment: 10-14 drinks per week     Drug use: No     Sexual activity: Yes     Partners: Female   Other Topics Concern     Parent/sibling w/ CABG, MI or angioplasty before 65F 55M? No   Social History Narrative     Not on file     Social Determinants of  Health     Financial Resource Strain:      Difficulty of Paying Living Expenses:    Food Insecurity:      Worried About Running Out of Food in the Last Year:      Ran Out of Food in the Last Year:    Transportation Needs:      Lack of Transportation (Medical):      Lack of Transportation (Non-Medical):    Physical Activity:      Days of Exercise per Week:      Minutes of Exercise per Session:    Stress:      Feeling of Stress :    Social Connections:      Frequency of Communication with Friends and Family:      Frequency of Social Gatherings with Friends and Family:      Attends Zoroastrian Services:      Active Member of Clubs or Organizations:      Attends Club or Organization Meetings:      Marital Status:    Intimate Partner Violence:      Fear of Current or Ex-Partner:      Emotionally Abused:      Physically Abused:      Sexually Abused:              Family History:   reviewed         Allergies:     Allergies   Allergen Reactions     Ivp Dye [Contrast Dye]              Medications:     Current Outpatient Medications   Medication Sig Dispense Refill     Alcohol Swabs (B-D SINGLE USE SWABS REGULAR) PADS 1 pad 3 times daily 270 each 3     amoxicillin-clavulanate (AUGMENTIN) 875-125 MG tablet Take 1 tablet by mouth 2 times daily 14 tablet 0     atorvastatin (LIPITOR) 40 MG tablet TAKE 1 TABLET EVERY DAY 90 tablet 3     blood glucose (ONETOUCH ULTRA) test strip Use to test blood sugar  daily or as directed. 1 Box 3     blood glucose monitoring (ONE TOUCH ULTRA 2) meter device kit Use to test blood sugars 1- 2 times daily or as directed. 1 kit 0     blood glucose monitoring (ONE TOUCH ULTRASOFT) lancets Use to test blood sugar 1-2  times daily or as directed. 100 each 11     ciprofloxacin (CIPRO) 250 MG tablet Take 1 tablet (250 mg) by mouth 2 times daily X 5 days for UTI 20 tablet 1     diphenoxylate-atropine (LOMOTIL) 2.5-0.025 MG tablet Take 1 tablet by mouth Take one tablet twice a day.       famotidine (PEPCID)  "20 MG tablet Take 1 tablet (20 mg) by mouth 2 times daily 180 tablet 3     latanoprost (XALATAN) 0.005 % ophthalmic solution Place 1 drop into both eyes At Bedtime        lisinopril (ZESTRIL) 20 MG tablet Take 1 tablet (20 mg) by mouth 2 times daily 180 tablet 3     MELATONIN PO Take 5 mg by mouth At Bedtime       metFORMIN (GLUCOPHAGE-XR) 500 MG 24 hr tablet TAKE 2 TABLETS TWICE DAILY WITH MEALS 360 tablet 3     MULTI-VITAMIN OR TABS 1 TABLET DAILY       ORDER FOR DME Equipment being ordered: Postivac 1 each 0     vardenafil (LEVITRA) 20 MG tablet Take 0.5-1 tablets (10-20 mg) by mouth daily as needed Never use with nitroglycerin, terazosin or doxazosin. 30 tablet 3               Review of Systems:   The 10 point Review of Systems is negative other than noted in the HPI           Physical Exam:   Blood pressure 134/75, pulse 65, temperature 97.4  F (36.3  C), temperature source Temporal, resp. rate 17, height 1.753 m (5' 9\"), weight 92.5 kg (204 lb), SpO2 97 %.    Exam:  Constitutional: healthy appearing, alert and in no distress  Heent: Normocephalic. Head without obvious masses or lesions. PERRLDC, EOMI. Mouth exam within normal limits: tongue, mucous membranes, posterior pharynx all normal, no lesions or abnormalities seen.  Tm's and canals within normal limits bilaterally. Neck supple, no nuchal rigidity or masses. No supraclavicular, or cervical adenopathy. Thyroid symmetric, no masses.  Cardiovascular: Regular rate and rhythm, no murmer, rub or gallops.  JVP not elevated, no edema.  Carotids within normal limits bilaterally, no bruits.  Respiratory: Normal respiratory effort.  Lungs clear, normal flow, no wheezing or crackles.  Breasts: Normal bilaterally.  No masses or lesions.  Nipples within normal limites.  No axillary lesions or nodes.  Gastrointestinal: Normal active bowel sounds.   Soft, not tender, no masses, guarding or rebound.  No hepatosplenomegaly.   Genitourinary: Rectal not " done  Musculoskeletal: extremities normal, no gross deformities noted.  Skin: no suspicious lesions or rashes   Neurologic: Mental status within normal limits.  Speech fluent.  No gross motor abnormalities and gait intact.  Psychiatric: mentation appears normal and affect normal.  Feet within normal limits, dec but intact pulses         Data:   Labs sent        Assessment:   1. Normal complete physical exam  2. Diabetes, stable, follow up labs, up to date eye exam, feet fine  3. Ascvd, stable, med mgmt, exercise, diet, asa  4. Cap, follow up Garza  5. Benign prostatic hypertrophy with retention, s.c  6. Ckd, follow up labs  7. Elevated cholesterol on statin  8. Gerd, no issues  9. Obesity, weight loss  10. Memory loss, follow up neuro  11. hcm         Plan:   shingrix at pharm  Exercise, diet  Letter with labs  Follow up neuro  Follow up urol  Call if changes      Dalton Mon M.D.            Patient has been advised of split billing requirements and indicates understanding: Yes   Are you in the first 12 months of your Medicare coverage?  No    HPI  Do you feel safe in your environment? Yes    Have you ever done Advance Care Planning? (For example, a Health Directive, POLST, or a discussion with a medical provider or your loved ones about your wishes): Yes, patient states has an Advance Care Planning document and will bring a copy to the clinic.       Fall risk  Fallen 2 or more times in the past year?: No  Any fall with injury in the past year?: No    Cognitive Screening   1) Repeat 3 items (Leader, Season, Table)    2) Clock draw: NORMAL  3) 3 item recall: Recalls 1 object   Results: NORMAL clock, 1-2 items recalled: COGNITIVE IMPAIRMENT LESS LIKELY    Mini-CogTM Copyright S Aishwarya. Licensed by the author for use in [facility]; reprinted with permission (caron@.[facility]). All rights reserved.      Do you have sleep apnea, excessive snoring or daytime drowsiness?: no    Reviewed and updated as needed this  "visit by clinical staff                 Reviewed and updated as needed this visit by Provider                Social History     Tobacco Use     Smoking status: Never Smoker     Smokeless tobacco: Never Used   Substance Use Topics     Alcohol use: Yes     Comment: 10-14 drinks per week     If you drink alcohol do you typically have >3 drinks per day or >7 drinks per week? No          Current providers sharing in care for this patient include:   Patient Care Team:  Dalton Mon MD as PCP - General (Internal Medicine)  Dalton Mon MD as Assigned PCP  Allen Pearson MD as Assigned Surgical Provider    The following health maintenance items are reviewed in Epic and correct as of today:  Health Maintenance Due   Topic Date Due     ANNUAL REVIEW OF HM ORDERS  Never done     ZOSTER IMMUNIZATION (2 of 3) 01/25/2011     DEXA  08/19/2017     MICROALBUMIN  08/22/2020     EYE EXAM  11/01/2020     A1C  12/12/2020     BMP  08/27/2021     CMP  08/27/2021     LIPID  08/27/2021     DIABETIC FOOT EXAM  08/27/2021     FALL RISK ASSESSMENT  08/27/2021     MEDICARE ANNUAL WELLNESS VISIT  08/27/2021     INFLUENZA VACCINE (1) 09/01/2021               Review of Systems      OBJECTIVE:   There were no vitals taken for this visit. Estimated body mass index is 29.24 kg/m  as calculated from the following:    Height as of 7/23/21: 1.753 m (5' 9\").    Weight as of 7/23/21: 89.8 kg (198 lb).  Physical Exam          ASSESSMENT / PLAN:       Patient has been advised of split billing requirements and indicates understanding: per ma  COUNSELING:  Reviewed preventive health counseling, as reflected in patient instructions       Regular exercise       Healthy diet/nutrition    Estimated body mass index is 29.24 kg/m  as calculated from the following:    Height as of 7/23/21: 1.753 m (5' 9\").    Weight as of 7/23/21: 89.8 kg (198 lb).    Weight management plan: Discussed healthy diet and exercise guidelines    He " reports that he has never smoked. He has never used smokeless tobacco.      Appropriate preventive services were discussed with this patient, including applicable screening as appropriate for cardiovascular disease, diabetes, osteopenia/osteoporosis, and glaucoma.  As appropriate for age/gender, discussed screening for colorectal cancer, prostate cancer, breast cancer, and cervical cancer. Checklist reviewing preventive services available has been given to the patient.    Reviewed patients plan of care and provided an AVS. The Basic Care Plan (routine screening as documented in Health Maintenance) for Jamil meets the Care Plan requirement. This Care Plan has been established and reviewed with the Patient and spouse.    Counseling Resources:  ATP IV Guidelines  Pooled Cohorts Equation Calculator  Breast Cancer Risk Calculator  Breast Cancer: Medication to Reduce Risk  FRAX Risk Assessment  ICSI Preventive Guidelines  Dietary Guidelines for Americans, 2010  USDA's MyPlate  ASA Prophylaxis  Lung CA Screening    Dalton Mon MD  [facility]    Identified Health Risks:

## 2021-08-30 NOTE — PATIENT INSTRUCTIONS
For the memory I would see Dr. Noah Ferguson    For an ent Dr. Patrick Hall is very good.    I would recommend getting the new shingles shot called shingrix, but I would do it at your pharmacy as they can check with the insurance company to see if it is paid for.      Dalton Mon M.D.

## 2021-08-31 LAB
ALBUMIN SERPL-MCNC: 4.1 G/DL (ref 3.4–5)
ALP SERPL-CCNC: 91 U/L (ref 40–150)
ALT SERPL W P-5'-P-CCNC: 56 U/L (ref 0–70)
ANION GAP SERPL CALCULATED.3IONS-SCNC: 4 MMOL/L (ref 3–14)
AST SERPL W P-5'-P-CCNC: 24 U/L (ref 0–45)
BILIRUB SERPL-MCNC: 0.4 MG/DL (ref 0.2–1.3)
BUN SERPL-MCNC: 27 MG/DL (ref 7–30)
CALCIUM SERPL-MCNC: 9.7 MG/DL (ref 8.5–10.1)
CHLORIDE BLD-SCNC: 103 MMOL/L (ref 94–109)
CHOLEST SERPL-MCNC: 169 MG/DL
CO2 SERPL-SCNC: 27 MMOL/L (ref 20–32)
CREAT SERPL-MCNC: 1.14 MG/DL (ref 0.66–1.25)
CREAT UR-MCNC: 55 MG/DL
FASTING STATUS PATIENT QL REPORTED: NO
GFR SERPL CREATININE-BSD FRML MDRD: 59 ML/MIN/1.73M2
GLUCOSE BLD-MCNC: 98 MG/DL (ref 70–99)
HDLC SERPL-MCNC: 46 MG/DL
LDLC SERPL CALC-MCNC: 70 MG/DL
MICROALBUMIN UR-MCNC: 18 MG/L
MICROALBUMIN/CREAT UR: 32.73 MG/G CR (ref 0–17)
NONHDLC SERPL-MCNC: 123 MG/DL
POTASSIUM BLD-SCNC: 4.7 MMOL/L (ref 3.4–5.3)
PROT SERPL-MCNC: 7.7 G/DL (ref 6.8–8.8)
SODIUM SERPL-SCNC: 134 MMOL/L (ref 133–144)
TRIGL SERPL-MCNC: 264 MG/DL

## 2021-09-01 NOTE — RESULT ENCOUNTER NOTE
It was a please seeing you.  You should be able to view your labs.    As you can see the labs look fine.  Your urine is better than 2 years ago, less proteins.  Your chemistry panel looks super.  Your lipids look very good.  I would encourage regular exercise and 10 pound weight loss to lower the triglycerides.  Your cbc is normal.  Your hemoglobin a1c is a bit higher this time.  Again, exercise and diet!    If you have any questions please call me.    Dalton

## 2021-09-04 ENCOUNTER — HEALTH MAINTENANCE LETTER (OUTPATIENT)
Age: 86
End: 2021-09-04

## 2021-10-09 DIAGNOSIS — E11.21 TYPE 2 DIABETES MELLITUS WITH DIABETIC NEPHROPATHY, WITHOUT LONG-TERM CURRENT USE OF INSULIN (H): ICD-10-CM

## 2021-10-09 DIAGNOSIS — I10 ESSENTIAL HYPERTENSION, BENIGN: ICD-10-CM

## 2021-10-09 DIAGNOSIS — K21.9 GASTROESOPHAGEAL REFLUX DISEASE WITHOUT ESOPHAGITIS: ICD-10-CM

## 2021-10-09 DIAGNOSIS — E78.5 HYPERLIPIDEMIA LDL GOAL <70: ICD-10-CM

## 2021-10-11 RX ORDER — FAMOTIDINE 20 MG/1
TABLET, FILM COATED ORAL
Qty: 180 TABLET | Refills: 3 | OUTPATIENT
Start: 2021-10-11

## 2021-10-11 RX ORDER — LISINOPRIL 20 MG/1
TABLET ORAL
Qty: 180 TABLET | Refills: 3 | OUTPATIENT
Start: 2021-10-11

## 2021-10-11 RX ORDER — ATORVASTATIN CALCIUM 40 MG/1
TABLET, FILM COATED ORAL
Qty: 90 TABLET | Refills: 3 | OUTPATIENT
Start: 2021-10-11

## 2021-10-11 RX ORDER — METFORMIN HCL 500 MG
TABLET, EXTENDED RELEASE 24 HR ORAL
Qty: 360 TABLET | Refills: 3 | OUTPATIENT
Start: 2021-10-11

## 2021-10-15 ENCOUNTER — IMMUNIZATION (OUTPATIENT)
Dept: NURSING | Facility: CLINIC | Age: 86
End: 2021-10-15
Payer: MEDICARE

## 2021-10-15 PROCEDURE — 91300 PR COVID VAC PFIZER DIL RECON 30 MCG/0.3 ML IM: CPT

## 2021-10-15 PROCEDURE — 0004A PR COVID VAC PFIZER DIL RECON 30 MCG/0.3 ML IM: CPT

## 2021-11-18 ENCOUNTER — APPOINTMENT (OUTPATIENT)
Dept: LAB | Facility: CLINIC | Age: 86
End: 2021-11-18
Payer: MEDICARE

## 2021-11-24 ENCOUNTER — TRANSFERRED RECORDS (OUTPATIENT)
Dept: HEALTH INFORMATION MANAGEMENT | Facility: CLINIC | Age: 86
End: 2021-11-24
Payer: MEDICARE

## 2021-11-29 ENCOUNTER — TRANSFERRED RECORDS (OUTPATIENT)
Dept: HEALTH INFORMATION MANAGEMENT | Facility: CLINIC | Age: 86
End: 2021-11-29
Payer: MEDICARE

## 2021-12-21 ENCOUNTER — LAB (OUTPATIENT)
Dept: LAB | Facility: CLINIC | Age: 86
End: 2021-12-21
Payer: MEDICARE

## 2021-12-21 DIAGNOSIS — R39.89 SUSPECTED UTI: Primary | ICD-10-CM

## 2021-12-21 LAB
ALBUMIN UR-MCNC: ABNORMAL MG/DL
APPEARANCE UR: CLEAR
BILIRUB UR QL STRIP: NEGATIVE
COLOR UR AUTO: YELLOW
GLUCOSE UR STRIP-MCNC: NEGATIVE MG/DL
HGB UR QL STRIP: ABNORMAL
KETONES UR STRIP-MCNC: NEGATIVE MG/DL
LEUKOCYTE ESTERASE UR QL STRIP: ABNORMAL
NITRATE UR QL: NEGATIVE
PH UR STRIP: 6 [PH] (ref 5–7)
SP GR UR STRIP: 1.02 (ref 1–1.03)
UROBILINOGEN UR STRIP-ACNC: 0.2 E.U./DL

## 2021-12-21 PROCEDURE — 81003 URINALYSIS AUTO W/O SCOPE: CPT | Mod: QW

## 2021-12-21 PROCEDURE — 87186 SC STD MICRODIL/AGAR DIL: CPT | Mod: GW

## 2021-12-21 PROCEDURE — 87086 URINE CULTURE/COLONY COUNT: CPT | Mod: GW

## 2021-12-21 PROCEDURE — 87088 URINE BACTERIA CULTURE: CPT | Mod: GW

## 2021-12-23 ENCOUNTER — TELEPHONE (OUTPATIENT)
Dept: UROLOGY | Facility: CLINIC | Age: 86
End: 2021-12-23
Payer: MEDICARE

## 2021-12-23 DIAGNOSIS — N30.00 ACUTE CYSTITIS WITHOUT HEMATURIA: Primary | ICD-10-CM

## 2021-12-23 LAB — BACTERIA UR CULT: ABNORMAL

## 2021-12-23 RX ORDER — AMOXICILLIN AND CLAVULANATE POTASSIUM 500; 125 MG/1; MG/1
1 TABLET, FILM COATED ORAL 2 TIMES DAILY
Qty: 14 TABLET | Refills: 0 | Status: SHIPPED | OUTPATIENT
Start: 2021-12-23 | End: 2021-12-30

## 2022-04-08 ENCOUNTER — TRANSFERRED RECORDS (OUTPATIENT)
Dept: HEALTH INFORMATION MANAGEMENT | Facility: CLINIC | Age: 87
End: 2022-04-08
Payer: MEDICARE

## 2022-04-16 ENCOUNTER — HEALTH MAINTENANCE LETTER (OUTPATIENT)
Age: 87
End: 2022-04-16

## 2022-05-12 ENCOUNTER — IMMUNIZATION (OUTPATIENT)
Dept: NURSING | Facility: CLINIC | Age: 87
End: 2022-05-12
Payer: MEDICARE

## 2022-05-12 PROCEDURE — 91305 COVID-19,PF,PFIZER (12+ YRS): CPT

## 2022-05-12 PROCEDURE — 0054A COVID-19,PF,PFIZER (12+ YRS): CPT

## 2022-09-29 DIAGNOSIS — K21.9 GASTROESOPHAGEAL REFLUX DISEASE WITHOUT ESOPHAGITIS: ICD-10-CM

## 2022-09-30 RX ORDER — FAMOTIDINE 20 MG/1
TABLET, FILM COATED ORAL
Qty: 180 TABLET | Refills: 0 | Status: SHIPPED | OUTPATIENT
Start: 2022-09-30 | End: 2022-10-24

## 2022-09-30 NOTE — TELEPHONE ENCOUNTER
Prescription approved per [facility] Refill Protocol.    Appointments in Next Year    Oct 24, 2022  1:30 PM  (Arrive by 1:10 PM)  Annual Wellness Visit with Dalton Mon MD  [facility] ([facility] ) [phone]        Jaci Price RN BSN MSN  [facility]

## 2022-10-11 ENCOUNTER — TRANSFERRED RECORDS (OUTPATIENT)
Dept: HEALTH INFORMATION MANAGEMENT | Facility: CLINIC | Age: 87
End: 2022-10-11

## 2022-10-11 DIAGNOSIS — E11.21 TYPE 2 DIABETES MELLITUS WITH DIABETIC NEPHROPATHY, WITHOUT LONG-TERM CURRENT USE OF INSULIN (H): ICD-10-CM

## 2022-10-11 DIAGNOSIS — E78.5 HYPERLIPIDEMIA LDL GOAL <70: ICD-10-CM

## 2022-10-11 NOTE — TELEPHONE ENCOUNTER
LOV 8- Yaa    Appointments in Next Year    Oct 24, 2022  1:30 PM  (Arrive by 1:10 PM)  Annual Wellness Visit with Dalton Mon MD  [facility] ([facility] ) [phone]        Kimmy Chase RT (R)

## 2022-10-14 RX ORDER — ATORVASTATIN CALCIUM 40 MG/1
TABLET, FILM COATED ORAL
Qty: 90 TABLET | Refills: 0 | Status: SHIPPED | OUTPATIENT
Start: 2022-10-14 | End: 2022-10-24

## 2022-10-14 RX ORDER — METFORMIN HCL 500 MG
TABLET, EXTENDED RELEASE 24 HR ORAL
Qty: 360 TABLET | Refills: 0 | Status: SHIPPED | OUTPATIENT
Start: 2022-10-14 | End: 2022-10-24

## 2022-10-14 NOTE — TELEPHONE ENCOUNTER
Routing refill request to provider for review/approval because:  LDL Cholesterol Calculated   Date Value Ref Range Status   08/30/2021 71 <=100 mg/dL Final     Comment:     Age 0-19 years:  Desirable: 0-110 mg/dL   Borderline high: 110-129 mg/dL   High: >= 130 mg/dL    Age 20 years and older:  Desirable: <100mg/dL  Above desirable: 100-129 mg/dL   Borderline high: 130-159 mg/dL   High: 160-189 mg/dL   Very high: >= 190 mg/dL   08/27/2020 60 <100 mg/dL Final     Comment:     Desirable:       <100 mg/dl     Hemoglobin A1C   Date Value Ref Range Status   08/30/2021 6.8 (H) 0.0 - 5.6 % Final     Comment:     Normal <5.7%   Prediabetes 5.7-6.4%    Diabetes 6.5% or higher     Note: Adopted from ADA consensus guidelines.   06/12/2020 6.3 (H) 4.0 - 6.0 % Final     Future Appointments 10/14/2022 - 4/12/2023        Date Visit Type Length Department Provider     10/24/2022  1:30 PM ANNUAL WELLNESS 30 min CS FAMILY PRAC/Dalton Gomes MD    Location Instructions:     [facility] is in [address] of the [facility] at [address] This is just south of [facility] and the [facility] exit off of [address]. Free parking is available; access the lot by turning east from [facility] onto [address] [place] [address]. Through the main entrance, the clinic is directly to the left.                       Bahman Chandler RN  [facility] Triage Nurse

## 2022-10-21 DIAGNOSIS — I10 ESSENTIAL HYPERTENSION, BENIGN: ICD-10-CM

## 2022-10-22 ENCOUNTER — HEALTH MAINTENANCE LETTER (OUTPATIENT)
Age: 87
End: 2022-10-22

## 2022-10-24 ENCOUNTER — OFFICE VISIT (OUTPATIENT)
Dept: FAMILY MEDICINE | Facility: CLINIC | Age: 87
End: 2022-10-24
Payer: MEDICARE

## 2022-10-24 VITALS
SYSTOLIC BLOOD PRESSURE: 134 MMHG | DIASTOLIC BLOOD PRESSURE: 72 MMHG | BODY MASS INDEX: 28.88 KG/M2 | HEIGHT: 69 IN | WEIGHT: 195 LBS | HEART RATE: 70 BPM

## 2022-10-24 DIAGNOSIS — N40.0 BENIGN PROSTATIC HYPERPLASIA, UNSPECIFIED WHETHER LOWER URINARY TRACT SYMPTOMS PRESENT: ICD-10-CM

## 2022-10-24 DIAGNOSIS — K21.9 GASTROESOPHAGEAL REFLUX DISEASE WITHOUT ESOPHAGITIS: ICD-10-CM

## 2022-10-24 DIAGNOSIS — Z00.00 MEDICARE ANNUAL WELLNESS VISIT, SUBSEQUENT: Primary | ICD-10-CM

## 2022-10-24 DIAGNOSIS — E78.5 HYPERLIPIDEMIA LDL GOAL <70: ICD-10-CM

## 2022-10-24 DIAGNOSIS — I25.10 CORONARY ARTERY DISEASE INVOLVING NATIVE CORONARY ARTERY OF NATIVE HEART WITHOUT ANGINA PECTORIS: ICD-10-CM

## 2022-10-24 DIAGNOSIS — R80.9 MICROALBUMINURIA: ICD-10-CM

## 2022-10-24 DIAGNOSIS — R41.3 MEMORY LOSS: ICD-10-CM

## 2022-10-24 DIAGNOSIS — R09.82 POST-NASAL DRIP: ICD-10-CM

## 2022-10-24 DIAGNOSIS — I10 ESSENTIAL HYPERTENSION, BENIGN: ICD-10-CM

## 2022-10-24 DIAGNOSIS — E11.21 TYPE 2 DIABETES MELLITUS WITH DIABETIC NEPHROPATHY, WITHOUT LONG-TERM CURRENT USE OF INSULIN (H): ICD-10-CM

## 2022-10-24 DIAGNOSIS — C61 PROSTATE CANCER (H): ICD-10-CM

## 2022-10-24 DIAGNOSIS — N18.30 STAGE 3 CHRONIC KIDNEY DISEASE, UNSPECIFIED WHETHER STAGE 3A OR 3B CKD (H): ICD-10-CM

## 2022-10-24 DIAGNOSIS — I65.29 CAROTID ATHEROSCLEROSIS, UNSPECIFIED LATERALITY: ICD-10-CM

## 2022-10-24 DIAGNOSIS — R33.9 URINARY RETENTION WITH INCOMPLETE BLADDER EMPTYING: ICD-10-CM

## 2022-10-24 LAB
ERYTHROCYTE [DISTWIDTH] IN BLOOD BY AUTOMATED COUNT: 14.1 % (ref 10–15)
HCT VFR BLD AUTO: 42.1 % (ref 40–53)
HGB BLD-MCNC: 13.8 G/DL (ref 13.3–17.7)
MCH RBC QN AUTO: 29 PG (ref 26.5–33)
MCHC RBC AUTO-ENTMCNC: 32.8 G/DL (ref 31.5–36.5)
MCV RBC AUTO: 88 FL (ref 78–100)
PLATELET # BLD AUTO: 208 10E3/UL (ref 150–450)
RBC # BLD AUTO: 4.76 10E6/UL (ref 4.4–5.9)
WBC # BLD AUTO: 6.6 10E3/UL (ref 4–11)

## 2022-10-24 PROCEDURE — 99214 OFFICE O/P EST MOD 30 MIN: CPT | Mod: 25 | Performed by: INTERNAL MEDICINE

## 2022-10-24 PROCEDURE — 82043 UR ALBUMIN QUANTITATIVE: CPT | Performed by: INTERNAL MEDICINE

## 2022-10-24 PROCEDURE — 85027 COMPLETE CBC AUTOMATED: CPT | Performed by: INTERNAL MEDICINE

## 2022-10-24 PROCEDURE — 36415 COLL VENOUS BLD VENIPUNCTURE: CPT | Performed by: INTERNAL MEDICINE

## 2022-10-24 PROCEDURE — G0439 PPPS, SUBSEQ VISIT: HCPCS | Performed by: INTERNAL MEDICINE

## 2022-10-24 RX ORDER — AZELASTINE 1 MG/ML
1 SPRAY, METERED NASAL 2 TIMES DAILY
Start: 2022-10-24

## 2022-10-24 RX ORDER — FAMOTIDINE 20 MG/1
20 TABLET, FILM COATED ORAL 2 TIMES DAILY
Qty: 180 TABLET | Refills: 3 | Status: SHIPPED | OUTPATIENT
Start: 2022-10-24 | End: 2023-11-14

## 2022-10-24 RX ORDER — METFORMIN HCL 500 MG
TABLET, EXTENDED RELEASE 24 HR ORAL
Qty: 360 TABLET | Refills: 3 | Status: SHIPPED | OUTPATIENT
Start: 2022-10-24

## 2022-10-24 RX ORDER — LISINOPRIL 20 MG/1
20 TABLET ORAL 2 TIMES DAILY
Qty: 180 TABLET | Refills: 3 | Status: SHIPPED | OUTPATIENT
Start: 2022-10-24 | End: 2023-11-14

## 2022-10-24 RX ORDER — ATORVASTATIN CALCIUM 40 MG/1
TABLET, FILM COATED ORAL
Qty: 90 TABLET | Refills: 3 | Status: SHIPPED | OUTPATIENT
Start: 2022-10-24 | End: 2023-11-14

## 2022-10-24 ASSESSMENT — ENCOUNTER SYMPTOMS
PALPITATIONS: 0
WEAKNESS: 0
MYALGIAS: 0
ARTHRALGIAS: 0
EYE PAIN: 0
PARESTHESIAS: 0
HEMATURIA: 0
NAUSEA: 0
JOINT SWELLING: 0
SORE THROAT: 0
HEARTBURN: 0
FREQUENCY: 0
NERVOUS/ANXIOUS: 0
SHORTNESS OF BREATH: 0
DIZZINESS: 0
COUGH: 0
HEMATOCHEZIA: 0
DYSURIA: 0
CHILLS: 0
ABDOMINAL PAIN: 0
HEADACHES: 0
DIARRHEA: 0
FEVER: 0
CONSTIPATION: 0

## 2022-10-24 ASSESSMENT — ACTIVITIES OF DAILY LIVING (ADL): CURRENT_FUNCTION: NO ASSISTANCE NEEDED

## 2022-10-24 NOTE — PATIENT INSTRUCTIONS
I would recommend getting the new shingles shot called shingrix, but I would do it at your pharmacy as they can check with the insurance company to see if it is paid for.    Get the newest covid booster and the flu shot at the pharmacy.

## 2022-10-25 LAB
CREAT UR-MCNC: 49 MG/DL
MICROALBUMIN UR-MCNC: 19 MG/L
MICROALBUMIN/CREAT UR: 38.78 MG/G CR (ref 0–17)

## 2022-10-25 RX ORDER — LISINOPRIL 20 MG/1
TABLET ORAL
Qty: 180 TABLET | Refills: 3 | OUTPATIENT
Start: 2022-10-25

## 2022-10-25 NOTE — TELEPHONE ENCOUNTER
This is a duplicate refill request, that has been refused to the pharmacy.   Bahman Chandler RN  [facility] Triage Nurse

## 2022-10-26 NOTE — RESULT ENCOUNTER NOTE
It was nice to see you.  Your labs look fine.  You have minimal proteinuria and the cbc is normal.    Let me know if you have questions.    Dalton

## 2023-03-30 ENCOUNTER — OFFICE VISIT (OUTPATIENT)
Dept: NEUROLOGY | Facility: CLINIC | Age: 88
End: 2023-03-30
Attending: INTERNAL MEDICINE
Payer: MEDICARE

## 2023-03-30 VITALS
SYSTOLIC BLOOD PRESSURE: 144 MMHG | WEIGHT: 199 LBS | BODY MASS INDEX: 29.47 KG/M2 | HEART RATE: 57 BPM | DIASTOLIC BLOOD PRESSURE: 79 MMHG | HEIGHT: 69 IN | OXYGEN SATURATION: 97 %

## 2023-03-30 DIAGNOSIS — R41.3 MEMORY LOSS: ICD-10-CM

## 2023-03-30 PROCEDURE — 99204 OFFICE O/P NEW MOD 45 MIN: CPT | Performed by: PSYCHIATRY & NEUROLOGY

## 2023-03-30 NOTE — NURSING NOTE
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    Weight as of this encounter: 199 lb (90.3 kg).. Body surface area is 2.1 meters squared. BP completed using cuff size: penny Coleman    "

## 2023-03-30 NOTE — PROGRESS NOTES
[facility]    Jamil Bedolla Sr., MD MRN# [id number]   Age: 87 year old YOB: 1935     Requesting physician: Dalton Lyn            Assessment and Plan:   Assessment:  1.  Recent memory difficulties  2.  Vascular risk factors of hyperlipidemia, hypertension, diabetes and sedentary lifestyle     Plan:  1.  Will review with MRI brain for vascular/structural pathology  2.  Neuropsychometric analysis to r/o emerging neurodegenerative disease  3.  RTC 7/13    I do not detect substantial cognitive difficulties in Dr. Bedolla although he may be recognizing the emergence of mild cognitive impairment either from vascular or other origin.  Thus far, they do not seem to interfere with his lifestyle but will review this at length with MR imaging of the brain and neuropsychometric analysis so that he may be aware of the source of his difficulties as well as potential medical interventions to prevent further deterioration.  We will meet in follow-up after the above studies are completed.             History of Present Illness:   CC:  I met with Dr. Bedolla and his wife, Salome, for 50 minutes today to review issues he is having with his recent memory.  He indicates that he has been having some mild difficulties over the past 2 years and at present seeks neurological review.    He is not known to have antecedent neurological problems.  He indicates that his mother had some memory difficulties when she was in her late 80s although no diagnosis of dementia was ever established.  He indicates that.he has difficulty finding directions while operating his motor vehicle, may not have his clear record of what he reads the next day when he picks up the book and on occasion will repeat himself and asked the same questions of his wife.  He does not have any other difficulties, has no problem with his ADLs or self-cares, medications, daily activities, reading his periodical's, conversing, doing  household projects etc.  He believes that these concerns have been present for the past 2 years and may be a little bit worse but not substantially so.    The balance of his neurologic review of systems is noncontributory for any another neurological complaints of headaches, vertigo, diplopia, dysarthria, dysphagia, weakness numbness or tingling, loss of consciousness, or seizures.  He is not known to have mood disruption, he sleeps well sleeping 4 hours at a time awakening in the middle the night reading for bed and then sleeping another 4 hours.  He does not suffer excessive daytime drowsiness.    His general health is noted below.  He states that he does not have any issues with coronary artery disease.  He does have vascular risk factors of hypertension, hyperlipidemia, non-insulin-dependent diabetes, and a sedentary lifestyle.  He is a non-smoker.  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    He spends his day staying busy.  He does operate a motor vehicle.  He was quite active in the kitchen preparing meals but states that now that his wife Salome has retired and there food interests are lessened he does not spend as much time for meal preparation, but not because he cannot do it just because his situation is changed.  He does not exercise regularly.               Physical Exam:   's current neurological examination is reassuringly normal.  His reflexes are absent with exception of the right ankle jerk which is present.  Toes are downgoing.  His mental status reveals him to be awake and alert he is oriented to person place and time speech is fluent comprehension is intact is able to do simple calculations without difficulty.  He recalls 2 of 3 objects at 5 minutes but 3 of 3 objects at 10 minutes.  His right left orientation is intact as is his capacity to recount current events.  He has no difficulties with expressive and receptive language.   The fundi are benign the discs are sharp his neck is supple cranial nerve exam 2 through 12 is normal his motor exam reveals intact strength tone and bulk throughout without fasciculation tremor atrophy or drift although consultative strength testing is limited of the right shoulder sensory exam is deferred his gait is natural he is able to heel walk and toe walk without difficulty.         Data:   All laboratory data reviewed  All imaging studies reviewed by me             DATA for DOCUMENTATION:         Past Medical History:     Patient Active Problem List   Diagnosis     Erectile dysfunction     Decreased libido     BPH (benign prostatic hyperplasia)     Advanced directives, counseling/discussion     Onychomycosis     Obesity (BMI 30-39.9)     Prostate cancer-[place] 4, treated with radiation     Microalbuminuria     History of colonic polyps     CAD (coronary artery disease)     Carotid artery plaque     Primary osteoarthritis of both knees     Gastroesophageal reflux disease without esophagitis     Essential hypertension, benign     Hyperlipidemia LDL goal <70     Type 2 diabetes mellitus with diabetic nephropathy, without long-term current use of insulin (H)     PFD (pelvic floor dysfunction)     Bowel dysfunction     Memory loss     History of recurrent UTIs     Chronic kidney disease, stage 3 (H)     Urinary retention with incomplete bladder emptying     Past Medical History:   Diagnosis Date     Bowel dysfunction     Dr. Marito Orta, urgent, soft or liquid stools     CAD (coronary artery disease)     per calcium score=>400     Carotid artery plaque 09/2011     Colon polyps     last colonoscopy nl 5/19     Diverticulosis of colon      Erectile dysfunction 07/18/2008     Glaucoma      High cholesterol      HTN (hypertension) 07/18/2008     Incomplete emptying of bladder due to benign prostatic hyperplasia     Dr. Mejia     Memory loss 08/2020    labs nl, to neuro     Obesity      PFO (patent foramen ovale)       Plantar fasciitis      Presbyesophagus      Prostate cancer-[place] 4, treated with radiation 07/28/2013    rising psa in 2018, seen at [place], had cryo     Type II or unspecified type diabetes mellitus without mention of complication, not stated as uncontrolled     Dxd about 2000     Urinary retention with incomplete bladder emptying     does intermittent straight cath       Also see scanned health assessment forms.       Past Surgical History:     Past Surgical History:   Procedure Laterality Date     APPENDECTOMY  1942     ARTHROSCOPY KNEE RT/LT  1998    partial menisectomy left knee     COLONOSCOPY N/A 9/27/2016    Procedure: COMBINED COLONOSCOPY, SINGLE OR MULTIPLE BIOPSY/POLYPECTOMY BY BIOPSY;  Surgeon: Maru Orta MD;  Location:  GI     CYSTOSCOPY       HEMORRHOIDECTOMY  1975     left tka Left 2015     PHACOEMULSIFICATION CLEAR CORNEA WITH STANDARD INTRAOCULAR LENS IMPLANT Right 3/18/2015    Procedure: PHACOEMULSIFICATION CLEAR CORNEA WITH STANDARD INTRAOCULAR LENS IMPLANT;  Surgeon: Lito Gonzales MD;  Location: [facility]     PHACOEMULSIFICATION CLEAR CORNEA WITH STANDARD INTRAOCULAR LENS IMPLANT Left 3/25/2015    Procedure: PHACOEMULSIFICATION CLEAR CORNEA WITH STANDARD INTRAOCULAR LENS IMPLANT;  Surgeon: Lito Gonzales MD;  Location:  EC     right knee replacement Right 2019     ROTATOR CUFF REPAIR RT/LT  1998    right.  caused him to retire     SURGICAL HISTORY OF -   1999    L4-5 discectomy     TONSILLECTOMY & ADENOIDECTOMY  1940     TURP  1988     ZZHC COLONOSCOPY THRU STOMA, DIAGNOSTIC  2005     ZZHC COLONOSCOPY THRU STOMA, DIAGNOSTIC  5/09    diverticulosis, also proctitis            Social History:     Social History     Socioeconomic History     Marital status:      Spouse name: Salome Gates     Number of children: 2     Years of education: Not on file     Highest education level: Not on file   Occupational History     Occupation: MD, Family [employer]     Employer: RETIRED    Tobacco Use     Smoking status: Never     Smokeless tobacco: Never   Substance and Sexual Activity     Alcohol use: Yes     Comment: 10 a week     Drug use: No     Sexual activity: Yes     Partners: Female   Other Topics Concern     Parent/sibling w/ CABG, MI or angioplasty before 65F 55M? No   Social History Narrative     Not on file     Social Determinants of Health     Financial Resource Strain: Not on file   Food Insecurity: Not on file   Transportation Needs: Not on file   Physical Activity: Not on file   Stress: Not on file   Social Connections: Not on file   Intimate Partner Violence: Not on file   Housing Stability: Not on file              Family History:     Family History   Problem Relation Age of Onset     C.A.D. Father      C.A.D. Brother      Asthma Brother      C.A.D. Mother      Diabetes Brother      Lipids Brother             Medications:     Current Outpatient Medications   Medication Sig     Alcohol Swabs (B-D SINGLE USE SWABS REGULAR) PADS 1 pad 3 times daily     atorvastatin (LIPITOR) 40 MG tablet TAKE 1 TABLET EVERY DAY     azelastine (ASTELIN) 0.1 % nasal spray Spray 1 spray into both nostrils 2 times daily     blood glucose (ONETOUCH ULTRA) test strip Use to test blood sugar  daily or as directed.     blood glucose monitoring (ONE TOUCH ULTRA 2) meter device kit Use to test blood sugars 1- 2 times daily or as directed.     blood glucose monitoring (ONE TOUCH ULTRASOFT) lancets Use to test blood sugar 1-2  times daily or as directed.     diphenoxylate-atropine (LOMOTIL) 2.5-0.025 MG tablet Take 1 tablet by mouth Take one tablet twice a day.     famotidine (PEPCID) 20 MG tablet Take 1 tablet (20 mg) by mouth 2 times daily     latanoprost (XALATAN) 0.005 % ophthalmic solution Place 1 drop into both eyes At Bedtime      lisinopril (ZESTRIL) 20 MG tablet Take 1 tablet (20 mg) by mouth 2 times daily     MELATONIN PO Take 5 mg by mouth At Bedtime     metFORMIN (GLUCOPHAGE XR) 500 MG 24 hr tablet  TAKE 2 TABLETS TWICE DAILY WITH MEALS     MULTI-VITAMIN OR TABS 1 TABLET DAILY     ORDER FOR DME Equipment being ordered: Postivac     vardenafil (LEVITRA) 20 MG tablet Take 0.5-1 tablets (10-20 mg) by mouth daily as needed Never use with nitroglycerin, terazosin or doxazosin.     No current facility-administered medications for this visit.              Review of Systems:   A comprehensive 10 point review of systems (constitutional, ENT, cardiac, peripheral vascular, lymphatic, respiratory, GI, , Musculoskeletal, skin, Neurological) was performed and found to be negative except as described in this note.     See intake form completed by patient

## 2023-03-30 NOTE — LETTER
3/30/2023         RE: Jamil Bedolla Sr., MD  [address] [place] [id number]        Dear Colleague,    Thank you for referring your patient, Jamil Bedolla Sr., MD, to the [facility]. Please see a copy of my visit note below.        [facility]    Jamil Bedolla Sr., MD MRN# [id number]   Age: 87 year old YOB: 1935     Requesting physician: Dalton Lyn            Assessment and Plan:   Assessment:  1.  Recent memory difficulties  2.  Vascular risk factors of hyperlipidemia, hypertension, diabetes and sedentary lifestyle     Plan:  1.  Will review with MRI brain for vascular/structural pathology  2.  Neuropsychometric analysis to r/o emerging neurodegenerative disease  3.  RTC 7/13    I do not detect substantial cognitive difficulties in Dr. Bedolla although he may be recognizing the emergence of mild cognitive impairment either from vascular or other origin.  Thus far, they do not seem to interfere with his lifestyle but will review this at length with MR imaging of the brain and neuropsychometric analysis so that he may be aware of the source of his difficulties as well as potential medical interventions to prevent further deterioration.  We will meet in follow-up after the above studies are completed.             History of Present Illness:   CC:  I met with Dr. Bedolla and his wife, Salome, for 50 minutes today to review issues he is having with his recent memory.  He indicates that he has been having some mild difficulties over the past 2 years and at present seeks neurological review.    He is not known to have antecedent neurological problems.  He indicates that his mother had some memory difficulties when she was in her late 80s although no diagnosis of dementia was ever established.  He indicates that.he has difficulty finding directions while operating his motor vehicle, may not have his clear record of what he reads the next  day when he picks up the book and on occasion will repeat himself and asked the same questions of his wife.  He does not have any other difficulties, has no problem with his ADLs or self-cares, medications, daily activities, reading his periodical's, conversing, doing household projects etc.  He believes that these concerns have been present for the past 2 years and may be a little bit worse but not substantially so.    The balance of his neurologic review of systems is noncontributory for any another neurological complaints of headaches, vertigo, diplopia, dysarthria, dysphagia, weakness numbness or tingling, loss of consciousness, or seizures.  He is not known to have mood disruption, he sleeps well sleeping 4 hours at a time awakening in the middle the night reading for bed and then sleeping another 4 hours.  He does not suffer excessive daytime drowsiness.    His general health is noted below.  He states that he does not have any issues with coronary artery disease.  He does have vascular risk factors of hypertension, hyperlipidemia, non-insulin-dependent diabetes, and a sedentary lifestyle.  He is a non-smoker.  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    He spends his day staying busy.  He does operate a motor vehicle.  He was quite active in the kitchen preparing meals but states that now that his wife Salome has retired and there food interests are lessened he does not spend as much time for meal preparation, but not because he cannot do it just because his situation is changed.  He does not exercise regularly.               Physical Exam:   's current neurological examination is reassuringly normal.  His reflexes are absent with exception of the right ankle jerk which is present.  Toes are downgoing.  His mental status reveals him to be awake and alert he is oriented to person place and time speech is fluent comprehension is intact is  able to do simple calculations without difficulty.  He recalls 2 of 3 objects at 5 minutes but 3 of 3 objects at 10 minutes.  His right left orientation is intact as is his capacity to recount current events.  He has no difficulties with expressive and receptive language.  The fundi are benign the discs are sharp his neck is supple cranial nerve exam 2 through 12 is normal his motor exam reveals intact strength tone and bulk throughout without fasciculation tremor atrophy or drift although consultative strength testing is limited of the right shoulder sensory exam is deferred his gait is natural he is able to heel walk and toe walk without difficulty.         Data:   All laboratory data reviewed  All imaging studies reviewed by me             DATA for DOCUMENTATION:         Past Medical History:     Patient Active Problem List   Diagnosis     Erectile dysfunction     Decreased libido     BPH (benign prostatic hyperplasia)     Advanced directives, counseling/discussion     Onychomycosis     Obesity (BMI 30-39.9)     Prostate cancer-[place] 4, treated with radiation     Microalbuminuria     History of colonic polyps     CAD (coronary artery disease)     Carotid artery plaque     Primary osteoarthritis of both knees     Gastroesophageal reflux disease without esophagitis     Essential hypertension, benign     Hyperlipidemia LDL goal <70     Type 2 diabetes mellitus with diabetic nephropathy, without long-term current use of insulin (H)     PFD (pelvic floor dysfunction)     Bowel dysfunction     Memory loss     History of recurrent UTIs     Chronic kidney disease, stage 3 (H)     Urinary retention with incomplete bladder emptying     Past Medical History:   Diagnosis Date     Bowel dysfunction     Dr. Marito Orta, urgent, soft or liquid stools     CAD (coronary artery disease)     per calcium score=>400     Carotid artery plaque 09/2011     Colon polyps     last colonoscopy nl 5/19     Diverticulosis of colon       Erectile dysfunction 07/18/2008     Glaucoma      High cholesterol      HTN (hypertension) 07/18/2008     Incomplete emptying of bladder due to benign prostatic hyperplasia     Dr. Mejia     Memory loss 08/2020    labs nl, to neuro     Obesity      PFO (patent foramen ovale)      Plantar fasciitis      Presbyesophagus      Prostate cancer-[place] 4, treated with radiation 07/28/2013    rising psa in 2018, seen at [place], had cryo     Type II or unspecified type diabetes mellitus without mention of complication, not stated as uncontrolled     Dxd about 2000     Urinary retention with incomplete bladder emptying     does intermittent straight cath       Also see scanned health assessment forms.       Past Surgical History:     Past Surgical History:   Procedure Laterality Date     APPENDECTOMY  1942     ARTHROSCOPY KNEE RT/LT  1998    partial menisectomy left knee     COLONOSCOPY N/A 9/27/2016    Procedure: COMBINED COLONOSCOPY, SINGLE OR MULTIPLE BIOPSY/POLYPECTOMY BY BIOPSY;  Surgeon: Maru Orta MD;  Location:  GI     CYSTOSCOPY       HEMORRHOIDECTOMY  1975     left tka Left 2015     PHACOEMULSIFICATION CLEAR CORNEA WITH STANDARD INTRAOCULAR LENS IMPLANT Right 3/18/2015    Procedure: PHACOEMULSIFICATION CLEAR CORNEA WITH STANDARD INTRAOCULAR LENS IMPLANT;  Surgeon: Lito Gonzales MD;  Location: [facility]     PHACOEMULSIFICATION CLEAR CORNEA WITH STANDARD INTRAOCULAR LENS IMPLANT Left 3/25/2015    Procedure: PHACOEMULSIFICATION CLEAR CORNEA WITH STANDARD INTRAOCULAR LENS IMPLANT;  Surgeon: Lito Gonzales MD;  Location:  EC     right knee replacement Right 2019     ROTATOR CUFF REPAIR RT/LT  1998    right.  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 No   Social History Narrative     Not on file     Social Determinants of Health     Financial Resource Strain: Not on file   Food Insecurity: Not on file   Transportation Needs: Not on file   Physical Activity: Not on file   Stress: Not on file   Social Connections: Not on file   Intimate Partner Violence: Not on file   Housing Stability: Not on file              Family History:     Family History   Problem Relation Age of Onset     C.A.D. Father      C.A.D. Brother      Asthma Brother      C.A.D. Mother      Diabetes Brother      Lipids Brother             Medications:     Current Outpatient Medications   Medication Sig     Alcohol Swabs (B-D SINGLE USE SWABS REGULAR) PADS 1 pad 3 times daily     atorvastatin (LIPITOR) 40 MG tablet TAKE 1 TABLET EVERY DAY     azelastine (ASTELIN) 0.1 % nasal spray Spray 1 spray into both nostrils 2 times daily     blood glucose (ONETOUCH ULTRA) test strip Use to test blood sugar  daily or as directed.     blood glucose monitoring (ONE TOUCH ULTRA 2) meter device kit Use to test blood sugars 1- 2 times daily or as directed.     blood glucose monitoring (ONE TOUCH ULTRASOFT) lancets Use to test blood sugar 1-2  times daily or as directed.     diphenoxylate-atropine (LOMOTIL) 2.5-0.025 MG tablet Take 1 tablet by mouth Take one tablet twice a day.     famotidine (PEPCID) 20 MG tablet Take 1 tablet (20 mg) by mouth 2 times daily      latanoprost (XALATAN) 0.005 % ophthalmic solution Place 1 drop into both eyes At Bedtime      lisinopril (ZESTRIL) 20 MG tablet Take 1 tablet (20 mg) by mouth 2 times daily     MELATONIN PO Take 5 mg by mouth At Bedtime     metFORMIN (GLUCOPHAGE XR) 500 MG 24 hr tablet TAKE 2 TABLETS TWICE DAILY WITH MEALS     MULTI-VITAMIN OR TABS 1 TABLET DAILY     ORDER FOR DME Equipment being ordered: Postivac     vardenafil (LEVITRA) 20 MG tablet Take 0.5-1 tablets (10-20 mg) by mouth daily as needed Never use with nitroglycerin, terazosin or doxazosin.     No current facility-administered medications for this visit.              Review of Systems:   A comprehensive 10 point review of systems (constitutional, ENT, cardiac, peripheral vascular, lymphatic, respiratory, GI, , Musculoskeletal, skin, Neurological) was performed and found to be negative except as described in this note.     See intake form completed by patient        Again, thank you for allowing me to participate in the care of your patient.        Sincerely,        Noah Ferguson MD, MD

## 2023-04-11 ENCOUNTER — TELEPHONE (OUTPATIENT)
Dept: NEUROPSYCHOLOGY | Facility: CLINIC | Age: 88
End: 2023-04-11
Payer: MEDICARE

## 2023-04-15 ENCOUNTER — TELEPHONE (OUTPATIENT)
Dept: FAMILY MEDICINE | Facility: CLINIC | Age: 88
End: 2023-04-15
Payer: MEDICARE

## 2023-04-15 ENCOUNTER — NURSE TRIAGE (OUTPATIENT)
Dept: NURSING | Facility: CLINIC | Age: 88
End: 2023-04-15
Payer: MEDICARE

## 2023-04-15 NOTE — TELEPHONE ENCOUNTER
Monday will be day 4.  Please consider for antiviral treatment    TIMMY CHRISTIAN RN on 4/15/2023 at 3:46 PM

## 2023-04-15 NOTE — TELEPHONE ENCOUNTER
COVID Positive/Requesting COVID treatment    Patient is positive for COVID and requesting treatment options.    Date of positive COVID test (PCR or at home)? 4/15/23  Current COVID symptoms: cough  Date COVID symptoms began: 4/13/23    Message should be routed to clinic RN pool. Best phone number to use for call back: [phone]

## 2023-04-15 NOTE — TELEPHONE ENCOUNTER
Patient's wife, Salome, called regarding worry about patient's covid symptoms and feels that he needs to be on paxlovid medication as soon as possible. Triaged patient to ensure he does not need a higher level of care at this time. Patient not wanting to wait until Monday for Paxlovid. Recommended [facility] tele[facility] test to treat option. Salome and patient verbalized understanding and plans to go that route instead.    Chandrika Thomason RN on 4/15/2023 at 5:42 PM    Reason for Disposition    [1] HIGH RISK for severe COVID complications (e.g., weak immune system, age > 64 years, obesity with BMI > 25, pregnant, chronic lung disease or other chronic medical condition) AND [2] COVID symptoms (e.g., cough, fever)  (Exceptions: Already seen by PCP and no new or worsening symptoms.)    Additional Information    Negative: SEVERE difficulty breathing (e.g., struggling for each breath, speaks in single words)    Negative: Difficult to awaken or acting confused (e.g., disoriented, slurred speech)    Negative: Bluish (or gray) lips or face now    Negative: Shock suspected (e.g., cold/pale/clammy skin, too weak to stand, low BP, rapid pulse)    Negative: Sounds like a life-threatening emergency to the triager    Negative: SEVERE or constant chest pain or pressure  (Exception: Mild central chest pain, present only when coughing.)    Negative: MODERATE difficulty breathing (e.g., speaks in phrases, SOB even at rest, pulse 100-120)    Negative: Chest pain or pressure    Negative: Patient sounds very sick or weak to the triager    Negative: [1] Headache AND [2] stiff neck (can't touch chin to chest)    Negative: Oxygen level (e.g., pulse oximetry) 90 percent or lower    Negative: MILD difficulty breathing (e.g., minimal/no SOB at rest, SOB with walking, pulse <100)    Negative: Fever > 103 F (39.4 C)    Negative: [1] Fever > 101 F (38.3 C) AND [2] age > 60 years    Negative: [1] Fever > 100.0 F (37.8 C) AND [2] bedridden  (e.g., nursing home patient, CVA, chronic illness, recovering from surgery)    Protocols used: CORONAVIRUS (COVID-19) DIAGNOSED OR [id number]

## 2023-04-15 NOTE — TELEPHONE ENCOUNTER
See addendum added to telephone encounter for update.    Reason for Disposition    [1] HIGH RISK for severe COVID complications (e.g., weak immune system, age > 64 years, obesity with BMI > 25, pregnant, chronic lung disease or other chronic medical condition) AND [2] COVID symptoms (e.g., cough, fever)  (Exceptions: Already seen by PCP and no new or worsening symptoms.)    Additional Information    Negative: SEVERE difficulty breathing (e.g., struggling for each breath, speaks in single words)    Negative: Difficult to awaken or acting confused (e.g., disoriented, slurred speech)    Negative: Bluish (or gray) lips or face now    Negative: Shock suspected (e.g., cold/pale/clammy skin, too weak to stand, low BP, rapid pulse)    Negative: Sounds like a life-threatening emergency to the triager    Negative: SEVERE or constant chest pain or pressure  (Exception: Mild central chest pain, present only when coughing.)    Negative: MODERATE difficulty breathing (e.g., speaks in phrases, SOB even at rest, pulse 100-120)    Negative: Chest pain or pressure    Negative: Patient sounds very sick or weak to the triager    Negative: [1] Headache AND [2] stiff neck (can't touch chin to chest)    Negative: Oxygen level (e.g., pulse oximetry) 90 percent or lower    Negative: MILD difficulty breathing (e.g., minimal/no SOB at rest, SOB with walking, pulse <100)    Negative: Fever > 103 F (39.4 C)    Negative: [1] Fever > 101 F (38.3 C) AND [2] age > 60 years    Negative: [1] Fever > 100.0 F (37.8 C) AND [2] bedridden (e.g., nursing home patient, CVA, chronic illness, recovering from surgery)    Protocols used: CORONAVIRUS (COVID-19) DIAGNOSED OR [id number]

## 2023-04-17 NOTE — TELEPHONE ENCOUNTER
Triage talked to patient's wife Merari. Wife reported pt is taking antiviral. She was advised to seek care at ER if severe chest pain or breathing problems.. She was also advised on COVID-19 quarantine. She verbalized agreement with plan.

## 2023-04-17 NOTE — TELEPHONE ENCOUNTER
Patient Contact    Attempt # 1    Was call answered? No.    No answer. Mailbox was full.    Jennifer Petersen RN

## 2023-04-17 NOTE — TELEPHONE ENCOUNTER
Call attempted to mobile phone number. Voicemail is full. Triage left voice message at home phone advising pt to call triage back .

## 2023-05-02 ENCOUNTER — DOCUMENTATION ONLY (OUTPATIENT)
Dept: LAB | Facility: CLINIC | Age: 88
End: 2023-05-02
Payer: MEDICARE

## 2023-05-02 DIAGNOSIS — E11.21 TYPE 2 DIABETES MELLITUS WITH DIABETIC NEPHROPATHY, WITHOUT LONG-TERM CURRENT USE OF INSULIN (H): Primary | ICD-10-CM

## 2023-05-02 NOTE — PROGRESS NOTES
Jamil Bedolla Sr., MD has an upcoming lab appointment:    Future Appointments   Date Time Provider Department Center   5/3/2023  2:45 PM CS LAB [facility]    6/7/2023  8:30 AM [facility]2 [facility]2 [facility]   7/13/2023  9:00 AM Noah Ferguson MD [facility]UR    9/22/2023  8:00 AM Kiarra Gray PsyD NP [facility]   11/14/2023  9:30 AM Dalton Mon MD CSFPIM        There is no order available. Please review and place either future orders or HMPO (Review of Health Maintenance Protocol Orders), as appropriate.    Health Maintenance Due   Topic     ANNUAL REVIEW OF HM ORDERS      A1C      BMP      CMP      LIPID      Misti Lyman

## 2023-05-03 ENCOUNTER — LAB (OUTPATIENT)
Dept: LAB | Facility: CLINIC | Age: 88
End: 2023-05-03
Payer: MEDICARE

## 2023-05-03 DIAGNOSIS — E11.21 TYPE 2 DIABETES MELLITUS WITH DIABETIC NEPHROPATHY, WITHOUT LONG-TERM CURRENT USE OF INSULIN (H): ICD-10-CM

## 2023-05-03 LAB
ALBUMIN SERPL BCG-MCNC: 4.2 G/DL (ref 3.5–5.2)
ALP SERPL-CCNC: 106 U/L (ref 40–129)
ALT SERPL W P-5'-P-CCNC: 47 U/L (ref 10–50)
ANION GAP SERPL CALCULATED.3IONS-SCNC: 11 MMOL/L (ref 7–15)
AST SERPL W P-5'-P-CCNC: 34 U/L (ref 10–50)
BILIRUB SERPL-MCNC: 0.2 MG/DL
BUN SERPL-MCNC: 22.6 MG/DL (ref 8–23)
CALCIUM SERPL-MCNC: 9.6 MG/DL (ref 8.8–10.2)
CHLORIDE SERPL-SCNC: 104 MMOL/L (ref 98–107)
CHOLEST SERPL-MCNC: 159 MG/DL
CREAT SERPL-MCNC: 1.36 MG/DL (ref 0.67–1.17)
DEPRECATED HCO3 PLAS-SCNC: 22 MMOL/L (ref 22–29)
ERYTHROCYTE [DISTWIDTH] IN BLOOD BY AUTOMATED COUNT: 14.4 % (ref 10–15)
GFR SERPL CREATININE-BSD FRML MDRD: 51 ML/MIN/1.73M2
GLUCOSE SERPL-MCNC: 141 MG/DL (ref 70–99)
HBA1C MFR BLD: 6.9 % (ref 0–5.6)
HCT VFR BLD AUTO: 40.4 % (ref 40–53)
HDLC SERPL-MCNC: 44 MG/DL
HGB BLD-MCNC: 13.1 G/DL (ref 13.3–17.7)
LDLC SERPL CALC-MCNC: 60 MG/DL
MCH RBC QN AUTO: 28.9 PG (ref 26.5–33)
MCHC RBC AUTO-ENTMCNC: 32.4 G/DL (ref 31.5–36.5)
MCV RBC AUTO: 89 FL (ref 78–100)
NONHDLC SERPL-MCNC: 115 MG/DL
PLATELET # BLD AUTO: 189 10E3/UL (ref 150–450)
POTASSIUM SERPL-SCNC: 4.8 MMOL/L (ref 3.4–5.3)
PROT SERPL-MCNC: 6.9 G/DL (ref 6.4–8.3)
RBC # BLD AUTO: 4.54 10E6/UL (ref 4.4–5.9)
SODIUM SERPL-SCNC: 137 MMOL/L (ref 136–145)
TRIGL SERPL-MCNC: 274 MG/DL
WBC # BLD AUTO: 5.9 10E3/UL (ref 4–11)

## 2023-05-03 PROCEDURE — 85027 COMPLETE CBC AUTOMATED: CPT

## 2023-05-03 PROCEDURE — 80053 COMPREHEN METABOLIC PANEL: CPT

## 2023-05-03 PROCEDURE — 83036 HEMOGLOBIN GLYCOSYLATED A1C: CPT

## 2023-05-03 PROCEDURE — 36415 COLL VENOUS BLD VENIPUNCTURE: CPT

## 2023-05-03 PROCEDURE — 80061 LIPID PANEL: CPT

## 2023-05-05 NOTE — RESULT ENCOUNTER NOTE
Dear Jamil,     I'm covering for Dr. Mon today:     Here are your recent results.  Your cholesterol panel is about the same as last year.  You have had a mild decrease in your kidney function.  You should avoid medications like ibuprofen, Aleve, naproxen and be sure to stay hydrated.  Your liver function test was normal and your electrolytes were normal.  Your hemoglobin A1c which is a 3-month diabetes test is stable.  Your blood counts were stable.      Dr. Mon will see the results when he returns to the clinic.     Please let us know if you have any questions or concerns.    Regards,  Kiersten Wilson PA-C

## 2023-05-11 ENCOUNTER — APPOINTMENT (OUTPATIENT)
Dept: LAB | Facility: CLINIC | Age: 88
End: 2023-05-11
Payer: MEDICARE

## 2023-06-07 ENCOUNTER — HOSPITAL ENCOUNTER (OUTPATIENT)
Dept: MRI IMAGING | Facility: CLINIC | Age: 88
Discharge: HOME | End: 2023-06-07
Attending: PSYCHIATRY & NEUROLOGY | Admitting: PSYCHIATRY & NEUROLOGY
Payer: MEDICARE

## 2023-06-07 DIAGNOSIS — R41.3 MEMORY LOSS: ICD-10-CM

## 2023-06-07 PROCEDURE — G1010 CDSM STANSON: HCPCS

## 2023-06-09 ENCOUNTER — TRANSFERRED RECORDS (OUTPATIENT)
Dept: HEALTH INFORMATION MANAGEMENT | Facility: CLINIC | Age: 88
End: 2023-06-09
Payer: MEDICARE

## 2023-07-08 ENCOUNTER — LAB (OUTPATIENT)
Dept: LAB | Facility: CLINIC | Age: 88
End: 2023-07-08
Payer: MEDICARE

## 2023-07-08 DIAGNOSIS — D64.9 LOW HEMOGLOBIN: ICD-10-CM

## 2023-07-08 DIAGNOSIS — N18.30 STAGE 3 CHRONIC KIDNEY DISEASE, UNSPECIFIED WHETHER STAGE 3A OR 3B CKD (H): ICD-10-CM

## 2023-07-08 DIAGNOSIS — R53.83 OTHER FATIGUE: ICD-10-CM

## 2023-07-08 LAB
BASOPHILS # BLD AUTO: 0.1 10E3/UL (ref 0–0.2)
BASOPHILS NFR BLD AUTO: 1 %
CREAT SERPL-MCNC: 1.52 MG/DL (ref 0.67–1.17)
EOSINOPHIL # BLD AUTO: 0.2 10E3/UL (ref 0–0.7)
EOSINOPHIL NFR BLD AUTO: 3 %
ERYTHROCYTE [DISTWIDTH] IN BLOOD BY AUTOMATED COUNT: 14 % (ref 10–15)
GFR SERPL CREATININE-BSD FRML MDRD: 44 ML/MIN/1.73M2
HCT VFR BLD AUTO: 42.9 % (ref 40–53)
HGB BLD-MCNC: 13.9 G/DL (ref 13.3–17.7)
HOLD SPECIMEN: NORMAL
IMM GRANULOCYTES # BLD: 0 10E3/UL
IMM GRANULOCYTES NFR BLD: 0 %
LYMPHOCYTES # BLD AUTO: 1.9 10E3/UL (ref 0.8–5.3)
LYMPHOCYTES NFR BLD AUTO: 27 %
MCH RBC QN AUTO: 28.5 PG (ref 26.5–33)
MCHC RBC AUTO-ENTMCNC: 32.4 G/DL (ref 31.5–36.5)
MCV RBC AUTO: 88 FL (ref 78–100)
MONOCYTES # BLD AUTO: 0.6 10E3/UL (ref 0–1.3)
MONOCYTES NFR BLD AUTO: 9 %
NEUTROPHILS # BLD AUTO: 4.2 10E3/UL (ref 1.6–8.3)
NEUTROPHILS NFR BLD AUTO: 60 %
NRBC # BLD AUTO: 0 10E3/UL
NRBC BLD AUTO-RTO: 0 /100
PLATELET # BLD AUTO: 223 10E3/UL (ref 150–450)
RBC # BLD AUTO: 4.87 10E6/UL (ref 4.4–5.9)
TSH SERPL DL<=0.005 MIU/L-ACNC: 1.17 UIU/ML (ref 0.3–4.2)
WBC # BLD AUTO: 7 10E3/UL (ref 4–11)

## 2023-07-08 PROCEDURE — 85025 COMPLETE CBC W/AUTO DIFF WBC: CPT

## 2023-07-08 PROCEDURE — 84443 ASSAY THYROID STIM HORMONE: CPT

## 2023-07-08 PROCEDURE — 82565 ASSAY OF CREATININE: CPT

## 2023-07-08 PROCEDURE — 36415 COLL VENOUS BLD VENIPUNCTURE: CPT

## 2023-07-12 ENCOUNTER — TELEPHONE (OUTPATIENT)
Dept: NEUROLOGY | Facility: CLINIC | Age: 88
End: 2023-07-12
Payer: MEDICARE

## 2023-07-13 ENCOUNTER — OFFICE VISIT (OUTPATIENT)
Dept: NEUROLOGY | Facility: CLINIC | Age: 88
End: 2023-07-13
Payer: MEDICARE

## 2023-07-13 VITALS
HEIGHT: 69 IN | WEIGHT: 199 LBS | DIASTOLIC BLOOD PRESSURE: 80 MMHG | SYSTOLIC BLOOD PRESSURE: 153 MMHG | OXYGEN SATURATION: 96 % | HEART RATE: 61 BPM | BODY MASS INDEX: 29.47 KG/M2

## 2023-07-13 DIAGNOSIS — R41.3 MEMORY LOSS: Primary | ICD-10-CM

## 2023-07-13 PROCEDURE — 99214 OFFICE O/P EST MOD 30 MIN: CPT | Performed by: PSYCHIATRY & NEUROLOGY

## 2023-07-13 RX ORDER — SEMAGLUTIDE 0.68 MG/ML
INJECTION, SOLUTION SUBCUTANEOUS
COMMUNITY
Start: 2023-06-19

## 2023-07-13 NOTE — PROGRESS NOTES
[facility]    Jamil Bedolla Sr., MD MRN# [id number]   Age: 87 year old YOB: 1935     Requesting physician: No ref. provider found  Dalton Mon            Assessment and Plan:   Assessment:  1.  Reports of recent memory difficulties     Plan:  1.  Await neuropsychometric analysis scheduled for September 2.  F/U phone conversation after testing.    I am pleased not to identify significant vascular changes in the brain despite his risk factors of hypertension, hyperlipidemia, and diabetes.  If any cognitive impairment is present, it is quite mild and not indicative of any serious neurological pathology.  He does report having nonrestorative sleep awakening in the middle of the night taking some alcohol and melatonin to put himself back to sleep and I wonder if this pattern may have some impact here as well.    At any rate, no other changes are made.  I will await the findings of his neuropsychometric testing and we will speak about those results by phone once they are completed.             History of Present Illness:   CC:  I met with Dr. Bedolla and his wife Salome today for 30 minutes to review his recent testing with reference to investigating concerns pertaining to recent memory difficulties.  Clinically, he continues to do well.  He has no difficulties with his medications, driving, self-cares, and routine things.  He states that he still has some difficulty with recent memory pertaining to issues recalling names.  Salome indicates that he does on occasion repeat himself and asked the same question.  He indicates that his father and that his mother had the same neurological issues in their older years.  He indicates that he has had no other neurological complaints or concerns.  He has completed his MRI brain imaging which we reviewed today at length showing some degree of age-related atrophy, a trivial amount of white matter changes consistent with small vessel disease, but no overt  pathology of any concern.  His neuropsychometric testing has been scheduled but will not be completed until September of this year.  The balance of his health history is unchanged.             Physical Exam:   He recalls nearly 3 out of 3 objects at 4 minutes.  He is otherwise awake, alert, oriented, appropriate, with fluent speech and comprehension.         Data:   All laboratory data reviewed  All imaging studies reviewed by me             DATA for DOCUMENTATION:         Past Medical History:     Patient Active Problem List   Diagnosis     Erectile dysfunction     Decreased libido     BPH (benign prostatic hyperplasia)     Advanced directives, counseling/discussion     Onychomycosis     Obesity (BMI 30-39.9)     Prostate cancer-[place] 4, treated with radiation     Microalbuminuria     History of colonic polyps     CAD (coronary artery disease)     Carotid artery plaque     Primary osteoarthritis of both knees     Gastroesophageal reflux disease without esophagitis     Essential hypertension, benign     Hyperlipidemia LDL goal <70     Type 2 diabetes mellitus with diabetic nephropathy, without long-term current use of insulin (H)     PFD (pelvic floor dysfunction)     Bowel dysfunction     Memory loss     History of recurrent UTIs     Chronic kidney disease, stage 3 (H)     Urinary retention with incomplete bladder emptying     Past Medical History:   Diagnosis Date     Bowel dysfunction     Dr. Marito Orta, urgent, soft or liquid stools     CAD (coronary artery disease)     per calcium score=>400     Carotid artery plaque 09/2011     Colon polyps     last colonoscopy nl 5/19     Diverticulosis of colon      Erectile dysfunction 07/18/2008     Glaucoma      High cholesterol      HTN (hypertension) 07/18/2008     Incomplete emptying of bladder due to benign prostatic hyperplasia     Dr. Mejia     Memory loss 08/2020    labs nl, to neuro     Obesity      PFO (patent foramen ovale)      Plantar fasciitis       Presbyesophagus      Prostate cancer-[place] 4, treated with radiation 07/28/2013    rising psa in 2018, seen at [place], had cryo     Type II or unspecified type diabetes mellitus without mention of complication, not stated as uncontrolled     Dxd about 2000     Urinary retention with incomplete bladder emptying     does intermittent straight cath       Also see scanned health assessment forms.       Past Surgical History:     Past Surgical History:   Procedure Laterality Date     APPENDECTOMY  1942     ARTHROSCOPY KNEE RT/LT  1998    partial menisectomy left knee     COLONOSCOPY N/A 9/27/2016    Procedure: COMBINED COLONOSCOPY, SINGLE OR MULTIPLE BIOPSY/POLYPECTOMY BY BIOPSY;  Surgeon: Maru Orta MD;  Location:  GI     CYSTOSCOPY       HEMORRHOIDECTOMY  1975     left tka Left 2015     PHACOEMULSIFICATION CLEAR CORNEA WITH STANDARD INTRAOCULAR LENS IMPLANT Right 3/18/2015    Procedure: PHACOEMULSIFICATION CLEAR CORNEA WITH STANDARD INTRAOCULAR LENS IMPLANT;  Surgeon: Lito Gonzales MD;  Location: [facility]     PHACOEMULSIFICATION CLEAR CORNEA WITH STANDARD INTRAOCULAR LENS IMPLANT Left 3/25/2015    Procedure: PHACOEMULSIFICATION CLEAR CORNEA WITH STANDARD INTRAOCULAR LENS IMPLANT;  Surgeon: Lito Gonzales MD;  Location:  EC     right knee replacement Right 2019     ROTATOR CUFF REPAIR RT/LT  1998    right.  caused him to retire     SURGICAL HISTORY OF -   1999    L4-5 discectomy     TONSILLECTOMY & ADENOIDECTOMY  1940     TURP  1988     ZZHC COLONOSCOPY THRU STOMA, DIAGNOSTIC  2005     ZZHC COLONOSCOPY THRU STOMA, DIAGNOSTIC  5/09    diverticulosis, also proctitis            Social History:     Social History     Socioeconomic History     Marital status:      Spouse name: Salome Gates     Number of children: 2     Years of education: Not on file     Highest education level: Not on file   Occupational History     Occupation: MD, Family [employer]     Employer: RETIRED   Tobacco Use     Smoking status:  Never     Smokeless tobacco: Never   Substance and Sexual Activity     Alcohol use: Yes     Comment: 10 a week     Drug use: No     Sexual activity: Yes     Partners: Female   Other Topics Concern     Parent/sibling w/ CABG, MI or angioplasty before 65F 55M? No   Social History Narrative     Not on file     Social Determinants of Health     Financial Resource Strain: Not on file   Food Insecurity: Not on file   Transportation Needs: Not on file   Physical Activity: Not on file   Stress: Not on file   Social Connections: Not on file   Intimate Partner Violence: Not on file   Housing Stability: Not on file              Family History:     Family History   Problem Relation Age of Onset     C.A.D. Father      C.A.D. Brother      Asthma Brother      C.A.STACEY. Mother      Diabetes Brother      Lipids Brother             Medications:     Current Outpatient Medications   Medication Sig     Alcohol Swabs (B-D SINGLE USE SWABS REGULAR) PADS 1 pad 3 times daily     atorvastatin (LIPITOR) 40 MG tablet TAKE 1 TABLET EVERY DAY     azelastine (ASTELIN) 0.1 % nasal spray Spray 1 spray into both nostrils 2 times daily     blood glucose (ONETOUCH ULTRA) test strip Use to test blood sugar  daily or as directed.     blood glucose monitoring (ONE TOUCH ULTRA 2) meter device kit Use to test blood sugars 1- 2 times daily or as directed.     blood glucose monitoring (ONE TOUCH ULTRASOFT) lancets Use to test blood sugar 1-2  times daily or as directed.     diphenoxylate-atropine (LOMOTIL) 2.5-0.025 MG tablet Take 1 tablet by mouth Take one tablet twice a day.     famotidine (PEPCID) 20 MG tablet Take 1 tablet (20 mg) by mouth 2 times daily     latanoprost (XALATAN) 0.005 % ophthalmic solution Place 1 drop into both eyes At Bedtime      lisinopril (ZESTRIL) 20 MG tablet Take 1 tablet (20 mg) by mouth 2 times daily     MELATONIN PO Take 5 mg by mouth At Bedtime     metFORMIN (GLUCOPHAGE XR) 500 MG 24 hr tablet TAKE 2 TABLETS TWICE DAILY WITH  MEALS     MULTI-VITAMIN OR TABS 1 TABLET DAILY     ORDER FOR DME Equipment being ordered: Postivac     OZEMPIC, 0.25 OR 0.5 MG/DOSE, 2 MG/3ML pen INJECT 0.5 MG SUBCUTANEOUSLY ONCE A WEEK FOR 4 WEEKS     vardenafil (LEVITRA) 20 MG tablet Take 0.5-1 tablets (10-20 mg) by mouth daily as needed Never use with nitroglycerin, terazosin or doxazosin.     No current facility-administered medications for this visit.              Review of Systems:   A comprehensive 10 point review of systems (constitutional, ENT, cardiac, peripheral vascular, lymphatic, respiratory, GI, , Musculoskeletal, skin, Neurological) was performed and found to be negative except as described in this note.     See intake form completed by patient

## 2023-07-13 NOTE — NURSING NOTE
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    Weight as of this encounter: 90.3 kg (199 lb).. Body surface area is 2.1 meters squared. BP completed using cuff size: regular    Mitzi Rathai  "

## 2023-07-13 NOTE — LETTER
7/13/2023         RE: Jamil Bedolla Sr., MD  [address] [place] [id number]        Dear Colleague,    Thank you for referring your patient, Jamil Bedolla Sr., MD, to the [facility]. Please see a copy of my visit note below.        [facility]    Jamil Bedolla Sr., MD MRN# [id number]   Age: 87 year old YOB: 1935     Requesting physician: No ref. provider found  Dalton Mon            Assessment and Plan:   Assessment:  1.  Reports of recent memory difficulties     Plan:  1.  Await neuropsychometric analysis scheduled for September 2.  F/U phone conversation after testing.    I am pleased not to identify significant vascular changes in the brain despite his risk factors of hypertension, hyperlipidemia, and diabetes.  If any cognitive impairment is present, it is quite mild and not indicative of any serious neurological pathology.  He does report having nonrestorative sleep awakening in the middle of the night taking some alcohol and melatonin to put himself back to sleep and I wonder if this pattern may have some impact here as well.    At any rate, no other changes are made.  I will await the findings of his neuropsychometric testing and we will speak about those results by phone once they are completed.             History of Present Illness:   CC:  I met with Dr. Bedolla and his wife Salome today for 30 minutes to review his recent testing with reference to investigating concerns pertaining to recent memory difficulties.  Clinically, he continues to do well.  He has no difficulties with his medications, driving, self-cares, and routine things.  He states that he still has some difficulty with recent memory pertaining to issues recalling names.  Salome indicates that he does on occasion repeat himself and asked the same question.  He indicates that his father and that his mother had the same neurological issues in their older years.  He indicates  that he has had no other neurological complaints or concerns.  He has completed his MRI brain imaging which we reviewed today at length showing some degree of age-related atrophy, a trivial amount of white matter changes consistent with small vessel disease, but no overt pathology of any concern.  His neuropsychometric testing has been scheduled but will not be completed until September of this year.  The balance of his health history is unchanged.             Physical Exam:   He recalls nearly 3 out of 3 objects at 4 minutes.  He is otherwise awake, alert, oriented, appropriate, with fluent speech and comprehension.         Data:   All laboratory data reviewed  All imaging studies reviewed by me             DATA for DOCUMENTATION:         Past Medical History:     Patient Active Problem List   Diagnosis     Erectile dysfunction     Decreased libido     BPH (benign prostatic hyperplasia)     Advanced directives, counseling/discussion     Onychomycosis     Obesity (BMI 30-39.9)     Prostate cancer-[place] 4, treated with radiation     Microalbuminuria     History of colonic polyps     CAD (coronary artery disease)     Carotid artery plaque     Primary osteoarthritis of both knees     Gastroesophageal reflux disease without esophagitis     Essential hypertension, benign     Hyperlipidemia LDL goal <70     Type 2 diabetes mellitus with diabetic nephropathy, without long-term current use of insulin (H)     PFD (pelvic floor dysfunction)     Bowel dysfunction     Memory loss     History of recurrent UTIs     Chronic kidney disease, stage 3 (H)     Urinary retention with incomplete bladder emptying     Past Medical History:   Diagnosis Date     Bowel dysfunction     Dr. Marito Orta, urgent, soft or liquid stools     CAD (coronary artery disease)     per calcium score=>400     Carotid artery plaque 09/2011     Colon polyps     last colonoscopy nl 5/19     Diverticulosis of colon      Erectile dysfunction 07/18/2008      Glaucoma      High cholesterol      HTN (hypertension) 07/18/2008     Incomplete emptying of bladder due to benign prostatic hyperplasia     Dr. 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    rising psa in 2018, seen at [place], had cryo     Type II or unspecified type diabetes mellitus without mention of complication, not stated as uncontrolled     Dxd about 2000     Urinary retention with incomplete bladder emptying     does intermittent straight cath       Also see scanned health assessment forms.       Past Surgical History:     Past Surgical History:   Procedure Laterality Date     APPENDECTOMY  1942     ARTHROSCOPY KNEE RT/LT  1998    partial menisectomy left knee     COLONOSCOPY N/A 9/27/2016    Procedure: COMBINED COLONOSCOPY, SINGLE OR MULTIPLE BIOPSY/POLYPECTOMY BY BIOPSY;  Surgeon: Maru Orta MD;  Location:  GI     CYSTOSCOPY       HEMORRHOIDECTOMY  1975     left tka Left 2015     PHACOEMULSIFICATION CLEAR CORNEA WITH STANDARD INTRAOCULAR LENS IMPLANT Right 3/18/2015    Procedure: PHACOEMULSIFICATION CLEAR CORNEA WITH STANDARD INTRAOCULAR LENS IMPLANT;  Surgeon: Lito Gonzales MD;  Location:  [facility]     PHACOEMULSIFICATION CLEAR CORNEA WITH STANDARD INTRAOCULAR LENS IMPLANT Left 3/25/2015    Procedure: PHACOEMULSIFICATION CLEAR CORNEA WITH STANDARD INTRAOCULAR LENS IMPLANT;  Surgeon: Lito Gonzales MD;  Location:  EC     right knee replacement Right 2019     ROTATOR CUFF REPAIR RT/LT  1998    right.  caused him to retire     SURGICAL HISTORY OF -   1999    L4-5 discectomy     TONSILLECTOMY & ADENOIDECTOMY  1940     TURP  1988     ZZ COLONOSCOPY THRU STOMA, DIAGNOSTIC  2005     ZZ COLONOSCOPY THRU STOMA, DIAGNOSTIC  5/09    diverticulosis, also proctitis            Social History:     Social History     Socioeconomic History      Marital status:      Spouse name: Salome Gates     Number of children: 2     Years of education: Not on file     Highest education level: Not on file   Occupational History     Occupation: MD, Family [employer]     Employer: RETIRED   Tobacco Use     Smoking status: Never     Smokeless tobacco: Never   Substance and Sexual Activity     Alcohol use: Yes     Comment: 10 a week     Drug use: No     Sexual activity: Yes     Partners: Female   Other Topics Concern     Parent/sibling w/ CABG, MI or angioplasty before 65F 55M? No   Social History Narrative     Not on file     Social Determinants of Health     Financial Resource Strain: Not on file   Food Insecurity: Not on file   Transportation Needs: Not on file   Physical Activity: Not on file   Stress: Not on file   Social Connections: Not on file   Intimate Partner Violence: Not on file   Housing Stability: Not on file              Family History:     Family History   Problem Relation Age of Onset     C.A.D. Father      C.A.D. Brother      Asthma Brother      C.A.D. Mother      Diabetes Brother      Lipids Brother             Medications:     Current Outpatient Medications   Medication Sig     Alcohol Swabs (B-D SINGLE USE SWABS REGULAR) PADS 1 pad 3 times daily     atorvastatin (LIPITOR) 40 MG tablet TAKE 1 TABLET EVERY DAY     azelastine (ASTELIN) 0.1 % nasal spray Spray 1 spray into both nostrils 2 times daily     blood glucose (ONETOUCH ULTRA) test strip Use to test blood sugar  daily or as directed.     blood glucose monitoring (ONE TOUCH ULTRA 2) meter device kit Use to test blood sugars 1- 2 times daily or as directed.     blood glucose monitoring (ONE TOUCH ULTRASOFT) lancets Use to test blood sugar 1-2  times daily or as directed.     diphenoxylate-atropine (LOMOTIL) 2.5-0.025 MG tablet Take 1 tablet by mouth Take one tablet twice a day.     famotidine (PEPCID) 20 MG tablet Take 1 tablet (20 mg) by mouth 2 times daily     latanoprost (XALATAN) 0.005 %  ophthalmic solution Place 1 drop into both eyes At Bedtime      lisinopril (ZESTRIL) 20 MG tablet Take 1 tablet (20 mg) by mouth 2 times daily     MELATONIN PO Take 5 mg by mouth At Bedtime     metFORMIN (GLUCOPHAGE XR) 500 MG 24 hr tablet TAKE 2 TABLETS TWICE DAILY WITH MEALS     MULTI-VITAMIN OR TABS 1 TABLET DAILY     ORDER FOR DME Equipment being ordered: Postivac     OZEMPIC, 0.25 OR 0.5 MG/DOSE, 2 MG/3ML pen INJECT 0.5 MG SUBCUTANEOUSLY ONCE A WEEK FOR 4 WEEKS     vardenafil (LEVITRA) 20 MG tablet Take 0.5-1 tablets (10-20 mg) by mouth daily as needed Never use with nitroglycerin, terazosin or doxazosin.     No current facility-administered medications for this visit.              Review of Systems:   A comprehensive 10 point review of systems (constitutional, ENT, cardiac, peripheral vascular, lymphatic, respiratory, GI, , Musculoskeletal, skin, Neurological) was performed and found to be negative except as described in this note.     See intake form completed by patient        Again, thank you for allowing me to participate in the care of your patient.        Sincerely,        Noah Ferguson MD, MD

## 2023-07-18 ENCOUNTER — VIRTUAL VISIT (OUTPATIENT)
Dept: FAMILY MEDICINE | Facility: CLINIC | Age: 88
End: 2023-07-18
Payer: MEDICARE

## 2023-07-18 DIAGNOSIS — E11.21 TYPE 2 DIABETES MELLITUS WITH DIABETIC NEPHROPATHY, WITHOUT LONG-TERM CURRENT USE OF INSULIN (H): ICD-10-CM

## 2023-07-18 DIAGNOSIS — N18.30 STAGE 3 CHRONIC KIDNEY DISEASE, UNSPECIFIED WHETHER STAGE 3A OR 3B CKD (H): ICD-10-CM

## 2023-07-18 DIAGNOSIS — R53.83 OTHER FATIGUE: Primary | ICD-10-CM

## 2023-07-18 PROCEDURE — 99212 OFFICE O/P EST SF 10 MIN: CPT | Mod: 95 | Performed by: INTERNAL MEDICINE

## 2023-07-18 RX ORDER — SEMAGLUTIDE 0.68 MG/ML
0.5 INJECTION, SOLUTION SUBCUTANEOUS
Qty: 3 ML
Start: 2023-07-18

## 2023-07-18 NOTE — PROGRESS NOTES
Start time 1:26  End time: 1:37  He is at home, I am in office  amwell used.    Wife present on video    The patient notes fatigue for quite some time now.  Sleep seems to be fairly good although interrupted.  No known apnea but some snoring.  A complete review of systems is otherwise negative.  He feels well.  He is not exercising and has been quite inactive since COVID.    We also reviewed his labs with the elevated creatinine.  Slightly higher than previously.    He has diabetes and Ozempic was added by his endocrinologist in June.  He is doing quite well with that.    He was seen by neurology as noted for memory loss and will be having neuropsych testing in the fall.    The patient on video appears quite well.  He is sitting comfortably.  Normal facial expression.  Mental status seems appropriate.  Skin tone looks normal.  Eyes are normal.  Respiratory rate is normal and he is in no distress.    ASSESSMENT:  1. Fatigue, suspect due to inactivity, interrupted sleep, doubt cv, no signs thyroid, diabetes is controlled, doubt other cause, could consider sleep eval  2. Ckd, a bit higher, has complete physical exam in the fall and to check it and urine then  3. Diabetes, controlled  4. Memory loss, follow up neuro    PLAN:  Try to be more active  Follow up this fall    Dalton Mon M.D.

## 2023-07-21 ENCOUNTER — LAB (OUTPATIENT)
Dept: LAB | Facility: CLINIC | Age: 88
End: 2023-07-21
Payer: MEDICARE

## 2023-07-21 ENCOUNTER — MEDICAL CORRESPONDENCE (OUTPATIENT)
Dept: HEALTH INFORMATION MANAGEMENT | Facility: CLINIC | Age: 88
End: 2023-07-21

## 2023-07-21 ENCOUNTER — TELEPHONE (OUTPATIENT)
Dept: UROLOGY | Facility: CLINIC | Age: 88
End: 2023-07-21

## 2023-07-21 DIAGNOSIS — N30.00 ACUTE CYSTITIS WITHOUT HEMATURIA: Primary | ICD-10-CM

## 2023-07-21 DIAGNOSIS — N30.00 ACUTE CYSTITIS WITHOUT HEMATURIA: ICD-10-CM

## 2023-07-21 PROCEDURE — 87088 URINE BACTERIA CULTURE: CPT

## 2023-07-21 PROCEDURE — 87086 URINE CULTURE/COLONY COUNT: CPT

## 2023-07-21 PROCEDURE — 87186 SC STD MICRODIL/AGAR DIL: CPT

## 2023-07-21 NOTE — TELEPHONE ENCOUNTER
[facility] Call Center    Phone Message    May a detailed message be left on voicemail: yes     Reason for Call: Patient is calling because he has a UTI and would like to come drop off a sample to be tested at clinic. Please reach out to patient when orders have been placed. Thank you    Action Taken: Message routed to:  [facility] ([facility] URO    Travel Screening: Not Applicable

## 2023-07-24 LAB
BACTERIA UR CULT: ABNORMAL
BACTERIA UR CULT: ABNORMAL

## 2023-07-25 DIAGNOSIS — N39.0 UTI (URINARY TRACT INFECTION): Primary | ICD-10-CM

## 2023-08-01 ENCOUNTER — TELEPHONE (OUTPATIENT)
Dept: UROLOGY | Facility: CLINIC | Age: 88
End: 2023-08-01
Payer: MEDICARE

## 2023-08-01 DIAGNOSIS — R39.89 SUSPECTED UTI: Primary | ICD-10-CM

## 2023-08-01 NOTE — TELEPHONE ENCOUNTER
Pt called today to get scheduled for his 10-14 UAUC for his surgery at the [facility]. I explained that he should take the order that he has and go to a [facility] like the one in the [facility] because are lab is not equipped to take orders from outside providers. He told me he always gets his UAUC done here. So I scheduled him and asked the nurses to put in an order under Dr. Pearson.    Lizzeth

## 2023-08-03 ENCOUNTER — LAB (OUTPATIENT)
Dept: LAB | Facility: CLINIC | Age: 88
End: 2023-08-03
Payer: MEDICARE

## 2023-08-03 DIAGNOSIS — R39.89 SUSPECTED UTI: ICD-10-CM

## 2023-08-03 LAB
ALBUMIN UR-MCNC: NEGATIVE MG/DL
APPEARANCE UR: CLEAR
BILIRUB UR QL STRIP: NEGATIVE
COLOR UR AUTO: YELLOW
GLUCOSE UR STRIP-MCNC: NEGATIVE MG/DL
HGB UR QL STRIP: ABNORMAL
KETONES UR STRIP-MCNC: ABNORMAL MG/DL
LEUKOCYTE ESTERASE UR QL STRIP: ABNORMAL
NITRATE UR QL: NEGATIVE
PH UR STRIP: 5 [PH] (ref 5–7)
SP GR UR STRIP: 1.02 (ref 1–1.03)
UROBILINOGEN UR STRIP-ACNC: 0.2 E.U./DL

## 2023-08-03 PROCEDURE — 87086 URINE CULTURE/COLONY COUNT: CPT

## 2023-08-03 PROCEDURE — 87088 URINE BACTERIA CULTURE: CPT

## 2023-08-03 PROCEDURE — 87186 SC STD MICRODIL/AGAR DIL: CPT

## 2023-08-03 PROCEDURE — 81003 URINALYSIS AUTO W/O SCOPE: CPT | Mod: QW

## 2023-08-07 LAB
BACTERIA UR CULT: ABNORMAL
BACTERIA UR CULT: ABNORMAL

## 2023-09-22 ENCOUNTER — OFFICE VISIT (OUTPATIENT)
Dept: NEUROPSYCHOLOGY | Facility: CLINIC | Age: 88
End: 2023-09-22
Attending: PSYCHIATRY & NEUROLOGY
Payer: MEDICARE

## 2023-09-22 DIAGNOSIS — R41.3 MEMORY LOSS: ICD-10-CM

## 2023-09-22 PROCEDURE — 96132 NRPSYC TST EVAL PHYS/QHP 1ST: CPT | Performed by: CLINICAL NEUROPSYCHOLOGIST

## 2023-09-22 PROCEDURE — 96133 NRPSYC TST EVAL PHYS/QHP EA: CPT | Performed by: CLINICAL NEUROPSYCHOLOGIST

## 2023-09-22 PROCEDURE — 96138 PSYCL/NRPSYC TECH 1ST: CPT | Performed by: CLINICAL NEUROPSYCHOLOGIST

## 2023-09-22 PROCEDURE — 90791 PSYCH DIAGNOSTIC EVALUATION: CPT | Performed by: CLINICAL NEUROPSYCHOLOGIST

## 2023-09-22 PROCEDURE — 96139 PSYCL/NRPSYC TST TECH EA: CPT | Performed by: CLINICAL NEUROPSYCHOLOGIST

## 2023-09-22 NOTE — PROGRESS NOTES
CONFIDENTIAL NEUROPSYCHOLOGICAL EVALUATION  **This is a medical document intended to be read within the context of the larger medical chart and for communication with the referring provider.  It is writing in medical language and may contain abbreviations that are unfamiliar.  Please consult the provider for further clarification.**    Name:  Jamil Bedolla Sr., MD  (Jamil)  YOB: 1935  Date of Assessment: Sep 22, 2023  Referring Provider: Dr. 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       SUMMARY AND CLINICAL IMPRESSIONS: See below for relevant background information and detailed test results.  See separate abstract for a list of neuropsychological measures and test scores.     The results of the neuropsychological evaluation are mildly abnormal - and concerning for a frontal-subcortical profile.  Within the context of estimated superior pre-morbid functioning, Dr. Bedolla demonstrated weaknesses in new learning of both visual and verbal information.   Dr. Bedolla demonstrated borderline to average new learning - with generally a flat learning curve when given repeated trials.  While not considered impaired when compared to general population, his performance is likely to reflect decline from his superior baseline as well as difficulties in efficient new learning.  Verbal fluencies and aspects of attention were also quite variable, ranged from the average to superior range.  Again, while this is not considered normatively impaired - this is likely reflective of a decline from his superior baseline. With specific regards to learning and memory, Dr. Bedolla's difficulties in new learning greatly impacted what he was able to recall after a delay and then  also recognize - which can look like memory loss. However, this pattern is not indicative of a difficulty in consolidation per se, but rather new information isn't being learned in the first place which then can't be recalled later (and could account for things such as asking questions repeatedly).  All other cognitive domains were within expectation including language (abstraction and naming), visuospatial processing and reasoning, processing speed, and all aspects of executive functioning. Dr. Bedolla did not endorse any symptoms of depression, anxiety, or stress either in the clinical interview or on questionnaires.     Dr. Bedolla reported a change in some activities of daily living, although it appears to be due to changing life circumstances rather than due to cognitive changes. Given no significant changes in activities of daily living, combined with only relatively mild changes in new learning, Dr. Bedolla does not meet diagnostic criteria for a cognitive diagnosis at this time - which would be consistent with what Dr. Ferguson also found on his neurological evaluation.  However, given the pattern of Dr. Bedolla's suspected cognitive decline - I am concerned about progression/conversion to dementia due to vascular etiology.  He does have a number of cerebrovascular risk factors as well as white matter changes noted on imaging.  This evaluation is not indicative of an Alzheimer's disease pattern - however, this cannot be fully ruled out at this time.  The symptoms onset and course are not concerning for a PD, LBD, or FTD.  Dr. Ferguson mentioned non-restorative sleep as well and alcohol use - and while this may be contributing (particularly to variability in attention) this alone could not account for the difficulties in new learning mentioned above    RECOMMENDATIONS:    I recommend a repeat neuropsychological evaluation in 12-18 months to update diagnostic considerations and recommendations.    It is recommended that   Graeme live a heart-healthy lifestyle to minimize cerebrovascular risk factors.  Anything that's good for the heart is good for the brain.  This includes walking at least a mile a day, and eating a well-balanced diet.    It's highly recommended that Dr. Bedolla continue to remain as active as he is - including socially active, to practice cognitive skills and stave off cognitive, social, and physical deconditioning.   Additional recommendations will be made during phone feedback session. Dr. Bedolla is highly encouraged to follow-up with Dr. Ferguson regarding next-steps.     DIAGNOSES  Subjective Cognitive Impairment (Memory Loss, R41.3)      Thank you for allowing me to participate in Jamil Bedolla Sr., MD's care.   I hope this report is helpful to all those involved.  I would be happy to discuss these results in detail or answer any other questions.        Kiarra Gray PsyD,   Clinical Neuropsychologist    RELEVANT BACKGROUND INFORMATION  Informed consent  was obtained after discussing the purpose and procedures of the evaluation, as well as aspects of confidentiality and effort/engagement.  Background information was obtained from a clinical interview with Dr. Bedolla and his wife (Salome) as well as review of available medical records.     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 Ferguson on 3/30/2023 for complaints about mild memory difficulties that insidiously onset over the past 2 years. He reported have difficulties finding directions while driving, will forget what he read the day prior, and he will repeat the same question.  He denied any difficulties with ADLs. Balance continues to be an issue although there were no other neurological complaints. He does have broken sleep - he will sleep about 4 hours at a time. He does have vascular risk factors including HTN, HLD, SM and a sedentary lifestyle (this is different that what was explained to me.).  Neurological examination was normal, including  "office screener for cognition.  However, Dr. Ferguson requested this evaluation to determine if there is a presence or emerging mild cognitive impairment.     Current Clinical Interview:    Dr. Bedolla presented with his wife, Salome.  Dr. Bedolla reported that as he has aged, he has more memory loss and poor spatial orientation.  He has to think more about driving to familiar places.  He also reported more \"tip of the tongue\" moments and difficulties in word-findings.  His wife reported that he asks the same question multiple times and he is not as quick to understand directions. However, Salome's answers usually feel familiar to him.  He struggles with \"spatial orientation\" as he has to think about how to drive to familiar places now.  He denied any difficulties in making decisions. 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      With regards to ADLs, Salome does most of the driving now - although this is due to multiple factors including Dr. Bedolla does not enjoy driving the shared vehicle.  He may doze more when riding as a passenger. He is not as active as he used to be - although he continues to have a large number of interests and hobbies. He recently went on a hunting trip in [place]. Salome does most of the cooking and paying the bills largely because it's easier for her to do (versus necessity due to cognitive changes in Dr. Bedolla). Dr. Bedolla is primarily responsible for the house and yard work including lawn mowing and wood splitting.  He manages his own medications without difficulties.  He checks his blood glucose daily with finger pricks.  He noted continued difficulties with balance but denied a history of falls.     HEALTH HABITS, BEHAVIORS AND MOOD:   Description of current mood:  \"Fine.\" Denied depression, anxiety, good solid marriage, Salome:  88 and still feeling good enough wants another 10 years    Appetite: no " changes in sense of smell or taste, reduced appetite, but due to age?  Salome thinks there is a change in his sense of taste    Sleep/Energy: Sleep, 8 hours every day, about 4-5 hours and then wake up, take a nap in the afternoon to catch up 1-1.5 hours short; 90 minutes, no apnea, lightly snores and not continuous, talks in his sleep when dreaming (clearly - normal voice - right on the edge of awakening), doesn't move at night, just talks, not punching or getting up and walking,     Exercise: does some but not as much as he used to    Chronic Pain: no chronic pain (two knee replacements but they do well), some discomfort in shoulder, did a bout of PT/oT, but this was years ago    Tobacco use:  Denied.     Alcohol use:  a lot less -  -6 oz a day with meals    Other Drugs: Denied.     Hallucinations: Denied.     Suicidal ideation: Denied.     MEDICAL/PSYCHIATRIC/SURGICAL HISTORY:   Patient Active Problem List   Diagnosis    Erectile dysfunction    Decreased libido    BPH (benign prostatic hyperplasia)    Advanced directives, counseling/discussion    Onychomycosis    Obesity (BMI 30-39.9)    Prostate cancer-Gavin 4, treated with radiation    Microalbuminuria    History of colonic polyps    CAD (coronary artery disease)    Carotid artery plaque    Primary osteoarthritis of both knees    Gastroesophageal reflux disease without esophagitis    Essential hypertension, benign    Hyperlipidemia LDL goal <70    Type 2 diabetes mellitus with diabetic nephropathy, without long-term current use of insulin (H)    PFD (pelvic floor dysfunction)    Bowel dysfunction    Memory loss    History of recurrent UTIs    Chronic kidney disease, stage 3 (H)    Urinary retention with incomplete bladder emptying       Past Medical History:   Diagnosis Date    Bowel dysfunction     Dr. Marito Orta, urgent, soft or liquid stools    CAD (coronary artery disease)     per calcium score=>400    Carotid artery plaque 09/2011    Colon polyps      last colonoscopy nl 5/19    Diverticulosis of colon     Erectile dysfunction 07/18/2008    Glaucoma     High cholesterol     HTN (hypertension) 07/18/2008    Incomplete emptying of bladder due to benign prostatic hyperplasia     Dr. Mejia    Memory loss 08/2020    labs nl, to neuro, seen by Dr. Ferguson 2023    Obesity     PFO (patent foramen ovale)     Plantar fasciitis     Presbyesophagus     Prostate cancer-Gavin 4, treated with radiation 07/28/2013    rising psa in 2018, seen at [facility], had cryo    Type II or unspecified type diabetes mellitus without mention of complication, not stated as uncontrolled     Dxd about 2000, sees nava Hadley added 5/23    Urinary retention with incomplete bladder emptying     does intermittent straight cath       Past Surgical History:   Procedure Laterality Date    APPENDECTOMY  1942    ARTHROSCOPY KNEE RT/LT  1998    partial menisectomy left knee    COLONOSCOPY N/A 9/27/2016    Procedure: COMBINED COLONOSCOPY, SINGLE OR MULTIPLE BIOPSY/POLYPECTOMY BY BIOPSY;  Surgeon: Maru Orta MD;  Location:  GI    CYSTOSCOPY      HEMORRHOIDECTOMY  1975    left tka Left 2015    PHACOEMULSIFICATION CLEAR CORNEA WITH STANDARD INTRAOCULAR LENS IMPLANT Right 3/18/2015    Procedure: PHACOEMULSIFICATION CLEAR CORNEA WITH STANDARD INTRAOCULAR LENS IMPLANT;  Surgeon: Lito Gonzales MD;  Location:  [facility]    PHACOEMULSIFICATION CLEAR CORNEA WITH STANDARD INTRAOCULAR LENS IMPLANT Left 3/25/2015    Procedure: PHACOEMULSIFICATION CLEAR CORNEA WITH STANDARD INTRAOCULAR LENS IMPLANT;  Surgeon: Lito Gonzales MD;  Location:  [facility]    right knee replacement Right 2019    ROTATOR CUFF REPAIR RT/LT  1998    right.  caused him to retire    SURGICAL HISTORY OF -   1999    L4-5 discectomy    TONSILLECTOMY & ADENOIDECTOMY  1940    TURP  1988    ZZ COLONOSCOPY THRU STOMA, DIAGNOSTIC  2005    ZZ COLONOSCOPY THRU STOMA, DIAGNOSTIC  5/09    diverticulosis, also proctitis       Family History:   "  Family History   Problem Relation Age of Onset    C.A.D. Father     C.A.D. Brother     Asthma Brother     C.A.D. Mother     Diabetes Brother     Lipids Brother        IMAGING:  Brain MRI completed 6/7/2023: \"FINDINGS:   Moderate generalized parenchymal volume loss is present without  convincing subjective lobar predilection. Scattered nonspecific white  matter T2 hyperintensities likely represent mild chronic small vessel  ischemic change. No evidence of recent ischemia, hemorrhage, mass, or  hydrocephalus.\"    MEDICATIONS: Dr. Bedolla has a current medication list which includes the following prescription(s): b-d single use swabs regular, atorvastatin, azelastine, blood glucose, blood glucose monitoring, blood glucose monitoring, diphenoxylate-atropine, famotidine, latanoprost, lisinopril, melatonin, metformin, multi-vitamin, order for dme, ozempic (0.25 or 0.5 mg/dose), ozempic (0.25 or 0.5 mg/dose), and vardenafil..    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  He denied any learning or attentional difficulties in school.  He obtained straight As, and graduated high school on time.  He completed his undergraduate in 3 years at [employer], and also completed medical school there as well.  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 He has 2 sons in the area, and 3 grandchildren.  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  He is also into snowmobiling and boating.  He is responsible for winterizing and maneuvering the heavy equipment. Dr. Bedolla also enjoys travelling, silva, carpentry and making furniture.  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     BEHAVIORAL OBSERVATIONS: Dr. Bedolla arrived at his appointment early, and accompanied by his wife.  No gross or " fine motor impairments noted on casual observation. Dr. Bedolla wore glasses and hearing and vision appeared adequate for the testing environment.  Speech was normal.  Thoughts were goal-directed and linear, and he was appropriately oriented. Rapport was established and maintained. Dr. Bedolla was good humors and often made jokes during downtimes in testing.  He easily anticipated task directions and did not require significant repetition or simplification. He appeared to put forth good effort.     TEST RESULTS: Please use the following table for reference.   Percentile Ranks Descriptor   <2nd Percentile Extremely Low Range   3rd - 9th Percentile Borderline Range   10th - 16th Percentile Below Average Range   17th - 24th Percentile Low Average   25th - 75th Percentile Average   76th - 90th Percentile Above Average   91st - 97th Percentile Superior   >98th Percentile Very Superior      Performance Validity:  Dr. Bedolla performed within expectations on embedded measures of performance validity.  Taken together with the behavioral observations above, the following results are considered an accurate representation of his current neuropsychological functioning.     Pre-Morbid Functioning: Dr. Bedolla performed in the superior range on a task of single-word reading.  Taken together with demographic information, it is estimated that his pre-morbid functioning is at least within the above average to superior ranges.     Language Skills: Verbal abstraction was within the very superior range. When corrected for education, confrontation was in the average range, and semantic verbal fluency was average.  When corrected for education, phonemic verbal fluency was in the below average range.     Visuospatial: Judgment of line orientation is normal. Copy of simple figures are normal. Block construction is in the very superior range. Clock construction and abstraction were normal.     Attention/Working Memory/Processing Speed: Simple attention was  in the average range.  Working memory ranged from the average to superior range.  Processing speed was in the average range.     Learning/Memory: List-learning was in the lower end of average, characterized by a flat learning curve. He was only able to independently recall about a third of what he learned initially - which is considered to be normatively in the below average range.  Recognition memory was in the average range.  Learning for narrative information was in average range, and independent retrieval was in the average range.  Recognition memory was in the low average range. Visual learning revealed a similar profile:  a flat learning curve considered to be in the borderline range.  He was only able to independently recall about a third of the information he learned, which was in the extremely low range.  Recognition memory was consistent with information learned, but considered in the below average range.     Executive Functioning: A task of rapid alternating attention was average (when corrected for education).  Response inhibition was average for response speed as well as number of errors made. Novel problem-solving was average (when corrected for education).  He was not overly perseverative and was able to generate and flexibly move between problem-solving strategies.     Psychological Functioning: Dr. Bedolla did not endorse any symptoms on a questionnaire of affective distress, including symptoms of depression, anxiety, or stress.     Procedures Administered by Psychologist: Clinical Interview with Dr. Bedolla and his wife, review of available medical records, test selection, interpretation, preparation of written report, and feedback. Please see nursing note for procedures administered by psychometrist.      Tests Administered:  Performance Validity Measures, NAB Orientation, ACS TOPF, WAIS-IV (Selected Subtests), WRAML-3 List Learning, WMS-IV Logical Memory, BVMT-R, BNT, COWAT/Animals, RBANS [place], Trails  A&B, D-KEFS Color-Word Interference, WCST-64 THEODORE-42       Activities included in this evaluation CPT Code Time Units   Psychological Diagnostic Interview 24026 - 1   Neuropsychology Professional Time 13916 165 1   [place] on 511[address]  2   Neuropsychologist Admin/Scoring Tests [id number]     Add On [id number]     Psychometrician Time - Test 20521 220 1   Admin/Scoring [id number]  6   DX:

## 2023-09-22 NOTE — NURSING NOTE
Pt was seen for neuropsychological evaluation at the request of Dr. Noah Ferguson for the purposes of diagnostic clarification and treatment planning. 220 minutes of test administration and scoring were provided by this writer. Please see Dr. Kiarra Gray's report for a full interpretation of the findings.    Giancarlo Amador MA, CSP

## 2023-09-22 NOTE — LETTER
9/22/2023      RE: Jamil Bedolla Sr., MD  [address] [place] [address]       CONFIDENTIAL NEUROPSYCHOLOGICAL EVALUATION  **This is a medical document intended to be read within the context of the larger medical chart and for communication with the referring provider.  It is writing in medical language and may contain abbreviations that are unfamiliar.  Please consult the provider for further clarification.**    Name:  Jamil Bedolla Sr., MD  (Jamil)  YOB: 1935  Date of Assessment: Sep 22, 2023  Referring Provider: Dr. 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       SUMMARY AND CLINICAL IMPRESSIONS: See below for relevant background information and detailed test results.  See separate abstract for a list of neuropsychological measures and test scores.     The results of the neuropsychological evaluation are mildly abnormal - and concerning for a frontal-subcortical profile.  Within the context of estimated superior pre-morbid functioning, Dr. Bedolla demonstrated weaknesses in new learning of both visual and verbal information.   Dr. Bedolla demonstrated borderline to average new learning - with generally a flat learning curve when given repeated trials.  While not considered impaired when compared to general population, his performance is likely to reflect decline from his superior baseline as well as difficulties in efficient new learning.  Verbal fluencies and aspects of attention were also quite variable, ranged from the average to superior range.  Again, while this is not considered normatively impaired - this is likely reflective of a decline from his superior baseline. With specific regards to learning and memory, Dr. Bedolla's  difficulties in new learning greatly impacted what he was able to recall after a delay and then also recognize - which can look like memory loss. However, this pattern is not indicative of a difficulty in consolidation per se, but rather new information isn't being learned in the first place which then can't be recalled later (and could account for things such as asking questions repeatedly).  All other cognitive domains were within expectation including language (abstraction and naming), visuospatial processing and reasoning, processing speed, and all aspects of executive functioning. Maryam Bedolla did not endorse any symptoms of depression, anxiety, or stress either in the clinical interview or on questionnaires.     Maryam Bedolla reported a change in some activities of daily living, although it appears to be due to changing life circumstances rather than due to cognitive changes. Given no significant changes in activities of daily living, combined with only relatively mild changes in new learning, Dr. Bedolla does not meet diagnostic criteria for a cognitive diagnosis at this time - which would be consistent with what Dr. Ferguson also found on his neurological evaluation.  However, given the pattern of Dr. Bedolla's suspected cognitive decline - I am concerned about progression/conversion to dementia due to vascular etiology.  He does have a number of cerebrovascular risk factors as well as white matter changes noted on imaging.  This evaluation is not indicative of an Alzheimer's disease pattern - however, this cannot be fully ruled out at this time.  The symptoms onset and course are not concerning for a PD, LBD, or FTD.  Dr. Ferguson mentioned non-restorative sleep as well and alcohol use - and while this may be contributing (particularly to variability in attention) this alone could not account for the difficulties in new learning mentioned above    RECOMMENDATIONS:    I recommend a repeat neuropsychological evaluation in 12-18  months to update diagnostic considerations and recommendations.    It is recommended that Dr. Bedolla live a heart-healthy lifestyle to minimize cerebrovascular risk factors.  Anything that's good for the heart is good for the brain.  This includes walking at least a mile a day, and eating a well-balanced diet.    It's highly recommended that Dr. Bedolla continue to remain as active as he is - including socially active, to practice cognitive skills and stave off cognitive, social, and physical deconditioning.   Additional recommendations will be made during phone feedback session. Dr. Bedolla is highly encouraged to follow-up with Dr. Ferguson regarding next-steps.     DIAGNOSES  Subjective Cognitive Impairment (Memory Loss, R41.3)      Thank you for allowing me to participate in Jamil Bedolla Sr., MD's care.   I hope this report is helpful to all those involved.  I would be happy to discuss these results in detail or answer any other questions.        Kiarra Gray PsyD,   Clinical Neuropsychologist    RELEVANT BACKGROUND INFORMATION  Informed consent  was obtained after discussing the purpose and procedures of the evaluation, as well as aspects of confidentiality and effort/engagement.  Background information was obtained from a clinical interview with Dr. Bedolla and his wife (Salome) as well as review of available medical records.     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 Ferguson on 3/30/2023 for complaints about mild memory difficulties that insidiously onset over the past 2 years. He reported have difficulties finding directions while driving, will forget what he read the day prior, and he will repeat the same question.  He denied any difficulties with ADLs. Balance continues to be an issue although there were no other neurological complaints. He does have broken sleep - he will sleep about 4 hours at a time. He does have vascular risk factors including HTN, HLD, SM and a sedentary lifestyle (this  "is different that what was explained to me.).  Neurological examination was normal, including office screener for cognition.  However, Dr. Ferguson requested this evaluation to determine if there is a presence or emerging mild cognitive impairment.     Current Clinical Interview:    Dr. Bedolla presented with his wife, Salome.  Dr. Bedolla reported that as he has aged, he has more memory loss and poor spatial orientation.  He has to think more about driving to familiar places.  He also reported more \"tip of the tongue\" moments and difficulties in word-findings.  His wife reported that he asks the same question multiple times and he is not as quick to understand directions. However, Salome's answers usually feel familiar to him.  He struggles with \"spatial orientation\" as he has to think about how to drive to familiar places now.  He denied any difficulties in making decisions. 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      With regards to ADLs, Salome does most of the driving now - although this is due to multiple factors including Dr. Bedolla does not enjoy driving the shared vehicle.  He may doze more when riding as a passenger. He is not as active as he used to be - although he continues to have a large number of interests and hobbies. He recently went on a hunting trip in [place]. Salome does most of the cooking and paying the bills largely because it's easier for her to do (versus necessity due to cognitive changes in Dr. Bedolla). Dr. Bedolla is primarily responsible for the house and yard work including lawn mowing and wood splitting.  He manages his own medications without difficulties.  He checks his blood glucose daily with finger pricks.  He noted continued difficulties with balance but denied a history of falls.     HEALTH HABITS, BEHAVIORS AND MOOD:   Description of current mood:  \"Fine.\" Denied depression, anxiety, good " solid marriage, Salome:  88 and still feeling good enough wants another 10 years    Appetite: no changes in sense of smell or taste, reduced appetite, but due to age?  Salome thinks there is a change in his sense of taste    Sleep/Energy: Sleep, 8 hours every day, about 4-5 hours and then wake up, take a nap in the afternoon to catch up 1-1.5 hours short; 90 minutes, no apnea, lightly snores and not continuous, talks in his sleep when dreaming (clearly - normal voice - right on the edge of awakening), doesn't move at night, just talks, not punching or getting up and walking,     Exercise: does some but not as much as he used to    Chronic Pain: no chronic pain (two knee replacements but they do well), some discomfort in shoulder, did a bout of PT/oT, but this was years ago    Tobacco use:  Denied.     Alcohol use:  a lot less -  -6 oz a day with meals    Other Drugs: Denied.     Hallucinations: Denied.     Suicidal ideation: Denied.     MEDICAL/PSYCHIATRIC/SURGICAL HISTORY:   Patient Active Problem List   Diagnosis     Erectile dysfunction     Decreased libido     BPH (benign prostatic hyperplasia)     Advanced directives, counseling/discussion     Onychomycosis     Obesity (BMI 30-39.9)     Prostate cancer-[place] 4, treated with radiation     Microalbuminuria     History of colonic polyps     CAD (coronary artery disease)     Carotid artery plaque     Primary osteoarthritis of both knees     Gastroesophageal reflux disease without esophagitis     Essential hypertension, benign     Hyperlipidemia LDL goal <70     Type 2 diabetes mellitus with diabetic nephropathy, without long-term current use of insulin (H)     PFD (pelvic floor dysfunction)     Bowel dysfunction     Memory loss     History of recurrent UTIs     Chronic kidney disease, stage 3 (H)     Urinary retention with incomplete bladder emptying       Past Medical History:   Diagnosis Date     Bowel dysfunction     Dr. Marito Orta, urgent, soft or liquid  stools     CAD (coronary artery disease)     per calcium score=>400     Carotid artery plaque 09/2011     Colon polyps     last colonoscopy nl 5/19     Diverticulosis of colon      Erectile dysfunction 07/18/2008     Glaucoma      High cholesterol      HTN (hypertension) 07/18/2008     Incomplete emptying of bladder due to benign prostatic hyperplasia     Dr. Mejia     Memory loss 08/2020    labs nl, to neuro, seen by Dr. Ferguson 2023     Obesity      PFO (patent foramen ovale)      Plantar fasciitis      Presbyesophagus      Prostate cancer-[facility] 4, treated with radiation 07/28/2013    rising psa in 2018, seen at [facility], had cryo     Type II or unspecified type diabetes mellitus without mention of complication, not stated as uncontrolled     Dxd about 2000, sees nava Hadley added 5/23     Urinary retention with incomplete bladder emptying     does intermittent straight cath       Past Surgical History:   Procedure Laterality Date     APPENDECTOMY  1942     ARTHROSCOPY KNEE RT/LT  1998    partial menisectomy left knee     COLONOSCOPY N/A 9/27/2016    Procedure: COMBINED COLONOSCOPY, SINGLE OR MULTIPLE BIOPSY/POLYPECTOMY BY BIOPSY;  Surgeon: Maru Orta MD;  Location:  GI     CYSTOSCOPY       HEMORRHOIDECTOMY  1975     left tka Left 2015     PHACOEMULSIFICATION CLEAR CORNEA WITH STANDARD INTRAOCULAR LENS IMPLANT Right 3/18/2015    Procedure: PHACOEMULSIFICATION CLEAR CORNEA WITH STANDARD INTRAOCULAR LENS IMPLANT;  Surgeon: Lito Gonzales MD;  Location[facility]     PHACOEMULSIFICATION CLEAR CORNEA WITH STANDARD INTRAOCULAR LENS IMPLANT Left 3/25/2015    Procedure: PHACOEMULSIFICATION CLEAR CORNEA WITH STANDARD INTRAOCULAR LENS IMPLANT;  Surgeon: Lito Gonzales MD;  Location:  [facility]     right knee replacement Right 2019     ROTATOR CUFF REPAIR RT/LT  1998    right.  caused him to retire     SURGICAL HISTORY OF -   1999    L4-5 discectomy     TONSILLECTOMY & ADENOIDECTOMY  1940     TURP  1988      "[facility] COLONOSCOPY THRU STOMA, DIAGNOSTIC  2005     [facility] COLONOSCOPY THRU STOMA, DIAGNOSTIC  5/09    diverticulosis, also proctitis       Family History:    Family History   Problem Relation Age of Onset     C.A.D. Father      C.A.D. Brother      Asthma Brother      C.A.D. Mother      Diabetes Brother      Lipids Brother        IMAGING:  Brain MRI completed 6/7/2023: \"FINDINGS:   Moderate generalized parenchymal volume loss is present without  convincing subjective lobar predilection. Scattered nonspecific white  matter T2 hyperintensities likely represent mild chronic small vessel  ischemic change. No evidence of recent ischemia, hemorrhage, mass, or  hydrocephalus.\"    MEDICATIONS: Dr. Bedolla has a current medication list which includes the following prescription(s): b-d single use swabs regular, atorvastatin, azelastine, blood glucose, blood glucose monitoring, blood glucose monitoring, diphenoxylate-atropine, famotidine, latanoprost, lisinopril, melatonin, metformin, multi-vitamin, order for dme, ozempic (0.25 or 0.5 mg/dose), ozempic (0.25 or 0.5 mg/dose), and vardenafil..    SOCIAL/DEVELOPMENTAL/OCCUPATIONAL HISTORY:   Dr. Bedolla was born and raised in [place], [place].  He denied any learning or attentional difficulties in school.  He obtained straight As, and graduated high school on time.  He completed his undergraduate in 3 years at [employer], and also completed medical school there as well.  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 He has 2 sons in the area, and 3 grandchildren.  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  He is also into snowmobiling and boating.  He is responsible for winterizing and maneuvering the heavy equipment. Dr. Bedolla also enjoys travelling, silva, carpentry and making furniture.  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     BEHAVIORAL OBSERVATIONS: Dr. Bedolla arrived at his appointment early, and accompanied by his wife.  No gross or fine motor impairments noted on casual observation. Dr. Bedolla wore glasses and hearing and vision appeared adequate for the testing environment.  Speech was normal.  Thoughts were goal-directed and linear, and he was appropriately oriented. Rapport was established and maintained. Dr. Bedolla was good humors and often made jokes during downtimes in testing.  He easily anticipated task directions and did not require significant repetition or simplification. He appeared to put forth good effort.     TEST RESULTS: Please use the following table for reference.   Percentile Ranks Descriptor   <2nd Percentile Extremely Low Range   3rd - 9th Percentile Borderline Range   10th - 16th Percentile Below Average Range   17th - 24th Percentile Low Average   25th - 75th Percentile Average   76th - 90th Percentile Above Average   91st - 97th Percentile Superior   >98th Percentile Very Superior      Performance Validity:  Dr. Bedolla performed within expectations on embedded measures of performance validity.  Taken together with the behavioral observations above, the following results are considered an accurate representation of his current neuropsychological functioning.     Pre-Morbid Functioning: Dr. Bedolla performed in the superior range on a task of single-word reading.  Taken together with demographic information, it is estimated that his pre-morbid functioning is at least within the above average to superior ranges.     Language Skills: Verbal abstraction was within the very superior range. When corrected for education, confrontation was in the average range, and semantic verbal fluency was average.  When corrected for education, phonemic verbal fluency was in the below average range.     Visuospatial: Judgment of line orientation is normal. Copy of simple figures are normal. Block  construction is in the very superior range. Clock construction and abstraction were normal.     Attention/Working Memory/Processing Speed: Simple attention was in the average range.  Working memory ranged from the average to superior range.  Processing speed was in the average range.     Learning/Memory: List-learning was in the lower end of average, characterized by a flat learning curve. He was only able to independently recall about a third of what he learned initially - which is considered to be normatively in the below average range.  Recognition memory was in the average range.  Learning for narrative information was in average range, and independent retrieval was in the average range.  Recognition memory was in the low average range. Visual learning revealed a similar profile:  a flat learning curve considered to be in the borderline range.  He was only able to independently recall about a third of the information he learned, which was in the extremely low range.  Recognition memory was consistent with information learned, but considered in the below average range.     Executive Functioning: A task of rapid alternating attention was average (when corrected for education).  Response inhibition was average for response speed as well as number of errors made. Novel problem-solving was average (when corrected for education).  He was not overly perseverative and was able to generate and flexibly move between problem-solving strategies.     Psychological Functioning: Dr. Bedolla did not endorse any symptoms on a questionnaire of affective distress, including symptoms of depression, anxiety, or stress.     Procedures Administered by Psychologist: Clinical Interview with Dr. Bedolla and his wife, review of available medical records, test selection, interpretation, preparation of written report, and feedback. Please see nursing note for procedures administered by psychometrist.      Tests Administered:  Performance Validity  Measures, NAB Orientation, ACS TOPF, WAIS-IV (Selected Subtests), WRAML-3 List Learning, WMS-IV Logical Memory, BVMT-R, BNT, COWAT/Animals, RBANS [place], Trails A&B, D-KEFS Color-Word Interference, WCST-64 THEODORE-42       Activities included in this evaluation CPT Code Time Units   Psychological Diagnostic Interview 04211 - 1   Neuropsychology Professional Time [id number] 165 1   [place] on 02923  2   Neuropsychologist Admin/Scoring Tests 08571     Add On 96910     Psychometrician Time - Test 49382 220 1   Admin/Scoring [id number]  6   DX:                                   Kiarra Gray PsyD

## 2023-09-25 ENCOUNTER — TRANSFERRED RECORDS (OUTPATIENT)
Dept: HEALTH INFORMATION MANAGEMENT | Facility: CLINIC | Age: 88
End: 2023-09-25
Payer: MEDICARE

## 2023-09-25 LAB
CREATININE (EXTERNAL): 1.23 MG/DL (ref 0.66–1.25)
GFR ESTIMATED (EXTERNAL): 57 ML/MIN/1.7
GLUCOSE (EXTERNAL): 130 MG/DL (ref 70–99)
HBA1C MFR BLD: 6.8 %
POTASSIUM (EXTERNAL): 5.2 MMOL/L (ref 3.5–5.1)

## 2023-10-07 NOTE — PROGRESS NOTES
Provider: AL      Tech: LALO      Patient Name: Jamil Bedolla      : 1935      MRN: [id number]      SAUCEDA: 2023      Age: 88      Education: 20      Ethnicity: C      Handedness: Right      Station: OP             NEUROPSYCHOLOGICAL TESTS RAW SCORE STANDARDIZED SCORE* %jason   COGNITIVE STATUS       NAB Orientation () 29 --     INTELLECTUAL ABILITY RAW SCORE STANDARDIZED %jason   WAIS-IV        VCI --          Similarities 31 ScS= 17 99%   MARY --      Block Design 46 ScS= 17 99%   WMI --      Digit Span 26 ScS= 12 75%   PSI --      Coding 40 ScS= 11 63%   PREMORBID ESTIMATE RAW SCORE STANDARDIZED SCORE* %jason   Test of Premorbid Functioning (TOPF)  Actual SS= 119 90%    60 Pred SS= 118 --     E-FSIQ= 118 --   ATTENTION/CONC. & PROC SPEED RAW SCORE STANDARDIZED SCORE* %jason   WAIS-IV       Digit Span Total 26 ScS= 12 75%   Digit Span Forward (LDSF=6) 9 ScS= 10 50%   Digit Span Backward (LDSB=5) 9 ScS= 14 91%   Digit Span Sequencing (LDSS=6) 8 ScS= 13 84%   Reliable Digit Span (RDS) 9 --  --   MEMORY RAW SCORE STANDARDIZED SCORE* %jason   Verbal       WMS-IV (OA)       Logical Memory I 21 ScS= 9 37%   Logical Memory II 7 ScS= 8 25%   Logical Memory Recognition 14 -  17-25%   WRAML-III Verbal Learning       Total Trials 1-4 Correct se 20 ScS= 8 25%   Trial 1 se 3 -  --   Trial 2 se 5 -  --   Trial 3 se 6 -  --   Trial 4 se 6 -  --   Delayed Recall se 2 ScS= 7 16%   Recognition 17 ScS= 10 50%   Total Intrusions se 4 Z= 0.88 81%   Total Repetitions se 1 Z= -0.28 39%   Verbal Learning Recognition 17 ScS= 10 50%   Visual        Brief Visual Memory Test - Revised (BVMT-R; Form 1)      Trial 1 3 --     Trial 2 3 --     Trial 3 3 --     Trials 1-3 9 TS= 31 3%     Z= -1    Learning 0 TS= 30 2%     Z= -1.96    Delayed Recall 1 TS= 23 <1%     Z= -4.5    Percent Retained 33% --  <1   Recognition Hits 4 --  6-10   Recognition False Alarms 0 --  >16   Discrimination Index 4   11-16   LANGUAGE RAW SCORE STANDARDIZED SCORE*  %jason   [place] Naming Test s,r,e 56 TS= 52 58%   Phonemic Cue (Correct; Administered) 3; 4      COWAT (FAS) 30 TS= 40 16%   Repetition Errors 1      Set Loss Errors 0      Animal Naming 15 TS= 43 24%   Repetition Errors 1      Set Loss Errors 0      VISUOSPATIAL RAW SCORE STANDARDIZED SCORE* %jason   Clock Drawing Test       Construction BORDERLINE --  --   Abstraction NORMAL --  --   BVMT-R Copy (12/12) 12      RBANS Visuospat./Constr.       Line Orientation 18 --  >75   EXECUTIVE FUNCTIONS RAW SCORE STANDARDIZED SCORE* %jason   Trail Making Test (Errors; Time)        Part A s,r,e 0; 38 TS= 49 46%   Part B s,r,e 0; 95 TS= 50 50%   DKEFS Color-Word Interference Test       Color Naming 41 ScS= 8 25%   Word Reading 24 ScS= 11 63%   Inhibition 81 ScS= 11 63%   Inhibition Total Errors 1 ScS= 12 75%   [place] Card Sorting Test (WCST-64)       Categories Completede 4 --  >16   Trials to Complete 1st Category 11 --  >16   Total Errorse 14 TS= 48 42%   Perseverative Responsese 12 TS= 42 21%   Failure To Maintain Sete 0 --  -   % Conceptual Level Responses 0 TS= 0 0%   # Conceptual Level Responses 48 TS= 49 47%   MOOD AND PERSONALITY RAW SCORE INTERPRETATION %jason   Depression, Anxiety, Stress Scale (THEODORE-42)       Depression 0 NORMAL  --   Anxiety 0 NORMAL  --   Stress 0 NORMAL  --          * All standardized scores are adjusted for age. Superscripts denote additional adjustment for (s)ex, (r)ace/ethnicity, (l)anguage, and/or (e)ducation.   ** Descriptive ranges are based on American Academy of Clinical Neuropsychology (2020) consensus guidelines, or test manuals where appropriate.    WNL = within normal limits. DC = discontinued due to patient s inability to complete the test.     Standardized scores: TS = T-score; mean = 50, standard deviation =10; z = z-score; mean = 0, standard deviation = 1; ScS = scaled score; mean = 10, standard deviation = 3; MAS = [place] Older Adult Normative Study age adjusted scaled score; mean =  10, standard deviation = 3; SS = standard score; mean = 100, standard deviation = 15.   **Non-standardized administration, estimated using WAIS normative data

## 2023-10-09 ENCOUNTER — TELEPHONE (OUTPATIENT)
Dept: NEUROPSYCHOLOGY | Facility: CLINIC | Age: 88
End: 2023-10-09
Payer: MEDICARE

## 2023-10-09 NOTE — TELEPHONE ENCOUNTER
Called and provided feedback for the neuropsychological evaluation to Angel.  They asked questions as appropriate.  Encouraged to live a heart-health lifestyle.  Follow-up in 12-18 months, to be directed by neurology or PCP.     Kiarra Gray PsyD, LP

## 2023-11-03 ENCOUNTER — OFFICE VISIT (OUTPATIENT)
Dept: NEUROLOGY | Facility: CLINIC | Age: 88
End: 2023-11-03
Payer: MEDICARE

## 2023-11-03 VITALS
WEIGHT: 186 LBS | HEART RATE: 61 BPM | DIASTOLIC BLOOD PRESSURE: 81 MMHG | BODY MASS INDEX: 27.47 KG/M2 | OXYGEN SATURATION: 97 % | SYSTOLIC BLOOD PRESSURE: 157 MMHG

## 2023-11-03 DIAGNOSIS — I10 ESSENTIAL HYPERTENSION, BENIGN: ICD-10-CM

## 2023-11-03 DIAGNOSIS — E11.21 TYPE 2 DIABETES MELLITUS WITH DIABETIC NEPHROPATHY, WITHOUT LONG-TERM CURRENT USE OF INSULIN (H): ICD-10-CM

## 2023-11-03 DIAGNOSIS — R41.3 MEMORY LOSS: Primary | ICD-10-CM

## 2023-11-03 DIAGNOSIS — E78.5 HYPERLIPIDEMIA LDL GOAL <70: ICD-10-CM

## 2023-11-03 DIAGNOSIS — G47.00 PERSISTENT DISORDER OF INITIATING OR MAINTAINING SLEEP: ICD-10-CM

## 2023-11-03 PROCEDURE — 99215 OFFICE O/P EST HI 40 MIN: CPT | Performed by: PSYCHIATRY & NEUROLOGY

## 2023-11-03 RX ORDER — BICALUTAMIDE 50 MG/1
50 TABLET, FILM COATED ORAL
COMMUNITY
Start: 2023-09-01

## 2023-11-03 NOTE — LETTER
11/3/2023         RE: Jamil Bedolla Sr., MD  [address] [place] [id number]        Dear Colleague,    Thank you for referring your patient, Jamil Bedolla Sr., MD, to the [facility]. Please see a copy of my visit note below.        [facility]    Jamil Bedolla Sr., MD MRN# [id number]   Age: 88 year old YOB: 1935     Requesting physician: No ref. 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  Non-restorative sleep  Vascular risk factors of hypertension, hyperlipidemia, diabetes and obseity     Plan:   Defer implementation of neurological remedy for MCI (memantine or donepezil) for now and repeat neuropsychometric testing in 12-18 months to gauge progression  Consider implementation of alternative sleep aids (trazodone etc.)  Alcohol cessation  Additional vascular risk factor management: - alternative to atorvastatin to get triglycerides down, more aggressive dietary/exercise routine to reduce BMI, additional monitoring of BP's and perhaps even consideration of ASA 81 mg daily    After lengthy discussion, we will forward these notes to Dr. Bedolla's primary physician, Dr. Mon for his review and consideration as well.  I did suggest sleep monitoring perhaps with some newer technological implements (Prezto ring Apple Watch etc.) or perhaps meeting with my former associate, Dr. Cody Rubio at the [facility], [facility] who specializes in sleep.             History of Present Illness:   CC:  I met today with Dr. Bedolla and his wife Salome for 40 minutes to follow-up with his neuropsychometric analysis, investigating his concerns pertaining to recent memory difficulties.  He was evaluated this September by Dr. Gray who did find evidence of difficulty with recent memory and new learning and retention.  There did not appear however to be any other areas involved  for any other cognitive domains.  Although he did well, she did feel that this might have been a decline from his previous superior baseline.    We discussed these findings at length.  I do not necessarily believe that these findings do disclose substantial neural pathology however still have some concerns that there may be caused by 1 of 3 issues:    1.,  He continues to manifest a disrupted sleep pattern.  He goes to bed around 11:00 unassisted will sleep until perhaps 3 or 4 AM, awakens spontaneously where he will stay up for the next hour and read, take 4 ounces of wine as well as 10 mg of melatonin and indicates he is able to go to sleep again around 4:00 in will sleep another 4 hours.  He finds that if he does not do this he is excessively tired during the day and he have to take an afternoon nap.  It is plausible that this is a distractor and interferes with his cognitive skills.  I certainly think that abandoning alcohol completely might be of utility here as well.    2.  Although his MRI study done earlier this year did not disclose substantial vascular changes, he still has risk factors including diabetes, hypertension, hyperlipidemia, as well as obesity.  I am not certain that the Lipitor has done much to manage his triglycerides although it has improved his cholesterol numbers substantially.  I also wonder if his blood pressure which is elevated today is elevated at other times and perhaps were not simply aware of it.  I think his sugars are well managed but his weight and exercise provide him with a BMI of 27.47 and I think this could certainly be improved.  I am not certain that he does not have a vascular cognitive impairment which would act very differently from that caused by a neurodegenerative illness and there certainly would be little adverse impact of trying to manage his risk factors a little more aggressively.  I might even consider small dose aspirin but I do not feel strongly enough about  it today to recommend he commence.    3.  It is still possible that this is the very beginning of a neurodegenerative process.  Dr. Gray suggested he be retested again in another 12 to 18 months.  I have not scheduled this as I am leaving my practice but I certainly think it might be prudent if we are unable to achieve any remedy through alternative interventions.  We did discuss the use of memantine and donepezil for treatment however he already has some gastrointestinal symptoms and I do not think this is worthwhile doing at this moment in time.               Physical Exam:            Data:   All laboratory data reviewed  All imaging studies reviewed by me             DATA for DOCUMENTATION:         Past Medical History:     Patient Active Problem List   Diagnosis     Erectile dysfunction     Decreased libido     BPH (benign prostatic hyperplasia)     Advanced directives, counseling/discussion     Onychomycosis     Obesity (BMI 30-39.9)     Prostate cancer-[place] 4, treated with radiation     Microalbuminuria     History of colonic polyps     CAD (coronary artery disease)     Carotid artery plaque     Primary osteoarthritis of both knees     Gastroesophageal reflux disease without esophagitis     Essential hypertension, benign     Hyperlipidemia LDL goal <70     Type 2 diabetes mellitus with diabetic nephropathy, without long-term current use of insulin (H)     PFD (pelvic floor dysfunction)     Bowel dysfunction     Memory loss     History of recurrent UTIs     Chronic kidney disease, stage 3 (H)     Urinary retention with incomplete bladder emptying     Past Medical History:   Diagnosis Date     Bowel dysfunction     Dr. Marito Orta, urgent, soft or liquid stools     CAD (coronary artery disease)     per calcium score=>400     Carotid artery plaque 09/2011     Colon polyps     last colonoscopy nl 5/19     Diverticulosis of colon      Erectile dysfunction 07/18/2008     Glaucoma      High cholesterol       HTN (hypertension) 07/18/2008     Incomplete emptying of bladder due to benign prostatic hyperplasia     Dr. Mejia     Memory loss 08/2020    labs nl, to neuro, seen by Dr. Ferguson 2023     Obesity      PFO (patent foramen ovale)      Plantar fasciitis      Presbyesophagus      Prostate cancer-[place] 4, treated with radiation 07/28/2013    rising psa in 2018, seen at [facility], had cryo     Type II or unspecified type diabetes mellitus without mention of complication, not stated as uncontrolled     Dxd about 2000, sees nava Hadley added 5/23     Urinary retention with incomplete bladder emptying     does intermittent straight cath       Also see scanned health assessment forms.       Past Surgical History:     Past Surgical History:   Procedure Laterality Date     APPENDECTOMY  1942     ARTHROSCOPY KNEE RT/LT  1998    partial menisectomy left knee     COLONOSCOPY N/A 9/27/2016    Procedure: COMBINED COLONOSCOPY, SINGLE OR MULTIPLE BIOPSY/POLYPECTOMY BY BIOPSY;  Surgeon: Maru Orta MD;  Location:  GI     CYSTOSCOPY       HEMORRHOIDECTOMY  1975     left tka Left 2015     PHACOEMULSIFICATION CLEAR CORNEA WITH STANDARD INTRAOCULAR LENS IMPLANT Right 3/18/2015    Procedure: PHACOEMULSIFICATION CLEAR CORNEA WITH STANDARD INTRAOCULAR LENS IMPLANT;  Surgeon: Lito Gonzales MD;  Location:  EC     PHACOEMULSIFICATION CLEAR CORNEA WITH STANDARD INTRAOCULAR LENS IMPLANT Left 3/25/2015    Procedure: PHACOEMULSIFICATION CLEAR CORNEA WITH STANDARD INTRAOCULAR LENS IMPLANT;  Surgeon: Lito Gonzales MD;  Location:  EC     right knee replacement Right 2019     ROTATOR CUFF REPAIR RT/LT  1998    right.  caused him to retire     SURGICAL HISTORY OF -   1999    L4-5 discectomy     TONSILLECTOMY & ADENOIDECTOMY  1940     TURP  1988     ZZ COLONOSCOPY THRU STOMA, DIAGNOSTIC  2005     ZZ COLONOSCOPY THRU STOMA, DIAGNOSTIC  5/09    diverticulosis, also proctitis            Social History:     Social History      Socioeconomic History     Marital status:      Spouse name: Salome Gates     Number of children: 2     Years of education: Not on file     Highest education level: Not on file   Occupational History     Occupation: MD, Family [employer]     Employer: RETIRED   Tobacco Use     Smoking status: Never     Smokeless tobacco: Never   Substance and Sexual Activity     Alcohol use: Yes     Comment: 10 a week     Drug use: No     Sexual activity: Yes     Partners: Female   Other Topics Concern     Parent/sibling w/ CABG, MI or angioplasty before 65F 55M? No   Social History Narrative     Not on file     Social Determinants of Health     Financial Resource Strain: Not on file   Food Insecurity: Not on file   Transportation Needs: Not on file   Physical Activity: Not on file   Stress: Not on file   Social Connections: Not on file   Interpersonal Safety: Not on file   Housing Stability: Not on file              Family History:     Family History   Problem Relation Age of Onset     C.A.D. Father      C.A.D. Brother      Asthma Brother      C.A.D. Mother      Diabetes Brother      Lipids Brother             Medications:     Current Outpatient Medications   Medication Sig     Alcohol Swabs (B-D SINGLE USE SWABS REGULAR) PADS 1 pad 3 times daily     atorvastatin (LIPITOR) 40 MG tablet TAKE 1 TABLET EVERY DAY     azelastine (ASTELIN) 0.1 % nasal spray Spray 1 spray into both nostrils 2 times daily     bicalutamide (CASODEX) 50 MG tablet Take 50 mg by mouth     blood glucose (ONETOUCH ULTRA) test strip Use to test blood sugar  daily or as directed.     blood glucose monitoring (ONE TOUCH ULTRA 2) meter device kit Use to test blood sugars 1- 2 times daily or as directed.     blood glucose monitoring (ONE TOUCH ULTRASOFT) lancets Use to test blood sugar 1-2  times daily or as directed.     diphenoxylate-atropine (LOMOTIL) 2.5-0.025 MG tablet Take 1 tablet by mouth Take one tablet twice a day.     famotidine (PEPCID) 20 MG tablet  Take 1 tablet (20 mg) by mouth 2 times daily     latanoprost (XALATAN) 0.005 % ophthalmic solution Place 1 drop into both eyes At Bedtime      lisinopril (ZESTRIL) 20 MG tablet Take 1 tablet (20 mg) by mouth 2 times daily     MELATONIN PO Take 5 mg by mouth At Bedtime     metFORMIN (GLUCOPHAGE XR) 500 MG 24 hr tablet TAKE 2 TABLETS TWICE DAILY WITH MEALS     MULTI-VITAMIN OR TABS 1 TABLET DAILY     ORDER FOR DME Equipment being ordered: Postivac     OZEMPIC, 0.25 OR 0.5 MG/DOSE, 2 MG/3ML pen INJECT 0.5 MG SUBCUTANEOUSLY ONCE A WEEK FOR 4 WEEKS     semaglutide (OZEMPIC, 0.25 OR 0.5 MG/DOSE,) 2 MG/3ML pen Inject 0.5 mg Subcutaneous every 7 days     vardenafil (LEVITRA) 20 MG tablet Take 0.5-1 tablets (10-20 mg) by mouth daily as needed Never use with nitroglycerin, terazosin or doxazosin.     No current facility-administered medications for this visit.              Review of Systems:   A comprehensive 10 point review of systems (constitutional, ENT, cardiac, peripheral vascular, lymphatic, respiratory, GI, , Musculoskeletal, skin, Neurological) was performed and found to be negative except as described in this note.     See intake form completed by patient      Again, thank you for allowing me to participate in the care of your patient.        Sincerely,        Noah Ferguson MD, MD

## 2023-11-03 NOTE — PROGRESS NOTES
[facility]    Jamil Bedolla Sr., MD MRN# [id number]   Age: 88 year old YOB: 1935     Requesting physician: No ref. 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  Non-restorative sleep  Vascular risk factors of hypertension, hyperlipidemia, diabetes and obseity     Plan:   Defer implementation of neurological remedy for MCI (memantine or donepezil) for now and repeat neuropsychometric testing in 12-18 months to gauge progression  Consider implementation of alternative sleep aids (trazodone etc.)  Alcohol cessation  Additional vascular risk factor management: - alternative to atorvastatin to get triglycerides down, more aggressive dietary/exercise routine to reduce BMI, additional monitoring of BP's and perhaps even consideration of ASA 81 mg daily    After lengthy discussion, we will forward these notes to Dr. Bedolla's primary physician, Dr. Mon for his review and consideration as well.  I did suggest sleep monitoring perhaps with some newer technological implements (Oura ring Apple Watch etc.) or perhaps meeting with my former associate, Dr. Cody Rubio at the [facility], [facility] who specializes in sleep.             History of Present Illness:   CC:  I met today with Dr. Bedolla and his wife Salome for 40 minutes to follow-up with his neuropsychometric analysis, investigating his concerns pertaining to recent memory difficulties.  He was evaluated this September by Dr. Gray who did find evidence of difficulty with recent memory and new learning and retention.  There did not appear however to be any other areas involved for any other cognitive domains.  Although he did well, she did feel that this might have been a decline from his previous superior baseline.    We discussed these findings at length.  I do not necessarily believe that these findings do disclose substantial neural  pathology however still have some concerns that there may be caused by 1 of 3 issues:    1.,  He continues to manifest a disrupted sleep pattern.  He goes to bed around 11:00 unassisted will sleep until perhaps 3 or 4 AM, awakens spontaneously where he will stay up for the next hour and read, take 4 ounces of wine as well as 10 mg of melatonin and indicates he is able to go to sleep again around 4:00 in will sleep another 4 hours.  He finds that if he does not do this he is excessively tired during the day and he have to take an afternoon nap.  It is plausible that this is a distractor and interferes with his cognitive skills.  I certainly think that abandoning alcohol completely might be of utility here as well.    2.  Although his MRI study done earlier this year did not disclose substantial vascular changes, he still has risk factors including diabetes, hypertension, hyperlipidemia, as well as obesity.  I am not certain that the Lipitor has done much to manage his triglycerides although it has improved his cholesterol numbers substantially.  I also wonder if his blood pressure which is elevated today is elevated at other times and perhaps were not simply aware of it.  I think his sugars are well managed but his weight and exercise provide him with a BMI of 27.47 and I think this could certainly be improved.  I am not certain that he does not have a vascular cognitive impairment which would act very differently from that caused by a neurodegenerative illness and there certainly would be little adverse impact of trying to manage his risk factors a little more aggressively.  I might even consider small dose aspirin but I do not feel strongly enough about it today to recommend he commence.    3.  It is still possible that this is the very beginning of a neurodegenerative process.  Dr. Gray suggested he be retested again in another 12 to 18 months.  I have not scheduled this as I am leaving my practice but I  certainly think it might be prudent if we are unable to achieve any remedy through alternative interventions.  We did discuss the use of memantine and donepezil for treatment however he already has some gastrointestinal symptoms and I do not think this is worthwhile doing at this moment in time.               Physical Exam:            Data:   All laboratory data reviewed  All imaging studies reviewed by me             DATA for DOCUMENTATION:         Past Medical History:     Patient Active Problem List   Diagnosis    Erectile dysfunction    Decreased libido    BPH (benign prostatic hyperplasia)    Advanced directives, counseling/discussion    Onychomycosis    Obesity (BMI 30-39.9)    Prostate cancer-[place] 4, treated with radiation    Microalbuminuria    History of colonic polyps    CAD (coronary artery disease)    Carotid artery plaque    Primary osteoarthritis of both knees    Gastroesophageal reflux disease without esophagitis    Essential hypertension, benign    Hyperlipidemia LDL goal <70    Type 2 diabetes mellitus with diabetic nephropathy, without long-term current use of insulin (H)    PFD (pelvic floor dysfunction)    Bowel dysfunction    Memory loss    History of recurrent UTIs    Chronic kidney disease, stage 3 (H)    Urinary retention with incomplete bladder emptying     Past Medical History:   Diagnosis Date    Bowel dysfunction     Dr. Marito Orta, urgent, soft or liquid stools    CAD (coronary artery disease)     per calcium score=>400    Carotid artery plaque 09/2011    Colon polyps     last colonoscopy nl 5/19    Diverticulosis of colon     Erectile dysfunction 07/18/2008    Glaucoma     High cholesterol     HTN (hypertension) 07/18/2008    Incomplete emptying of bladder due to benign prostatic hyperplasia     Dr. Mejia    Memory loss 08/2020    labs nl, to neuro, seen by Dr. Ferguson 2023    Obesity     PFO (patent foramen ovale)     Plantar fasciitis     Presbyesophagus     Prostate  cancer-[place] 4, treated with radiation 07/28/2013    rising psa in 2018, seen at [place], had cryo    Type II or unspecified type diabetes mellitus without mention of complication, not stated as uncontrolled     Dxd about 2000, sees nava Hadley added 5/23    Urinary retention with incomplete bladder emptying     does intermittent straight cath       Also see scanned health assessment forms.       Past Surgical History:     Past Surgical History:   Procedure Laterality Date    APPENDECTOMY  1942    ARTHROSCOPY KNEE RT/LT  1998    partial menisectomy left knee    COLONOSCOPY N/A 9/27/2016    Procedure: COMBINED COLONOSCOPY, SINGLE OR MULTIPLE BIOPSY/POLYPECTOMY BY BIOPSY;  Surgeon: Maru Orta MD;  Location:  GI    CYSTOSCOPY      HEMORRHOIDECTOMY  1975    left tka Left 2015    PHACOEMULSIFICATION CLEAR CORNEA WITH STANDARD INTRAOCULAR LENS IMPLANT Right 3/18/2015    Procedure: PHACOEMULSIFICATION CLEAR CORNEA WITH STANDARD INTRAOCULAR LENS IMPLANT;  Surgeon: Lito Gonzales MD;  Location:  EC    PHACOEMULSIFICATION CLEAR CORNEA WITH STANDARD INTRAOCULAR LENS IMPLANT Left 3/25/2015    Procedure: PHACOEMULSIFICATION CLEAR CORNEA WITH STANDARD INTRAOCULAR LENS IMPLANT;  Surgeon: Lito Gonzales MD;  Location:  EC    right knee replacement Right 2019    ROTATOR CUFF REPAIR RT/LT  1998    right.  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 No   Social History Narrative    Not on file     Social Determinants of Health     Financial Resource Strain: Not on file   Food Insecurity: Not on file   Transportation Needs: Not on file   Physical Activity: Not on file   Stress: Not on file   Social Connections: Not on file   Interpersonal Safety: Not on file   Housing Stability: Not on file              Family History:     Family History   Problem Relation Age of Onset    C.A.D. Father     C.A.D. Brother     Asthma Brother     C.A.D. Mother     Diabetes Brother     Lipids Brother             Medications:     Current Outpatient Medications   Medication Sig    Alcohol Swabs (B-D SINGLE USE SWABS REGULAR) PADS 1 pad 3 times daily    atorvastatin (LIPITOR) 40 MG tablet TAKE 1 TABLET EVERY DAY    azelastine (ASTELIN) 0.1 % nasal spray Spray 1 spray into both nostrils 2 times daily    bicalutamide (CASODEX) 50 MG tablet Take 50 mg by mouth    blood glucose (ONETOUCH ULTRA) test strip Use to test blood sugar  daily or as directed.    blood glucose monitoring (ONE TOUCH ULTRA 2) meter device kit Use to test blood sugars 1- 2 times daily or as directed.    blood glucose monitoring (ONE TOUCH ULTRASOFT) lancets Use to test blood sugar 1-2  times daily or as directed.    diphenoxylate-atropine (LOMOTIL) 2.5-0.025 MG tablet Take 1 tablet by mouth Take one tablet twice a day.    famotidine (PEPCID) 20 MG tablet Take 1 tablet (20 mg) by mouth 2 times daily    latanoprost (XALATAN) 0.005 % ophthalmic solution Place 1 drop into both eyes At Bedtime     lisinopril (ZESTRIL) 20 MG tablet Take 1 tablet (20 mg) by mouth 2 times daily    MELATONIN PO Take 5 mg by mouth At Bedtime    metFORMIN (GLUCOPHAGE XR) 500 MG 24 hr tablet TAKE 2 TABLETS  TWICE DAILY WITH MEALS    MULTI-VITAMIN OR TABS 1 TABLET DAILY    ORDER FOR DME Equipment being ordered: Postivac    OZEMPIC, 0.25 OR 0.5 MG/DOSE, 2 MG/3ML pen INJECT 0.5 MG SUBCUTANEOUSLY ONCE A WEEK FOR 4 WEEKS    semaglutide (OZEMPIC, 0.25 OR 0.5 MG/DOSE,) 2 MG/3ML pen Inject 0.5 mg Subcutaneous every 7 days    vardenafil (LEVITRA) 20 MG tablet Take 0.5-1 tablets (10-20 mg) by mouth daily as needed Never use with nitroglycerin, terazosin or doxazosin.     No current facility-administered medications for this visit.              Review of Systems:   A comprehensive 10 point review of systems (constitutional, ENT, cardiac, peripheral vascular, lymphatic, respiratory, GI, , Musculoskeletal, skin, Neurological) was performed and found to be negative except as described in this note.     See intake form completed by patient

## 2023-11-03 NOTE — NURSING NOTE
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    Weight as of this encounter: 84.4 kg (186 lb).. Body surface area is 2.03 meters squared. BP completed using cuff size: penny Coleman    "

## 2023-11-05 ENCOUNTER — HEALTH MAINTENANCE LETTER (OUTPATIENT)
Age: 88
End: 2023-11-05

## 2023-11-14 ENCOUNTER — OFFICE VISIT (OUTPATIENT)
Dept: FAMILY MEDICINE | Facility: CLINIC | Age: 88
End: 2023-11-14
Payer: MEDICARE

## 2023-11-14 VITALS
HEIGHT: 69 IN | BODY MASS INDEX: 27.85 KG/M2 | TEMPERATURE: 97.7 F | HEART RATE: 59 BPM | OXYGEN SATURATION: 97 % | DIASTOLIC BLOOD PRESSURE: 72 MMHG | WEIGHT: 188 LBS | RESPIRATION RATE: 16 BRPM | SYSTOLIC BLOOD PRESSURE: 164 MMHG

## 2023-11-14 DIAGNOSIS — R33.9 URINARY RETENTION WITH INCOMPLETE BLADDER EMPTYING: ICD-10-CM

## 2023-11-14 DIAGNOSIS — K21.9 GASTROESOPHAGEAL REFLUX DISEASE WITHOUT ESOPHAGITIS: ICD-10-CM

## 2023-11-14 DIAGNOSIS — G47.09 OTHER INSOMNIA: ICD-10-CM

## 2023-11-14 DIAGNOSIS — I25.10 CORONARY ARTERY DISEASE INVOLVING NATIVE CORONARY ARTERY OF NATIVE HEART WITHOUT ANGINA PECTORIS: ICD-10-CM

## 2023-11-14 DIAGNOSIS — I10 ESSENTIAL HYPERTENSION, BENIGN: ICD-10-CM

## 2023-11-14 DIAGNOSIS — C61 PROSTATE CANCER (H): ICD-10-CM

## 2023-11-14 DIAGNOSIS — E78.5 HYPERLIPIDEMIA LDL GOAL <70: ICD-10-CM

## 2023-11-14 DIAGNOSIS — N18.30 STAGE 3 CHRONIC KIDNEY DISEASE, UNSPECIFIED WHETHER STAGE 3A OR 3B CKD (H): ICD-10-CM

## 2023-11-14 DIAGNOSIS — E11.21 TYPE 2 DIABETES MELLITUS WITH DIABETIC NEPHROPATHY, WITHOUT LONG-TERM CURRENT USE OF INSULIN (H): ICD-10-CM

## 2023-11-14 DIAGNOSIS — R41.3 MEMORY LOSS: ICD-10-CM

## 2023-11-14 DIAGNOSIS — Z00.00 MEDICARE ANNUAL WELLNESS VISIT, SUBSEQUENT: Primary | ICD-10-CM

## 2023-11-14 LAB
ALBUMIN SERPL BCG-MCNC: 4.6 G/DL (ref 3.5–5.2)
ALP SERPL-CCNC: 86 U/L (ref 40–150)
ALT SERPL W P-5'-P-CCNC: 35 U/L (ref 0–70)
ANION GAP SERPL CALCULATED.3IONS-SCNC: 14 MMOL/L (ref 7–15)
AST SERPL W P-5'-P-CCNC: 27 U/L (ref 0–45)
BILIRUB SERPL-MCNC: 0.4 MG/DL
BUN SERPL-MCNC: 16.9 MG/DL (ref 8–23)
CALCIUM SERPL-MCNC: 10.1 MG/DL (ref 8.8–10.2)
CHLORIDE SERPL-SCNC: 101 MMOL/L (ref 98–107)
CHOLEST SERPL-MCNC: 150 MG/DL
CREAT SERPL-MCNC: 1.09 MG/DL (ref 0.67–1.17)
CRP SERPL-MCNC: <3 MG/L
DEPRECATED HCO3 PLAS-SCNC: 22 MMOL/L (ref 22–29)
EGFRCR SERPLBLD CKD-EPI 2021: 66 ML/MIN/1.73M2
ERYTHROCYTE [DISTWIDTH] IN BLOOD BY AUTOMATED COUNT: 14.6 % (ref 10–15)
GLUCOSE SERPL-MCNC: 118 MG/DL (ref 70–99)
HCT VFR BLD AUTO: 44.4 % (ref 40–53)
HDLC SERPL-MCNC: 64 MG/DL
HGB BLD-MCNC: 14.2 G/DL (ref 13.3–17.7)
LDLC SERPL CALC-MCNC: 70 MG/DL
MCH RBC QN AUTO: 28.6 PG (ref 26.5–33)
MCHC RBC AUTO-ENTMCNC: 32 G/DL (ref 31.5–36.5)
MCV RBC AUTO: 90 FL (ref 78–100)
NONHDLC SERPL-MCNC: 86 MG/DL
PLATELET # BLD AUTO: 214 10E3/UL (ref 150–450)
POTASSIUM SERPL-SCNC: 4.5 MMOL/L (ref 3.4–5.3)
PROT SERPL-MCNC: 7.4 G/DL (ref 6.4–8.3)
RBC # BLD AUTO: 4.96 10E6/UL (ref 4.4–5.9)
SODIUM SERPL-SCNC: 137 MMOL/L (ref 135–145)
TRIGL SERPL-MCNC: 80 MG/DL
WBC # BLD AUTO: 8.4 10E3/UL (ref 4–11)

## 2023-11-14 PROCEDURE — 85027 COMPLETE CBC AUTOMATED: CPT | Performed by: INTERNAL MEDICINE

## 2023-11-14 PROCEDURE — 80053 COMPREHEN METABOLIC PANEL: CPT | Performed by: INTERNAL MEDICINE

## 2023-11-14 PROCEDURE — G0439 PPPS, SUBSEQ VISIT: HCPCS | Performed by: INTERNAL MEDICINE

## 2023-11-14 PROCEDURE — 86140 C-REACTIVE PROTEIN: CPT | Performed by: INTERNAL MEDICINE

## 2023-11-14 PROCEDURE — 36415 COLL VENOUS BLD VENIPUNCTURE: CPT | Performed by: INTERNAL MEDICINE

## 2023-11-14 PROCEDURE — 99214 OFFICE O/P EST MOD 30 MIN: CPT | Mod: 25 | Performed by: INTERNAL MEDICINE

## 2023-11-14 PROCEDURE — 80061 LIPID PANEL: CPT | Performed by: INTERNAL MEDICINE

## 2023-11-14 RX ORDER — AMLODIPINE BESYLATE 5 MG/1
5 TABLET ORAL DAILY
Qty: 90 TABLET | Refills: 3 | Status: SHIPPED | OUTPATIENT
Start: 2023-11-14 | End: 2024-03-29

## 2023-11-14 RX ORDER — LISINOPRIL 20 MG/1
20 TABLET ORAL 2 TIMES DAILY
Qty: 180 TABLET | Refills: 3 | Status: SHIPPED | OUTPATIENT
Start: 2023-11-14 | End: 2024-03-29

## 2023-11-14 RX ORDER — FAMOTIDINE 20 MG/1
20 TABLET, FILM COATED ORAL 2 TIMES DAILY
Qty: 180 TABLET | Refills: 3 | Status: SHIPPED | OUTPATIENT
Start: 2023-11-14 | End: 2024-03-29

## 2023-11-14 RX ORDER — ATORVASTATIN CALCIUM 40 MG/1
TABLET, FILM COATED ORAL
Qty: 90 TABLET | Refills: 3 | Status: SHIPPED | OUTPATIENT
Start: 2023-11-14 | End: 2024-03-29

## 2023-11-14 RX ORDER — TRAZODONE HYDROCHLORIDE 50 MG/1
50 TABLET, FILM COATED ORAL AT BEDTIME
Qty: 50 TABLET | Refills: 3 | Status: SHIPPED | OUTPATIENT
Start: 2023-11-14 | End: 2024-09-03

## 2023-11-14 ASSESSMENT — PAIN SCALES - GENERAL: PAINLEVEL: NO PAIN (0)

## 2023-11-14 ASSESSMENT — ACTIVITIES OF DAILY LIVING (ADL): CURRENT_FUNCTION: NO ASSISTANCE NEEDED

## 2023-11-14 NOTE — PATIENT INSTRUCTIONS
I would get the covid booster and rsv vaccine at  your pharmacy.    Start amlodipine at night for your blood pressure in addition to your other meds.    Try trazodone at bedtime for sleep.      Follow up with me in 3 months.    Dalton Mon M.D.

## 2023-11-14 NOTE — PROGRESS NOTES
SUBJECTIVE:   Jamil is a 88 year old who presents for Preventive Visit.    Overall he is doing quite well but his blood pressure is high.  He is not exercising.  He was seen by neurology and has mild memory loss but no treatment is indicated.  He has regular follow-up on his diabetes with endocrine.  He does straight caths several times a day.  He has had chronic insomnia for years with trouble staying asleep.  He has no other issues.  He is here with his wife               Past Medical History:      Past Medical History:   Diagnosis Date    Bowel dysfunction     Dr. Marito Orta, urgent, soft or liquid stools    CAD (coronary artery disease)     per calcium score=>400    Carotid artery plaque 09/2011    Colon polyps     last colonoscopy nl 5/19    Diverticulosis of colon     Erectile dysfunction 07/18/2008    Glaucoma     High cholesterol     HTN (hypertension) 07/18/2008    Incomplete emptying of bladder due to benign prostatic hyperplasia     Dr. Mejia    Memory loss 08/2020    labs nl, to neuro, seen by Dr. Ferguson 2023    Obesity     PFO (patent foramen ovale)     Plantar fasciitis     Presbyesophagus     Prostate cancer-Gavin 4, treated with radiation 07/28/2013    rising psa in 2018, seen at [place], had cryo    Type II or unspecified type diabetes mellitus without mention of complication, not stated as uncontrolled     Dxd about 2000, sees nava Hadley added 5/23    Urinary retention with incomplete bladder emptying     does intermittent straight cath             Past Surgical History:      Past Surgical History:   Procedure Laterality Date    APPENDECTOMY  1942    ARTHROSCOPY KNEE RT/LT  1998    partial menisectomy left knee    COLONOSCOPY N/A 9/27/2016    Procedure: COMBINED COLONOSCOPY, SINGLE OR MULTIPLE BIOPSY/POLYPECTOMY BY BIOPSY;  Surgeon: Maru Orta MD;  Location:  GI    CYSTOSCOPY      HEMORRHOIDECTOMY  1975    left tka Left 2015    PHACOEMULSIFICATION CLEAR CORNEA WITH STANDARD  INTRAOCULAR LENS IMPLANT Right 3/18/2015    Procedure: PHACOEMULSIFICATION CLEAR CORNEA WITH STANDARD INTRAOCULAR LENS IMPLANT;  Surgeon: Lito Gonzales MD;  Location:  EC    PHACOEMULSIFICATION CLEAR CORNEA WITH STANDARD INTRAOCULAR LENS IMPLANT Left 3/25/2015    Procedure: PHACOEMULSIFICATION CLEAR CORNEA WITH STANDARD INTRAOCULAR LENS IMPLANT;  Surgeon: Lito Gonzales MD;  Location:  EC    right knee replacement Right 2019    ROTATOR CUFF REPAIR RT/LT  1998    right.  caused him to retire    SURGICAL HISTORY OF -   1999    L4-5 discectomy    TONSILLECTOMY & ADENOIDECTOMY  1940    TURP  1988    ZZHC COLONOSCOPY THRU STOMA, DIAGNOSTIC  2005    ZZHC COLONOSCOPY THRU STOMA, DIAGNOSTIC  5/09    diverticulosis, also proctitis             Social History:     Social History     Socioeconomic History    Marital status:      Spouse name: Salome Gates    Number of children: 2    Years of education: Not on file    Highest education level: Not on file   Occupational History    Occupation: MD, Family [employer]     Employer: RETIRED   Tobacco Use    Smoking status: Never    Smokeless tobacco: Never   Substance and Sexual Activity    Alcohol use: Yes     Comment: 10 a week    Drug use: No    Sexual activity: Yes     Partners: Female   Other Topics Concern    Parent/sibling w/ CABG, MI or angioplasty before 65F 55M? No   Social History Narrative    Not on file     Social Determinants of Health     Financial Resource Strain: Not on file   Food Insecurity: Not on file   Transportation Needs: Not on file   Physical Activity: Not on file   Stress: Not on file   Social Connections: Not on file   Interpersonal Safety: Not on file   Housing Stability: Not on file             Family History:   reviewed         Allergies:     Allergies   Allergen Reactions    Ivp Dye [Contrast Dye]              Medications:     Current Outpatient Medications   Medication Sig Dispense Refill    Alcohol Swabs (B-D SINGLE USE SWABS REGULAR) PADS  1 pad 3 times daily 270 each 3    amLODIPine (NORVASC) 5 MG tablet Take 1 tablet (5 mg) by mouth daily 90 tablet 3    atorvastatin (LIPITOR) 40 MG tablet TAKE 1 TABLET EVERY DAY 90 tablet 3    azelastine (ASTELIN) 0.1 % nasal spray Spray 1 spray into both nostrils 2 times daily      bicalutamide (CASODEX) 50 MG tablet Take 50 mg by mouth      blood glucose (ONETOUCH ULTRA) test strip Use to test blood sugar  daily or as directed. 1 Box 3    blood glucose monitoring (ONE TOUCH ULTRA 2) meter device kit Use to test blood sugars 1- 2 times daily or as directed. 1 kit 0    blood glucose monitoring (ONE TOUCH ULTRASOFT) lancets Use to test blood sugar 1-2  times daily or as directed. 100 each 11    diphenoxylate-atropine (LOMOTIL) 2.5-0.025 MG tablet Take 1 tablet by mouth Take one tablet twice a day.      famotidine (PEPCID) 20 MG tablet Take 1 tablet (20 mg) by mouth 2 times daily 180 tablet 3    latanoprost (XALATAN) 0.005 % ophthalmic solution Place 1 drop into both eyes At Bedtime       lisinopril (ZESTRIL) 20 MG tablet Take 1 tablet (20 mg) by mouth 2 times daily 180 tablet 3    MELATONIN PO Take 5 mg by mouth At Bedtime      metFORMIN (GLUCOPHAGE XR) 500 MG 24 hr tablet TAKE 2 TABLETS TWICE DAILY WITH MEALS 360 tablet 3    MULTI-VITAMIN OR TABS 1 TABLET DAILY      ORDER FOR DME Equipment being ordered: Postivac 1 each 0    OZEMPIC, 0.25 OR 0.5 MG/DOSE, 2 MG/3ML pen INJECT 0.5 MG SUBCUTANEOUSLY ONCE A WEEK FOR 4 WEEKS      semaglutide (OZEMPIC, 0.25 OR 0.5 MG/DOSE,) 2 MG/3ML pen Inject 0.5 mg Subcutaneous every 7 days 3 mL     traZODone (DESYREL) 50 MG tablet Take 1 tablet (50 mg) by mouth at bedtime 50 tablet 3    vardenafil (LEVITRA) 20 MG tablet Take 0.5-1 tablets (10-20 mg) by mouth daily as needed Never use with nitroglycerin, terazosin or doxazosin. 30 tablet 3               Review of Systems:     The 10 point Review of Systems is negative other than noted in the HPI           Physical Exam:   Blood pressure  "(!) 164/72, pulse 59, temperature 97.7  F (36.5  C), temperature source Temporal, resp. rate 16, height 1.753 m (5' 9\"), weight 85.3 kg (188 lb), SpO2 97%.    Exam:  Constitutional: healthy appearing, alert and in no distress  Heent: Normocephalic. Head without obvious masses or lesions. PERRLDC, EOMI. Mouth exam within normal limits: tongue, mucous membranes, posterior pharynx all normal, no lesions or abnormalities seen.  Tm's and canals within normal limits bilaterally. Neck supple, no nuchal rigidity or masses. No supraclavicular, or cervical adenopathy. Thyroid symmetric, no masses.  Cardiovascular: Regular rate and rhythm, no murmer, rub or gallops.  JVP not elevated, no edema.  Carotids within normal limits bilaterally, no bruits.  Respiratory: Normal respiratory effort.  Lungs clear, normal flow, no wheezing or crackles.  Breasts: Normal bilaterally.  No masses or lesions.  Nipples within normal limites.  No axillary lesions or nodes.  Gastrointestinal: Normal active bowel sounds.   Soft, not tender, no masses, guarding or rebound.  No hepatosplenomegaly.   Genitourinary: Rectal not done  Musculoskeletal: extremities normal, no gross deformities noted.  Skin: no suspicious lesions or rashes   Neurologic: Mental status within normal limits.  Speech fluent.  No gross motor abnormalities and gait intact.  Psychiatric: mentation appears normal and affect normal.  Feet within normal limits bilat, no sores         Data:   Labs sent        Assessment:   Normal complete physical exam  Hypertension, control not adequate  Acid reflux, controlled  Hyperlipidemia, follow-up labs, on statin therapy  Insomnia, try trazodone  Diabetes, follow-up endocrine  BPH with urinary retention, stable  CKD, follow-up labs  Memory loss, follow  Coronary artery disease, stable  Prostate cancer, he has regular [facility] follow-up for this  Healthcare maintenance         Plan:   Add Norvasc 5 mg  Add trazodone 50 mg at bedtime, DC if " "side effects  Vaccines at pharmacy  Follow-up with me in 3 months  Exercise      Dalton Mon M.D.            Are you in the first 12 months of your Medicare coverage?  No    Healthy Habits:     In general, how would you rate your overall health?  Very good    Frequency of exercise:  None    Duration of exercise:  Less than 15 minutes    Do you usually eat at least 4 servings of fruit and vegetables a day, include whole grains    & fiber and avoid regularly eating high fat or \"junk\" foods?  No    Taking medications regularly:  Yes    Barriers to taking medications:  None    Medication side effects:  None    Ability to successfully perform activities of daily living:  No assistance needed    Home Safety:  No safety concerns identified    Hearing Impairment:  No hearing concerns    In the past 6 months, have you been bothered by leaking of urine? Yes    In general, how would you rate your overall mental or emotional health?  Excellent    Additional concerns today:  No          Have you ever done Advance Care Planning? (For example, a Health Directive, POLST, or a discussion with a medical provider or your loved ones about your wishes): No, advance care planning information given to patient to review.  Patient plans to discuss their wishes with loved ones or provider.         Fall risk       Cognitive Screening   1) Repeat 3 items (Leader, Season, Table)    2) Clock draw: NORMAL  3) 3 item recall: Recalls 2 objects   Results: NORMAL clock, 1-2 items recalled: COGNITIVE IMPAIRMENT LESS LIKELY    Mini-CogTM Copyright S Aishwarya. Licensed by the author for use in [facility]; reprinted with permission (caron@.[email or web address]). All rights reserved.      Do you have sleep apnea, excessive snoring or daytime drowsiness? : no    Reviewed and updated as needed this visit by clinical staff                  Reviewed and updated as needed this visit by Provider                 Social History     Tobacco Use    Smoking status: " Never    Smokeless tobacco: Never   Substance Use Topics    Alcohol use: Yes     Comment: 10 a week             10/24/2022     1:11 PM   Alcohol Use   Prescreen: >3 drinks/day or >7 drinks/week? No         8/30/2021     3:38 PM   AUDIT - Alcohol Use Disorders Identification Test - Reproduced from the World Health Organization Audit 2001 (Second Edition)   1.  How often do you have a drink containing alcohol? 2 to 3 times a week   2.  How many drinks containing alcohol do you have on a typical day when you are drinking? 1 or 2   3.  How often do you have five or more drinks on one occasion? Never   4.  How often during the last year have you found that you were not able to stop drinking once you had started? Never   5.  How often during the last year have you failed to do what was normally expected of you because of drinking? Never   6.  How often during the last year have you needed a first drink in the morning to get yourself going after a heavy drinking session? Never   7.  How often during the last year have you had a feeling of guilt or remorse after drinking? Never   8.  How often during the last year have you been unable to remember what happened the night before because of your drinking? Never   9.  Have you or someone else been injured because of your drinking? No   10. Has a relative, friend, doctor or other health care worker been concerned about your drinking or suggested you cut down? No   TOTAL SCORE 3     Do you have a current opioid prescription? No  Do you use any other controlled substances or medications that are not prescribed by a provider? None              Current providers sharing in care for this patient include:   Patient Care Team:  Dalton Mon MD as PCP - General (Internal Medicine)  Dalton Mon MD as Assigned PCP  Noah Ferguson MD as MD (Neurology)  Noah Ferguson MD as Assigned Neuroscience Provider  Kiarra Gray PsyD as Assigned Behavioral Health  "Provider    The following health maintenance items are reviewed in Epic and correct as of today:  Health Maintenance   Topic Date Due    ANNUAL REVIEW OF HM ORDERS  Never done    RSV VACCINE (Pregnancy & 60+) (1 - 1-dose 60+ series) Never done    IPV IMMUNIZATION (3 of 3 - Adult catch-up series) 11/09/2003    DEXA  08/19/2017    EYE EXAM  05/18/2023    COVID-19 Vaccine (6 - 2023-24 season) 09/01/2023    MICROALBUMIN  10/24/2023    DIABETIC FOOT EXAM  10/24/2023    FALL RISK ASSESSMENT  10/24/2023    A1C  11/03/2023    MEDICARE ANNUAL WELLNESS VISIT  10/24/2023    ZOSTER IMMUNIZATION (2 of 2) 10/28/2023    BMP  05/03/2024    CMP  05/03/2024    LIPID  05/03/2024    HEMOGLOBIN  07/08/2024    DTAP/TDAP/TD IMMUNIZATION (3 - Td or Tdap) 06/27/2025    ADVANCE CARE PLANNING  10/24/2027    PHQ-2 (once per calendar year)  Completed    INFLUENZA VACCINE  Completed    Pneumococcal Vaccine: 65+ Years  Completed    URINALYSIS  Completed    HPV IMMUNIZATION  Aged Out    MENINGITIS IMMUNIZATION  Aged Out    RSV MONOCLONAL ANTIBODY  Aged Out               Review of Systems      OBJECTIVE:   There were no vitals taken for this visit. Estimated body mass index is 27.47 kg/m  as calculated from the following:    Height as of 7/13/23: 1.753 m (5' 9\").    Weight as of 11/3/23: 84.4 kg (186 lb).  Physical Exam          ASSESSMENT / PLAN:             COUNSELING:  Reviewed preventive health counseling, as reflected in patient instructions       Regular exercise      BMI:   Estimated body mass index is 27.47 kg/m  as calculated from the following:    Height as of 7/13/23: 1.753 m (5' 9\").    Weight as of 11/3/23: 84.4 kg (186 lb).         He reports that he has never smoked. He has never used smokeless tobacco.      Appropriate preventive services were discussed with this patient, including applicable screening as appropriate for fall prevention, nutrition, physical activity, Tobacco-use cessation, weight loss and cognition.  Checklist " reviewing preventive services available has been given to the patient.    Reviewed patients plan of care and provided an AVS. The Basic Care Plan (routine screening as documented in Health Maintenance) for Jamil meets the Care Plan requirement. This Care Plan has been established and reviewed with the Patient and spouse.          Dalton Mon MD  [facility]    Identified Health Risks:  I have reviewed Opioid Use Disorder and Substance Use Disorder risk factors and made any needed referrals.

## 2023-11-14 NOTE — RESULT ENCOUNTER NOTE
It was very nice to see you today.  You should be able to view your test results which look quite good.  This includes your CBC, C-reactive protein, cholesterol, blood salts, kidney test, liver tests, and proteins.  Your sugar is fine at 118.    Please let me know if you have questions.    Dalton

## 2023-11-17 ENCOUNTER — TRANSFERRED RECORDS (OUTPATIENT)
Dept: HEALTH INFORMATION MANAGEMENT | Facility: CLINIC | Age: 88
End: 2023-11-17
Payer: MEDICARE

## 2023-11-17 LAB — RETINOPATHY: NEGATIVE

## 2023-12-04 ENCOUNTER — MYC REFILL (OUTPATIENT)
Dept: FAMILY MEDICINE | Facility: CLINIC | Age: 88
End: 2023-12-04

## 2023-12-04 ENCOUNTER — APPOINTMENT (OUTPATIENT)
Dept: LAB | Facility: CLINIC | Age: 88
End: 2023-12-04
Payer: MEDICARE

## 2023-12-04 DIAGNOSIS — N52.8 OTHER MALE ERECTILE DYSFUNCTION: ICD-10-CM

## 2023-12-04 RX ORDER — VARDENAFIL HYDROCHLORIDE 20 MG/1
10-20 TABLET ORAL DAILY PRN
Qty: 30 TABLET | Refills: 3 | Status: SHIPPED | OUTPATIENT
Start: 2023-12-04

## 2023-12-11 ENCOUNTER — TRANSFERRED RECORDS (OUTPATIENT)
Dept: HEALTH INFORMATION MANAGEMENT | Facility: CLINIC | Age: 88
End: 2023-12-11
Payer: MEDICARE

## 2024-02-05 ENCOUNTER — OFFICE VISIT (OUTPATIENT)
Dept: FAMILY MEDICINE | Facility: CLINIC | Age: 89
End: 2024-02-05
Payer: MEDICARE

## 2024-02-05 VITALS
TEMPERATURE: 97.5 F | DIASTOLIC BLOOD PRESSURE: 60 MMHG | WEIGHT: 190 LBS | HEART RATE: 69 BPM | HEIGHT: 69 IN | OXYGEN SATURATION: 95 % | BODY MASS INDEX: 28.14 KG/M2 | RESPIRATION RATE: 16 BRPM | SYSTOLIC BLOOD PRESSURE: 138 MMHG

## 2024-02-05 DIAGNOSIS — E11.21 TYPE 2 DIABETES MELLITUS WITH DIABETIC NEPHROPATHY, WITHOUT LONG-TERM CURRENT USE OF INSULIN (H): ICD-10-CM

## 2024-02-05 DIAGNOSIS — C61 PROSTATE CANCER (H): ICD-10-CM

## 2024-02-05 DIAGNOSIS — B35.1 ONYCHOMYCOSIS: ICD-10-CM

## 2024-02-05 DIAGNOSIS — N18.30 STAGE 3 CHRONIC KIDNEY DISEASE, UNSPECIFIED WHETHER STAGE 3A OR 3B CKD (H): ICD-10-CM

## 2024-02-05 DIAGNOSIS — I10 ESSENTIAL HYPERTENSION, BENIGN: Primary | ICD-10-CM

## 2024-02-05 PROCEDURE — 99213 OFFICE O/P EST LOW 20 MIN: CPT | Performed by: INTERNAL MEDICINE

## 2024-02-05 RX ORDER — EFINACONAZOLE 100 MG/ML
SOLUTION TOPICAL DAILY
Qty: 8 ML | Refills: 3 | Status: SHIPPED | OUTPATIENT
Start: 2024-02-05 | End: 2024-02-05

## 2024-02-05 RX ORDER — EFINACONAZOLE 100 MG/ML
SOLUTION TOPICAL DAILY
Qty: 8 ML | Refills: 3 | Status: SHIPPED | OUTPATIENT
Start: 2024-02-05

## 2024-02-05 ASSESSMENT — PAIN SCALES - GENERAL: PAINLEVEL: NO PAIN (0)

## 2024-02-05 NOTE — PROGRESS NOTES
The patient presents with his wife for follow-up to his hypertension.  As noted and reviewed in November I added amlodipine.  He has been taking this and tolerating it well.    He does have a red spot on his forehead that he tells me has been there for 40 years and does not want to do anything about it.    He has medical mycosis and wants to use Jublia for that.  I discussed that I would never use it really do not know anything about it but he is comfortable with me prescribing it.    Past Medical History:   Diagnosis Date    Bowel dysfunction     Dr. Marito Orta, urgent, soft or liquid stools    CAD (coronary artery disease)     per calcium score=>400    Carotid artery plaque 09/2011    Colon polyps     last colonoscopy nl 5/19    Diverticulosis of colon     Erectile dysfunction 07/18/2008    Glaucoma     High cholesterol     HTN (hypertension) 07/18/2008    Incomplete emptying of bladder due to benign prostatic hyperplasia     Dr. Mejia    Memory loss 08/2020    labs nl, to neuro, seen by Dr. Ferguson 2023    Obesity     PFO (patent foramen ovale)     Plantar fasciitis     Presbyesophagus     Prostate cancer-Gavin 4, treated with radiation 07/28/2013    rising psa in 2018, seen at [place], had cryo    Type II or unspecified type diabetes mellitus without mention of complication, not stated as uncontrolled     Dxd about 2000, sees nava Hadley added 5/23    Urinary retention with incomplete bladder emptying     does intermittent straight cath     Past Surgical History:   Procedure Laterality Date    APPENDECTOMY  1942    ARTHROSCOPY KNEE RT/LT  1998    partial menisectomy left knee    COLONOSCOPY N/A 9/27/2016    Procedure: COMBINED COLONOSCOPY, SINGLE OR MULTIPLE BIOPSY/POLYPECTOMY BY BIOPSY;  Surgeon: Maru Orta MD;  Location:  GI    CYSTOSCOPY      HEMORRHOIDECTOMY  1975    left tka Left 2015    PHACOEMULSIFICATION CLEAR CORNEA WITH STANDARD INTRAOCULAR LENS IMPLANT Right 3/18/2015     Procedure: PHACOEMULSIFICATION CLEAR CORNEA WITH STANDARD INTRAOCULAR LENS IMPLANT;  Surgeon: Lito Gonzales MD;  Location:  EC    PHACOEMULSIFICATION CLEAR CORNEA WITH STANDARD INTRAOCULAR LENS IMPLANT Left 3/25/2015    Procedure: PHACOEMULSIFICATION CLEAR CORNEA WITH STANDARD INTRAOCULAR LENS IMPLANT;  Surgeon: Lito Gonzales MD;  Location:  EC    right knee replacement Right 2019    ROTATOR CUFF REPAIR RT/LT  1998    right.  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 No   Social History Narrative    Not on file     Social Determinants of Health     Financial Resource Strain: Not on file   Food Insecurity: Not on file   Transportation Needs: Not on file   Physical Activity: Not on file   Stress: Not on file   Social Connections: Not on file   Interpersonal Safety: Not on file   Housing Stability: Not on file     Current Outpatient Medications   Medication Sig Dispense Refill    Alcohol Swabs (B-D SINGLE USE SWABS REGULAR) PADS 1 pad 3 times daily 270 each 3    amLODIPine (NORVASC) 5 MG tablet Take 1 tablet (5 mg) by mouth daily 90 tablet 3    atorvastatin (LIPITOR) 40 MG tablet TAKE 1 TABLET EVERY DAY 90 tablet 3    azelastine (ASTELIN) 0.1  "% nasal spray Spray 1 spray into both nostrils 2 times daily      bicalutamide (CASODEX) 50 MG tablet Take 50 mg by mouth      blood glucose (ONETOUCH ULTRA) test strip Use to test blood sugar  daily or as directed. 1 Box 3    blood glucose monitoring (ONE TOUCH ULTRA 2) meter device kit Use to test blood sugars 1- 2 times daily or as directed. 1 kit 0    blood glucose monitoring (ONE TOUCH ULTRASOFT) lancets Use to test blood sugar 1-2  times daily or as directed. 100 each 11    diphenoxylate-atropine (LOMOTIL) 2.5-0.025 MG tablet Take 1 tablet by mouth Take one tablet twice a day.      Efinaconazole (JUBLIA) 10 % SOLN Externally apply topically daily 8 mL 3    famotidine (PEPCID) 20 MG tablet Take 1 tablet (20 mg) by mouth 2 times daily 180 tablet 3    latanoprost (XALATAN) 0.005 % ophthalmic solution Place 1 drop into both eyes At Bedtime       lisinopril (ZESTRIL) 20 MG tablet Take 1 tablet (20 mg) by mouth 2 times daily 180 tablet 3    MELATONIN PO Take 5 mg by mouth At Bedtime      metFORMIN (GLUCOPHAGE XR) 500 MG 24 hr tablet TAKE 2 TABLETS TWICE DAILY WITH MEALS 360 tablet 3    MULTI-VITAMIN OR TABS 1 TABLET DAILY      ORDER FOR DME Equipment being ordered: Postivac 1 each 0    OZEMPIC, 0.25 OR 0.5 MG/DOSE, 2 MG/3ML pen INJECT 0.5 MG SUBCUTANEOUSLY ONCE A WEEK FOR 4 WEEKS      semaglutide (OZEMPIC, 0.25 OR 0.5 MG/DOSE,) 2 MG/3ML pen Inject 0.5 mg Subcutaneous every 7 days 3 mL     traZODone (DESYREL) 50 MG tablet Take 1 tablet (50 mg) by mouth at bedtime 50 tablet 3    vardenafil (LEVITRA) 20 MG tablet Take 0.5-1 tablets (10-20 mg) by mouth daily as needed Never use with nitroglycerin, terazosin or doxazosin. 30 tablet 3     Allergies   Allergen Reactions    Ivp Dye [Contrast Dye]      FAMILY HISTORY NOTED AND REVIEWED    REVIEW OF SYSTEMS: above    PHYSICAL EXAM    /60   Pulse 69   Temp 97.5  F (36.4  C)   Resp 16   Ht 1.753 m (5' 9\")   Wt 86.2 kg (190 lb)   SpO2 95%   BMI 28.06 kg/m  "     Patient appears non toxic  Flat irregular red spot on the top of his forehead  Onychomycotic changes of his toenails bilaterally    ASSESSMENT:  Hypertension, controlled with addition of amlodipine  Skin lesion, he he elects not to do anything about it  Onychomycosis, Breanna was prescribed  Diabetes, follow-up with specialist  CKD, follow-up labs have been stable  CA prostate, no evidence of disease    PLAN:  Sagrarioblia  Follow up complete physical exam Nov  Follow up specialists    Dalton Mon M.D.

## 2024-03-29 DIAGNOSIS — I10 ESSENTIAL HYPERTENSION, BENIGN: ICD-10-CM

## 2024-03-29 DIAGNOSIS — K21.9 GASTROESOPHAGEAL REFLUX DISEASE WITHOUT ESOPHAGITIS: ICD-10-CM

## 2024-03-29 DIAGNOSIS — E78.5 HYPERLIPIDEMIA LDL GOAL <70: ICD-10-CM

## 2024-03-29 RX ORDER — ATORVASTATIN CALCIUM 40 MG/1
TABLET, FILM COATED ORAL
Qty: 90 TABLET | Refills: 3 | OUTPATIENT
Start: 2024-03-29

## 2024-03-29 RX ORDER — AMLODIPINE BESYLATE 5 MG/1
5 TABLET ORAL DAILY
Qty: 90 TABLET | Refills: 1 | Status: SHIPPED | OUTPATIENT
Start: 2024-03-29

## 2024-03-29 RX ORDER — FAMOTIDINE 20 MG/1
20 TABLET, FILM COATED ORAL 2 TIMES DAILY
Qty: 180 TABLET | Refills: 3 | OUTPATIENT
Start: 2024-03-29

## 2024-03-29 RX ORDER — LISINOPRIL 20 MG/1
20 TABLET ORAL 2 TIMES DAILY
Qty: 180 TABLET | Refills: 1 | Status: SHIPPED | OUTPATIENT
Start: 2024-03-29

## 2024-03-29 RX ORDER — LISINOPRIL 20 MG/1
20 TABLET ORAL 2 TIMES DAILY
Qty: 180 TABLET | Refills: 3 | OUTPATIENT
Start: 2024-03-29

## 2024-03-29 RX ORDER — FAMOTIDINE 20 MG/1
20 TABLET, FILM COATED ORAL 2 TIMES DAILY
Qty: 180 TABLET | Refills: 1 | Status: SHIPPED | OUTPATIENT
Start: 2024-03-29

## 2024-03-29 RX ORDER — ATORVASTATIN CALCIUM 40 MG/1
TABLET, FILM COATED ORAL
Qty: 90 TABLET | Refills: 1 | Status: SHIPPED | OUTPATIENT
Start: 2024-03-29

## 2024-03-29 NOTE — TELEPHONE ENCOUNTER
Pharmacy change.    Thank you,  Ruben Church, Triage RN [facility]  11:59 AM 3/29/2024

## 2024-04-01 ENCOUNTER — TRANSFERRED RECORDS (OUTPATIENT)
Dept: HEALTH INFORMATION MANAGEMENT | Facility: CLINIC | Age: 89
End: 2024-04-01
Payer: MEDICARE

## 2024-04-01 LAB
ALBUMIN/CREATININE RATIO: 89 MG/G (ref 0–29)
HBA1C MFR BLD: 6.1 %

## 2024-05-23 ENCOUNTER — APPOINTMENT (OUTPATIENT)
Dept: LAB | Facility: CLINIC | Age: 89
End: 2024-05-23
Payer: MEDICARE

## 2024-05-23 ENCOUNTER — APPOINTMENT (OUTPATIENT)
Dept: URBAN - METROPOLITAN AREA CLINIC 255 | Age: 89
Setting detail: DERMATOLOGY
End: 2024-05-29

## 2024-05-23 DIAGNOSIS — Z48.02 ENCOUNTER FOR REMOVAL OF SUTURES: ICD-10-CM

## 2024-05-23 PROCEDURE — OTHER MIPS QUALITY: OTHER

## 2024-05-23 PROCEDURE — OTHER DIAGNOSIS COMMENT: OTHER

## 2024-05-23 PROCEDURE — 99024 POSTOP FOLLOW-UP VISIT: CPT

## 2024-05-23 PROCEDURE — OTHER RETURN TO REFERRING PROVIDER: OTHER

## 2024-05-23 PROCEDURE — OTHER COUNSELING: OTHER

## 2024-05-23 PROCEDURE — OTHER SUTURE REMOVAL (GLOBAL PERIOD): OTHER

## 2024-05-23 ASSESSMENT — LOCATION DETAILED DESCRIPTION DERM: LOCATION DETAILED: RIGHT SUPERIOR FOREHEAD

## 2024-05-23 ASSESSMENT — LOCATION SIMPLE DESCRIPTION DERM: LOCATION SIMPLE: RIGHT FOREHEAD

## 2024-05-23 ASSESSMENT — LOCATION ZONE DERM: LOCATION ZONE: FACE

## 2024-05-23 NOTE — PROCEDURE: DIAGNOSIS COMMENT
Comment: S/P MMS for Primary Nodular Basal Cell Carcinoma on (5/13/2024)
Detail Level: Simple
Render Risk Assessment In Note?: no

## 2024-05-23 NOTE — PROCEDURE: SUTURE REMOVAL (GLOBAL PERIOD)
Detail Level: Detailed
Add 28164 Cpt? (Important Note: In 2017 The Use Of 09547 Is Being Tracked By Cms To Determine Future Global Period Reimbursement For Global Periods): no

## 2024-06-02 ENCOUNTER — HEALTH MAINTENANCE LETTER (OUTPATIENT)
Age: 89
End: 2024-06-02

## 2024-09-03 DIAGNOSIS — G47.09 OTHER INSOMNIA: ICD-10-CM

## 2024-09-03 RX ORDER — TRAZODONE HYDROCHLORIDE 50 MG/1
50 TABLET, FILM COATED ORAL AT BEDTIME
Qty: 50 TABLET | Refills: 2 | Status: SHIPPED | OUTPATIENT
Start: 2024-09-03

## 2024-09-12 NOTE — PROGRESS NOTES
Telehealth E-Visit      Date: 2024   Patient Name: Lindsay Amezquita  : 1983   MRN: [id number]     Chief Complaint:    Chief Complaint   Patient presents with    Fever    Diarrhea    Vomiting       I have reviewed the E-Visit questionnaire and the patient's answers, my assessment and plan are listed below.     This provider is located at the [facility] (through [facility]), [address] [place] [address] using a secure [facility] Video Visit through [employer]. Patient is being seen remotely via telehealth at their home address in [place], and stated they are in a secure environment for this session. The patient's condition being diagnosed/treated is appropriate for telemedicine. The provider identified herself as well as her credentials. The patient, and/or patients guardian, consent to be seen remotely, and when consent is given they understand that the consent allows for patient identifiable information to be sent to a third party as needed. They may refuse to be seen remotely at any time. The electronic data is encrypted and password protected, and the patient and/or guardian has been advised of the potential risks to privacy not withstanding such measures.    You have chosen to receive care through a telehealth visit. Do you consent to use a video/audio connection for your medical care today? Yes    History of Present Illness: Lindsay Amezquita is a 41 y.o. female who is presenting with fever, vomiting, diarrhea.  She reports that the symptoms have been ongoing for several days and seem to have been worsening over the last 2-3 days.  Overnight fever got up as high as 101.2.  She has been vomiting several times this morning.  She is coughing up thick green mucus.  She reports she has not eaten and has not been drinking very much.  She is taking Allegra-D for allergies and doing Flonase nasal sprays.  She cannot take Zofran for nausea and vomiting due to causing constipation.   SUBJECTIVE:   Jamil is a 87 year old who presents for Preventive Visit.    The patient presents with his wife.  He has multiple medical problems.  He was recently on endocrine, actually today, and labs were done there.  He has prostate cancer and regular follow-up with the [facility].  He has CKD.  He has BPH.    He has ongoing memory loss.  We discussed this in the past that he was going to see neurology but has not done so.    He has presumed coronary disease.  He straight caths 4 times a day.  His postnasal drainage is controlled.  His acid reflux is controlled.  He is not working out.    With respect to his diabetes he checks his sugars once a day and for the most part they have been good.  His A1c was 7.3 in August as noted.  Up-to-date on his eye exam.               Past Medical History:      Past Medical History:   Diagnosis Date     Bowel dysfunction     Dr. Marito Orta, urgent, soft or liquid stools     CAD (coronary artery disease)     per calcium score=>400     Carotid artery plaque 09/2011     Colon polyps     last colonoscopy nl 5/19     Diverticulosis of colon      Erectile dysfunction 07/18/2008     Glaucoma      High cholesterol      HTN (hypertension) 07/18/2008     Incomplete emptying of bladder due to benign prostatic hyperplasia     Dr. Mejia     Memory loss 08/2020    labs nl, to neuro     Obesity      PFO (patent foramen ovale)      Plantar fasciitis      Presbyesophagus      Prostate cancer-[place] 4, treated with radiation 07/28/2013    rising psa in 2018, seen at [place], had cryo     Type II or unspecified type diabetes mellitus without mention of complication, not stated as uncontrolled     Dxd about 2000     Urinary retention with incomplete bladder emptying     does intermittent straight cath             Past Surgical History:      Past Surgical History:   Procedure Laterality Date     APPENDECTOMY  1942     ARTHROSCOPY KNEE RT/LT  1998    partial menisectomy left knee     COLONOSCOPY  N/A 9/27/2016    Procedure: COMBINED COLONOSCOPY, SINGLE OR MULTIPLE BIOPSY/POLYPECTOMY BY BIOPSY;  Surgeon: Maru Orta MD;  Location:  GI     CYSTOSCOPY       HEMORRHOIDECTOMY  1975     left tka Left 2015     PHACOEMULSIFICATION CLEAR CORNEA WITH STANDARD INTRAOCULAR LENS IMPLANT Right 3/18/2015    Procedure: PHACOEMULSIFICATION CLEAR CORNEA WITH STANDARD INTRAOCULAR LENS IMPLANT;  Surgeon: Lito Gonzales MD;  Location:  EC     PHACOEMULSIFICATION CLEAR CORNEA WITH STANDARD INTRAOCULAR LENS IMPLANT Left 3/25/2015    Procedure: PHACOEMULSIFICATION CLEAR CORNEA WITH STANDARD INTRAOCULAR LENS IMPLANT;  Surgeon: Lito Gonzales MD;  Location:  EC     right knee replacement Right 2019     ROTATOR CUFF REPAIR RT/LT  1998    right.  caused him to retire     SURGICAL HISTORY OF -   1999    L4-5 discectomy     TONSILLECTOMY & ADENOIDECTOMY  1940     TURP  1988     ZZHC COLONOSCOPY THRU STOMA, DIAGNOSTIC  2005     ZZHC COLONOSCOPY THRU STOMA, DIAGNOSTIC  5/09    diverticulosis, also proctitis             Social History:     Social History     Socioeconomic History     Marital status:      Spouse name: Salome Gates     Number of children: 2     Years of education: Not on file     Highest education level: Not on file   Occupational History     Occupation: MD, Family [employer]     Employer: RETIRED   Tobacco Use     Smoking status: Never     Smokeless tobacco: Never   Substance and Sexual Activity     Alcohol use: Yes     Comment: 10 a week     Drug use: No     Sexual activity: Yes     Partners: Female   Other Topics Concern     Parent/sibling w/ CABG, MI or angioplasty before 65F 55M? No   Social History Narrative     Not on file     Social Determinants of Health     Financial Resource Strain: Not on file   Food Insecurity: Not on file   Transportation Needs: Not on file   Physical Activity: Not on file   Stress: Not on file   Social Connections: Not on file   Intimate Partner Violence: Not on file  She cannot keep promethazine down.    Subjective      I have reviewed and the following portions of the patient's history were updated as appropriate: past family history, past medical history, past social history, past surgical history and problem list.    Medications:     Current Outpatient Medications:     acetaminophen (TYLENOL) 500 MG tablet, Take 2 tablets by mouth Every 8 (Eight) Hours As Needed for Mild Pain., Disp: 60 tablet, Rfl: 0    amitriptyline (ELAVIL) 150 MG tablet, Take 1 tablet by mouth Every Night., Disp: 30 tablet, Rfl: 1    atorvastatin (LIPITOR) 40 MG tablet, Take 1 tablet by mouth every night at bedtime., Disp: 90 tablet, Rfl: 3    azelastine (ASTELIN) 0.1 % nasal spray, 2 sprays into the nostril(s) as directed by provider 2 (Two) Times a Day. Use in each nostril as directed, Disp: 30 mL, Rfl: 1    buPROPion XL (Wellbutrin XL) 150 MG 24 hr tablet, Take 1 tablet by mouth Every Morning., Disp: 30 tablet, Rfl: 1    butalbital-acetaminophen-caffeine (FIORICET, ESGIC) -40 MG per tablet, Take 1 tablet by mouth 2 (Two) Times a Day As Needed for Headache., Disp: 20 tablet, Rfl: 0    Cariprazine HCl (Vraylar) 4.5 MG capsule capsule, Take 1 capsule by mouth Daily., Disp: 30 capsule, Rfl: 1    cefdinir (OMNICEF) 300 MG capsule, Take 1 capsule by mouth 2 (Two) Times a Day for 7 days., Disp: 14 capsule, Rfl: 0    Chlorhexidine Gluconate 4 % solution, APPLY TOPICALLY TO THE APPROPRIATE AREA AS DIRECTED DAILY AS NEEDED FOR WOUND CARE., Disp: , Rfl:     desvenlafaxine (PRISTIQ) 100 MG 24 hr tablet, Take 1 tablet by mouth Daily., Disp: 30 tablet, Rfl: 1    diazePAM (VALIUM) 10 MG tablet, Take 1 tablet by mouth 3 (Three) Times a Day As Needed for Anxiety., Disp: 45 tablet, Rfl: 0    empagliflozin (Jardiance) 10 MG tablet tablet, Take 1 tablet by mouth Daily., Disp: 90 tablet, Rfl: 1    fexofenadine-pseudoephedrine (GNP Fexofenadine/PSE ER)  MG per 12 hr tablet, Take 1 tablet by mouth 2 (Two) Times a  "  Housing Stability: Not on file             Family History:   reviewed         Allergies:     Allergies   Allergen Reactions     Ivp Dye [Contrast Dye]              Medications:     Current Outpatient Medications   Medication Sig Dispense Refill     atorvastatin (LIPITOR) 40 MG tablet TAKE 1 TABLET EVERY DAY 90 tablet 3     azelastine (ASTELIN) 0.1 % nasal spray Spray 1 spray into both nostrils 2 times daily       famotidine (PEPCID) 20 MG tablet Take 1 tablet (20 mg) by mouth 2 times daily 180 tablet 3     lisinopril (ZESTRIL) 20 MG tablet Take 1 tablet (20 mg) by mouth 2 times daily 180 tablet 3     metFORMIN (GLUCOPHAGE XR) 500 MG 24 hr tablet TAKE 2 TABLETS TWICE DAILY WITH MEALS 360 tablet 3     Alcohol Swabs (B-D SINGLE USE SWABS REGULAR) PADS 1 pad 3 times daily 270 each 3     blood glucose (ONETOUCH ULTRA) test strip Use to test blood sugar  daily or as directed. 1 Box 3     blood glucose monitoring (ONE TOUCH ULTRA 2) meter device kit Use to test blood sugars 1- 2 times daily or as directed. 1 kit 0     blood glucose monitoring (ONE TOUCH ULTRASOFT) lancets Use to test blood sugar 1-2  times daily or as directed. 100 each 11     diphenoxylate-atropine (LOMOTIL) 2.5-0.025 MG tablet Take 1 tablet by mouth Take one tablet twice a day.       latanoprost (XALATAN) 0.005 % ophthalmic solution Place 1 drop into both eyes At Bedtime        MELATONIN PO Take 5 mg by mouth At Bedtime       MULTI-VITAMIN OR TABS 1 TABLET DAILY       ORDER FOR DME Equipment being ordered: Postivac 1 each 0     vardenafil (LEVITRA) 20 MG tablet Take 0.5-1 tablets (10-20 mg) by mouth daily as needed Never use with nitroglycerin, terazosin or doxazosin. 30 tablet 3               Review of Systems:   The 10 point Review of Systems is negative other than noted in the HPI           Physical Exam:   Blood pressure 134/72, pulse 70, height 1.753 m (5' 9\"), weight 88.5 kg (195 lb).    Exam:  Constitutional: healthy appearing, alert and in no " Day., Disp: 60 tablet, Rfl: 1    fluconazole (DIFLUCAN) 150 MG tablet, Take 1 tablet by mouth Every 72 (Seventy-Two) Hours for 3 doses., Disp: 3 tablet, Rfl: 0    fluticasone (FLONASE) 50 MCG/ACT nasal spray, 2 sprays into the nostril(s) as directed by provider Daily., Disp: 11.1 mL, Rfl: 1    FREESTYLE LITE test strip, See Admin Instructions., Disp: , Rfl:     gabapentin (Neurontin) 800 MG tablet, Take 1 tablet by mouth 3 (Three) Times a Day., Disp: 90 tablet, Rfl: 0    HYDROcodone-acetaminophen (NORCO) 5-325 MG per tablet, Take 1 tablet by mouth Every 8 (Eight) Hours As Needed for Severe Pain (pain) for up to 12 doses., Disp: 5 tablet, Rfl: 0    ibuprofen (ADVIL,MOTRIN) 800 MG tablet, Take 1 tablet by mouth Every 8 (Eight) Hours As Needed for Mild Pain., Disp: 60 tablet, Rfl: 0    ketorolac (TORADOL) 10 MG tablet, Take 1 tablet by mouth Every 6 (Six) Hours As Needed for Severe Pain., Disp: 20 tablet, Rfl: 0    Lurasidone HCl (Latuda) 120 MG tablet tablet, Take 1 tablet by mouth Daily., Disp: 30 tablet, Rfl: 1    montelukast (SINGULAIR) 10 MG tablet, Take 1 tablet by mouth Every Night., Disp: 30 tablet, Rfl: 5    omeprazole (priLOSEC) 20 MG capsule, Take 1 capsule by mouth Daily., Disp: 90 capsule, Rfl: 1    OXcarbazepine (TRILEPTAL) 300 MG tablet, Take 1 tablet by mouth 2 (Two) Times a Day., Disp: 60 tablet, Rfl: 1    polyethylene glycol (MIRALAX) 17 g packet, Take 17 g by mouth Daily., Disp: 30 each, Rfl: 1    promethazine (PHENERGAN) 25 MG tablet, TAKE 1 TABLET BY MOUTH EVERY 6 HOURS AS NEEDED FOR NAUSEA OR VOMITING. (Patient not taking: Reported on 9/7/2024), Disp: 20 tablet, Rfl: 1    promethazine-dextromethorphan (PROMETHAZINE-DM) 6.25-15 MG/5ML syrup, Take 5 mL by mouth 4 (Four) Times a Day As Needed for Cough (Nausea)., Disp: 180 mL, Rfl: 1    Rimegepant Sulfate (Nurtec) 75 MG tablet dispersible tablet, Take 1 tablet by mouth Every Other Day. Take Monday,Wednesday,Friday, and Saturday., Disp: 16 tablet,  "Rfl: 0    tiZANidine (ZANAFLEX) 4 MG tablet, Take 1 tablet by mouth Every 8 (Eight) Hours As Needed for Muscle Spasms., Disp: 270 tablet, Rfl: 0    Zavegepant HCl 10 MG/ACT solution, 10 mg into the nostril(s) as directed by provider Daily As Needed (migraine). (1 spray into 1 nostril every 24 hrs prn), Disp: 6 each, Rfl: 5    Allergies:   Allergies   Allergen Reactions    Aspirin Anaphylaxis and Hives     Wheezing         Codeine Anaphylaxis    Cyclobenzaprine Other (See Comments)     \"gives me chest pain\"    Other reaction(s): Other (See Comments)    Haldol [Haloperidol] Rash    Imitrex [Sumatriptan] Anaphylaxis and Rash    Latex Hives and Rash    Penicillins Hives and Anaphylaxis     Vomiting    Zofran [Ondansetron] GI Intolerance     Constipation    Lamictal [Lamotrigine] Itching     Itching and hives    Metoclopramide Headache, Hives and Other (See Comments)    Morphine Itching    Oxycodone-Acetaminophen GI Intolerance     Other reaction(s): GI Intolerance    Oxycontin [Oxycodone] GI Intolerance    Prochlorperazine Nausea And Vomiting and Unknown (See Comments)    Tramadol Hives and Nausea And Vomiting       Objective     Physical Exam: Limited due to virtual visit  Vital Signs: There were no vitals filed for this visit.         Physical Exam  Constitutional:       General: She is not in acute distress.     Appearance: Normal appearance. She is not ill-appearing, toxic-appearing or diaphoretic.   HENT:      Head: Normocephalic and atraumatic.      Right Ear: External ear normal.      Left Ear: External ear normal.      Nose: Nose normal.   Eyes:      Extraocular Movements: Extraocular movements intact.      Conjunctiva/sclera: Conjunctivae normal.   Pulmonary:      Effort: Pulmonary effort is normal.   Musculoskeletal:      Cervical back: Normal range of motion.   Neurological:      General: No focal deficit present.      Mental Status: She is alert and oriented to person, place, and time.   Psychiatric:         " distress  Heent: Normocephalic. Head without obvious masses or lesions. PERRLDC, EOMI. Mouth exam within normal limits: tongue, mucous membranes, posterior pharynx all normal, no lesions or abnormalities seen.  Tm's and canals within normal limits bilaterally. Neck supple, no nuchal rigidity or masses. No supraclavicular, or cervical adenopathy. Thyroid symmetric, no masses.  Cardiovascular: Regular rate and rhythm, no murmer, rub or gallops.  JVP not elevated, no edema.  Carotids within normal limits bilaterally, no bruits.  Respiratory: Normal respiratory effort.  Lungs clear, normal flow, no wheezing or crackles.  Breasts: Normal bilaterally.  No masses or lesions.  Nipples within normal limites.  No axillary lesions or nodes.  Gastrointestinal: Normal active bowel sounds.   Soft, not tender, no masses, guarding or rebound.  No hepatosplenomegaly.   Genitourinary: Rectal not done  Musculoskeletal: extremities normal, no gross deformities noted.  Skin: no suspicious lesions or rashes   Neurologic: Mental status within normal limits.  Speech fluent.  No gross motor abnormalities and gait intact.  Psychiatric: mentation appears normal and affect normal.  Feet no lesions, dec pulses         Data:   Sent, many already done        Assessment:   1. Normal complete physical exam  2. Memory loss, to neuro  3. Niddm, follow up endo  4. Cap, follow up [facility]  5. Urine retention, doing s.c  6. Ckd, follow up labs  7. Elevated cholesterol, on statin  8. Hypertension, controlled  9. Gerd, no issues  10. Presumed cad, med mgmt  11. hcm         Plan:   Get outside labs  Labs today  Exercise, diet and weight loss  Neuro eval  covid and flu shots at [facility]            Patient has been advised of split billing requirements and indicates understanding: Yes  Are you in the first 12 months of your Medicare coverage?  No    Healthy Habits:     In general, how would you rate your overall health?  Good    Frequency of  "exercise:  None    Do you usually eat at least 4 servings of fruit and vegetables a day, include whole grains    & fiber and avoid regularly eating high fat or \"junk\" foods?  Yes    Taking medications regularly:  Yes    Medication side effects:  None    Ability to successfully perform activities of daily living:  No assistance needed    Home Safety:  No safety concerns identified    Hearing Impairment:  No hearing concerns    In the past 6 months, have you been bothered by leaking of urine?  No    In general, how would you rate your overall mental or emotional health?  Excellent      PHQ-2 Total Score: 0    Additional concerns today:  No    Do you feel safe in your environment? Yes    Have you ever done Advance Care Planning? (For example, a Health Directive, POLST, or a discussion with a medical provider or your loved ones about your wishes): No, advance care planning information given to patient to review.  Patient plans to discuss their wishes with loved ones or provider.         Fall risk      Cognitive Screening         Reviewed and updated as needed this visit by clinical staff                  Reviewed and updated as needed this visit by Provider                 Social History     Tobacco Use     Smoking status: Never     Smokeless tobacco: Never   Substance Use Topics     Alcohol use: Yes     Comment: 10-14 drinks per week         Alcohol Use 8/30/2021   Prescreen: >3 drinks/day or >7 drinks/week? Yes   Prescreen: >3 drinks/day or >7 drinks/week? -   AUDIT SCORE  3               Current providers sharing in care for this patient include:   Patient Care Team:  Dalton Mon MD as PCP - General (Internal Medicine)  Dalton Mon MD as Assigned PCP  Allen Pearson MD as Assigned Cancer Care Provider    The following health maintenance items are reviewed in Epic and correct as of today:  Health Maintenance   Topic Date Due     ANNUAL REVIEW OF HM ORDERS  Never done     ZOSTER " Mood and Affect: Mood normal.         Behavior: Behavior normal.         Thought Content: Thought content normal.         Judgment: Judgment normal.           Assessment / Plan      Assessment/Plan:   Diagnoses and all orders for this visit:    1. Acute URI (Primary)  -     azelastine (ASTELIN) 0.1 % nasal spray; 2 sprays into the nostril(s) as directed by provider 2 (Two) Times a Day. Use in each nostril as directed  Dispense: 30 mL; Refill: 1  -     promethazine-dextromethorphan (PROMETHAZINE-DM) 6.25-15 MG/5ML syrup; Take 5 mL by mouth 4 (Four) Times a Day As Needed for Cough (Nausea).  Dispense: 180 mL; Refill: 1    2. Acute sinusitis, recurrence not specified, unspecified location  -     cefdinir (OMNICEF) 300 MG capsule; Take 1 capsule by mouth 2 (Two) Times a Day for 7 days.  Dispense: 14 capsule; Refill: 0      Sxs likely 2/2 viral infection but given prolonged course of symptoms will start short course of antibiotics  Continue fluticasone and antihistamine.  Will add azelastine  Symptomatic treatment with Promethazine DM cough syrup which should also help with nausea.  Increase fluid intake.    Advised patient that vomiting was likely due to lots of phlegm on the stomach and that she needed to try to eat something to help prevent this  Counseled regarding course of viral illness       Follow Up:   No follow-ups on file.    Any medications prescribed have been sent electronically to   [id number] - [place], [place] - [address] AT [facility] [address] - [phone]  - [phone] FX  [address] [place] [address]  Phone: [phone] Fax: [phone]      15 minutes were spent reviewing the patient's questionnaire, formulating a treatment plan, and relaying information to the patient via [employer].    Hunter Winkler MD  [facility]  09/12/24  10:02 EDT   "IMMUNIZATION (2 of 3) 01/25/2011     DEXA  08/19/2017     EYE EXAM  11/01/2020     PHQ-2 (once per calendar year)  01/01/2022     A1C  02/28/2022     COVID-19 Vaccine (5 - Booster for Pfizer series) 07/07/2022     MEDICARE ANNUAL WELLNESS VISIT  08/30/2022     BMP  08/30/2022     CMP  08/30/2022     LIPID  08/30/2022     MICROALBUMIN  08/30/2022     DIABETIC FOOT EXAM  08/30/2022     FALL RISK ASSESSMENT  08/30/2022     INFLUENZA VACCINE (1) 09/01/2022     HEMOGLOBIN  08/30/2022     DTAP/TDAP/TD IMMUNIZATION (6 - Td or Tdap) 06/27/2025     ADVANCE CARE PLANNING  08/30/2026     Pneumococcal Vaccine: 65+ Years  Completed     URINALYSIS  Completed     IPV IMMUNIZATION  Aged Out     MENINGITIS IMMUNIZATION  Aged Out               Review of Systems      OBJECTIVE:   There were no vitals taken for this visit. Estimated body mass index is 30.13 kg/m  as calculated from the following:    Height as of 8/30/21: 1.753 m (5' 9\").    Weight as of 8/30/21: 92.5 kg (204 lb).  Physical Exam          ASSESSMENT / PLAN:             COUNSELING:  Reviewed preventive health counseling, as reflected in patient instructions       Regular exercise       Healthy diet/nutrition    Estimated body mass index is 30.13 kg/m  as calculated from the following:    Height as of 8/30/21: 1.753 m (5' 9\").    Weight as of 8/30/21: 92.5 kg (204 lb).    Weight management plan: Discussed healthy diet and exercise guidelines    He reports that he has never smoked. He has never used smokeless tobacco.      Appropriate preventive services were discussed with this patient, including applicable screening as appropriate for cardiovascular disease, diabetes, osteopenia/osteoporosis, and glaucoma.  As appropriate for age/gender, discussed screening for colorectal cancer, prostate cancer, breast cancer, and cervical cancer. Checklist reviewing preventive services available has been given to the patient.    Reviewed patients plan of care and provided an AVS. The " Basic Care Plan (routine screening as documented in Health Maintenance) for Jamil meets the Care Plan requirement. This Care Plan has been established and reviewed with the Patient and spouse.    Counseling Resources:  ATP IV Guidelines  Pooled Cohorts Equation Calculator  Breast Cancer Risk Calculator  Breast Cancer: Medication to Reduce Risk  FRAX Risk Assessment  ICSI Preventive Guidelines  Dietary Guidelines for Americans, 2010  [employer] MyPlate  ASA Prophylaxis  Lung CA Screening    Dalton Mon MD  [facility]    Identified Health Risks:

## 2024-09-20 ENCOUNTER — NURSE TRIAGE (OUTPATIENT)
Dept: FAMILY MEDICINE | Facility: CLINIC | Age: 89
End: 2024-09-20
Payer: MEDICARE

## 2024-09-20 NOTE — TELEPHONE ENCOUNTER
S-(situation): Left large toe infection.     B-(background): Skin is red, swollen . Area a size of dime.  No opening in the skin. Painful at touch.  Difficult to wear a shoe.   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     A-(assessment): Patient is requesting an antibiotic. They are traveling on a cruise next week.     [facility] in [place].     R-(recommendations): Will send to PCP per patient request.     Reason for Disposition   Wound infection suspected by triager (e.g., other signs of wound infection)    Additional Information   Negative: [1] Widespread rash AND [2] bright red, sunburn-like AND [3] too weak to stand   Negative: Sounds like a life-threatening emergency to the triager   Negative: [1] Wound infection diagnosed AND [2] taking an antibiotic   Negative: [1] Cellulitis diagnosed AND [2] taking an antibiotic   Negative: Animal bite wound infection suspected   Negative: Boil suspected (painful red lump)   Negative: Impetigo suspected (e.g., infected sore; soft yellow crusts or scabs)   Negative: Surgical  wound infection suspected   Negative: Surgical wound infection suspected (post-op)   Negative: Skin glue used to close wound and not infected   Negative: Stitches (or staples) and not infected   Negative: Chronic wound care and Negative Pressure Wound Therapy (NPWT) symptoms and questions   Negative: SEVERE pain in the wound   Negative: [1] SEVERE pain with bending of finger (or toe) AND [2] wound on hand (or foot)   Negative: [1] Widespread rash AND [2] bright red, sunburn-like   Negative: Fever > 103 F (39.4 C)   Negative: Black (necrotic), dark purple, or blisters develop in area of wound   Negative: Patient sounds very sick or weak to the triager   Negative: [1] Looks infected (spreading redness, red streak, pus) AND [2] fever   Negative: [1] Looks infected (e.g., spreading redness) AND [2] face wound   Negative: [1] Red streak runs from the wound AND [2]  "longer than 1 inch (2.5 cm)   Negative: [1] Skin around the wound has become red AND [2] larger than 2 inches (5 cm)   Negative: [1] Finger wound AND [2] entire finger swollen    Answer Assessment - Initial Assessment Questions  1. LOCATION: \"Where is the wound located?\"       Large great toe.  2. WOUND APPEARANCE: \"What does the wound look like?\"       Redness to skin.   3. SIZE: If redness is present, ask: \"What is the size of the red area?\" (Inches, centimeters, or compare to size of a coin)       Dime size.   4. SPREAD: \"What's changed in the last day?\"  \"Do you see any red streaks coming from the wound?\"      No  5. ONSET: \"When did it start to look infected?\"       Not sure.   6. MECHANISM: \"How did the wound start, what was the cause?\"      Not sure.   7. PAIN: Do you have any pain?\"  If Yes, ask: \"How bad is the pain?\"  (e.g., Scale 1-10; mild, moderate, or severe)     - MILD (1-3): Doesn't interfere with normal activities.      - MODERATE (4-7): Interferes with normal activities or awakens from sleep.     - SEVERE (8-10): Excruciating pain, unable to do any normal activities.        Mild.  8. FEVER: \"Do you have a fever?\" If Yes, ask: \"What is your temperature, how was it measured, and when did it start?\"      No  9. OTHER SYMPTOMS: \"Do you have any other symptoms?\" (e.g., shaking chills, weakness, rash elsewhere on body)      No  10. PREGNANCY: \"Is there any chance you are pregnant?\" \"When was your last menstrual period?\"        N/A    Protocols used: Wound Infection [id number] Lozano RN  [facility]     "

## 2024-09-20 NOTE — TELEPHONE ENCOUNTER
Tried to call but went right to voicemail for Salome.  Left vm to call clinic back asap.  If call back by [phone], could do video visit with Dr. Edgar, otherwise Urgent care.

## 2024-09-20 NOTE — TELEPHONE ENCOUNTER
Unfortunately I am going to recommend a visit.  I can see him now if he can do virtual, otherwise urgent care would be appropriate for this particular complaint.  Kingsley Edgar MD on 9/20/2024 at 4:22 PM

## 2024-09-23 NOTE — TELEPHONE ENCOUNTER
Is there a way he can do a team visit tomorrow if there are openings?  If not let me know.    Dalton Mon M.D.

## 2024-09-23 NOTE — TELEPHONE ENCOUNTER
Patient Contact     Attempt # 1     Was call answered?  No.  Left message on voicemail with information to call triage back on both number provider in contacts.     On callback please help pt schedule with team tomorrow.      Osei Robison RN  [facility]

## 2024-09-23 NOTE — TELEPHONE ENCOUNTER
Patient's spouse called the clinic back (C2C on file) and provider's message was relayed to her and patient. The stated that they want PCP to review the message and that he is not wanting to go to urgent care. They will be traveling on Wednesday.    Leticia ORTEZ RN  [facility] Triage Team

## 2024-09-24 ENCOUNTER — OFFICE VISIT (OUTPATIENT)
Dept: FAMILY MEDICINE | Facility: CLINIC | Age: 89
End: 2024-09-24
Payer: MEDICARE

## 2024-09-24 VITALS
BODY MASS INDEX: 28.73 KG/M2 | OXYGEN SATURATION: 95 % | DIASTOLIC BLOOD PRESSURE: 74 MMHG | RESPIRATION RATE: 16 BRPM | HEART RATE: 68 BPM | WEIGHT: 194 LBS | TEMPERATURE: 96.6 F | SYSTOLIC BLOOD PRESSURE: 122 MMHG | HEIGHT: 69 IN

## 2024-09-24 DIAGNOSIS — L03.032 PARONYCHIA OF TOE, LEFT: Primary | ICD-10-CM

## 2024-09-24 DIAGNOSIS — E11.21 TYPE 2 DIABETES MELLITUS WITH DIABETIC NEPHROPATHY, WITHOUT LONG-TERM CURRENT USE OF INSULIN (H): ICD-10-CM

## 2024-09-24 PROCEDURE — 99213 OFFICE O/P EST LOW 20 MIN: CPT | Performed by: PHYSICIAN ASSISTANT

## 2024-09-24 RX ORDER — CEPHALEXIN 500 MG/1
500 CAPSULE ORAL 3 TIMES DAILY
Qty: 30 CAPSULE | Refills: 0 | Status: SHIPPED | OUTPATIENT
Start: 2024-09-24

## 2024-09-24 ASSESSMENT — PAIN SCALES - GENERAL: PAINLEVEL: NO PAIN (1)

## 2024-09-24 NOTE — TELEPHONE ENCOUNTER
Spoke to pt's spouse (C2C on file) - shared Dr. 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

## 2024-09-24 NOTE — PROGRESS NOTES
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  They are traveling to [place] tomorrow and plan to do a lot of walking  States he has chronic onychomycosis but now the lateral toenail border is erythematous and tender  He has been doing warm soaks with some relief  Denies any discharge.  Denies fever or chills.    Past Medical History:   Diagnosis Date    Bowel dysfunction     Dr. Marito Orta, urgent, soft or liquid stools    CAD (coronary artery disease)     per calcium score=>400    Carotid artery plaque 09/2011    Colon polyps     last colonoscopy nl 5/19    Diverticulosis of colon     Erectile dysfunction 07/18/2008    Glaucoma     High cholesterol     HTN (hypertension) 07/18/2008    Incomplete emptying of bladder due to benign prostatic hyperplasia     Dr. Mejia    Memory loss 08/2020    labs nl, to neuro, seen by Dr. Ferguson 2023    Obesity     PFO (patent foramen ovale)     Plantar fasciitis     Presbyesophagus     Prostate cancer-Gavin 4, treated with radiation 07/28/2013    rising psa in 2018, seen at [place], had cryo    Type II or unspecified type diabetes mellitus without mention of complication, not stated as uncontrolled     Dxd about 2000, sees nava Hadley added 5/23    Urinary retention with incomplete bladder emptying     does intermittent straight cath     Past Surgical History:   Procedure Laterality Date    APPENDECTOMY  1942    ARTHROSCOPY KNEE RT/LT  1998    partial menisectomy left knee    COLONOSCOPY N/A 9/27/2016    Procedure: COMBINED COLONOSCOPY, SINGLE OR MULTIPLE BIOPSY/POLYPECTOMY BY BIOPSY;  Surgeon: Maru Orta MD;  Location:  GI    CYSTOSCOPY      HEMORRHOIDECTOMY  1975    left tka Left 2015    PHACOEMULSIFICATION CLEAR CORNEA WITH STANDARD INTRAOCULAR LENS IMPLANT Right 3/18/2015    Procedure: PHACOEMULSIFICATION CLEAR CORNEA WITH STANDARD INTRAOCULAR LENS IMPLANT;  Surgeon: Lito Gonzales MD;  Location:  EC    PHACOEMULSIFICATION CLEAR  CORNEA WITH STANDARD INTRAOCULAR LENS IMPLANT Left 3/25/2015    Procedure: PHACOEMULSIFICATION CLEAR CORNEA WITH STANDARD INTRAOCULAR LENS IMPLANT;  Surgeon: Lito Gonzales MD;  Location: [facility]    right knee replacement Right 2019    ROTATOR CUFF REPAIR RT/LT  1998    right.  caused him to retire    SURGICAL HISTORY OF -   1999    L4-5 discectomy    TONSILLECTOMY & ADENOIDECTOMY  1940    TURP  1988    ZZHC COLONOSCOPY THRU STOMA, DIAGNOSTIC  2005    ZZHC COLONOSCOPY THRU STOMA, DIAGNOSTIC  5/09    diverticulosis, also proctitis     Social History     Tobacco Use    Smoking status: Never    Smokeless tobacco: Never   Substance Use Topics    Alcohol use: Yes     Comment: 10 a week     Current Outpatient Medications   Medication Sig Dispense Refill    Alcohol Swabs (B-D SINGLE USE SWABS REGULAR) PADS 1 pad 3 times daily 270 each 3    amLODIPine (NORVASC) 5 MG tablet Take 1 tablet (5 mg) by mouth daily 90 tablet 1    atorvastatin (LIPITOR) 40 MG tablet TAKE 1 TABLET EVERY DAY 90 tablet 1    azelastine (ASTELIN) 0.1 % nasal spray Spray 1 spray into both nostrils 2 times daily      bicalutamide (CASODEX) 50 MG tablet Take 50 mg by mouth      blood glucose (ONETOUCH ULTRA) test strip Use to test blood sugar  daily or as directed. 1 Box 3    blood glucose monitoring (ONE TOUCH ULTRA 2) meter device kit Use to test blood sugars 1- 2 times daily or as directed. 1 kit 0    blood glucose monitoring (ONE TOUCH ULTRASOFT) lancets Use to test blood sugar 1-2  times daily or as directed. 100 each 11    cephALEXin (KEFLEX) 500 MG capsule Take 1 capsule (500 mg) by mouth 3 times daily. 30 capsule 0    diphenoxylate-atropine (LOMOTIL) 2.5-0.025 MG tablet Take 1 tablet by mouth Take one tablet twice a day.      Efinaconazole (JUBLIA) 10 % SOLN Externally apply topically daily 8 mL 3    famotidine (PEPCID) 20 MG tablet Take 1 tablet (20 mg) by mouth 2 times daily 180 tablet 1    latanoprost (XALATAN) 0.005 % ophthalmic solution Place  "1 drop into both eyes At Bedtime       lisinopril (ZESTRIL) 20 MG tablet Take 1 tablet (20 mg) by mouth 2 times daily 180 tablet 1    MELATONIN PO Take 5 mg by mouth At Bedtime      metFORMIN (GLUCOPHAGE XR) 500 MG 24 hr tablet TAKE 2 TABLETS TWICE DAILY WITH MEALS 360 tablet 3    MULTI-VITAMIN OR TABS 1 TABLET DAILY      ORDER FOR DME Equipment being ordered: Postivac 1 each 0    OZEMPIC, 0.25 OR 0.5 MG/DOSE, 2 MG/3ML pen INJECT 0.5 MG SUBCUTANEOUSLY ONCE A WEEK FOR 4 WEEKS      semaglutide (OZEMPIC, 0.25 OR 0.5 MG/DOSE,) 2 MG/3ML pen Inject 0.5 mg Subcutaneous every 7 days 3 mL     traZODone (DESYREL) 50 MG tablet TAKE 1 TABLET BY MOUTH AT BEDTIME 50 tablet 2    vardenafil (LEVITRA) 20 MG tablet Take 0.5-1 tablets (10-20 mg) by mouth daily as needed Never use with nitroglycerin, terazosin or doxazosin. 30 tablet 3     Allergies   Allergen Reactions    Ivp Dye [Contrast Dye]      PHYSICAL EXAM:    /74 (BP Location: Right arm, Patient Position: Sitting, Cuff Size: Adult Regular)   Pulse 68   Temp (!) 96.6  F (35.9  C) (Temporal)   Resp 16   Ht 1.753 m (5' 9\")   Wt 88 kg (194 lb)   SpO2 95%   BMI 28.65 kg/m      Patient appears non toxic  L great toe with onychomycosis of the nail.  No drainage or crust or bleeding  Nail border is slightly erythematous and tender, no fluctuance    Assessment and Plan:     (L03.032) Paronychia of toe, left  (primary encounter diagnosis)  Comment:   Plan: cephALEXin (KEFLEX) 500 MG capsule        Tid x 10d.  Cont warm soaks    (E11.21) Type 2 diabetes mellitus with diabetic nephropathy, without long-term current use of insulin (H)  Comment:   Plan: followed by Dr. Dumont and has follow scheduled for blanquita Frazier PA-C      "

## 2024-10-20 ENCOUNTER — HEALTH MAINTENANCE LETTER (OUTPATIENT)
Age: 89
End: 2024-10-20

## 2024-10-22 ENCOUNTER — LAB (OUTPATIENT)
Dept: LAB | Facility: CLINIC | Age: 89
End: 2024-10-22
Payer: MEDICARE

## 2024-10-22 ENCOUNTER — TELEPHONE (OUTPATIENT)
Dept: UROLOGY | Facility: CLINIC | Age: 89
End: 2024-10-22

## 2024-10-22 DIAGNOSIS — R39.89 SUSPECTED UTI: Primary | ICD-10-CM

## 2024-10-22 DIAGNOSIS — R39.89 SUSPECTED UTI: ICD-10-CM

## 2024-10-22 LAB
ALBUMIN UR-MCNC: 30 MG/DL
APPEARANCE UR: CLEAR
BILIRUB UR QL STRIP: NEGATIVE
COLOR UR AUTO: YELLOW
GLUCOSE UR STRIP-MCNC: NEGATIVE MG/DL
HGB UR QL STRIP: ABNORMAL
KETONES UR STRIP-MCNC: NEGATIVE MG/DL
LEUKOCYTE ESTERASE UR QL STRIP: ABNORMAL
NITRATE UR QL: POSITIVE
PH UR STRIP: 5.5 [PH] (ref 5–7)
SP GR UR STRIP: 1.02 (ref 1–1.03)
UROBILINOGEN UR STRIP-ACNC: 0.2 E.U./DL

## 2024-10-22 PROCEDURE — 87186 SC STD MICRODIL/AGAR DIL: CPT

## 2024-10-22 PROCEDURE — 87086 URINE CULTURE/COLONY COUNT: CPT

## 2024-10-22 PROCEDURE — 87088 URINE BACTERIA CULTURE: CPT

## 2024-10-22 PROCEDURE — 81003 URINALYSIS AUTO W/O SCOPE: CPT | Mod: QW

## 2024-10-22 NOTE — TELEPHONE ENCOUNTER
Patient called nurse line and reports that [place]. He thinks he has a UTI and would like orders placed. Orders for [facility]AUC placed and patient was made a lab appointment to leave a sample.     Lo Roy LPN

## 2024-10-24 LAB
BACTERIA UR CULT: ABNORMAL
BACTERIA UR CULT: ABNORMAL

## 2024-10-25 ENCOUNTER — TELEPHONE (OUTPATIENT)
Dept: ONCOLOGY | Facility: CLINIC | Age: 89
End: 2024-10-25
Payer: MEDICARE

## 2024-10-25 DIAGNOSIS — N39.0 UTI (URINARY TRACT INFECTION): Primary | ICD-10-CM

## 2024-10-25 DIAGNOSIS — N39.0 URINARY TRACT INFECTION: Primary | ICD-10-CM

## 2024-10-25 RX ORDER — SULFAMETHOXAZOLE AND TRIMETHOPRIM 800; 160 MG/1; MG/1
1 TABLET ORAL 2 TIMES DAILY
Qty: 10 TABLET | Refills: 0 | Status: SHIPPED | OUTPATIENT
Start: 2024-10-25 | End: 2024-10-25

## 2024-10-25 RX ORDER — CIPROFLOXACIN 500 MG/1
500 TABLET, FILM COATED ORAL 2 TIMES DAILY
Qty: 10 TABLET | Refills: 0 | Status: SHIPPED | OUTPATIENT
Start: 2024-10-25 | End: 2024-10-30

## 2024-10-25 NOTE — TELEPHONE ENCOUNTER
Denise mackey UC results   .  Patient of Tamica france who does [facility]    can you treat off protocol ?  Let me know  once you send it in  [facility] will update patient

## 2024-10-25 NOTE — TELEPHONE ENCOUNTER
Bactrim DS BID x 5 days sent to patients preferred pharmacy.    Denise Mcgee, RN, BSN  Care Coordinator  [facility]

## 2024-10-30 DIAGNOSIS — I10 ESSENTIAL HYPERTENSION, BENIGN: ICD-10-CM

## 2024-10-30 DIAGNOSIS — E78.5 HYPERLIPIDEMIA LDL GOAL <70: ICD-10-CM

## 2024-10-30 RX ORDER — ATORVASTATIN CALCIUM 40 MG/1
TABLET, FILM COATED ORAL
Qty: 90 TABLET | Refills: 1 | Status: SHIPPED | OUTPATIENT
Start: 2024-10-30

## 2024-10-30 RX ORDER — LISINOPRIL 20 MG/1
20 TABLET ORAL 2 TIMES DAILY
Qty: 180 TABLET | Refills: 1 | Status: SHIPPED | OUTPATIENT
Start: 2024-10-30

## 2024-11-04 ENCOUNTER — TRANSFERRED RECORDS (OUTPATIENT)
Dept: HEALTH INFORMATION MANAGEMENT | Facility: CLINIC | Age: 89
End: 2024-11-04

## 2024-11-04 ENCOUNTER — APPOINTMENT (OUTPATIENT)
Dept: LAB | Facility: CLINIC | Age: 89
End: 2024-11-04
Payer: MEDICARE

## 2024-11-04 LAB
CHOLESTEROL (EXTERNAL): 189 MG/DL (ref 50–199)
CREATININE (EXTERNAL): 1.82 MG/DL (ref 0.66–1.25)
GFR ESTIMATED (EXTERNAL): 35 ML/MIN/1.7
GLUCOSE (EXTERNAL): 121 MG/DL (ref 70–99)
HBA1C MFR BLD: 6.4 %
HDLC SERPL-MCNC: 43 MG/DL
LDL CHOLESTEROL CALCULATED (EXTERNAL): 118 MG/DL
POTASSIUM (EXTERNAL): 5.5 MMOL/L (ref 3.5–5.1)
TRIGLYCERIDES (EXTERNAL): 138 MG/DL (ref 10–150)

## 2024-11-19 ENCOUNTER — OFFICE VISIT (OUTPATIENT)
Dept: FAMILY MEDICINE | Facility: CLINIC | Age: 89
End: 2024-11-19
Payer: MEDICARE

## 2024-11-19 VITALS
TEMPERATURE: 98 F | WEIGHT: 191 LBS | SYSTOLIC BLOOD PRESSURE: 103 MMHG | DIASTOLIC BLOOD PRESSURE: 59 MMHG | OXYGEN SATURATION: 95 % | RESPIRATION RATE: 20 BRPM | HEIGHT: 68 IN | BODY MASS INDEX: 28.95 KG/M2 | HEART RATE: 59 BPM

## 2024-11-19 DIAGNOSIS — R79.89 ELEVATED SERUM CREATININE: ICD-10-CM

## 2024-11-19 DIAGNOSIS — I25.10 CORONARY ARTERY DISEASE INVOLVING NATIVE CORONARY ARTERY OF NATIVE HEART WITHOUT ANGINA PECTORIS: ICD-10-CM

## 2024-11-19 DIAGNOSIS — N18.30 STAGE 3 CHRONIC KIDNEY DISEASE, UNSPECIFIED WHETHER STAGE 3A OR 3B CKD (H): ICD-10-CM

## 2024-11-19 DIAGNOSIS — M79.675 PAIN OF TOE OF LEFT FOOT: ICD-10-CM

## 2024-11-19 DIAGNOSIS — R33.9 URINARY RETENTION WITH INCOMPLETE BLADDER EMPTYING: ICD-10-CM

## 2024-11-19 DIAGNOSIS — E11.21 TYPE 2 DIABETES MELLITUS WITH DIABETIC NEPHROPATHY, WITHOUT LONG-TERM CURRENT USE OF INSULIN (H): ICD-10-CM

## 2024-11-19 DIAGNOSIS — G47.09 OTHER INSOMNIA: ICD-10-CM

## 2024-11-19 DIAGNOSIS — E78.5 HYPERLIPIDEMIA LDL GOAL <70: ICD-10-CM

## 2024-11-19 DIAGNOSIS — K21.9 GASTROESOPHAGEAL REFLUX DISEASE WITHOUT ESOPHAGITIS: ICD-10-CM

## 2024-11-19 DIAGNOSIS — Z00.00 ENCOUNTER FOR MEDICARE ANNUAL WELLNESS EXAM: Primary | ICD-10-CM

## 2024-11-19 DIAGNOSIS — N52.8 OTHER MALE ERECTILE DYSFUNCTION: ICD-10-CM

## 2024-11-19 DIAGNOSIS — R41.3 MEMORY LOSS: ICD-10-CM

## 2024-11-19 DIAGNOSIS — C61 PROSTATE CANCER (H): ICD-10-CM

## 2024-11-19 DIAGNOSIS — I10 ESSENTIAL HYPERTENSION, BENIGN: ICD-10-CM

## 2024-11-19 LAB
ERYTHROCYTE [DISTWIDTH] IN BLOOD BY AUTOMATED COUNT: 13.5 % (ref 10–15)
HCT VFR BLD AUTO: 40.4 % (ref 40–53)
HGB BLD-MCNC: 12.9 G/DL (ref 13.3–17.7)
MCH RBC QN AUTO: 28.5 PG (ref 26.5–33)
MCHC RBC AUTO-ENTMCNC: 31.9 G/DL (ref 31.5–36.5)
MCV RBC AUTO: 89 FL (ref 78–100)
PLATELET # BLD AUTO: 208 10E3/UL (ref 150–450)
RBC # BLD AUTO: 4.52 10E6/UL (ref 4.4–5.9)
WBC # BLD AUTO: 7.4 10E3/UL (ref 4–11)

## 2024-11-19 RX ORDER — FAMOTIDINE 20 MG/1
20 TABLET, FILM COATED ORAL 2 TIMES DAILY
Qty: 180 TABLET | Refills: 3 | Status: SHIPPED | OUTPATIENT
Start: 2024-11-19

## 2024-11-19 RX ORDER — TRAZODONE HYDROCHLORIDE 50 MG/1
50 TABLET, FILM COATED ORAL AT BEDTIME
Qty: 50 TABLET | Refills: 2 | Status: SHIPPED | OUTPATIENT
Start: 2024-11-19

## 2024-11-19 RX ORDER — CEPHALEXIN 500 MG/1
500 CAPSULE ORAL 3 TIMES DAILY
Qty: 21 CAPSULE | Refills: 0 | Status: SHIPPED | OUTPATIENT
Start: 2024-11-19

## 2024-11-19 RX ORDER — LISINOPRIL 20 MG/1
20 TABLET ORAL 2 TIMES DAILY
Qty: 180 TABLET | Refills: 3 | Status: SHIPPED | OUTPATIENT
Start: 2024-11-19

## 2024-11-19 RX ORDER — VARDENAFIL HYDROCHLORIDE 20 MG/1
10-20 TABLET ORAL DAILY PRN
Qty: 30 TABLET | Refills: 3 | Status: SHIPPED | OUTPATIENT
Start: 2024-11-19

## 2024-11-19 RX ORDER — ATORVASTATIN CALCIUM 40 MG/1
TABLET, FILM COATED ORAL
Qty: 90 TABLET | Refills: 3 | Status: SHIPPED | OUTPATIENT
Start: 2024-11-19

## 2024-11-19 SDOH — HEALTH STABILITY: PHYSICAL HEALTH: ON AVERAGE, HOW MANY DAYS PER WEEK DO YOU ENGAGE IN MODERATE TO STRENUOUS EXERCISE (LIKE A BRISK WALK)?: 0 DAYS

## 2024-11-19 SDOH — HEALTH STABILITY: PHYSICAL HEALTH: ON AVERAGE, HOW MANY MINUTES DO YOU ENGAGE IN EXERCISE AT THIS LEVEL?: 0 MIN

## 2024-11-19 ASSESSMENT — SOCIAL DETERMINANTS OF HEALTH (SDOH): HOW OFTEN DO YOU GET TOGETHER WITH FRIENDS OR RELATIVES?: ONCE A WEEK

## 2024-11-19 ASSESSMENT — PAIN SCALES - GENERAL: PAINLEVEL_OUTOF10: NO PAIN (0)

## 2024-11-19 NOTE — PROGRESS NOTES
Preventive Care Visit  [facility] SHEEBA Mon MD, Internal Medicine  Nov 19, 2024          Hipolito Negron is a 89 year old, presenting for the following:    He presents with his wife.  He has a few issues.    He is up-to-date with his endocrinologist and his A1c was done there and it was good.  His creatinine was up to 1.8.  Will check that again today.  He does have CKD.  He has seen nephrology in the past and would like a referral back to that person.    He has hyperlipidemia.  His cholesterol was done by endocrine.    He has a pain in the left great toe.  He was felt to have a paronychia in September and was given Keflex with improvement.  It came back about a week ago.    He has chronic memory loss as well as urinary retention.  He has prostate cancer and sees [facility] for that.  He otherwise has been stable.               Past Medical History:      Past Medical History:   Diagnosis Date    Bowel dysfunction     Dr. Marito Orta, urgent, soft or liquid stools    CAD (coronary artery disease)     per calcium score=>400    Carotid artery plaque 09/2011    CKD (chronic kidney disease) stage 3, GFR 30-59 ml/min (H)     Dr. Gonzalez    Colon polyps     last colonoscopy nl 5/19    Diverticulosis of colon     Erectile dysfunction 07/18/2008    Glaucoma     High cholesterol     HTN (hypertension) 07/18/2008    added norvasc to regimen 11/2023    Incomplete emptying of bladder due to benign prostatic hyperplasia     Dr. Mejia    Memory loss 08/2020    labs nl, to neuro, seen by Dr. Ferguson 2023    Obesity     PFO (patent foramen ovale)     Plantar fasciitis     Presbyesophagus     Prostate cancer-[place] 4, treated with radiation 07/28/2013    rising psa in 2018, seen at [place], had cryo; has reg [place] fu    Type II or unspecified type diabetes mellitus without mention of complication, not stated as uncontrolled     Dxd about 2000, sees kristy Hadleyemppat added 5/23    Urinary retention with  incomplete bladder emptying     does intermittent straight cath             Past Surgical History:      Past Surgical History:   Procedure Laterality Date    APPENDECTOMY  1942    ARTHROSCOPY KNEE RT/LT  1998    partial menisectomy left knee    COLONOSCOPY N/A 9/27/2016    Procedure: COMBINED COLONOSCOPY, SINGLE OR MULTIPLE BIOPSY/POLYPECTOMY BY BIOPSY;  Surgeon: Maru Orta MD;  Location:  GI    CYSTOSCOPY      HEMORRHOIDECTOMY  1975    left tka Left 2015    PHACOEMULSIFICATION CLEAR CORNEA WITH STANDARD INTRAOCULAR LENS IMPLANT Right 3/18/2015    Procedure: PHACOEMULSIFICATION CLEAR CORNEA WITH STANDARD INTRAOCULAR LENS IMPLANT;  Surgeon: Lito Gonzales MD;  Location:  EC    PHACOEMULSIFICATION CLEAR CORNEA WITH STANDARD INTRAOCULAR LENS IMPLANT Left 3/25/2015    Procedure: PHACOEMULSIFICATION CLEAR CORNEA WITH STANDARD INTRAOCULAR LENS IMPLANT;  Surgeon: Lito Gonzales MD;  Location:  EC    right knee replacement Right 2019    ROTATOR CUFF REPAIR RT/LT  1998    right.  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 No   Social History Narrative    Not on file      Social Drivers of Health     Financial Resource Strain: Low Risk  (11/19/2024)    Financial Resource Strain     Within the past 12 months, have you or your family members you live with been unable to get utilities (heat, electricity) when it was really needed?: No   Food Insecurity: Low Risk  (11/19/2024)    Food Insecurity     Within the past 12 months, did you worry that your food would run out before you got money to buy more?: No     Within the past 12 months, did the food you bought just not last and you didn t have money to get more?: No   Transportation Needs: Low Risk  (11/19/2024)    Transportation Needs     Within the past 12 months, has lack of transportation kept you from medical appointments, getting your medicines, non-medical meetings or appointments, work, or from getting things that you need?: No   Physical Activity: Inactive (11/19/2024)    Exercise Vital Sign     Days of Exercise per Week: 0 days     Minutes of Exercise per Session: 0 min   Stress: No Stress Concern Present (11/19/2024)    [employer] [employer] [place] [employer] - Occupational Stress Questionnaire     Feeling of Stress : Not at all   Social Connections: Unknown (11/19/2024)    Social Connection and Isolation Panel [NHANES]     Frequency of Communication with Friends and Family: Not on file     Frequency of Social Gatherings with Friends and Family: Once a week     Attends [employer] Services: Not on file     Active Member of Clubs or Organizations: Not on file     Attends Club or Organization Meetings: Not on file     Marital Status: Not on file   Interpersonal Safety: Not At Risk (8/9/2023)    [facility]    Humiliation, Afraid, Rape, and Kick questionnaire     Fear of Current or Ex-Partner: No     Emotionally Abused: No     Physically Abused: No     Sexually Abused: No   Housing Stability: Low Risk  (11/19/2024)    Housing Stability     Do you have housing? : Yes     Are you worried about losing  your housing?: No             Family History:   reviewed         Allergies:     Allergies   Allergen Reactions    Ivp Dye [Contrast Dye]              Medications:     Current Outpatient Medications   Medication Sig Dispense Refill    Alcohol Swabs (B-D SINGLE USE SWABS REGULAR) PADS 1 pad 3 times daily 270 each 3    atorvastatin (LIPITOR) 40 MG tablet TAKE 1 TABLET EVERY DAY 90 tablet 3    azelastine (ASTELIN) 0.1 % nasal spray Spray 1 spray into both nostrils 2 times daily      blood glucose (ONETOUCH ULTRA) test strip Use to test blood sugar  daily or as directed. 1 Box 3    blood glucose monitoring (ONE TOUCH ULTRA 2) meter device kit Use to test blood sugars 1- 2 times daily or as directed. 1 kit 0    blood glucose monitoring (ONE TOUCH ULTRASOFT) lancets Use to test blood sugar 1-2  times daily or as directed. 100 each 11    cephALEXin (KEFLEX) 500 MG capsule Take 1 capsule (500 mg) by mouth 3 times daily. 21 capsule 0    diphenoxylate-atropine (LOMOTIL) 2.5-0.025 MG tablet Take 1 tablet by mouth Take one tablet twice a day.      famotidine (PEPCID) 20 MG tablet Take 1 tablet (20 mg) by mouth 2 times daily. 180 tablet 3    latanoprost (XALATAN) 0.005 % ophthalmic solution Place 1 drop into both eyes At Bedtime       lisinopril (ZESTRIL) 20 MG tablet Take 1 tablet (20 mg) by mouth 2 times daily. 180 tablet 3    MELATONIN PO Take 5 mg by mouth At Bedtime      metFORMIN (GLUCOPHAGE XR) 500 MG 24 hr tablet TAKE 2 TABLETS TWICE DAILY WITH MEALS 360 tablet 3    ORDER FOR DME Equipment being ordered: Postivac 1 each 0    OZEMPIC, 0.25 OR 0.5 MG/DOSE, 2 MG/3ML pen INJECT 0.5 MG SUBCUTANEOUSLY ONCE A WEEK FOR 4 WEEKS      traZODone (DESYREL) 50 MG tablet Take 1 tablet (50 mg) by mouth at bedtime. 50 tablet 2    vardenafil (LEVITRA) 20 MG tablet Take 0.5-1 tablets (10-20 mg) by mouth daily as needed. Never use with nitroglycerin, terazosin or doxazosin. 30 tablet 3    MULTI-VITAMIN OR TABS 1 TABLET DAILY (Patient not  "taking: Reported on 11/19/2024)                 Review of Systems:     The 10 point Review of Systems is negative other than noted in the HPI           Physical Exam:   Blood pressure 103/59, pulse 59, temperature 98  F (36.7  C), temperature source Oral, resp. rate 20, height 1.725 m (5' 7.91\"), weight 86.6 kg (191 lb), SpO2 95%.    Exam:  Constitutional: healthy appearing, alert and in no distress  Heent: Normocephalic. Head without obvious masses or lesions. PERRLDC, EOMI. Mouth exam within normal limits: tongue, mucous membranes, posterior pharynx all normal, no lesions or abnormalities seen.  Tm's and canals within normal limits bilaterally. Neck supple, no nuchal rigidity or masses. No supraclavicular, or cervical adenopathy. Thyroid symmetric, no masses.  Cardiovascular: Regular rate and rhythm, no murmer, rub or gallops.  JVP not elevated, no edema.  Carotids within normal limits bilaterally, no bruits.  Respiratory: Normal respiratory effort.  Lungs clear, normal flow, no wheezing or crackles.  Breasts: Normal bilaterally.  No masses or lesions.  Nipples within normal limites.  No axillary lesions or nodes.  Gastrointestinal: Normal active bowel sounds.   Soft, not tender, no masses, guarding or rebound.  No hepatosplenomegaly.   Genitourinary: Rectal not done  Musculoskeletal: extremities normal, no gross deformities noted.  Skin: no suspicious lesions or rashes   Neurologic: Mental status within normal limits.  Speech fluent.  No gross motor abnormalities and gait intact.  Psychiatric: mentation appears normal and affect normal.  Foot exam bilaterally reveals no sores, normal capillary refill and warmth, no open lesions.  Decreased pulses.  Left great toe is slightly red.  I see no pus or discharge.  It is tender.         Data:   Labs sent        Assessment:   Normal complete physical exam  Left great toe pain, suspect ingrown toenail and we will refer urgently to podiatry.  Also given Keflex to use for " now  Elevated creatinine, up to 1.8 which is higher than normal.  I will repeat it today.  If elevated will get renal ultrasound.  Also did renal referral  Diabetes, control good, up-to-date eye exam  Hyperlipidemia, on statin therapy  Acid reflux, controlled  Hypertension.  He was on amlodipine as of last year which I started at his physical but his blood pressure was running low and they stopped it.  His control is adequate.  Coronary artery disease, med management, this is based on a calcium score only  Prostate cancer, follow-up with urology at [facility]  Memory loss  Urinary retention, doing straight caths 4 times a day  Erectile dysfunction  Healthcare maintenance         Plan:   Up-to-date eye exam  Up-to-date immunizations  Labs today and letter, consider renal ultrasound  Renal eval  Keflex 500 mg 3 times daily and see podiatry  Follow-up endocrine  Call if problems      Dalton Mon M.D.        Physical and Recheck Medication        11/19/2024     9:42 AM   Additional Questions   Roomed by Sourav   Accompanied by Salome wife         Via the Health Maintenance questionnaire, the patient has reported the following services have been completed -Eye Exam: [facility] 2023-11-17, this information has been sent to the abstraction team.    HPI          Health Care Directive  Patient does not have a Health Care Directive: Patient states has Advance Directive and will bring in a copy to clinic.      11/19/2024   General Health   How would you rate your overall physical health? Excellent   Feel stress (tense, anxious, or unable to sleep) Not at all            11/19/2024   Nutrition   Diet: Regular (no restrictions)            11/19/2024   Exercise   Days per week of moderate/strenous exercise 0 days   Average minutes spent exercising at this level 0 min      (!) EXERCISE CONCERN      11/19/2024   Social Factors   Frequency of gathering with friends or relatives Once a week   Worry food won't last until get  money to buy more No   Food not last or not have enough money for food? No   Do you have housing? (Housing is defined as stable permanent housing and does not include staying ouside in a car, in a tent, in an abandoned building, in an overnight shelter, or couch-surfing.) Yes   Are you worried about losing your housing? No   Lack of transportation? No   Unable to get utilities (heat,electricity)? No            11/19/2024   Fall Risk   Fallen 2 or more times in the past year? No     No    Trouble with walking or balance? No     No        Patient-reported    Multiple values from one day are sorted in reverse-chronological order          11/19/2024   Activities of Daily Living- Home Safety   Needs help with the following daily activites None of the above   Safety concerns in the home None of the above            11/19/2024   Dental   Dentist two times every year? Yes            11/19/2024   Hearing Screening   Hearing concerns? (!) I NEED TO ASK PEOPLE TO SPEAK UP OR REPEAT THEMSELVES.    (!) IT'S HARD TO FOLLOW A CONVERSATION IN A NOISY RESTAURANT OR CROWDED ROOM.    (!) TROUBLE UNDERSTANDING SOFT OR WHISPERED SPEECH.       Multiple values from one day are sorted in reverse-chronological order         11/19/2024   Driving Risk Screening   Patient/family members have concerns about driving No            11/19/2024   General Alertness/Fatigue Screening   Have you been more tired than usual lately? No            11/19/2024   Urinary Incontinence Screening   Bothered by leaking urine in past 6 months No            11/19/2024   TB Screening   Were you born outside of the [place]? No            Today's PHQ-2 Score:       11/19/2024     9:53 AM   PHQ-2 ( 1999 Pfizer)   Q1: Little interest or pleasure in doing things 0    Q2: Feeling down, depressed or hopeless 0    PHQ-2 Score 0    Q1: Little interest or pleasure in doing things Not at all   Q2: Feeling down, depressed or hopeless Not at all   PHQ-2 Score 0       Patient-reported            11/19/2024   Substance Use   Alcohol more than 3/day or more than 7/wk No   Do you have a current opioid prescription? No   How severe/bad is pain from 1 to 10? 0/10 (No Pain)   Do you use any other substances recreationally? No        Social History     Tobacco Use    Smoking status: Never    Smokeless tobacco: Never   Substance Use Topics    Alcohol use: Yes     Comment: 10 a week    Drug use: No             Reviewed and updated as needed this visit by Provider                      Current providers sharing in care for this patient include:  Patient Care Team:  Dalton Mon MD as PCP - General (Internal Medicine)  Dalton Mon MD as Assigned PCP  Noah Ferguson MD as MD (Neurology)  Noah Ferguson MD as Assigned Neuroscience Provider  Kiarra Gray PsyD as Assigned Behavioral Health Provider    The following health maintenance items are reviewed in Epic and correct as of today:  Health Maintenance   Topic Date Due    ANNUAL REVIEW OF HM ORDERS  Never done    DEXA  08/19/2017    COVID-19 Vaccine (8 - 2024-25 season) 11/07/2024    BMP  11/14/2024    CMP  11/14/2024    DIABETIC FOOT EXAM  11/14/2024    EYE EXAM  11/17/2024    HEMOGLOBIN  11/14/2024    MICROALBUMIN  04/01/2025    A1C  05/04/2025    LIPID  11/04/2025    MEDICARE ANNUAL WELLNESS VISIT  11/19/2025    FALL RISK ASSESSMENT  11/19/2025    ADVANCE CARE PLANNING  11/14/2028    DTAP/TDAP/TD IMMUNIZATION (4 - Td or Tdap) 07/20/2034    PHQ-2 (once per calendar year)  Completed    INFLUENZA VACCINE  Completed    Pneumococcal Vaccine: 65+ Years  Completed    URINALYSIS  Completed    ZOSTER IMMUNIZATION  Completed    RSV VACCINE  Completed    HPV IMMUNIZATION  Aged Out    MENINGITIS IMMUNIZATION  Aged Out    RSV MONOCLONAL ANTIBODY  Aged Out            Objective    Exam  There were no vitals taken for this visit.   Estimated body mass index is 28.65 kg/m  as calculated from the following:    Height as of 9/24/24: 1.753  "m (5' 9\").    Weight as of 9/24/24: 88 kg (194 lb).    Physical Exam          11/19/2024   Mini Cog   Clock Draw Score 2 Normal   3 Item Recall 3 objects recalled   Mini Cog Total Score 5                 Signed Electronically by: Dalton Mon MD    "

## 2024-11-19 NOTE — PATIENT INSTRUCTIONS
Patient Education   Preventive Care Advice   This is general advice given by our system to help you stay healthy. However, your care team may have specific advice just for you. Please talk to your care team about your preventive care needs.  Nutrition  Eat 5 or more servings of fruits and vegetables each day.  Try wheat bread, brown rice and whole grain pasta (instead of white bread, rice, and pasta).  Get enough calcium and vitamin D. Check the label on foods and aim for 100% of the RDA (recommended daily allowance).  Lifestyle  Exercise at least 150 minutes each week  (30 minutes a day, 5 days a week).  Do muscle strengthening activities 2 days a week. These help control your weight and prevent disease.  No smoking.  Wear sunscreen to prevent skin cancer.  Have a dental exam and cleaning every 6 months.  Yearly exams  See your health care team every year to talk about:  Any changes in your health.  Any medicines your care team has prescribed.  Preventive care, family planning, and ways to prevent chronic diseases.  Shots (vaccines)   HPV shots (up to age 26), if you've never had them before.  Hepatitis B shots (up to age 59), if you've never had them before.  COVID-19 shot: Get this shot when it's due.  Flu shot: Get a flu shot every year.  Tetanus shot: Get a tetanus shot every 10 years.  Pneumococcal, hepatitis A, and RSV shots: Ask your care team if you need these based on your risk.  Shingles shot (for age 50 and up)  General health tests  Diabetes screening:  Starting at age 35, Get screened for diabetes at least every 3 years.  If you are younger than age 35, ask your care team if you should be screened for diabetes.  Cholesterol test: At age 39, start having a cholesterol test every 5 years, or more often if advised.  Bone density scan (DEXA): At age 50, ask your care team if you should have this scan for osteoporosis (brittle bones).  Hepatitis C: Get tested at least once in your life.  STIs (sexually  transmitted infections)  Before age 24: Ask your care team if you should be screened for STIs.  After age 24: Get screened for STIs if you're at risk. You are at risk for STIs (including HIV) if:  You are sexually active with more than one person.  You don't use condoms every time.  You or a partner was diagnosed with a sexually transmitted infection.  If you are at risk for HIV, ask about PrEP medicine to prevent HIV.  Get tested for HIV at least once in your life, whether you are at risk for HIV or not.  Cancer screening tests  Cervical cancer screening: If you have a cervix, begin getting regular cervical cancer screening tests starting at age 21.  Breast cancer scan (mammogram): If you've ever had breasts, begin having regular mammograms starting at age 40. This is a scan to check for breast cancer.  Colon cancer screening: It is important to start screening for colon cancer at age 45.  Have a colonoscopy test every 10 years (or more often if you're at risk) Or, ask your provider about stool tests like a FIT test every year or Cologuard test every 3 years.  To learn more about your testing options, visit:   .  For help making a decision, visit:   https://bit.ly/ar21220.  Prostate cancer screening test: If you have a prostate, ask your care team if a prostate cancer screening test (PSA) at age 55 is right for you.  Lung cancer screening: If you are a current or former smoker ages 50 to 80, ask your care team if ongoing lung cancer screenings are right for you.  For informational purposes only. Not to replace the advice of your health care provider. Copyright   2023 [facility]. All rights reserved. Clinically reviewed by the [facility] Transitions Program. [employer] [id number] - REV 01/24.  Hearing Loss: Care Instructions  Overview     Hearing loss is a sudden or slow decrease in how well you hear. It can range from slight to profound. Permanent hearing loss can occur with aging. It also can  happen when you are exposed long-term to loud noise. Examples include listening to loud music, riding motorcycles, or being around other loud machines.  Hearing loss can affect your work and home life. It can make you feel lonely or depressed. You may feel that you have lost your independence. But hearing aids and other devices can help you hear better and feel connected to others.  Follow-up care is a key part of your treatment and safety. Be sure to make and go to all appointments, and call your doctor if you are having problems. It's also a good idea to know your test results and keep a list of the medicines you take.  How can you care for yourself at home?  Avoid loud noises whenever possible. This helps keep your hearing from getting worse.  Always wear hearing protection around loud noises.  Wear a hearing aid as directed.  A professional can help you pick a hearing aid that will work best for you.  You can also get hearing aids over the counter for mild to moderate hearing loss.  Have hearing tests as your doctor suggests. They can show whether your hearing has changed. Your hearing aid may need to be adjusted.  Use other devices as needed. These may include:  Telephone amplifiers and hearing aids that can connect to a television, stereo, radio, or microphone.  Devices that use lights or vibrations. These alert you to the doorbell, a ringing telephone, or a baby monitor.  Television closed-captioning. This shows the words at the bottom of the screen. Most new TVs can do this.  TTY (text telephone). This lets you type messages back and forth on the telephone instead of talking or listening. These devices are also called TDD. When messages are typed on the keyboard, they are sent over the phone line to a receiving TTY. The message is shown on a monitor.  Use text messaging, social media, and email if it is hard for you to communicate by telephone.  Try to learn a listening technique called speechreading. It is  "not lipreading. You pay attention to people's gestures, expressions, posture, and tone of voice. These clues can help you understand what a person is saying. Face the person you are talking to, and have them face you. Make sure the lighting is good. You need to see the other person's face clearly.  Think about counseling if you need help to adjust to your hearing loss.  When should you call for help?  Watch closely for changes in your health, and be sure to contact your doctor if:    You think your hearing is getting worse.     You have new symptoms, such as dizziness or nausea.   Where can you learn more?  Go to https://www.[employer].net/patiented  Enter R798 in the search box to learn more about \"Hearing Loss: Care Instructions.\"  Current as of: September 27, 2023  Content Version: 14.2 2024 [facility] [employer].   Care instructions adapted under license by your healthcare professional. If you have questions about a medical condition or this instruction, always ask your healthcare professional. [employer] disclaims any warranty or liability for your use of this information.       "

## 2024-11-20 ENCOUNTER — TELEPHONE (OUTPATIENT)
Dept: FAMILY MEDICINE | Facility: CLINIC | Age: 89
End: 2024-11-20
Payer: MEDICARE

## 2024-11-20 LAB
ALBUMIN SERPL BCG-MCNC: 4.2 G/DL (ref 3.5–5.2)
ALP SERPL-CCNC: 86 U/L (ref 40–150)
ALT SERPL W P-5'-P-CCNC: 24 U/L (ref 0–70)
ANION GAP SERPL CALCULATED.3IONS-SCNC: 10 MMOL/L (ref 7–15)
AST SERPL W P-5'-P-CCNC: 24 U/L (ref 0–45)
BILIRUB SERPL-MCNC: 0.4 MG/DL
BUN SERPL-MCNC: 25.6 MG/DL (ref 8–23)
CALCIUM SERPL-MCNC: 9.6 MG/DL (ref 8.8–10.4)
CHLORIDE SERPL-SCNC: 105 MMOL/L (ref 98–107)
CREAT SERPL-MCNC: 1.58 MG/DL (ref 0.67–1.17)
EGFRCR SERPLBLD CKD-EPI 2021: 42 ML/MIN/1.73M2
GLUCOSE SERPL-MCNC: 110 MG/DL (ref 70–99)
HCO3 SERPL-SCNC: 22 MMOL/L (ref 22–29)
POTASSIUM SERPL-SCNC: 5 MMOL/L (ref 3.4–5.3)
PROT SERPL-MCNC: 7 G/DL (ref 6.4–8.3)
SODIUM SERPL-SCNC: 137 MMOL/L (ref 135–145)

## 2024-11-20 NOTE — RESULT ENCOUNTER NOTE
Dr. Bedolla,    As you can see your hemoglobin is 12.9, slightly lower than last year.  Your CBC is otherwise normal.  On the chemistries your creatinine remains high.  It is about the same as it was a year ago but better than it was at the endocrinologist..  I think the bottom line is the low hemoglobin is related to the renal insufficiency.  I would ask that you have a follow-up lab in 4-6 months just to double check these things.    Please let me know if you have questions.    Dalton

## 2024-11-20 NOTE — TELEPHONE ENCOUNTER
PRIOR AUTHORIZATION DENIED    Medication: VARDENAFIL HCL 20 MG PO TABS  Insurance Company: [employer] - Phone [phone] Fax [phone]  Denial Date: 11/19/2024  Denial Reason(s): NOT COVERED FOR ED      Appeal Information:       Patient Notified: NO

## 2024-11-21 ENCOUNTER — TELEPHONE (OUTPATIENT)
Dept: PODIATRY | Facility: CLINIC | Age: 89
End: 2024-11-21
Payer: MEDICARE

## 2024-11-21 NOTE — TELEPHONE ENCOUNTER
[facility] Call [facility]    Phone Message    May a detailed message be left on voicemail: yes     Reason for Call: Patient has a 1-2 Day Referral for Infection on [place]. ( Diabetic) Patient has Appt for Dec 4. Please Assist with Sooner. Thanks    Action Taken: Message routed to:  Other:  PODIATRY    Travel Screening: Not Applicable     Date of Service:

## 2024-11-21 NOTE — TELEPHONE ENCOUNTER
Called patient and scheduled for tomorrow with Dr. Mclaughlin.     Tea NANCE RN, [facility] 11/21/24 2:21 PM

## 2024-12-10 ENCOUNTER — TRANSFERRED RECORDS (OUTPATIENT)
Dept: HEALTH INFORMATION MANAGEMENT | Facility: CLINIC | Age: 89
End: 2024-12-10
Payer: MEDICARE

## 2024-12-26 ENCOUNTER — LAB (OUTPATIENT)
Dept: LAB | Facility: CLINIC | Age: 89
End: 2024-12-26
Payer: MEDICARE

## 2024-12-26 ENCOUNTER — TELEPHONE (OUTPATIENT)
Dept: UROLOGY | Facility: CLINIC | Age: 89
End: 2024-12-26
Payer: MEDICARE

## 2024-12-26 DIAGNOSIS — R39.89 SUSPECTED UTI: Primary | ICD-10-CM

## 2024-12-26 DIAGNOSIS — R39.89 SUSPECTED UTI: ICD-10-CM

## 2024-12-26 LAB
ALBUMIN UR-MCNC: 70 MG/DL
APPEARANCE UR: ABNORMAL
BILIRUB UR QL STRIP: NEGATIVE
COLOR UR AUTO: ABNORMAL
GLUCOSE UR STRIP-MCNC: NEGATIVE MG/DL
HGB UR QL STRIP: ABNORMAL
KETONES UR STRIP-MCNC: NEGATIVE MG/DL
LEUKOCYTE ESTERASE UR QL STRIP: ABNORMAL
NITRATE UR QL: POSITIVE
PH UR STRIP: 5.5 [PH] (ref 5–7)
SP GR UR STRIP: 1.01 (ref 1–1.03)
UROBILINOGEN UR STRIP-MCNC: NORMAL MG/DL

## 2024-12-26 PROCEDURE — 81003 URINALYSIS AUTO W/O SCOPE: CPT | Mod: QW

## 2024-12-26 PROCEDURE — 87186 SC STD MICRODIL/AGAR DIL: CPT

## 2024-12-26 NOTE — TELEPHONE ENCOUNTER
[facility] Call [facility]    Phone Message    May a detailed message be left on voicemail: yes     Reason for Call: Symptoms or Concerns     If patient has red-flag symptoms, warm transfer to triage line    Current symptom or concern: Believes to have UTI  Cloudy urine, dysuria, frequency    Symptoms have been present for:  2 days started 12/25/24    Has patient previously been seen for this? Yes    By Michel      Are there any new or worsening symptoms? No  Patient is requesting that a UA/ UC order be placed. Please review and call patient to further advise.     Action Taken: Other: UROLOGY    Travel Screening: Not Applicable

## 2024-12-27 LAB — BACTERIA UR CULT: ABNORMAL

## 2024-12-30 ENCOUNTER — TELEPHONE (OUTPATIENT)
Dept: UROLOGY | Facility: CLINIC | Age: 89
End: 2024-12-30
Payer: MEDICARE

## 2024-12-30 DIAGNOSIS — N39.0 URINARY TRACT INFECTION: Primary | ICD-10-CM

## 2024-12-30 RX ORDER — CEPHALEXIN 500 MG/1
500 CAPSULE ORAL 2 TIMES DAILY
Qty: 14 CAPSULE | Refills: 0 | Status: SHIPPED | OUTPATIENT
Start: 2024-12-30

## 2024-12-30 NOTE — TELEPHONE ENCOUNTER
[facility]    Phone Message    May a detailed message be left on voicemail: yes     Reason for Call: Requesting Results     Name/type of test: labs  Date of test: 12/26/24  Was test done at a location other than [facility] (Please fill in the location if not [facility])?: No    Action Taken: Message routed to:  [facility] armando uro    Travel Screening: Not Applicable     Date of Service:

## 2025-01-21 DIAGNOSIS — K21.9 GASTROESOPHAGEAL REFLUX DISEASE WITHOUT ESOPHAGITIS: ICD-10-CM

## 2025-01-21 RX ORDER — FAMOTIDINE 20 MG/1
20 TABLET, FILM COATED ORAL 2 TIMES DAILY
Qty: 180 TABLET | Refills: 2 | Status: SHIPPED | OUTPATIENT
Start: 2025-01-21

## 2025-03-19 DIAGNOSIS — G47.09 OTHER INSOMNIA: ICD-10-CM

## 2025-03-19 RX ORDER — TRAZODONE HYDROCHLORIDE 50 MG/1
50 TABLET ORAL AT BEDTIME
Qty: 90 TABLET | Refills: 1 | Status: SHIPPED | OUTPATIENT
Start: 2025-03-19

## 2025-03-19 NOTE — TELEPHONE ENCOUNTER
Pt's wife on CTC asked to send Rx for Trazodone to [facility] pharmacy. Pt is out of medication. Rx was previously sent to [facility] pharmacy.     Wife agreed to follow up with pharmacy to find how soon they can fill.

## 2025-03-31 ENCOUNTER — TRANSFERRED RECORDS (OUTPATIENT)
Dept: HEALTH INFORMATION MANAGEMENT | Facility: CLINIC | Age: 89
End: 2025-03-31
Payer: MEDICARE

## 2025-05-05 ENCOUNTER — APPOINTMENT (OUTPATIENT)
Dept: LAB | Facility: CLINIC | Age: 89
End: 2025-05-05
Payer: MEDICARE

## 2025-05-24 ENCOUNTER — HEALTH MAINTENANCE LETTER (OUTPATIENT)
Age: 89
End: 2025-05-24

## 2025-06-04 ENCOUNTER — LAB (OUTPATIENT)
Dept: LAB | Facility: CLINIC | Age: 89
End: 2025-06-04
Payer: MEDICARE

## 2025-06-04 DIAGNOSIS — R39.89 SUSPECTED UTI: Primary | ICD-10-CM

## 2025-06-04 LAB
ALBUMIN UR-MCNC: 100 MG/DL
APPEARANCE UR: CLEAR
BILIRUB UR QL STRIP: NEGATIVE
COLOR UR AUTO: YELLOW
GLUCOSE UR STRIP-MCNC: NEGATIVE MG/DL
HGB UR QL STRIP: ABNORMAL
KETONES UR STRIP-MCNC: NEGATIVE MG/DL
LEUKOCYTE ESTERASE UR QL STRIP: ABNORMAL
NITRATE UR QL: POSITIVE
PH UR STRIP: 6 [PH] (ref 5–7)
SP GR UR STRIP: 1.02 (ref 1–1.03)
UROBILINOGEN UR STRIP-ACNC: 0.2 E.U./DL

## 2025-06-04 PROCEDURE — 81003 URINALYSIS AUTO W/O SCOPE: CPT

## 2025-06-05 LAB
BACTERIA UR CULT: ABNORMAL
BACTERIA UR CULT: ABNORMAL

## 2025-06-09 ENCOUNTER — TELEPHONE (OUTPATIENT)
Dept: UROLOGY | Facility: CLINIC | Age: 89
End: 2025-06-09
Payer: MEDICARE

## 2025-06-09 DIAGNOSIS — N39.0 UTI (URINARY TRACT INFECTION): Primary | ICD-10-CM

## 2025-06-09 RX ORDER — SULFAMETHOXAZOLE AND TRIMETHOPRIM 800; 160 MG/1; MG/1
1 TABLET ORAL 2 TIMES DAILY
Qty: 14 TABLET | Refills: 0 | Status: SHIPPED | OUTPATIENT
Start: 2025-06-09 | End: 2025-06-16

## 2025-06-09 NOTE — TELEPHONE ENCOUNTER
Patient notified of antibiotic sent.   Drug interaction noted when sending, okay given by  to send med.  Sara Noguera LPN

## 2025-06-09 NOTE — TELEPHONE ENCOUNTER
----- Message from Allen Pearson sent at 6/9/2025 12:50 PM CDT -----    His culture is positive. Is he symptomatic? If so, let's have him do Bactrim DS PO BID x 7 days.    Thanks,  Bubba

## 2025-07-16 DIAGNOSIS — I10 ESSENTIAL HYPERTENSION, BENIGN: Primary | ICD-10-CM

## 2025-07-16 NOTE — TELEPHONE ENCOUNTER
Clinic RN: Please investigate patient's chart or contact patient if the information cannot be found because dose and pharmacy are different than what is in chart.     Malissa Walden RN on 7/16/2025 at 9:23 AM

## 2025-07-16 NOTE — TELEPHONE ENCOUNTER
Pharmacy ([facility]) called the clinic and stated that patient is completely out of medication and they will not be able to get the medication to the patient in several days. Patient is requesting that a 30 day supply be sent to [facility] pharmacy in [place]. Medication and pharmacy is pended. Routing to refill team for approval.     Leticia ORTEZ RN  [facility] Triage Team

## 2025-07-17 RX ORDER — LISINOPRIL 20 MG/1
20 TABLET ORAL 2 TIMES DAILY
Qty: 60 TABLET | Refills: 0 | Status: SHIPPED | OUTPATIENT
Start: 2025-07-17

## 2025-07-17 NOTE — TELEPHONE ENCOUNTER
Patient endorses that he is taking lisinopril 20mg, BID and that the pended pharmacy ([facility] on [address] ) is correct.  -This is a temporary refill r/t to delays in mail delivery.

## 2025-07-24 ENCOUNTER — LAB (OUTPATIENT)
Dept: LAB | Facility: CLINIC | Age: 89
End: 2025-07-24
Payer: MEDICARE

## 2025-07-24 DIAGNOSIS — R39.89 SUSPECTED UTI: Primary | ICD-10-CM

## 2025-07-24 LAB
ALBUMIN UR-MCNC: 100 MG/DL
APPEARANCE UR: CLEAR
BILIRUB UR QL STRIP: NEGATIVE
COLOR UR AUTO: YELLOW
GLUCOSE UR STRIP-MCNC: NEGATIVE MG/DL
HGB UR QL STRIP: ABNORMAL
KETONES UR STRIP-MCNC: NEGATIVE MG/DL
LEUKOCYTE ESTERASE UR QL STRIP: ABNORMAL
NITRATE UR QL: NEGATIVE
PH UR STRIP: 5.5 [PH] (ref 5–7)
SP GR UR STRIP: 1.02 (ref 1–1.03)
UROBILINOGEN UR STRIP-ACNC: 0.2 E.U./DL

## 2025-07-24 PROCEDURE — 87086 URINE CULTURE/COLONY COUNT: CPT

## 2025-07-24 PROCEDURE — 87186 SC STD MICRODIL/AGAR DIL: CPT

## 2025-07-24 PROCEDURE — 81003 URINALYSIS AUTO W/O SCOPE: CPT

## 2025-07-24 PROCEDURE — 87088 URINE BACTERIA CULTURE: CPT

## 2025-07-26 LAB — BACTERIA UR CULT: ABNORMAL

## 2025-07-30 ENCOUNTER — TELEPHONE (OUTPATIENT)
Dept: UROLOGY | Facility: CLINIC | Age: 89
End: 2025-07-30
Payer: MEDICARE

## 2025-07-30 DIAGNOSIS — N39.0 UTI (URINARY TRACT INFECTION): Primary | ICD-10-CM

## 2025-07-30 RX ORDER — AMOXICILLIN 500 MG/1
500 CAPSULE ORAL 2 TIMES DAILY
Qty: 14 CAPSULE | Refills: 0 | Status: SHIPPED | OUTPATIENT
Start: 2025-07-30 | End: 2025-08-06

## 2025-07-30 NOTE — TELEPHONE ENCOUNTER
----- Message from Allen Pearson sent at 7/30/2025  8:04 AM CDT -----    Is he symptomatic? If so, can send amoxicillin 500mg PO BID x 7 days.  Also, I haven't seen him in over 4 years. He should really re-establish care with one of the APPs if we are going to continue doing these urine cultures and prescribing antibiotics. Would also be appropriate for his PCP to do this.    Thanks,  Bubba

## 2025-07-31 ENCOUNTER — OFFICE VISIT (OUTPATIENT)
Dept: UROLOGY | Facility: CLINIC | Age: 89
End: 2025-07-31
Payer: MEDICARE

## 2025-07-31 VITALS
WEIGHT: 195 LBS | SYSTOLIC BLOOD PRESSURE: 124 MMHG | BODY MASS INDEX: 27.92 KG/M2 | HEART RATE: 64 BPM | DIASTOLIC BLOOD PRESSURE: 76 MMHG | HEIGHT: 70 IN | OXYGEN SATURATION: 95 %

## 2025-07-31 DIAGNOSIS — N30.00 ACUTE CYSTITIS WITHOUT HEMATURIA: Primary | ICD-10-CM

## 2025-07-31 DIAGNOSIS — C61 PROSTATE CANCER (H): ICD-10-CM

## 2025-07-31 RX ORDER — CEPHALEXIN 500 MG/1
500 CAPSULE ORAL 2 TIMES DAILY
Qty: 10 CAPSULE | Refills: 0 | Status: SHIPPED | OUTPATIENT
Start: 2025-07-31 | End: 2025-08-05

## 2025-07-31 ASSESSMENT — PAIN SCALES - GENERAL: PAINLEVEL_OUTOF10: NO PAIN (0)

## 2025-07-31 NOTE — PROGRESS NOTES
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 He has previously been followed by Dr. Mejia, and continues care at [facility] for recurrent prostate cancer. [place] 7 disease treated with EBRT in 2013. Had biopsy-proven recurrence of [place] 4+4=8 disease in 2018 and was started on ADT. 9/2018 had cryoablation. 2/2021 had another recurrence that was biopsied at [place]. He has been using CIC since then due to difficulty voiding. He has poor bladder sensation.     Last seen by me in 7/2021. Multiple recent UTIs. E.coli in 2024 and staph aureus last two times. Has cloudy urine with severe bladder spasms when he gets UTIs.    PSA   5/5/2025 - 0.34  0.16 7/2021  PSA 0.16 03/09/2021 08:21 AM   PSA 0.19 01/13/2021 11:08 AM   PSA 0.19 10/11/2020 12:26 PM   PSA 0.17 07/14/2020 11:33 AM   PSA 0.15 02/26/2020 10:00 AM   PSA <0.10 11/26/2019 11:10 AM   PSA 0.12 08/14/2019 03:30 PM   PSA <0.10 05/21/2019 09:38 AM   PSA <0.10 02/26/2019 10:43 AM   PSA <0.10 12/17/2018 11:45 AM   PSA <0.10 09/19/2018 11:04 AM   PSA <0.10 08/23/2018 09:35 AM      Not currently on hormones. None since 2018.    MRI Prostate 2/11/2025  Impression    1.  Highly suspicious for local recurrence.  2.  Negative for lymph node metastasis.  3.  Negative for bone metastasis.    Culture 50,000-100,000 CFU/mL Staphylococcus aureus Abnormal         Resulting Agency: [facility]     Susceptibility     Staphylococcus aureus     RADHA     Daptomycin 0.25 ug/mL Susceptible     Doxycycline <=0.5 ug/mL Susceptible     Gentamicin <=0.5 ug/mL Susceptible     Nitrofurantoin <=16 ug/mL Susceptible     Oxacillin 0.5 ug/mL Susceptible 1     Tetracycline <=1 ug/mL Susceptible     Trimethoprim/Sulfamethoxazole <=0.5/9.5 u... Susceptible     Vancomycin <=0.5 ug/mL Susceptible              Past Medical History:      Past Medical History:   Diagnosis Date    Bowel dysfunction     Dr. Marito Orta, urgent, soft or liquid stools    CAD (coronary artery disease)     per calcium score=>400    Carotid artery plaque 09/2011    CKD (chronic kidney disease) stage 3, GFR 30-59 ml/min (H)     Dr. Gonzalez    Colon polyps     last colonoscopy nl 5/19    Diverticulosis of colon     Erectile dysfunction 07/18/2008    Glaucoma     High cholesterol     HTN (hypertension) 07/18/2008    added norvasc to regimen 11/2023    Incomplete emptying of bladder due to benign prostatic hyperplasia     Dr. Mejia    Memory loss 08/2020    labs nl, to neuro, seen by Dr. Ferguson 2023    Obesity     PFO (patent foramen ovale)     Plantar fasciitis     Presbyesophagus     Prostate cancer-[place] 4, treated with radiation 07/28/2013    rising psa in 2018, seen at [place], had cryo; has reg [facility]    Type II or unspecified type diabetes mellitus without mention of complication, not stated as uncontrolled     Dxd about 2000, sees nava Hadley added 5/23    Urinary retention with incomplete bladder emptying     does intermittent straight cath          Past Surgical History:     Past Surgical History:   Procedure Laterality Date    APPENDECTOMY  1942    ARTHROSCOPY KNEE RT/LT  1998    partial menisectomy left knee    COLONOSCOPY N/A 9/27/2016    Procedure: COMBINED COLONOSCOPY, SINGLE OR MULTIPLE BIOPSY/POLYPECTOMY BY BIOPSY;  Surgeon: Maru Orta MD;  Location:  GI    CYSTOSCOPY      HEMORRHOIDECTOMY  1975    left tka Left 2015    PHACOEMULSIFICATION CLEAR CORNEA WITH STANDARD INTRAOCULAR LENS IMPLANT Right 3/18/2015    Procedure: PHACOEMULSIFICATION CLEAR CORNEA WITH STANDARD INTRAOCULAR LENS IMPLANT;  Surgeon: Lito Gonzales MD;  Location:  EC    PHACOEMULSIFICATION CLEAR CORNEA WITH STANDARD INTRAOCULAR LENS IMPLANT Left 3/25/2015    Procedure: PHACOEMULSIFICATION CLEAR CORNEA WITH STANDARD INTRAOCULAR LENS IMPLANT;  Surgeon: Janet  Lito GRIFFIN MD;  Location:  EC    right knee replacement Right 2019    ROTATOR CUFF REPAIR RT/LT  1998    right.  caused him to retire    SURGICAL HISTORY OF -   1999    L4-5 discectomy    TONSILLECTOMY & ADENOIDECTOMY  1940    TURP  1988    [facility] COLONOSCOPY THRU STOMA, DIAGNOSTIC  2005    [facility] COLONOSCOPY THRU STOMA, DIAGNOSTIC  5/09    diverticulosis, also proctitis          Medications     Current Outpatient Medications   Medication Sig Dispense Refill    Alcohol Swabs (B-D SINGLE USE SWABS REGULAR) PADS 1 pad 3 times daily 270 each 3    amoxicillin (AMOXIL) 500 MG capsule Take 1 capsule (500 mg) by mouth 2 times daily for 7 days. 14 capsule 0    atorvastatin (LIPITOR) 40 MG tablet TAKE 1 TABLET EVERY DAY 90 tablet 3    azelastine (ASTELIN) 0.1 % nasal spray Spray 1 spray into both nostrils 2 times daily      blood glucose (ONETOUCH ULTRA) test strip Use to test blood sugar  daily or as directed. 1 Box 3    blood glucose monitoring (ONE TOUCH ULTRA 2) meter device kit Use to test blood sugars 1- 2 times daily or as directed. 1 kit 0    blood glucose monitoring (ONE TOUCH ULTRASOFT) lancets Use to test blood sugar 1-2  times daily or as directed. 100 each 11    cephALEXin (KEFLEX) 500 MG capsule Take 1 capsule (500 mg) by mouth 2 times daily. 14 capsule 0    cephALEXin (KEFLEX) 500 MG capsule Take 1 capsule (500 mg) by mouth 3 times daily. 21 capsule 0    diphenoxylate-atropine (LOMOTIL) 2.5-0.025 MG tablet Take 1 tablet by mouth Take one tablet twice a day.      famotidine (PEPCID) 20 MG tablet Take 1 tablet by mouth twice daily 180 tablet 2    latanoprost (XALATAN) 0.005 % ophthalmic solution Place 1 drop into both eyes At Bedtime       lisinopril (ZESTRIL) 20 MG tablet Take 1 tablet (20 mg) by mouth 2 times daily. 60 tablet 0    lisinopril (ZESTRIL) 20 MG tablet Take 1 tablet (20 mg) by mouth 2 times daily. 180 tablet 3    MELATONIN PO Take 5 mg by mouth At Bedtime      metFORMIN (GLUCOPHAGE XR) 500 MG 24 hr  tablet TAKE 2 TABLETS TWICE DAILY WITH MEALS 360 tablet 3    MULTI-VITAMIN OR TABS 1 TABLET DAILY      ORDER FOR DME Equipment being ordered: Postivac 1 each 0    OZEMPIC, 0.25 OR 0.5 MG/DOSE, 2 MG/3ML pen INJECT 0.5 MG SUBCUTANEOUSLY ONCE A WEEK FOR 4 WEEKS      traZODone (DESYREL) 50 MG tablet Take 1 tablet (50 mg) by mouth at bedtime. 90 tablet 1    vardenafil (LEVITRA) 20 MG tablet Take 0.5-1 tablets (10-20 mg) by mouth daily as needed. Never use with nitroglycerin, terazosin or doxazosin. 30 tablet 3     No current facility-administered medications for this visit.          Family History:     Family History   Problem Relation Age of Onset    KIKI.A.DAVE Mother     SUSAN. Father     KIMBERLY Brother     Asthma Brother     Diabetes Brother     Lipids Brother           Social History:     Social History     Socioeconomic History    Marital status:      Spouse name: Salome Gates    Number of children: 2    Years of education: Not on file    Highest education level: Not on file   Occupational History    Occupation: MD, Family [employer]     Employer: RETIRED   Tobacco Use    Smoking status: Never    Smokeless tobacco: Never   Vaping Use    Vaping status: Not on file   Substance and Sexual Activity    Alcohol use: Yes     Comment: 2 a week    Drug use: No    Sexual activity: Yes     Partners: Female   Other Topics Concern    Parent/sibling w/ CABG, MI or angioplasty before 65F 55M? No   Social History Narrative    Not on file     Social Drivers of Health     Financial Resource Strain: Low Risk  (11/19/2024)    Financial Resource Strain     Within the past 12 months, have you or your family members you live with been unable to get utilities (heat, electricity) when it was really needed?: No   Food Insecurity: Low Risk  (11/19/2024)    Food Insecurity     Within the past 12 months, did you worry that your food would run out before you got money to buy more?: No     Within the past 12 months, did the food you bought just not  last and you didn t have money to get more?: No   Transportation Needs: Low Risk  (11/19/2024)    Transportation Needs     Within the past 12 months, has lack of transportation kept you from medical appointments, getting your medicines, non-medical meetings or appointments, work, or from getting things that you need?: No   Physical Activity: Inactive (11/19/2024)    Exercise Vital Sign     Days of Exercise per Week: 0 days     Minutes of Exercise per Session: 0 min   Stress: No Stress Concern Present (11/19/2024)    [place] [employer] - Occupational Stress Questionnaire     Feeling of Stress : Not at all   Social Connections: Unknown (11/19/2024)    Social Connection and Isolation Panel [NHANES]     Frequency of Communication with Friends and Family: Not on file     Frequency of Social Gatherings with Friends and Family: Once a week     Attends Roman [employer] Services: Not on file     Active Member of Clubs or Organizations: Not on file     Attends Club or Organization Meetings: Not on file     Marital Status: Not on file   Interpersonal Safety: Not At Risk (8/9/2023)    Received from [facility]    Humiliation, Afraid, Rape, and Kick questionnaire     Fear of Current or Ex-Partner: No     Emotionally Abused: No     Physically Abused: No     Sexually Abused: No   Housing Stability: Low Risk  (11/19/2024)    Housing Stability     Do you have housing? : Yes     Are you worried about losing your housing?: No          Allergies:   Ivp dye [contrast dye]         Review of Systems:  From intake questionnaire     Skin: negative  Eyes: negative  Ears/Nose/Throat: negative  Respiratory: No shortness of breath, dyspnea on exertion, cough, or hemoptysis  Cardiovascular: No chest pain or palpitations  Gastrointestinal: negative; no nausea/vomiting, constipation or diarrhea  Genitourinary: as per HPI  Musculoskeletal: negative  Neurologic: negative  Psychiatric: negative  Hematologic/Lymphatic/Immunologic:  "negative  Endocrine: negative         Physical Exam:     Patient is a 89 year old  male   Vitals: Blood pressure 124/76, pulse 64, height 1.765 m (5' 9.5\"), weight 88.5 kg (195 lb), SpO2 95%.  Constitutional: Body mass index is 28.38 kg/m .  Alert, no acute distress, oriented, conversant  Eyes: no scleral icterus; extraocular muscles intact, moist conjunctivae  Respiratory: no respiratory distress, or pursed lip breathing  Cardiovascular: pulses strong and intact; no obvious jugular venous distension present  Gastrointestinal: soft, nontender, no organomegaly or masses,   Musculoskeletal: extremities normal, no peripheral edema  Skin: no suspicious lesions or rashes  Neuro: Alert, oriented, speech and mentation normal  Psych: affect and mood normal, alert and oriented to person, place and time      Labs and Pathology:    I reviewed all applicable laboratory and pathology data and went over findings with patient  Significant for   Lab Results   Component Value Date    CR 1.58 11/19/2024    CR 1.09 11/14/2023    CR 1.52 07/08/2023    CR 1.36 05/03/2023    CR 1.14 08/30/2021    CR 1.15 12/03/2020    CR 1.27 08/27/2020    CR 1.09 03/05/2020    CR 1.08 03/03/2020    CR 0.92 02/05/2020    CR 1.07 09/24/2019    CR 1.14 05/13/2019    CR 0.97 10/31/2018    CR 0.94 08/19/2018    CR 0.90 05/17/2018     PSA   Date Value Ref Range Status   03/20/2013 2.97 0 - 4 ug/L Final   08/14/2012 1.95 0 - 4 ug/L Final   03/29/2011 1.34 0 - 4 ug/L Final   03/23/2010 1.29 0 - 4 ug/L Final   07/18/2008 0.93 0 - 4 ug/L Final       Outside and Past Medical records:    Review of prior external note(s) from - [facility] information from [place] reviewed  Review of the result(s) of each unique test - PSA, creat, urine culture, MRI         Assessment and Plan:     89 year old male with prostate cancer history, s/p EBRT and subsequently cryoablation at [facility] for recurrences. PSA remains low, not currently on hormones. Does also do CIC for " urinary retention and has had history of UTIs. Continues prostate cancer care at [place]. PSA rising slowly. Regarding UTIs, we discussed the nature of colonization with CIC. Will plan an additional 5 days of cephalexin as he has already had a few days, but will extend given complex UTI. He will otherwise follow-up with me as needed.     - Cephalexin x 5 days  - Continue CIC  - Follow-up, as scheduled, at [facility] for PSA    Allen Pearson MD  Urology  [facility]

## 2025-07-31 NOTE — Clinical Note
7/31/2025       RE: Jamil Bedolla Sr., MD  [address] [place] [address]     Dear Colleague,    Thank you for referring your patient, Jamil Bedolla Sr., MD, to the [facility]OLOG[facility] [place] at [facility]. Please see a copy of my visit note below.          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 He has previously been followed by Dr. Mejia, and continues care at [facility] for recurrent prostate cancer. [place] 7 disease treated with EBRT in 2013. Had biopsy-proven recurrence of [place] 4+4=8 disease in 2018 and was started on ADT. 9/2018 had cryoablation. 2/2021 had another recurrence that was biopsied at [place]. He has been using CIC since then due to difficulty voiding. He has poor bladder sensation.     Last seen by me in 7/2021. Multiple recent UTIs.    PSA   5/5/2025 - 0.34  0.16 7/2021  PSA 0.16 03/09/2021 08:21 AM   PSA 0.19 01/13/2021 11:08 AM   PSA 0.19 10/11/2020 12:26 PM   PSA 0.17 07/14/2020 11:33 AM   PSA 0.15 02/26/2020 10:00 AM   PSA <0.10 11/26/2019 11:10 AM   PSA 0.12 08/14/2019 03:30 PM   PSA <0.10 05/21/2019 09:38 AM   PSA <0.10 02/26/2019 10:43 AM   PSA <0.10 12/17/2018 11:45 AM   PSA <0.10 09/19/2018 11:04 AM   PSA <0.10 08/23/2018 09:35 AM      Not currently on hormones. None since 2018.    MRI Prostate 2/11/2025  Impression    1.  Highly suspicious for local recurrence.  2.  Negative for lymph node metastasis.  3.  Negative for bone metastasis.    Culture 50,000-100,000 CFU/mL Staphylococcus aureus Abnormal         Resulting Agency: [facility]     Susceptibility     Staphylococcus aureus     RADHA     Daptomycin 0.25 ug/mL Susceptible     Doxycycline <=0.5 ug/mL Susceptible     Gentamicin <=0.5 ug/mL Susceptible     Nitrofurantoin <=16 ug/mL Susceptible      Oxacillin 0.5 ug/mL Susceptible 1     Tetracycline <=1 ug/mL Susceptible     Trimethoprim/Sulfamethoxazole <=0.5/9.5 u... Susceptible     Vancomycin <=0.5 ug/mL Susceptible              Past Medical History:     Past Medical History:   Diagnosis Date    Bowel dysfunction     Dr. Marito Orta, urgent, soft or liquid stools    CAD (coronary artery disease)     per calcium score=>400    Carotid artery plaque 09/2011    CKD (chronic kidney disease) stage 3, GFR 30-59 ml/min (H)     Dr. Gonzalez    Colon polyps     last colonoscopy nl 5/19    Diverticulosis of colon     Erectile dysfunction 07/18/2008    Glaucoma     High cholesterol     HTN (hypertension) 07/18/2008    added norvasc to regimen 11/2023    Incomplete emptying of bladder due to benign prostatic hyperplasia     Dr. Mejia    Memory loss 08/2020    labs nl, to neuro, seen by Dr. Ferguson 2023    Obesity     PFO (patent foramen ovale)     Plantar fasciitis     Presbyesophagus     Prostate cancer-Gavin 4, treated with radiation 07/28/2013    rising psa in 2018, seen at [place], had cryo; has reg [place] fu    Type II or unspecified type diabetes mellitus without mention of complication, not stated as uncontrolled     Dxd about 2000, sees nava Hadley added 5/23    Urinary retention with incomplete bladder emptying     does intermittent straight cath          Past Surgical History:     Past Surgical History:   Procedure Laterality Date    APPENDECTOMY  1942    ARTHROSCOPY KNEE RT/LT  1998    partial menisectomy left knee    COLONOSCOPY N/A 9/27/2016    Procedure: COMBINED COLONOSCOPY, SINGLE OR MULTIPLE BIOPSY/POLYPECTOMY BY BIOPSY;  Surgeon: Maru Orta MD;  Location:  GI    CYSTOSCOPY      HEMORRHOIDECTOMY  1975    left tka Left 2015    PHACOEMULSIFICATION CLEAR CORNEA WITH STANDARD INTRAOCULAR LENS IMPLANT Right 3/18/2015    Procedure: PHACOEMULSIFICATION CLEAR CORNEA WITH STANDARD INTRAOCULAR LENS IMPLANT;  Surgeon: Lito Gonzales MD;   Location:  EC    PHACOEMULSIFICATION CLEAR CORNEA WITH STANDARD INTRAOCULAR LENS IMPLANT Left 3/25/2015    Procedure: PHACOEMULSIFICATION CLEAR CORNEA WITH STANDARD INTRAOCULAR LENS IMPLANT;  Surgeon: Lito Gonzales MD;  Location:  EC    right knee replacement Right 2019    ROTATOR CUFF REPAIR RT/LT  1998    right.  caused him to retire    SURGICAL HISTORY OF -   1999    L4-5 discectomy    TONSILLECTOMY & ADENOIDECTOMY  1940    TURP  1988    ZZ COLONOSCOPY THRU STOMA, DIAGNOSTIC  2005    ZZHC COLONOSCOPY THRU STOMA, DIAGNOSTIC  5/09    diverticulosis, also proctitis          Medications     Current Outpatient Medications   Medication Sig Dispense Refill    Alcohol Swabs (B-D SINGLE USE SWABS REGULAR) PADS 1 pad 3 times daily 270 each 3    amoxicillin (AMOXIL) 500 MG capsule Take 1 capsule (500 mg) by mouth 2 times daily for 7 days. 14 capsule 0    atorvastatin (LIPITOR) 40 MG tablet TAKE 1 TABLET EVERY DAY 90 tablet 3    azelastine (ASTELIN) 0.1 % nasal spray Spray 1 spray into both nostrils 2 times daily      blood glucose (ONETOUCH ULTRA) test strip Use to test blood sugar  daily or as directed. 1 Box 3    blood glucose monitoring (ONE TOUCH ULTRA 2) meter device kit Use to test blood sugars 1- 2 times daily or as directed. 1 kit 0    blood glucose monitoring (ONE TOUCH ULTRASOFT) lancets Use to test blood sugar 1-2  times daily or as directed. 100 each 11    cephALEXin (KEFLEX) 500 MG capsule Take 1 capsule (500 mg) by mouth 2 times daily. 14 capsule 0    cephALEXin (KEFLEX) 500 MG capsule Take 1 capsule (500 mg) by mouth 3 times daily. 21 capsule 0    diphenoxylate-atropine (LOMOTIL) 2.5-0.025 MG tablet Take 1 tablet by mouth Take one tablet twice a day.      famotidine (PEPCID) 20 MG tablet Take 1 tablet by mouth twice daily 180 tablet 2    latanoprost (XALATAN) 0.005 % ophthalmic solution Place 1 drop into both eyes At Bedtime       lisinopril (ZESTRIL) 20 MG tablet Take 1 tablet (20 mg) by mouth 2  times daily. 60 tablet 0    lisinopril (ZESTRIL) 20 MG tablet Take 1 tablet (20 mg) by mouth 2 times daily. 180 tablet 3    MELATONIN PO Take 5 mg by mouth At Bedtime      metFORMIN (GLUCOPHAGE XR) 500 MG 24 hr tablet TAKE 2 TABLETS TWICE DAILY WITH MEALS 360 tablet 3    MULTI-VITAMIN OR TABS 1 TABLET DAILY      ORDER FOR DME Equipment being ordered: Postivac 1 each 0    OZEMPIC, 0.25 OR 0.5 MG/DOSE, 2 MG/3ML pen INJECT 0.5 MG SUBCUTANEOUSLY ONCE A WEEK FOR 4 WEEKS      traZODone (DESYREL) 50 MG tablet Take 1 tablet (50 mg) by mouth at bedtime. 90 tablet 1    vardenafil (LEVITRA) 20 MG tablet Take 0.5-1 tablets (10-20 mg) by mouth daily as needed. Never use with nitroglycerin, terazosin or doxazosin. 30 tablet 3     No current facility-administered medications for this visit.          Family History:     Family History   Problem Relation Age of Onset    C.A.D. Mother     C.A.D. Father     C.A.D. 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 No   Social History Narrative    Not on file     Social Drivers of Health     Financial Resource Strain: Low Risk  (11/19/2024)    Financial Resource Strain     Within the past 12 months, have you or your family members you live with been unable to get utilities (heat, electricity) when it was really needed?: No   Food Insecurity: Low  Risk  (11/19/2024)    Food Insecurity     Within the past 12 months, did you worry that your food would run out before you got money to buy more?: No     Within the past 12 months, did the food you bought just not last and you didn t have money to get more?: No   Transportation Needs: Low Risk  (11/19/2024)    Transportation Needs     Within the past 12 months, has lack of transportation kept you from medical appointments, getting your medicines, non-medical meetings or appointments, work, or from getting things that you need?: No   Physical Activity: Inactive (11/19/2024)    Exercise Vital Sign     Days of Exercise per Week: 0 days     Minutes of Exercise per Session: 0 min   Stress: No Stress Concern Present (11/19/2024)    [place] [employer] - Occupational Stress Questionnaire     Feeling of Stress : Not at all   Social Connections: Unknown (11/19/2024)    Social Connection and Isolation Panel [NHANES]     Frequency of Communication with Friends and Family: Not on file     Frequency of Social Gatherings with Friends and Family: Once a week     Attends Episcopalian Services: Not on file     Active Member of Clubs or Organizations: Not on file     Attends Club or Organization Meetings: Not on file     Marital Status: Not on file   Interpersonal Safety: Not At Risk (8/9/2023)    Received from [facility] [place] [facility]    Humiliation, Afraid, Rape, and Kick questionnaire     Fear of Current or Ex-Partner: No     Emotionally Abused: No     Physically Abused: No     Sexually Abused: No   Housing Stability: Low Risk  (11/19/2024)    Housing Stability     Do you have housing? : Yes     Are you worried about losing your housing?: No          Allergies:   Ivp dye [contrast dye]         Review of Systems:  From intake questionnaire     Skin: negative  Eyes: negative  Ears/Nose/Throat: negative  Respiratory: No shortness of breath, dyspnea on exertion, cough, or hemoptysis  Cardiovascular: No chest pain or  "palpitations  Gastrointestinal: negative; no nausea/vomiting, constipation or diarrhea  Genitourinary: as per HPI  Musculoskeletal: negative  Neurologic: negative  Psychiatric: negative  Hematologic/Lymphatic/Immunologic: negative  Endocrine: negative         Physical Exam:     Patient is a 89 year old  male   Vitals: Blood pressure 124/76, pulse 64, height 1.765 m (5' 9.5\"), weight 88.5 kg (195 lb), SpO2 95%.  Constitutional: Body mass index is 28.38 kg/m .  Alert, no acute distress, oriented, conversant  Eyes: no scleral icterus; extraocular muscles intact, moist conjunctivae  Respiratory: no respiratory distress, or pursed lip breathing  Cardiovascular: pulses strong and intact; no obvious jugular venous distension present  Gastrointestinal: soft, nontender, no organomegaly or masses,   Musculoskeletal: extremities normal, no peripheral edema  Skin: no suspicious lesions or rashes  Neuro: Alert, oriented, speech and mentation normal  Psych: affect and mood normal, alert and oriented to person, place and time      Labs and Pathology:    I reviewed all applicable laboratory and pathology data and went over findings with patient  Significant for   Lab Results   Component Value Date    CR 1.58 11/19/2024    CR 1.09 11/14/2023    CR 1.52 07/08/2023    CR 1.36 05/03/2023    CR 1.14 08/30/2021    CR 1.15 12/03/2020    CR 1.27 08/27/2020    CR 1.09 03/05/2020    CR 1.08 03/03/2020    CR 0.92 02/05/2020    CR 1.07 09/24/2019    CR 1.14 05/13/2019    CR 0.97 10/31/2018    CR 0.94 08/19/2018    CR 0.90 05/17/2018     PSA   Date Value Ref Range Status   03/20/2013 2.97 0 - 4 ug/L Final   08/14/2012 1.95 0 - 4 ug/L Final   03/29/2011 1.34 0 - 4 ug/L Final   03/23/2010 1.29 0 - 4 ug/L Final   07/18/2008 0.93 0 - 4 ug/L Final       Outside and Past Medical records:    Review of prior external note(s) from - [facility] information from [place] reviewed  Review of the result(s) of each unique test - PSA, creat, urine culture, " MRI         Assessment and Plan:     89 year old male with prostate cancer history, s/p EBRT and subsequently cryoablation at [facility] for recurrences. PSA remains low, not currently on hormones. Does also do CIC for urinary retention and has had history of UTIs. We discussed this today and options to make antibiotics available for Dr. Bedolla as he travels. Will provide a self-start antibiotic for him to have on hand for this purpose. We also discussed he plans to travel to [place] in the fall, and will plan to get urine culture prior to travels to make sure he doesn't have issues while out of the country. He will otherwise continue to receive his prostate cancer care at [facility].      - self-start antibiotics  - Continue CIC  - Follow-up, as scheduled, at [facility] for PSA    Orders  No orders of the defined types were placed in this encounter.      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 He has previously been followed by Dr. Mejia, and continues care at [facility] for recurrent prostate cancer. [place] 7 disease treated with EBRT in 2013. Had biopsy-proven recurrence of [place] 4+4=8 disease in 2018 and was started on ADT. 9/2018 had cryoablation. 2/2021 had another recurrence that was biopsied at [facility]. He has been using CIC since then due to difficulty voiding. He has poor bladder sensation.     Last seen by me in 7/2021. Multiple recent UTIs. E.coli in 2024 and staph aureus last two times. Has cloudy urine with severe bladder spasms when he gets UTIs.    PSA   5/5/2025 - 0.34  0.16 7/2021  PSA 0.16 03/09/2021 08:21 AM   PSA 0.19 01/13/2021 11:08 AM   PSA 0.19 10/11/2020 12:26 PM   PSA 0.17 07/14/2020 11:33 AM   PSA 0.15 02/26/2020 10:00 AM   PSA <0.10  11/26/2019 11:10 AM   PSA 0.12 08/14/2019 03:30 PM   PSA <0.10 05/21/2019 09:38 AM   PSA <0.10 02/26/2019 10:43 AM   PSA <0.10 12/17/2018 11:45 AM   PSA <0.10 09/19/2018 11:04 AM   PSA <0.10 08/23/2018 09:35 AM      Not currently on hormones. None since 2018.    MRI Prostate 2/11/2025  Impression    1.  Highly suspicious for local recurrence.  2.  Negative for lymph node metastasis.  3.  Negative for bone metastasis.    Culture 50,000-100,000 CFU/mL Staphylococcus aureus Abnormal         Resulting Agency: [facility]     Susceptibility     Staphylococcus aureus     RADHA     Daptomycin 0.25 ug/mL Susceptible     Doxycycline <=0.5 ug/mL Susceptible     Gentamicin <=0.5 ug/mL Susceptible     Nitrofurantoin <=16 ug/mL Susceptible     Oxacillin 0.5 ug/mL Susceptible 1     Tetracycline <=1 ug/mL Susceptible     Trimethoprim/Sulfamethoxazole <=0.5/9.5 u... Susceptible     Vancomycin <=0.5 ug/mL Susceptible              Past Medical History:     Past Medical History:   Diagnosis Date     Bowel dysfunction     Dr. Marito Orta, urgent, soft or liquid stools     CAD (coronary artery disease)     per calcium score=>400     Carotid artery plaque 09/2011     CKD (chronic kidney disease) stage 3, GFR 30-59 ml/min (H)     Dr. Gonzalez     Colon polyps     last colonoscopy nl 5/19     Diverticulosis of colon      Erectile dysfunction 07/18/2008     Glaucoma      High cholesterol      HTN (hypertension) 07/18/2008    added norvasc to regimen 11/2023     Incomplete emptying of bladder due to benign prostatic hyperplasia     Dr. Mejia     Memory loss 08/2020    labs nl, to neuro, seen by Dr. Ferguson 2023     Obesity      PFO (patent foramen ovale)      Plantar fasciitis      Presbyesophagus      Prostate cancer-[place] 4, treated with radiation 07/28/2013    rising psa in 2018, seen at [place], had cryo; has reg [place] fu     Type II or unspecified type diabetes mellitus without mention of complication, not stated as uncontrolled     Dxd about  2000, sees nava Hadley added 5/23     Urinary retention with incomplete bladder emptying     does intermittent straight cath          Past Surgical History:     Past Surgical History:   Procedure Laterality Date     APPENDECTOMY  1942     ARTHROSCOPY KNEE RT/LT  1998    partial menisectomy left knee     COLONOSCOPY N/A 9/27/2016    Procedure: COMBINED COLONOSCOPY, SINGLE OR MULTIPLE BIOPSY/POLYPECTOMY BY BIOPSY;  Surgeon: Maru Orta MD;  Location:  GI     CYSTOSCOPY       HEMORRHOIDECTOMY  1975     left tka Left 2015     PHACOEMULSIFICATION CLEAR CORNEA WITH STANDARD INTRAOCULAR LENS IMPLANT Right 3/18/2015    Procedure: PHACOEMULSIFICATION CLEAR CORNEA WITH STANDARD INTRAOCULAR LENS IMPLANT;  Surgeon: Lito Gonzales MD;  Location:  EC     PHACOEMULSIFICATION CLEAR CORNEA WITH STANDARD INTRAOCULAR LENS IMPLANT Left 3/25/2015    Procedure: PHACOEMULSIFICATION CLEAR CORNEA WITH STANDARD INTRAOCULAR LENS IMPLANT;  Surgeon: Lito Gonzales MD;  Location:  EC     right knee replacement Right 2019     ROTATOR CUFF REPAIR RT/LT  1998    right.  caused him to retire     SURGICAL HISTORY OF -   1999    L4-5 discectomy     TONSILLECTOMY & ADENOIDECTOMY  1940     TURP  1988     ZZ COLONOSCOPY THRU STOMA, DIAGNOSTIC  2005     ZZ COLONOSCOPY THRU STOMA, DIAGNOSTIC  5/09    diverticulosis, also proctitis          Medications     Current Outpatient Medications   Medication Sig Dispense Refill     Alcohol Swabs (B-D SINGLE USE SWABS REGULAR) PADS 1 pad 3 times daily 270 each 3     amoxicillin (AMOXIL) 500 MG capsule Take 1 capsule (500 mg) by mouth 2 times daily for 7 days. 14 capsule 0     atorvastatin (LIPITOR) 40 MG tablet TAKE 1 TABLET EVERY DAY 90 tablet 3     azelastine (ASTELIN) 0.1 % nasal spray Spray 1 spray into both nostrils 2 times daily       blood glucose (ONETOUCH ULTRA) test strip Use to test blood sugar  daily or as directed. 1 Box 3     blood glucose monitoring (ONE TOUCH ULTRA 2)  meter device kit Use to test blood sugars 1- 2 times daily or as directed. 1 kit 0     blood glucose monitoring (ONE TOUCH ULTRASOFT) lancets Use to test blood sugar 1-2  times daily or as directed. 100 each 11     cephALEXin (KEFLEX) 500 MG capsule Take 1 capsule (500 mg) by mouth 2 times daily. 14 capsule 0     cephALEXin (KEFLEX) 500 MG capsule Take 1 capsule (500 mg) by mouth 3 times daily. 21 capsule 0     diphenoxylate-atropine (LOMOTIL) 2.5-0.025 MG tablet Take 1 tablet by mouth Take one tablet twice a day.       famotidine (PEPCID) 20 MG tablet Take 1 tablet by mouth twice daily 180 tablet 2     latanoprost (XALATAN) 0.005 % ophthalmic solution Place 1 drop into both eyes At Bedtime        lisinopril (ZESTRIL) 20 MG tablet Take 1 tablet (20 mg) by mouth 2 times daily. 60 tablet 0     lisinopril (ZESTRIL) 20 MG tablet Take 1 tablet (20 mg) by mouth 2 times daily. 180 tablet 3     MELATONIN PO Take 5 mg by mouth At Bedtime       metFORMIN (GLUCOPHAGE XR) 500 MG 24 hr tablet TAKE 2 TABLETS TWICE DAILY WITH MEALS 360 tablet 3     MULTI-VITAMIN OR TABS 1 TABLET DAILY       ORDER FOR DME Equipment being ordered: Postivac 1 each 0     OZEMPIC, 0.25 OR 0.5 MG/DOSE, 2 MG/3ML pen INJECT 0.5 MG SUBCUTANEOUSLY ONCE A WEEK FOR 4 WEEKS       traZODone (DESYREL) 50 MG tablet Take 1 tablet (50 mg) by mouth at bedtime. 90 tablet 1     vardenafil (LEVITRA) 20 MG tablet Take 0.5-1 tablets (10-20 mg) by mouth daily as needed. Never use with nitroglycerin, terazosin or doxazosin. 30 tablet 3     No current facility-administered medications for this visit.          Family History:     Family History   Problem Relation Age of Onset     C.A.D. Mother      C.A.D. Father      C.A.D. 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 No   Social History Narrative     Not on file     Social Drivers of Health     Financial Resource Strain: Low Risk  (11/19/2024)    Financial Resource Strain      Within the past 12 months, have you or your family members you live with been unable to get utilities (heat, electricity) when it was really needed?: No   Food Insecurity: Low Risk  (11/19/2024)    Food Insecurity      Within the past 12 months, did you worry that your food would run out before you got money to buy more?: No      Within the past 12 months, did the food you bought just not last and you didn t have money to get more?: No   Transportation Needs: Low Risk  (11/19/2024)    Transportation Needs      Within the past 12 months, has lack of transportation kept you from medical appointments, getting your medicines, non-medical meetings or appointments, work, or from getting things that you need?: No   Physical Activity: Inactive (11/19/2024)    Exercise Vital Sign      Days of Exercise per Week: 0 days      Minutes of Exercise per Session: 0 min   Stress: No Stress Concern Present (11/19/2024)    [place] of Occupational Health - Occupational Stress Questionnaire      Feeling of Stress : Not at all   Social Connections: Unknown (11/19/2024)    Social Connection and Isolation Panel [NHANES]      Frequency of Communication with Friends and Family: Not on file      Frequency of Social Gatherings with Friends and Family: Once a week      Attends Restorationism Services: Not on file      Active  "Member of Clubs or Organizations: Not on file      Attends Club or Organization Meetings: Not on file      Marital Status: Not on file   Interpersonal Safety: Not At Risk (8/9/2023)    Received from [facility]    Humiliation, Afraid, Rape, and Kick questionnaire      Fear of Current or Ex-Partner: No      Emotionally Abused: No      Physically Abused: No      Sexually Abused: No   Housing Stability: Low Risk  (11/19/2024)    Housing Stability      Do you have housing? : Yes      Are you worried about losing your housing?: No          Allergies:   Ivp dye [contrast dye]         Review of Systems:  From intake questionnaire     Skin: negative  Eyes: negative  Ears/Nose/Throat: negative  Respiratory: No shortness of breath, dyspnea on exertion, cough, or hemoptysis  Cardiovascular: No chest pain or palpitations  Gastrointestinal: negative; no nausea/vomiting, constipation or diarrhea  Genitourinary: as per HPI  Musculoskeletal: negative  Neurologic: negative  Psychiatric: negative  Hematologic/Lymphatic/Immunologic: negative  Endocrine: negative         Physical Exam:     Patient is a 89 year old  male   Vitals: Blood pressure 124/76, pulse 64, height 1.765 m (5' 9.5\"), weight 88.5 kg (195 lb), SpO2 95%.  Constitutional: Body mass index is 28.38 kg/m .  Alert, no acute distress, oriented, conversant  Eyes: no scleral icterus; extraocular muscles intact, moist conjunctivae  Respiratory: no respiratory distress, or pursed lip breathing  Cardiovascular: pulses strong and intact; no obvious jugular venous distension present  Gastrointestinal: soft, nontender, no organomegaly or masses,   Musculoskeletal: extremities normal, no peripheral edema  Skin: no suspicious lesions or rashes  Neuro: Alert, oriented, speech and mentation normal  Psych: affect and mood normal, alert and oriented to person, place and time      Labs and Pathology:    I reviewed all applicable laboratory and pathology data and went over findings with " patient  Significant for   Lab Results   Component Value Date    CR 1.58 11/19/2024    CR 1.09 11/14/2023    CR 1.52 07/08/2023    CR 1.36 05/03/2023    CR 1.14 08/30/2021    CR 1.15 12/03/2020    CR 1.27 08/27/2020    CR 1.09 03/05/2020    CR 1.08 03/03/2020    CR 0.92 02/05/2020    CR 1.07 09/24/2019    CR 1.14 05/13/2019    CR 0.97 10/31/2018    CR 0.94 08/19/2018    CR 0.90 05/17/2018     PSA   Date Value Ref Range Status   03/20/2013 2.97 0 - 4 ug/L Final   08/14/2012 1.95 0 - 4 ug/L Final   03/29/2011 1.34 0 - 4 ug/L Final   03/23/2010 1.29 0 - 4 ug/L Final   07/18/2008 0.93 0 - 4 ug/L Final       Outside and Past Medical records:    Review of prior external note(s) from - [facility] information from [facility] reviewed  Review of the result(s) of each unique test - PSA, creat, urine culture, MRI         Assessment and Plan:     89 year old male with prostate cancer history, s/p EBRT and subsequently cryoablation at [facility] for recurrences. PSA remains low, not currently on hormones. Does also do CIC for urinary retention and has had history of UTIs. Continues prostate cancer care at [facility]. PSA rising slowly. Regarding UTIs, we discussed the nature of colonization with CIC. Will plan an additional 5 days of cephalexin as he has already had a few days, but will extend given complex UTI. He will otherwise follow-up with me as needed.     - Cephalexin x 5 days  - Continue CIC  - Follow-up, as scheduled, at [facility] for PSA    Allen Pearson MD  Urology  [facility]                Again, thank you for allowing me to participate in the care of your patient.      Sincerely,    Allen Pearson MD